# Patient Record
Sex: FEMALE | Race: WHITE | NOT HISPANIC OR LATINO | Employment: OTHER | URBAN - METROPOLITAN AREA
[De-identification: names, ages, dates, MRNs, and addresses within clinical notes are randomized per-mention and may not be internally consistent; named-entity substitution may affect disease eponyms.]

---

## 2020-04-27 ENCOUNTER — TELEMEDICINE (OUTPATIENT)
Dept: FAMILY MEDICINE CLINIC | Facility: CLINIC | Age: 65
End: 2020-04-27
Payer: COMMERCIAL

## 2020-04-27 VITALS — HEIGHT: 67 IN | BODY MASS INDEX: 45.99 KG/M2 | WEIGHT: 293 LBS

## 2020-04-27 DIAGNOSIS — Z20.822 EXPOSURE TO COVID-19 VIRUS: Primary | ICD-10-CM

## 2020-04-27 DIAGNOSIS — M06.9 RHEUMATOID ARTHRITIS, INVOLVING UNSPECIFIED SITE, UNSPECIFIED RHEUMATOID FACTOR PRESENCE: ICD-10-CM

## 2020-04-27 PROCEDURE — 99213 OFFICE O/P EST LOW 20 MIN: CPT | Performed by: FAMILY MEDICINE

## 2020-04-27 RX ORDER — LORAZEPAM 0.5 MG/1
0.5 TABLET ORAL EVERY 6 HOURS PRN
COMMUNITY
End: 2021-10-06 | Stop reason: ALTCHOICE

## 2020-04-27 RX ORDER — FENOFIBRATE 48 MG/1
48 TABLET, COATED ORAL DAILY
COMMUNITY
End: 2020-07-15

## 2020-04-27 RX ORDER — FENOFIBRIC ACID 45 MG/1
1 CAPSULE, DELAYED RELEASE ORAL EVERY EVENING
COMMUNITY
Start: 2020-03-09 | End: 2021-12-06

## 2020-04-27 RX ORDER — FOLIC ACID 1 MG/1
1000 TABLET ORAL DAILY
COMMUNITY
Start: 2020-03-05

## 2020-04-27 RX ORDER — ADALIMUMAB 40MG/0.4ML
KIT SUBCUTANEOUS
COMMUNITY
Start: 2020-01-29 | End: 2020-07-15

## 2020-04-27 RX ORDER — PANTOPRAZOLE SODIUM 40 MG/1
TABLET, DELAYED RELEASE ORAL
COMMUNITY
Start: 2020-04-21 | End: 2020-09-17 | Stop reason: SDUPTHER

## 2020-04-27 RX ORDER — PREDNISONE 1 MG/1
TABLET ORAL
COMMUNITY
Start: 2020-03-19 | End: 2020-07-15

## 2020-04-27 RX ORDER — ROSUVASTATIN CALCIUM 20 MG/1
20 TABLET, COATED ORAL EVERY EVENING
COMMUNITY
Start: 2020-02-03

## 2020-06-04 ENCOUNTER — OFFICE VISIT (OUTPATIENT)
Dept: FAMILY MEDICINE CLINIC | Facility: CLINIC | Age: 65
End: 2020-06-04
Payer: COMMERCIAL

## 2020-06-04 VITALS
OXYGEN SATURATION: 98 % | WEIGHT: 293 LBS | HEIGHT: 67 IN | TEMPERATURE: 99.4 F | SYSTOLIC BLOOD PRESSURE: 122 MMHG | RESPIRATION RATE: 16 BRPM | DIASTOLIC BLOOD PRESSURE: 82 MMHG | BODY MASS INDEX: 45.99 KG/M2 | HEART RATE: 84 BPM

## 2020-06-04 DIAGNOSIS — Z95.5 STENTED CORONARY ARTERY: Primary | ICD-10-CM

## 2020-06-04 DIAGNOSIS — I25.118 CORONARY ARTERY DISEASE OF NATIVE ARTERY OF NATIVE HEART WITH STABLE ANGINA PECTORIS (HCC): ICD-10-CM

## 2020-06-04 DIAGNOSIS — M06.9 RHEUMATOID ARTHRITIS, INVOLVING UNSPECIFIED SITE, UNSPECIFIED RHEUMATOID FACTOR PRESENCE: ICD-10-CM

## 2020-06-04 DIAGNOSIS — G47.30 SLEEP APNEA, UNSPECIFIED TYPE: ICD-10-CM

## 2020-06-04 DIAGNOSIS — E78.00 HYPERCHOLESTEROLEMIA: ICD-10-CM

## 2020-06-04 DIAGNOSIS — K21.9 GASTROESOPHAGEAL REFLUX DISEASE WITHOUT ESOPHAGITIS: ICD-10-CM

## 2020-06-04 DIAGNOSIS — I10 ESSENTIAL HYPERTENSION: ICD-10-CM

## 2020-06-04 DIAGNOSIS — E53.8 B12 DEFICIENCY: ICD-10-CM

## 2020-06-04 DIAGNOSIS — R42 VERTIGO: ICD-10-CM

## 2020-06-04 DIAGNOSIS — E55.9 VITAMIN D DEFICIENCY: ICD-10-CM

## 2020-06-04 DIAGNOSIS — E61.1 IRON DEFICIENCY: ICD-10-CM

## 2020-06-04 DIAGNOSIS — R73.03 PRE-DIABETES: ICD-10-CM

## 2020-06-04 PROCEDURE — 3008F BODY MASS INDEX DOCD: CPT | Performed by: FAMILY MEDICINE

## 2020-06-04 PROCEDURE — 3079F DIAST BP 80-89 MM HG: CPT | Performed by: FAMILY MEDICINE

## 2020-06-04 PROCEDURE — 1036F TOBACCO NON-USER: CPT | Performed by: FAMILY MEDICINE

## 2020-06-04 PROCEDURE — 3074F SYST BP LT 130 MM HG: CPT | Performed by: FAMILY MEDICINE

## 2020-06-04 PROCEDURE — 99214 OFFICE O/P EST MOD 30 MIN: CPT | Performed by: FAMILY MEDICINE

## 2020-06-05 ENCOUNTER — TELEPHONE (OUTPATIENT)
Dept: ADMINISTRATIVE | Facility: OTHER | Age: 65
End: 2020-06-05

## 2020-06-18 LAB
25(OH)D3+25(OH)D2 SERPL-MCNC: 38.9 NG/ML (ref 30–100)
ALBUMIN SERPL-MCNC: 4.2 G/DL (ref 3.8–4.8)
ALBUMIN/GLOB SERPL: 1.8 {RATIO} (ref 1.2–2.2)
ALP SERPL-CCNC: 99 IU/L (ref 39–117)
ALT SERPL-CCNC: 22 IU/L (ref 0–32)
APPEARANCE UR: CLEAR
AST SERPL-CCNC: 28 IU/L (ref 0–40)
BACTERIA URNS QL MICRO: NORMAL
BASOPHILS # BLD AUTO: 0.1 X10E3/UL (ref 0–0.2)
BASOPHILS NFR BLD AUTO: 1 %
BILIRUB SERPL-MCNC: <0.2 MG/DL (ref 0–1.2)
BILIRUB UR QL STRIP: NEGATIVE
BUN SERPL-MCNC: 14 MG/DL (ref 8–27)
BUN/CREAT SERPL: 16 (ref 12–28)
CALCIUM SERPL-MCNC: 9.1 MG/DL (ref 8.7–10.3)
CHLORIDE SERPL-SCNC: 107 MMOL/L (ref 96–106)
CHOLEST SERPL-MCNC: 188 MG/DL (ref 100–199)
CO2 SERPL-SCNC: 19 MMOL/L (ref 20–29)
COLOR UR: YELLOW
CREAT SERPL-MCNC: 0.85 MG/DL (ref 0.57–1)
EOSINOPHIL # BLD AUTO: 0.1 X10E3/UL (ref 0–0.4)
EOSINOPHIL NFR BLD AUTO: 2 %
EPI CELLS #/AREA URNS HPF: NORMAL /HPF (ref 0–10)
ERYTHROCYTE [DISTWIDTH] IN BLOOD BY AUTOMATED COUNT: 13.4 % (ref 11.7–15.4)
FERRITIN SERPL-MCNC: 121 NG/ML (ref 15–150)
GLOBULIN SER-MCNC: 2.3 G/DL (ref 1.5–4.5)
GLUCOSE SERPL-MCNC: 121 MG/DL (ref 65–99)
GLUCOSE UR QL: NEGATIVE
HCT VFR BLD AUTO: 37.7 % (ref 34–46.6)
HDLC SERPL-MCNC: 62 MG/DL
HGB BLD-MCNC: 12.3 G/DL (ref 11.1–15.9)
HGB UR QL STRIP: NEGATIVE
IMM GRANULOCYTES # BLD: 0 X10E3/UL (ref 0–0.1)
IMM GRANULOCYTES NFR BLD: 0 %
IRON SATN MFR SERPL: 32 % (ref 15–55)
IRON SERPL-MCNC: 106 UG/DL (ref 27–139)
KETONES UR QL STRIP: NEGATIVE
LDLC SERPL CALC-MCNC: 92 MG/DL (ref 0–99)
LEUKOCYTE ESTERASE UR QL STRIP: NEGATIVE
LYMPHOCYTES # BLD AUTO: 2.8 X10E3/UL (ref 0.7–3.1)
LYMPHOCYTES NFR BLD AUTO: 43 %
MCH RBC QN AUTO: 29 PG (ref 26.6–33)
MCHC RBC AUTO-ENTMCNC: 32.6 G/DL (ref 31.5–35.7)
MCV RBC AUTO: 89 FL (ref 79–97)
MICRO URNS: NORMAL
MICRO URNS: NORMAL
MONOCYTES # BLD AUTO: 0.6 X10E3/UL (ref 0.1–0.9)
MONOCYTES NFR BLD AUTO: 10 %
MUCOUS THREADS URNS QL MICRO: PRESENT
NEUTROPHILS # BLD AUTO: 2.9 X10E3/UL (ref 1.4–7)
NEUTROPHILS NFR BLD AUTO: 44 %
NITRITE UR QL STRIP: NEGATIVE
PH UR STRIP: 5.5 [PH] (ref 5–7.5)
PLATELET # BLD AUTO: 270 X10E3/UL (ref 150–450)
POTASSIUM SERPL-SCNC: 4.5 MMOL/L (ref 3.5–5.2)
PROT SERPL-MCNC: 6.5 G/DL (ref 6–8.5)
PROT UR QL STRIP: NEGATIVE
RBC # BLD AUTO: 4.24 X10E6/UL (ref 3.77–5.28)
RBC #/AREA URNS HPF: NORMAL /HPF (ref 0–2)
SL AMB EGFR AFRICAN AMERICAN: 84 ML/MIN/1.73
SL AMB EGFR NON AFRICAN AMERICAN: 73 ML/MIN/1.73
SL AMB URINALYSIS REFLEX: NORMAL
SL AMB VLDL CHOLESTEROL CALC: 34 MG/DL (ref 5–40)
SODIUM SERPL-SCNC: 145 MMOL/L (ref 134–144)
SP GR UR: 1.01 (ref 1–1.03)
TIBC SERPL-MCNC: 334 UG/DL (ref 250–450)
TRIGL SERPL-MCNC: 169 MG/DL (ref 0–149)
TSH SERPL DL<=0.005 MIU/L-ACNC: 1.42 UIU/ML (ref 0.45–4.5)
UIBC SERPL-MCNC: 228 UG/DL (ref 118–369)
UROBILINOGEN UR STRIP-ACNC: 0.2 MG/DL (ref 0.2–1)
VIT B12 SERPL-MCNC: 547 PG/ML (ref 232–1245)
WBC # BLD AUTO: 6.6 X10E3/UL (ref 3.4–10.8)
WBC #/AREA URNS HPF: NORMAL /HPF (ref 0–5)

## 2020-07-01 ENCOUNTER — HOSPITAL ENCOUNTER (INPATIENT)
Facility: HOSPITAL | Age: 65
LOS: 2 days | Discharge: HOME | DRG: 854 | End: 2020-07-03
Attending: EMERGENCY MEDICINE | Admitting: INTERNAL MEDICINE
Payer: COMMERCIAL

## 2020-07-01 ENCOUNTER — APPOINTMENT (EMERGENCY)
Dept: CT IMAGING | Facility: HOSPITAL | Age: 65
DRG: 854 | End: 2020-07-01
Payer: COMMERCIAL

## 2020-07-01 DIAGNOSIS — N20.1 URETEROLITHIASIS: Primary | ICD-10-CM

## 2020-07-01 DIAGNOSIS — R30.0 DYSURIA: ICD-10-CM

## 2020-07-01 DIAGNOSIS — A41.9 SEPSIS (HCC): ICD-10-CM

## 2020-07-01 DIAGNOSIS — N39.0 UTI (URINARY TRACT INFECTION): ICD-10-CM

## 2020-07-01 LAB
ALBUMIN SERPL BCP-MCNC: 4 G/DL (ref 3.5–5)
ALP SERPL-CCNC: 103 U/L (ref 46–116)
ALT SERPL W P-5'-P-CCNC: 32 U/L (ref 12–78)
ANION GAP SERPL CALCULATED.3IONS-SCNC: 14 MMOL/L (ref 4–13)
APTT PPP: 25 SECONDS (ref 23–37)
AST SERPL W P-5'-P-CCNC: 24 U/L (ref 5–45)
BASOPHILS # BLD AUTO: 0.09 THOUSANDS/ΜL (ref 0–0.1)
BASOPHILS NFR BLD AUTO: 1 % (ref 0–1)
BILIRUB SERPL-MCNC: 0.38 MG/DL (ref 0.2–1)
BILIRUB UR QL STRIP: NEGATIVE
BUN SERPL-MCNC: 21 MG/DL (ref 5–25)
CALCIUM SERPL-MCNC: 9.2 MG/DL (ref 8.3–10.1)
CHLORIDE SERPL-SCNC: 104 MMOL/L (ref 100–108)
CLARITY UR: ABNORMAL
CO2 SERPL-SCNC: 23 MMOL/L (ref 21–32)
COLOR UR: YELLOW
CREAT SERPL-MCNC: 1.19 MG/DL (ref 0.6–1.3)
EOSINOPHIL # BLD AUTO: 0.23 THOUSAND/ΜL (ref 0–0.61)
EOSINOPHIL NFR BLD AUTO: 2 % (ref 0–6)
ERYTHROCYTE [DISTWIDTH] IN BLOOD BY AUTOMATED COUNT: 14.4 % (ref 11.6–15.1)
GFR SERPL CREATININE-BSD FRML MDRD: 48 ML/MIN/1.73SQ M
GLUCOSE SERPL-MCNC: 154 MG/DL (ref 65–140)
GLUCOSE UR STRIP-MCNC: NEGATIVE MG/DL
HCT VFR BLD AUTO: 41.4 % (ref 34.8–46.1)
HGB BLD-MCNC: 13.1 G/DL (ref 11.5–15.4)
HGB UR QL STRIP.AUTO: ABNORMAL
IMM GRANULOCYTES # BLD AUTO: 0.09 THOUSAND/UL (ref 0–0.2)
IMM GRANULOCYTES NFR BLD AUTO: 1 % (ref 0–2)
INR PPP: 1.09 (ref 0.84–1.19)
KETONES UR STRIP-MCNC: ABNORMAL MG/DL
LACTATE SERPL-SCNC: 2.6 MMOL/L (ref 0.5–2)
LEUKOCYTE ESTERASE UR QL STRIP: ABNORMAL
LIPASE SERPL-CCNC: 111 U/L (ref 73–393)
LYMPHOCYTES # BLD AUTO: 4.08 THOUSANDS/ΜL (ref 0.6–4.47)
LYMPHOCYTES NFR BLD AUTO: 29 % (ref 14–44)
MCH RBC QN AUTO: 29 PG (ref 26.8–34.3)
MCHC RBC AUTO-ENTMCNC: 31.6 G/DL (ref 31.4–37.4)
MCV RBC AUTO: 92 FL (ref 82–98)
MONOCYTES # BLD AUTO: 1.18 THOUSAND/ΜL (ref 0.17–1.22)
MONOCYTES NFR BLD AUTO: 8 % (ref 4–12)
NEUTROPHILS # BLD AUTO: 8.37 THOUSANDS/ΜL (ref 1.85–7.62)
NEUTS SEG NFR BLD AUTO: 59 % (ref 43–75)
NITRITE UR QL STRIP: NEGATIVE
NRBC BLD AUTO-RTO: 0 /100 WBCS
PH UR STRIP.AUTO: 7 [PH] (ref 4.5–8)
PLATELET # BLD AUTO: 325 THOUSANDS/UL (ref 149–390)
PMV BLD AUTO: 9.4 FL (ref 8.9–12.7)
POTASSIUM SERPL-SCNC: 3.5 MMOL/L (ref 3.5–5.3)
PROT SERPL-MCNC: 7.7 G/DL (ref 6.4–8.2)
PROT UR STRIP-MCNC: ABNORMAL MG/DL
PROTHROMBIN TIME: 13.5 SECONDS (ref 11.6–14.5)
RBC # BLD AUTO: 4.51 MILLION/UL (ref 3.81–5.12)
SODIUM SERPL-SCNC: 141 MMOL/L (ref 136–145)
SP GR UR STRIP.AUTO: 1.02 (ref 1–1.03)
UROBILINOGEN UR QL STRIP.AUTO: 1 E.U./DL
WBC # BLD AUTO: 14.04 THOUSAND/UL (ref 4.31–10.16)

## 2020-07-01 PROCEDURE — 87086 URINE CULTURE/COLONY COUNT: CPT | Performed by: PHYSICIAN ASSISTANT

## 2020-07-01 PROCEDURE — 99285 EMERGENCY DEPT VISIT HI MDM: CPT

## 2020-07-01 PROCEDURE — 93005 ELECTROCARDIOGRAM TRACING: CPT

## 2020-07-01 PROCEDURE — 81001 URINALYSIS AUTO W/SCOPE: CPT

## 2020-07-01 PROCEDURE — 80053 COMPREHEN METABOLIC PANEL: CPT | Performed by: PHYSICIAN ASSISTANT

## 2020-07-01 PROCEDURE — 85610 PROTHROMBIN TIME: CPT | Performed by: PHYSICIAN ASSISTANT

## 2020-07-01 PROCEDURE — 83690 ASSAY OF LIPASE: CPT | Performed by: PHYSICIAN ASSISTANT

## 2020-07-01 PROCEDURE — 96365 THER/PROPH/DIAG IV INF INIT: CPT

## 2020-07-01 PROCEDURE — 85025 COMPLETE CBC W/AUTO DIFF WBC: CPT | Performed by: PHYSICIAN ASSISTANT

## 2020-07-01 PROCEDURE — 96375 TX/PRO/DX INJ NEW DRUG ADDON: CPT

## 2020-07-01 PROCEDURE — 96372 THER/PROPH/DIAG INJ SC/IM: CPT

## 2020-07-01 PROCEDURE — 36415 COLL VENOUS BLD VENIPUNCTURE: CPT | Performed by: PHYSICIAN ASSISTANT

## 2020-07-01 PROCEDURE — 87040 BLOOD CULTURE FOR BACTERIA: CPT | Performed by: PHYSICIAN ASSISTANT

## 2020-07-01 PROCEDURE — 83605 ASSAY OF LACTIC ACID: CPT | Performed by: PHYSICIAN ASSISTANT

## 2020-07-01 PROCEDURE — 85730 THROMBOPLASTIN TIME PARTIAL: CPT | Performed by: PHYSICIAN ASSISTANT

## 2020-07-01 PROCEDURE — 74177 CT ABD & PELVIS W/CONTRAST: CPT

## 2020-07-01 RX ORDER — ONDANSETRON 2 MG/ML
4 INJECTION INTRAMUSCULAR; INTRAVENOUS ONCE
Status: COMPLETED | OUTPATIENT
Start: 2020-07-01 | End: 2020-07-02

## 2020-07-01 RX ORDER — METOCLOPRAMIDE HYDROCHLORIDE 5 MG/ML
10 INJECTION INTRAMUSCULAR; INTRAVENOUS ONCE
Status: COMPLETED | OUTPATIENT
Start: 2020-07-01 | End: 2020-07-01

## 2020-07-01 RX ORDER — OLANZAPINE 10 MG/1
6 INJECTION, POWDER, LYOPHILIZED, FOR SOLUTION INTRAMUSCULAR ONCE
Status: COMPLETED | OUTPATIENT
Start: 2020-07-01 | End: 2020-07-01

## 2020-07-01 RX ORDER — FENTANYL CITRATE 50 UG/ML
50 INJECTION, SOLUTION INTRAMUSCULAR; INTRAVENOUS ONCE
Status: COMPLETED | OUTPATIENT
Start: 2020-07-01 | End: 2020-07-01

## 2020-07-01 RX ORDER — ONDANSETRON 2 MG/ML
1 INJECTION INTRAMUSCULAR; INTRAVENOUS ONCE
Status: COMPLETED | OUTPATIENT
Start: 2020-07-01 | End: 2020-07-01

## 2020-07-01 RX ORDER — HYDROMORPHONE HCL/PF 1 MG/ML
0.5 SYRINGE (ML) INJECTION ONCE
Status: COMPLETED | OUTPATIENT
Start: 2020-07-01 | End: 2020-07-01

## 2020-07-01 RX ADMIN — OLANZAPINE 6 MG: 10 INJECTION, POWDER, FOR SOLUTION INTRAMUSCULAR at 20:28

## 2020-07-01 RX ADMIN — METOCLOPRAMIDE HYDROCHLORIDE 10 MG: 5 INJECTION INTRAMUSCULAR; INTRAVENOUS at 21:09

## 2020-07-01 RX ADMIN — SODIUM CHLORIDE 1000 ML: 0.9 INJECTION, SOLUTION INTRAVENOUS at 22:47

## 2020-07-01 RX ADMIN — HYDROMORPHONE HYDROCHLORIDE 0.5 MG: 1 INJECTION, SOLUTION INTRAMUSCULAR; INTRAVENOUS; SUBCUTANEOUS at 21:12

## 2020-07-01 RX ADMIN — SODIUM CHLORIDE 1000 ML: 0.9 INJECTION, SOLUTION INTRAVENOUS at 23:14

## 2020-07-01 RX ADMIN — Medication 1000 MG: at 22:47

## 2020-07-01 RX ADMIN — FENTANYL CITRATE 50 MCG: 50 INJECTION INTRAMUSCULAR; INTRAVENOUS at 22:44

## 2020-07-01 RX ADMIN — IOHEXOL 100 ML: 350 INJECTION, SOLUTION INTRAVENOUS at 22:27

## 2020-07-01 RX ADMIN — WATER: 1 INJECTION INTRAMUSCULAR; INTRAVENOUS; SUBCUTANEOUS at 20:28

## 2020-07-02 ENCOUNTER — PREP FOR PROCEDURE (OUTPATIENT)
Dept: UROLOGY | Facility: CLINIC | Age: 65
End: 2020-07-02

## 2020-07-02 ENCOUNTER — ANESTHESIA EVENT (INPATIENT)
Dept: PERIOP | Facility: HOSPITAL | Age: 65
DRG: 854 | End: 2020-07-02
Payer: COMMERCIAL

## 2020-07-02 ENCOUNTER — APPOINTMENT (INPATIENT)
Dept: RADIOLOGY | Facility: HOSPITAL | Age: 65
DRG: 854 | End: 2020-07-02
Payer: COMMERCIAL

## 2020-07-02 ENCOUNTER — ANESTHESIA (INPATIENT)
Dept: PERIOP | Facility: HOSPITAL | Age: 65
DRG: 854 | End: 2020-07-02
Payer: COMMERCIAL

## 2020-07-02 ENCOUNTER — TELEPHONE (OUTPATIENT)
Dept: UROLOGY | Facility: CLINIC | Age: 65
End: 2020-07-02

## 2020-07-02 DIAGNOSIS — Z46.6 ENCOUNTER FOR ADJUSTMENT OF URETERAL STENT: ICD-10-CM

## 2020-07-02 DIAGNOSIS — N20.0 NEPHROLITHIASIS: Primary | ICD-10-CM

## 2020-07-02 LAB
ANION GAP SERPL CALCULATED.3IONS-SCNC: 6 MMOL/L (ref 4–13)
ATRIAL RATE: 89 BPM
BACTERIA UR QL AUTO: ABNORMAL /HPF
BASOPHILS # BLD AUTO: 0.05 THOUSANDS/ΜL (ref 0–0.1)
BASOPHILS NFR BLD AUTO: 0 % (ref 0–1)
BUN SERPL-MCNC: 18 MG/DL (ref 5–25)
CALCIUM SERPL-MCNC: 7.9 MG/DL (ref 8.3–10.1)
CAOX CRY URNS QL MICRO: ABNORMAL /HPF
CHLORIDE SERPL-SCNC: 105 MMOL/L (ref 100–108)
CO2 SERPL-SCNC: 27 MMOL/L (ref 21–32)
CREAT SERPL-MCNC: 0.84 MG/DL (ref 0.6–1.3)
EOSINOPHIL # BLD AUTO: 0.01 THOUSAND/ΜL (ref 0–0.61)
EOSINOPHIL NFR BLD AUTO: 0 % (ref 0–6)
ERYTHROCYTE [DISTWIDTH] IN BLOOD BY AUTOMATED COUNT: 14.4 % (ref 11.6–15.1)
GFR SERPL CREATININE-BSD FRML MDRD: 74 ML/MIN/1.73SQ M
GLUCOSE SERPL-MCNC: 121 MG/DL (ref 65–140)
HCT VFR BLD AUTO: 36.4 % (ref 34.8–46.1)
HGB BLD-MCNC: 11.4 G/DL (ref 11.5–15.4)
IMM GRANULOCYTES # BLD AUTO: 0.07 THOUSAND/UL (ref 0–0.2)
IMM GRANULOCYTES NFR BLD AUTO: 1 % (ref 0–2)
LACTATE SERPL-SCNC: 0.7 MMOL/L (ref 0.5–2)
LACTATE SERPL-SCNC: 1.2 MMOL/L (ref 0.5–2)
LYMPHOCYTES # BLD AUTO: 1.95 THOUSANDS/ΜL (ref 0.6–4.47)
LYMPHOCYTES NFR BLD AUTO: 16 % (ref 14–44)
MCH RBC QN AUTO: 29.5 PG (ref 26.8–34.3)
MCHC RBC AUTO-ENTMCNC: 31.3 G/DL (ref 31.4–37.4)
MCV RBC AUTO: 94 FL (ref 82–98)
MONOCYTES # BLD AUTO: 0.87 THOUSAND/ΜL (ref 0.17–1.22)
MONOCYTES NFR BLD AUTO: 7 % (ref 4–12)
NEUTROPHILS # BLD AUTO: 9.38 THOUSANDS/ΜL (ref 1.85–7.62)
NEUTS SEG NFR BLD AUTO: 76 % (ref 43–75)
NON-SQ EPI CELLS URNS QL MICRO: ABNORMAL /HPF
NRBC BLD AUTO-RTO: 0 /100 WBCS
PLATELET # BLD AUTO: 252 THOUSANDS/UL (ref 149–390)
PMV BLD AUTO: 9.6 FL (ref 8.9–12.7)
POTASSIUM SERPL-SCNC: 3.3 MMOL/L (ref 3.5–5.3)
QRS AXIS: 58 DEGREES
QRSD INTERVAL: 76 MS
QT INTERVAL: 346 MS
QTC INTERVAL: 434 MS
RBC # BLD AUTO: 3.86 MILLION/UL (ref 3.81–5.12)
RBC #/AREA URNS AUTO: ABNORMAL /HPF
SARS-COV-2 RNA RESP QL NAA+PROBE: NEGATIVE
SODIUM SERPL-SCNC: 138 MMOL/L (ref 136–145)
T WAVE AXIS: 73 DEGREES
VENTRICULAR RATE: 95 BPM
WBC # BLD AUTO: 12.33 THOUSAND/UL (ref 4.31–10.16)
WBC #/AREA URNS AUTO: ABNORMAL /HPF

## 2020-07-02 PROCEDURE — 93010 ELECTROCARDIOGRAM REPORT: CPT | Performed by: INTERNAL MEDICINE

## 2020-07-02 PROCEDURE — 36415 COLL VENOUS BLD VENIPUNCTURE: CPT | Performed by: PHYSICIAN ASSISTANT

## 2020-07-02 PROCEDURE — 99255 IP/OBS CONSLTJ NEW/EST HI 80: CPT | Performed by: UROLOGY

## 2020-07-02 PROCEDURE — 99223 1ST HOSP IP/OBS HIGH 75: CPT | Performed by: INTERNAL MEDICINE

## 2020-07-02 PROCEDURE — 52332 CYSTOSCOPY AND TREATMENT: CPT | Performed by: UROLOGY

## 2020-07-02 PROCEDURE — 87635 SARS-COV-2 COVID-19 AMP PRB: CPT | Performed by: PHYSICIAN ASSISTANT

## 2020-07-02 PROCEDURE — C1769 GUIDE WIRE: HCPCS | Performed by: UROLOGY

## 2020-07-02 PROCEDURE — 0T768DZ DILATION OF RIGHT URETER WITH INTRALUMINAL DEVICE, VIA NATURAL OR ARTIFICIAL OPENING ENDOSCOPIC: ICD-10-PCS | Performed by: UROLOGY

## 2020-07-02 PROCEDURE — 80048 BASIC METABOLIC PNL TOTAL CA: CPT | Performed by: PHYSICIAN ASSISTANT

## 2020-07-02 PROCEDURE — 83605 ASSAY OF LACTIC ACID: CPT | Performed by: PHYSICIAN ASSISTANT

## 2020-07-02 PROCEDURE — 74420 UROGRAPHY RTRGR +-KUB: CPT

## 2020-07-02 PROCEDURE — 85025 COMPLETE CBC W/AUTO DIFF WBC: CPT | Performed by: PHYSICIAN ASSISTANT

## 2020-07-02 PROCEDURE — C2617 STENT, NON-COR, TEM W/O DEL: HCPCS | Performed by: UROLOGY

## 2020-07-02 DEVICE — STENT URETERAL 6 FR 26CM INLAY OPTIMA: Type: IMPLANTABLE DEVICE | Site: URETER | Status: FUNCTIONAL

## 2020-07-02 RX ORDER — CIPROFLOXACIN 500 MG/1
500 TABLET, FILM COATED ORAL EVERY 12 HOURS SCHEDULED
Qty: 6 TABLET | Refills: 0 | Status: SHIPPED | OUTPATIENT
Start: 2020-07-02 | End: 2020-07-07

## 2020-07-02 RX ORDER — FENTANYL CITRATE 50 UG/ML
INJECTION, SOLUTION INTRAMUSCULAR; INTRAVENOUS AS NEEDED
Status: DISCONTINUED | OUTPATIENT
Start: 2020-07-02 | End: 2020-07-02 | Stop reason: SURG

## 2020-07-02 RX ORDER — PHENAZOPYRIDINE HYDROCHLORIDE 100 MG/1
100 TABLET, FILM COATED ORAL
Qty: 6 TABLET | Refills: 0 | Status: SHIPPED | OUTPATIENT
Start: 2020-07-02 | End: 2020-07-04

## 2020-07-02 RX ORDER — POTASSIUM CHLORIDE 20 MEQ/1
40 TABLET, EXTENDED RELEASE ORAL ONCE
Status: COMPLETED | OUTPATIENT
Start: 2020-07-02 | End: 2020-07-02

## 2020-07-02 RX ORDER — CEFAZOLIN SODIUM 1 G/3ML
INJECTION, POWDER, FOR SOLUTION INTRAMUSCULAR; INTRAVENOUS AS NEEDED
Status: DISCONTINUED | OUTPATIENT
Start: 2020-07-02 | End: 2020-07-02 | Stop reason: SURG

## 2020-07-02 RX ORDER — FOLIC ACID 1 MG/1
1000 TABLET ORAL DAILY
Status: DISCONTINUED | OUTPATIENT
Start: 2020-07-02 | End: 2020-07-03 | Stop reason: HOSPADM

## 2020-07-02 RX ORDER — SODIUM CHLORIDE 9 MG/ML
100 INJECTION, SOLUTION INTRAVENOUS CONTINUOUS
Status: DISCONTINUED | OUTPATIENT
Start: 2020-07-02 | End: 2020-07-03 | Stop reason: HOSPADM

## 2020-07-02 RX ORDER — GABAPENTIN 100 MG/1
300 CAPSULE ORAL ONCE
Status: CANCELLED | OUTPATIENT
Start: 2020-07-02 | End: 2020-07-02

## 2020-07-02 RX ORDER — HEPARIN SODIUM 5000 [USP'U]/ML
5000 INJECTION, SOLUTION INTRAVENOUS; SUBCUTANEOUS EVERY 8 HOURS SCHEDULED
Status: DISCONTINUED | OUTPATIENT
Start: 2020-07-02 | End: 2020-07-03 | Stop reason: HOSPADM

## 2020-07-02 RX ORDER — PHENAZOPYRIDINE HYDROCHLORIDE 100 MG/1
100 TABLET, FILM COATED ORAL
Status: DISCONTINUED | OUTPATIENT
Start: 2020-07-02 | End: 2020-07-03 | Stop reason: HOSPADM

## 2020-07-02 RX ORDER — KETOROLAC TROMETHAMINE 30 MG/ML
15 INJECTION, SOLUTION INTRAMUSCULAR; INTRAVENOUS EVERY 6 HOURS PRN
Status: DISCONTINUED | OUTPATIENT
Start: 2020-07-02 | End: 2020-07-03 | Stop reason: HOSPADM

## 2020-07-02 RX ORDER — SUCCINYLCHOLINE/SOD CL,ISO/PF 100 MG/5ML
SYRINGE (ML) INTRAVENOUS AS NEEDED
Status: DISCONTINUED | OUTPATIENT
Start: 2020-07-02 | End: 2020-07-02 | Stop reason: SURG

## 2020-07-02 RX ORDER — OXYCODONE HYDROCHLORIDE 10 MG/1
10 TABLET ORAL EVERY 6 HOURS PRN
Status: DISCONTINUED | OUTPATIENT
Start: 2020-07-02 | End: 2020-07-03 | Stop reason: HOSPADM

## 2020-07-02 RX ORDER — LIDOCAINE HYDROCHLORIDE 10 MG/ML
INJECTION, SOLUTION EPIDURAL; INFILTRATION; INTRACAUDAL; PERINEURAL AS NEEDED
Status: DISCONTINUED | OUTPATIENT
Start: 2020-07-02 | End: 2020-07-02 | Stop reason: SURG

## 2020-07-02 RX ORDER — CALCIUM CARBONATE 200(500)MG
1000 TABLET,CHEWABLE ORAL DAILY PRN
Status: DISCONTINUED | OUTPATIENT
Start: 2020-07-02 | End: 2020-07-03 | Stop reason: HOSPADM

## 2020-07-02 RX ORDER — ACETAMINOPHEN 325 MG/1
975 TABLET ORAL ONCE
Status: DISCONTINUED | OUTPATIENT
Start: 2020-07-02 | End: 2020-07-02 | Stop reason: HOSPADM

## 2020-07-02 RX ORDER — GABAPENTIN 300 MG/1
300 CAPSULE ORAL ONCE
Status: DISCONTINUED | OUTPATIENT
Start: 2020-07-02 | End: 2020-07-02 | Stop reason: HOSPADM

## 2020-07-02 RX ORDER — ONDANSETRON 2 MG/ML
INJECTION INTRAMUSCULAR; INTRAVENOUS AS NEEDED
Status: DISCONTINUED | OUTPATIENT
Start: 2020-07-02 | End: 2020-07-02 | Stop reason: SURG

## 2020-07-02 RX ORDER — FENOFIBRATE 48 MG/1
48 TABLET, COATED ORAL DAILY
Status: DISCONTINUED | OUTPATIENT
Start: 2020-07-02 | End: 2020-07-03 | Stop reason: HOSPADM

## 2020-07-02 RX ORDER — SODIUM CHLORIDE, SODIUM LACTATE, POTASSIUM CHLORIDE, CALCIUM CHLORIDE 600; 310; 30; 20 MG/100ML; MG/100ML; MG/100ML; MG/100ML
INJECTION, SOLUTION INTRAVENOUS CONTINUOUS PRN
Status: DISCONTINUED | OUTPATIENT
Start: 2020-07-02 | End: 2020-07-02 | Stop reason: SURG

## 2020-07-02 RX ORDER — MAGNESIUM HYDROXIDE 1200 MG/15ML
LIQUID ORAL AS NEEDED
Status: DISCONTINUED | OUTPATIENT
Start: 2020-07-02 | End: 2020-07-02 | Stop reason: HOSPADM

## 2020-07-02 RX ORDER — DEXAMETHASONE SODIUM PHOSPHATE 4 MG/ML
INJECTION, SOLUTION INTRA-ARTICULAR; INTRALESIONAL; INTRAMUSCULAR; INTRAVENOUS; SOFT TISSUE AS NEEDED
Status: DISCONTINUED | OUTPATIENT
Start: 2020-07-02 | End: 2020-07-02 | Stop reason: SURG

## 2020-07-02 RX ORDER — FENTANYL CITRATE/PF 50 MCG/ML
25 SYRINGE (ML) INJECTION
Status: DISCONTINUED | OUTPATIENT
Start: 2020-07-02 | End: 2020-07-02 | Stop reason: HOSPADM

## 2020-07-02 RX ORDER — DOCUSATE SODIUM 100 MG/1
100 CAPSULE, LIQUID FILLED ORAL 3 TIMES DAILY
Qty: 42 CAPSULE | Refills: 0 | Status: SHIPPED | OUTPATIENT
Start: 2020-07-02 | End: 2020-07-15

## 2020-07-02 RX ORDER — ACETAMINOPHEN 325 MG/1
650 TABLET ORAL EVERY 6 HOURS PRN
Status: DISCONTINUED | OUTPATIENT
Start: 2020-07-02 | End: 2020-07-03 | Stop reason: HOSPADM

## 2020-07-02 RX ORDER — PANTOPRAZOLE SODIUM 40 MG/1
40 TABLET, DELAYED RELEASE ORAL
Status: DISCONTINUED | OUTPATIENT
Start: 2020-07-02 | End: 2020-07-03 | Stop reason: HOSPADM

## 2020-07-02 RX ORDER — OXYBUTYNIN CHLORIDE 5 MG/1
5 TABLET ORAL 2 TIMES DAILY PRN
Qty: 60 TABLET | Refills: 0 | Status: SHIPPED | OUTPATIENT
Start: 2020-07-02 | End: 2020-07-15

## 2020-07-02 RX ORDER — OXYCODONE HYDROCHLORIDE 5 MG/1
5 TABLET ORAL EVERY 6 HOURS PRN
Status: DISCONTINUED | OUTPATIENT
Start: 2020-07-02 | End: 2020-07-02

## 2020-07-02 RX ORDER — OXYCODONE HYDROCHLORIDE AND ACETAMINOPHEN 5; 325 MG/1; MG/1
1 TABLET ORAL EVERY 6 HOURS PRN
Qty: 8 TABLET | Refills: 0 | Status: SHIPPED | OUTPATIENT
Start: 2020-07-02 | End: 2020-07-07

## 2020-07-02 RX ORDER — TAMSULOSIN HYDROCHLORIDE 0.4 MG/1
0.4 CAPSULE ORAL
Qty: 30 CAPSULE | Refills: 0 | Status: SHIPPED | OUTPATIENT
Start: 2020-07-02 | End: 2020-07-15

## 2020-07-02 RX ORDER — ACETAMINOPHEN 325 MG/1
975 TABLET ORAL ONCE
Status: CANCELLED | OUTPATIENT
Start: 2020-07-02 | End: 2020-07-02

## 2020-07-02 RX ORDER — ONDANSETRON 4 MG/1
4 TABLET, ORALLY DISINTEGRATING ORAL EVERY 6 HOURS PRN
Qty: 20 TABLET | Refills: 0 | Status: SHIPPED | OUTPATIENT
Start: 2020-07-02 | End: 2020-07-15

## 2020-07-02 RX ORDER — TAMSULOSIN HYDROCHLORIDE 0.4 MG/1
0.4 CAPSULE ORAL
Status: DISCONTINUED | OUTPATIENT
Start: 2020-07-02 | End: 2020-07-03 | Stop reason: HOSPADM

## 2020-07-02 RX ORDER — HYDROMORPHONE HCL/PF 1 MG/ML
0.5 SYRINGE (ML) INJECTION EVERY 4 HOURS PRN
Status: DISCONTINUED | OUTPATIENT
Start: 2020-07-02 | End: 2020-07-02

## 2020-07-02 RX ORDER — PROPOFOL 10 MG/ML
INJECTION, EMULSION INTRAVENOUS AS NEEDED
Status: DISCONTINUED | OUTPATIENT
Start: 2020-07-02 | End: 2020-07-02 | Stop reason: SURG

## 2020-07-02 RX ORDER — LORAZEPAM 0.5 MG/1
0.5 TABLET ORAL EVERY 6 HOURS PRN
Status: DISCONTINUED | OUTPATIENT
Start: 2020-07-02 | End: 2020-07-03 | Stop reason: HOSPADM

## 2020-07-02 RX ORDER — OXYBUTYNIN CHLORIDE 5 MG/1
5 TABLET ORAL 3 TIMES DAILY
Status: DISCONTINUED | OUTPATIENT
Start: 2020-07-02 | End: 2020-07-03 | Stop reason: HOSPADM

## 2020-07-02 RX ADMIN — KETOROLAC TROMETHAMINE 15 MG: 30 INJECTION, SOLUTION INTRAMUSCULAR at 20:54

## 2020-07-02 RX ADMIN — CEFAZOLIN SODIUM 3000 MG: 1 INJECTION, POWDER, FOR SOLUTION INTRAMUSCULAR; INTRAVENOUS at 13:32

## 2020-07-02 RX ADMIN — METOPROLOL TARTRATE 12.5 MG: 25 TABLET, FILM COATED ORAL at 08:27

## 2020-07-02 RX ADMIN — SODIUM CHLORIDE 100 ML/HR: 0.9 INJECTION, SOLUTION INTRAVENOUS at 00:25

## 2020-07-02 RX ADMIN — HEPARIN SODIUM 5000 UNITS: 5000 INJECTION INTRAVENOUS; SUBCUTANEOUS at 15:23

## 2020-07-02 RX ADMIN — Medication 140 MG: at 13:23

## 2020-07-02 RX ADMIN — FOLIC ACID 1000 MCG: 1 TABLET ORAL at 08:27

## 2020-07-02 RX ADMIN — FENTANYL CITRATE 75 MCG: 50 INJECTION INTRAMUSCULAR; INTRAVENOUS at 13:29

## 2020-07-02 RX ADMIN — SODIUM CHLORIDE, SODIUM LACTATE, POTASSIUM CHLORIDE, AND CALCIUM CHLORIDE: .6; .31; .03; .02 INJECTION, SOLUTION INTRAVENOUS at 12:55

## 2020-07-02 RX ADMIN — SODIUM CHLORIDE 100 ML/HR: 0.9 INJECTION, SOLUTION INTRAVENOUS at 02:25

## 2020-07-02 RX ADMIN — TAMSULOSIN HYDROCHLORIDE 0.4 MG: 0.4 CAPSULE ORAL at 16:12

## 2020-07-02 RX ADMIN — LIDOCAINE HYDROCHLORIDE 100 MG: 10 INJECTION, SOLUTION EPIDURAL; INFILTRATION; INTRACAUDAL; PERINEURAL at 13:23

## 2020-07-02 RX ADMIN — FENTANYL CITRATE 25 MCG: 50 INJECTION INTRAMUSCULAR; INTRAVENOUS at 13:23

## 2020-07-02 RX ADMIN — FENOFIBRATE 48 MG: 48 TABLET ORAL at 17:28

## 2020-07-02 RX ADMIN — HEPARIN SODIUM 5000 UNITS: 5000 INJECTION INTRAVENOUS; SUBCUTANEOUS at 00:25

## 2020-07-02 RX ADMIN — OXYBUTYNIN CHLORIDE 5 MG: 5 TABLET ORAL at 20:54

## 2020-07-02 RX ADMIN — OXYCODONE HYDROCHLORIDE 5 MG: 5 TABLET ORAL at 10:20

## 2020-07-02 RX ADMIN — PHENAZOPYRIDINE 100 MG: 100 TABLET ORAL at 16:12

## 2020-07-02 RX ADMIN — ONDANSETRON 4 MG: 2 INJECTION INTRAMUSCULAR; INTRAVENOUS at 13:29

## 2020-07-02 RX ADMIN — PROPOFOL 300 MG: 10 INJECTION, EMULSION INTRAVENOUS at 13:23

## 2020-07-02 RX ADMIN — OXYBUTYNIN CHLORIDE 5 MG: 5 TABLET ORAL at 16:13

## 2020-07-02 RX ADMIN — HEPARIN SODIUM 5000 UNITS: 5000 INJECTION INTRAVENOUS; SUBCUTANEOUS at 21:03

## 2020-07-02 RX ADMIN — ONDANSETRON 4 MG: 2 INJECTION INTRAMUSCULAR; INTRAVENOUS at 14:04

## 2020-07-02 RX ADMIN — POTASSIUM CHLORIDE 40 MEQ: 1500 TABLET, EXTENDED RELEASE ORAL at 10:20

## 2020-07-02 RX ADMIN — CEFTRIAXONE 1000 MG: 1 INJECTION, POWDER, FOR SOLUTION INTRAMUSCULAR; INTRAVENOUS at 23:22

## 2020-07-02 RX ADMIN — METOPROLOL TARTRATE 12.5 MG: 25 TABLET ORAL at 20:54

## 2020-07-02 RX ADMIN — ONDANSETRON 4 MG: 2 INJECTION INTRAMUSCULAR; INTRAVENOUS at 00:25

## 2020-07-02 RX ADMIN — DEXAMETHASONE SODIUM PHOSPHATE 8 MG: 4 INJECTION, SOLUTION INTRAMUSCULAR; INTRAVENOUS at 13:29

## 2020-07-02 RX ADMIN — PANTOPRAZOLE SODIUM 40 MG: 40 TABLET, DELAYED RELEASE ORAL at 05:24

## 2020-07-02 NOTE — UTILIZATION REVIEW
Initial Clinical Review    Admission: Date/Time/Statement: Admission Orders (From admission, onward)     Ordered        07/01/20 2346  Inpatient Admission  Once                   Orders Placed This Encounter   Procedures    Inpatient Admission     Standing Status:   Standing     Number of Occurrences:   1     Order Specific Question:   Admitting Physician     Answer:   Jean Claude Gary     Order Specific Question:   Level of Care     Answer:   Med Surg [16]     Order Specific Question:   Estimated length of stay     Answer:   More than 2 Midnights     Order Specific Question:   Certification     Answer:   I certify that inpatient services are medically necessary for this patient for a duration of greater than two midnights  See H&P and MD Progress Notes for additional information about the patient's course of treatment  ED Arrival Information     Expected Arrival Acuity Means of Arrival Escorted By Service Admission Type    - 7/1/2020 20:11 Emergent Ambulance [facility] Emergency [facility] General Medicine Emergency    Arrival Complaint    ABDOMINAL PAIN        Chief Complaint   Patient presents with    Abdominal Pain     Acute onset RLQ pain x3 hours associated with severe n/v  EMS treats with a 20g L AC, 4mg zofran  Pt arrives in obvious discomfort     Assessment/Plan: 58 yo fem w/hx htn, obesity, CAD to ED from home by EMS admitted as inpatient due to sepsis from infected kidney stone, also has UTI  Presented with 1 day of severe 10/10 R flank/abdominal pain radiating into groin, improved by multiple analgesics given in ED  Exam reveals lethargic after meds, RLQ/CVA tenderness  Imaging showed R hydronephrosis with stone  UA positive  Urology consulted, IV antbx, IVF, analgesics, antiemetics in progress  NPO for possible surgical intervention  7/2 per urology: Dx R ureteral stone with renal colic and hydronephrosis   Aggressive IVF,flomax, narcotic analgesics, antiemetics, continue IV antbx, strain all urine, to OR if unable to medically pass the stone  Taken to OR 7/2 @1341:   Procedure:CYSTOSCOPY RETROGRADE PYELOGRAM WITH INSERTION STENT URETERAL (Right)  General anesthesia  Operative Findings:Orthotopic ureteral orifices, cloudy urine seen to exit upon placement of right ureteral stent, no complications  A 6 [place] by 26 centimeter ureteral stent was placed on the right-hand side    7/2 postop urology note: provide ditropan, pyridium, toradol, no further gu intervention needed  Medical service will continue care       ED Triage Vitals   Temperature Pulse Respirations Blood Pressure SpO2   07/01/20 2024 07/01/20 2013 07/01/20 2013 07/01/20 2013 07/01/20 2013   99 5 °F (37 5 °C) 104 22 (!) 146/101 96 %      Temp Source Heart Rate Source Patient Position - Orthostatic VS BP Location FiO2 (%)   07/01/20 2024 07/01/20 2013 07/01/20 2013 07/01/20 2013 --   Oral Monitor Sitting Right arm       Pain Score       07/01/20 2112       Worst Possible Pain        Wt Readings from Last 1 Encounters:   07/02/20 (!) 143 kg (316 lb 2 2 oz)     Additional Vital Signs:   Date/Time  Temp  Pulse  Resp  BP  MAP (mmHg)  SpO2  O2 Flow Rate (L/min)  O2 Device  Patient Position - Orthostatic VS   07/02/20 16:49:17  97 9 °F (36 6 °C)  81  18  159/73  102  98 %         07/02/20 16:00:28  98 6 °F (37 °C)  74  19  142/64  90  97 %         07/02/20 15:23:11  97 9 °F (36 6 °C)  80  17  143/76  98  93 %    None (Room air)  Lying   07/02/20 14:46:15  97 5 °F (36 4 °C)  78  16  143/73  96  94 %         07/02/20 1415    97  16  158/69    94 %    None (Room air)     07/02/20 1359  97 2 °F (36 2 °C)Abnormal   84  20  137/65    100 %  6 L/min  Simple mask     07/02/20 1250  97 °F (36 1 °C)Abnormal   75  20  127/62    98 %    None (Room air)     07/02/20 07:15:50  98 2 °F (36 8 °C)  90  18  127/65  86  92 %    None (Room air)  Lying   07/02/20 02:23:07  97 8 °F (36 6 °C)  94  18  149/75  100  94 %         07/02/20 0145   94  18  140/80    95 %    None (Room air)  Lying   07/02/20 0030  98 6 °F (37 °C)  92  18  136/72    98 %    None (Room air)  Lying   07/02/20 0000  99 °F (37 2 °C)  94  20  137/90    97 %    None (Room air)  Lying   07/01/20 2247    93  22  141/99    96 %    None (Room air)  Lying   07/01/20 2143    82  20  147/101Abnormal     95 %    None (Room air)  Sitting       Pertinent Labs/Diagnostic Test Results:   CT abdomen pelvis with contrast   Final Result by Josette Earl MD (07/01 2250)       Mild right-sided hydronephrosis, the cause of which appears to be a 5 mm calculus at the mid ureter        Scattered colonic diverticulosis with no inflammatory changes present to suggest acute diverticulitis       7/1 EKG: nsr    Results from last 7 days   Lab Units 07/02/20  0025   SARS-COV-2  Negative     Results from last 7 days   Lab Units 07/02/20  0517 07/01/20  2049   WBC Thousand/uL 12 33* 14 04*   HEMOGLOBIN g/dL 11 4* 13 1   HEMATOCRIT % 36 4 41 4   PLATELETS Thousands/uL 252 325   NEUTROS ABS Thousands/µL 9 38* 8 37*         Results from last 7 days   Lab Units 07/02/20  0517 07/01/20  2049   SODIUM mmol/L 138 141   POTASSIUM mmol/L 3 3* 3 5   CHLORIDE mmol/L 105 104   CO2 mmol/L 27 23   ANION GAP mmol/L 6 14*   BUN mg/dL 18 21   CREATININE mg/dL 0 84 1 19   EGFR ml/min/1 73sq m 74 48   CALCIUM mg/dL 7 9* 9 2     Results from last 7 days   Lab Units 07/01/20  2049   AST U/L 24   ALT U/L 32   ALK PHOS U/L 103   TOTAL PROTEIN g/dL 7 7   ALBUMIN g/dL 4 0   TOTAL BILIRUBIN mg/dL 0 38         Results from last 7 days   Lab Units 07/02/20  0517 07/01/20  2049   GLUCOSE RANDOM mg/dL 121 154*     Results from last 7 days   Lab Units 07/01/20  2049   PROTIME seconds 13 5   INR  1 09   PTT seconds 25     Results from last 7 days   Lab Units 07/02/20  0517 07/02/20  0024 07/01/20  2049   LACTIC ACID mmol/L 0 7 1 2 2 6*     Results from last 7 days   Lab Units 07/01/20  2049   LIPASE u/L 111     Results from last 7 days   Lab Units 07/01/20 2223   CLARITY UA  Cloudy   COLOR UA  Yellow   SPEC GRAV UA  1 025   PH UA  7 0   GLUCOSE UA mg/dl Negative   KETONES UA mg/dl Trace*   BLOOD UA  Large*   PROTEIN UA mg/dl 100 (2+)*   NITRITE UA  Negative   BILIRUBIN UA  Negative   UROBILINOGEN UA E U /dl 1 0   LEUKOCYTES UA  Small*   WBC UA /hpf 4-10*   RBC UA /hpf Innumerable*   BACTERIA UA /hpf None Seen   EPITHELIAL CELLS WET PREP /hpf Occasional       Results from last 7 days   Lab Units 07/01/20 2101 07/01/20 2049   BLOOD CULTURE  Received in Microbiology Lab  Culture in Progress  Received in Microbiology Lab  Culture in Progress       ED Treatment:   Medication Administration from 07/01/2020 2011 to 07/02/2020 0211       Date/Time Order Dose Route Action     07/01/2020 2029 ondansetron (FOR EMS ONLY) [facility]) 4 mg/2 mL injection 4 mg   Given to EMS     07/01/2020 2028 OLANZapine (ZyPREXA) IM injection 6 mg 6 mg Intramuscular Given     07/01/2020 2112 HYDROmorphone (DILAUDID) injection 0 5 mg 0 5 mg Intravenous Given     07/01/2020 2109 metoclopramide (REGLAN) injection 10 mg 10 mg Intravenous Given     07/01/2020 2247 sodium chloride 0 9 % bolus 1,000 mL 1,000 mL Intravenous New Bag     07/01/2020 2244 fentanyl citrate (PF) 100 MCG/2ML 50 mcg 50 mcg Intravenous Given     07/01/2020 2247 ceftriaxone (ROCEPHIN) 1 g/50 mL in dextrose IVPB 1,000 mg Intravenous New Bag     07/01/2020 2314 sodium chloride 0 9 % bolus 1,000 mL 1,000 mL Intravenous New Bag     07/02/2020 0025 ondansetron (ZOFRAN) injection 4 mg 4 mg Intravenous Given     07/02/2020 0025 sodium chloride 0 9 % infusion 100 mL/hr Intravenous New Bag     07/02/2020 0025 heparin (porcine) subcutaneous injection 5,000 Units 5,000 Units Subcutaneous Given        Past Medical History:   Diagnosis Date    GERD (gastroesophageal reflux disease)     High cholesterol     HPV (human papilloma virus) infection     Hypercholesterolemia     Hypertension     Myocardial infarct ([facility] )     X2    Pleuritic chest pain     Rheumatoid arthritis ([place] [address] )     Rheu and cervical    Sleep apnea 2017    Vertigo      Present on Admission:   Ureterolithiasis   UTI (urinary tract infection)   Sepsis ([place] [address] )   Coronary artery disease of native artery of native heart with stable angina pectoris ([place] [address] )   Hypertension      Admitting Diagnosis: Ureterolithiasis [N20 1]  UTI (urinary tract infection) [N39 0]  Unspecified abdominal pain [R10 9]  Sepsis ([place] [address] ) [A41 9]  Age/Sex: 59 y o  female  Admission Orders:  Scheduled Medications:    Medications:  cefTRIAXone 1,000 mg Intravenous Q24H   fenofibrate 48 mg Oral Daily   folic acid 2,057 mcg Oral Daily   heparin (porcine) 5,000 Units Subcutaneous Q8H [facility]   metoprolol tartrate 12 5 mg Oral Q12H KATEY   oxybutynin 5 mg Oral TID   pantoprazole 40 mg Oral Early Morning   phenazopyridine 100 mg Oral TID With Meals   tamsulosin 0 4 mg Oral Daily With Dinner     Continuous IV Infusions:    sodium chloride 100 mL/hr Intravenous Continuous     PRN Meds:    acetaminophen 650 mg Oral Q6H PRN   calcium carbonate 1,000 mg Oral Daily PRN   ketorolac 15 mg Intravenous Q6H PRN   LORazepam 0 5 mg Oral Q6H PRN   oxyCODONE  X 1 7/2 @1020 10 mg Oral Q6H PRN     SCD's  Strain all urine  NPO, adv to house diet    IP CONSULT TO UROLOGY    Network Utilization Review Department  [email or web address]  ATTENTION: Please call with any questions or concerns to [phone] and carefully listen to the prompts so that you are directed to the right person  All voicemails are confidential   Harry Carreno all requests for admission clinical reviews, approved or denied determinations and any other requests to dedicated fax number below belonging to the campus where the patient is receiving treatment   List of dedicated fax numbers for the Facilities:  [address] [place] [address] DENIALS (Administrative/Medical Necessity) [phone]   [address] [place] [address] (Maternity/NICU/Pediatrics) [phone]   [facility] [phone]   Lauryn Smalls [phone]   [address] [place] [address] [phone]   [address] [facility] 1525 [facility] [phone]   Katlintonio Kiera [phone]   [address] [facility]  [address] [phone]

## 2020-07-02 NOTE — H&P
H&P- Randa Lux 1955, 59 y o  female MRN: [id number]  Unit/Bed#: ED 23 Encounter: [id number]  Primary Care Provider: Therese Harry MD   Date and time admitted to hospital: 7/1/2020  8:11 PM    * Ureterolithiasis  Assessment & Plan  · Patient with severe right-sided flank pain which began earlier today  She has not have a history of stones  · Mild right-sided hydronephrosis, the cause of which appears to be a 5 mm calculus at the mid ureter  · Pain control  · Strain all urine  · Antibiotics as below  · NPO after midnight  · IV fluid    UTI (urinary tract infection)  Assessment & Plan  · Patient with UTIs POA   · Case was discussed with on-call Urology who recommended antibiotics ceftriaxone in keeping patient NPO  · Will continue antibiotics for now  · Trend white blood cells  · Monitor fevers  · Patient hemodynamically stable right now  Sepsis ([address] )  Assessment & Plan  SIRS criteria:  Tachycardia, leukocytosis  Suspected source:  Infected stone  Lactic acid:  2 6  End organ damage:  None  IV Fluids:  Normal saline  IV antibiotics:  Ceftriaxone  Follow up on culture results  Monitor vital signs, laboratory studies  Coronary artery disease of native artery of native heart with stable angina pectoris [facility])  Assessment & Plan  · Coronary artery disease S/P stent  · Follows with [facility]  · No chest pain at this time  · Continue metoprolol  Hypertension  Assessment & Plan  · BP stable  · Continue metoprolol 25 mg   · Monitor BP      VTE Prophylaxis: Heparin  / sequential compression device   Code Status: full   POLST: There is no POLST form on file for this patient (pre-hospital)  Discussion with family: patient     Anticipated Length of Stay:  Patient will be admitted on an Inpatient basis with an anticipated length of stay of  > 2 midnights     Justification for Hospital Stay: sepsis and infected kidney stone     Total Time for Visit, including Counseling / Coordination of Care: 45 minutes  Greater than 50% of this total time spent on direct patient counseling and coordination of care  Chief Complaint:   Abdominal pain     History of Present Illness:    Velma Ulloa is a 59 y o  female who presents with infected kidney stone  She has past medical history significant for hypertension, obesity, coronary artery disease  She presents to the emergency department for 1 day history of severe right-sided flank and abdominal pain radiating into her groin  States that her pain was a 10/10 when she was having it received multiple pain medications in the ED dry bring her pain under control  She states that she has never had a kidney stone in the past   She states that her neighbors a doctor who told her that she should probably come to the ED to be evaluated  In the emergency department CT scan did show 5 mm right-sided stone with hydronephrosis  Patient also noted to have UTI  On-call urology was contacted and recommended antibiotics in keeping patient NPO for possible urologic procedure tomorrow  She does state that she was having some significant nausea and vomiting associated with the pain  Review of Systems:    Review of Systems   Constitutional: Positive for appetite change and chills  Negative for fatigue, fever and unexpected weight change  Respiratory: Negative for cough, chest tightness and shortness of breath  Cardiovascular: Negative for chest pain and palpitations  Gastrointestinal: Positive for abdominal pain, nausea and vomiting  Negative for diarrhea  Genitourinary: Positive for flank pain  Negative for decreased urine volume, dysuria, frequency and urgency  Musculoskeletal: Negative for arthralgias  Neurological: Negative for dizziness, syncope, light-headedness and headaches  All other systems reviewed and are negative        Past Medical and Surgical History:     Past Medical History:   Diagnosis Date    GERD (gastroesophageal reflux disease)     High cholesterol     HPV (human papilloma virus) infection     Hypercholesterolemia     Hypertension     [facility] infarct [facility] )     X2    Pleuritic chest pain     Rheumatoid arthritis ([facility] )     Rheu and cervical    Sleep apnea 2017    Vertigo        Past Surgical History:   Procedure Laterality Date    ANGIOPLASTY      BACK SURGERY      BIOPSY CORE NEEDLE  1980    CARDIAC CATHETERIZATION  2015    CARDIAC CATHETERIZATION      CERVICAL CONE BIOPSY      1980's    COLONOSCOPY      2006, 2019    DILATION AND CURETTAGE OF UTERUS      MAMMO (HISTORICAL)  12/2018    NECK SURGERY      OTHER SURGICAL HISTORY Right 11/2017    Surgical removal of lypoma shoulder blade    SHOULDER SURGERY Right 2014    had a lympoma done in 2017 also same shoulder    SHOULDER SURGERY Right 2014    TONSILLECTOMY      WISDOM TOOTH EXTRACTION         Meds/Allergies:    Prior to Admission medications    Medication Sig Start Date End Date Taking?  Authorizing Provider   Ascorbic Acid, Vitamin C, (VITAMIN C) 100 MG tablet Take 1,000 mg by mouth daily   Yes Historical Provider, MD   aspirin 81 MG tablet Take 162 mg by mouth daily   Yes Historical Provider, MD   Choline Fenofibrate (FENOFIBRIC ACID) 45 MG CPDR Take 1 capsule by mouth daily 3/9/20  Yes Historical Provider, MD   fenofibrate (TRICOR) 48 mg tablet Take 48 mg by mouth daily   Yes Historical Provider, MD   folic acid (FOLVITE) 1 mg tablet Take 1,000 mcg by mouth daily 3/5/20  Yes Historical Provider, MD   LACTOBACILLUS PO Take by mouth daily   Yes Historical Provider, MD   methotrexate 2 5 mg tablet TAKE 8 TABLETS BY MOUTH ONCE A WEEK 2/6/20  Yes Historical Provider, MD   metoprolol tartrate (LOPRESSOR) 25 mg tablet  3/5/20  Yes Historical Provider, MD   OMEGA-3 FATTY ACIDS PO Take 1 g by mouth daily   Yes Historical Provider, MD   pantoprazole (PROTONIX) 40 mg tablet  4/21/20  Yes Historical Provider, MD   rosuvastatin (CRESTOR) 20 MG tablet Take 20 mg by mouth daily 2/3/20  Yes Historical Provider, MD   adalimumab (HUMIRA) 40 mg/0 8 mL PSKT Inject 40 mg under the skin every 14 (fourteen) days    Historical Provider, MD   choline fenofibrate (TRILIPIX) 135 MG capsule TAKE 1 CAPSULE BY MOUTH EVERY DAY 12/12/19   Historical Provider, MD   HUMIRA PEN 40 MG/0 4ML PNKT  1/29/20   Historical Provider, MD   LORazepam (ATIVAN) 0 5 mg tablet Take 0 5 mg by mouth every 6 (six) hours as needed    Historical Provider, MD   methotrexate 2 5 mg tablet Take by mouth    Historical Provider, MD   predniSONE 5 mg tablet 2 TABLET ONCE A DAY X 10 DAYS 3/19/20   Historical Provider, MD     I have reviewed home medications with patient personally  Allergies:    Allergies   Allergen Reactions    Atorvastatin Myalgia    Diphenhydramine Hcl (Sleep) Itching    Pravastatin Myalgia    Simvastatin Myalgia       Social History:     Marital Status:    Occupation: unkn[employer]   Patient Pre-hospital Living Situation: lives at home   Patient Pre-hospital Level of Mobility: ambulates   Patient Pre-hospital Diet Restrictions: NPO   Substance Use History:   Social History     Substance and Sexual Activity   Alcohol Use Yes    Frequency: Monthly or less    Drinks per session: 1 or 2    Binge frequency: Never     Social History     Tobacco Use   Smoking Status Former Smoker    Packs/day: 2 00    Years: 30 00    Pack years: 60 00   Smokeless Tobacco Never Used     Social History     Substance and Sexual Activity   Drug Use Never       Family History:    Family History   Problem Relation Age of Onset    Cancer Mother     Lung cancer Mother     Heart disease Father        Physical Exam:     Vitals:   Blood Pressure: 137/90 (07/02/20 0000)  Pulse: 94 (07/02/20 0000)  Temperature: 99 °F (37 2 °C) (07/02/20 0000)  Temp Source: Oral (07/02/20 0000)  Respirations: 20 (07/02/20 0000)  Height: 5' 7" (170 2 cm) (07/01/20 2013)  Weight - Scale: (!) 141 kg (310 lb) (07/01/20 2013)  SpO2: 97 % (07/02/20 0000)    Physical Exam   Constitutional: She is oriented to person, place, and time  She appears well-developed and well-nourished  No distress  Patient appears very lethargic secondary to pain medication   HENT:   Head: Normocephalic and atraumatic  Mouth/Throat: Mucous membranes are not pale, not dry and not cyanotic  Eyes: Pupils are equal, round, and reactive to light  Conjunctivae and EOM are normal    Neck: Normal range of motion  Neck supple  No JVD present  Cardiovascular: Normal rate, regular rhythm and normal heart sounds  No murmur heard  Pulmonary/Chest: Effort normal and breath sounds normal  She has no wheezes  She has no rales  Abdominal: Soft  Bowel sounds are normal  She exhibits no distension  There is tenderness in the right lower quadrant  There is CVA tenderness  Musculoskeletal: Normal range of motion  She exhibits no edema  No lower extremity edema, Homans sign negative bilaterally  Neurological: She is alert and oriented to person, place, and time  No cranial nerve deficit or sensory deficit  Skin: Skin is warm and dry  Capillary refill takes less than 2 seconds  No rash noted  She is not diaphoretic  No erythema  Psychiatric: She has a normal mood and affect  Nursing note and vitals reviewed  Additional Data:     Lab Results: I have personally reviewed pertinent reports        Results from last 7 days   Lab Units 07/01/20  2049   WBC Thousand/uL 14 04*   HEMOGLOBIN g/dL 13 1   HEMATOCRIT % 41 4   PLATELETS Thousands/uL 325   NEUTROS PCT % 59   LYMPHS PCT % 29   MONOS PCT % 8   EOS PCT % 2     Results from last 7 days   Lab Units 07/01/20  2049   SODIUM mmol/L 141   POTASSIUM mmol/L 3 5   CHLORIDE mmol/L 104   CO2 mmol/L 23   BUN mg/dL 21   CREATININE mg/dL 1 19   ANION GAP mmol/L 14*   CALCIUM mg/dL 9 2   ALBUMIN g/dL 4 0   TOTAL BILIRUBIN mg/dL 0 38   ALK PHOS U/L 103   ALT U/L 32   AST U/L 24   GLUCOSE RANDOM mg/dL 154*     Results from last 7 days Lab Units 07/01/20  2049   INR  1 09             Results from last 7 days   Lab Units 07/01/20 2049   LACTIC ACID mmol/L 2 6*       Imaging: I have personally reviewed pertinent reports  CT abdomen pelvis with contrast   Final Result by Rian Hubbard MD (07/01 2250)      Mild right-sided hydronephrosis, the cause of which appears to be a 5 mm calculus at the mid ureter  Scattered colonic diverticulosis with no inflammatory changes present to suggest acute diverticulitis  Workstation performed: [id number]             EKG, Pathology, and Other Studies Reviewed on Admission:   · All reports viewed in epic     [employer] / [employer] Records Reviewed: Yes     ** Please Note: This note has been constructed using a voice recognition system   **

## 2020-07-02 NOTE — TELEPHONE ENCOUNTER
Patient is status post placement of a 6 [place] by 26 centimeter right ureteral stent, my office will add her to the stent database  She will need ureteroscopy and laser lithotripsy with any urology attending that is available in a number of weeks     She is not a candidate for the ambulatory surgery center given her weight and her comorbid conditions

## 2020-07-02 NOTE — PLAN OF CARE
Problem: GASTROINTESTINAL - ADULT  Goal: Maintains adequate nutritional intake  Description  INTERVENTIONS:  - Monitor percentage of each meal consumed  - Identify factors contributing to decreased intake, treat as appropriate  - Assist with meals as needed  - Monitor I&O, weight, and lab values if indicated  - Obtain nutrition services referral as needed  Outcome: Not Progressing  Note:   Pt is currently NPO

## 2020-07-02 NOTE — ASSESSMENT & PLAN NOTE
· Patient with UTIs POA   · Case was discussed with on-call Urology who recommended antibiotics ceftriaxone in keeping patient NPO  · Will continue antibiotics for now  · Trend white blood cells  · Monitor fevers  · Patient hemodynamically stable right now

## 2020-07-02 NOTE — TREATMENT PLAN
Patrice Luna is a 66-year-old female presenting with right flank pain and renal colic secondary to CT confirmed obstructing right ureteral calculus, right hydronephrosis and UTI  Postoperative day 0 cystoscopy, retrograde pyelography and insertion of right ureteral stent  Procedure went well without adverse event or complication  Cloudy urine seen on ureteral cannulation  Plan:  Continue medical optimization, symptom management and antibiosis  Discharge at the discretion of the Internal Medicine service tomorrow if patient remains afebrile and symptom managed  Ditropan, Pyridium and Toradol prescribed while inpatient  Stent stent related medications electronically prescribed  Patient will be contacted by our service with hospital follow-up appointment for preoperative re-evaluation and/or scheduling of elective ureteroscopy  No further  intervention indicated during this hospital stay  Our service will sign off

## 2020-07-02 NOTE — OP NOTE
OPERATIVE REPORT  PATIENT NAME: Velma Ulloa    :  1955  MRN: [id number]  Pt Location: AN OR ROOM 03    SURGERY DATE: 2020    Surgeon(s) and Role:     * Libra Malik MD - Primary    Preop Diagnosis:  Ureterolithiasis [N20 1]    Post-Op Diagnosis Codes:     * Ureterolithiasis [N20 1]    Procedure(s) (LRB):  CYSTOSCOPY RETROGRADE PYELOGRAM WITH INSERTION STENT URETERAL (Right)    Specimen(s):  * No specimens in log *    Estimated Blood Loss:   Minimal    Drains:  Ureteral Drain/Stent Right ureter 6 Fr  (Active)   Number of days: 0       Anesthesia Type:   General    Operative Indications:  Ureterolithiasis [N20 1]  Urinary tract infection    Operative Findings:  Orthotopic ureteral orifices, cloudy urine seen to exit upon placement of right ureteral stent, no complications  A 6 [place] by 26 centimeter ureteral stent was placed on the right-hand side    Complications:   None    Procedure and Technique:    PROCEDURES PERFORMED:  1) Cystoscopy  2) right retrograde pyelography with fluoroscopic interpretation  3) right ureteral stent placement (6F x 26cm    SURGEON:  Libra Malik MD    ASSISTANTS:  None    NOTE:  There were no qualified teaching residents to assist with this case    ANESTHESIA: General     COMPLICATIONS:   None    ANTIBIOTICS:  Ancef    INTRAOPERATIVE THROMBOEMBOLISM PROPHYLAXIS:  Pneumatic compression stockings     RADIOLOGIC FINDINGS:    1  Retrograde pyelogram was performed on the right side using a 5 Fr open ended catheter  Seventeen milliliters of contrast was injected, at incremental aliquots throughout the ureter    2  The following findings were noted: Moderate hydronephrosis, no filling defects or abnormalities noted on retrograde      INDICATIONS FOR PROCEDURE:  Morris Hinton is an 59 y o  old female with a right ureteral stone, elevated lactate, elevated white blood cell count, urinary tract infection, and right hydronephrosis    After discussing the options, the patient elected to undergo ureteral stent placement  We discussed the procedure in detail, the alternatives, and the risks, and they signed informed consent to proceed  PROCEDURE IN DETAIL:   The patient was identified and brought to the OR  Antibiotic prophylaxis and DVT prophylaxis were administered  They were placed in the comfortable dorsal lithotomy position with care to pad all pressure points  They were prepped and draped in the usual sterile fashion using hibiclens  A surgical time out was performed with all in the room in agreement with the correct patient, procedure, indications, and laterality  A 21-Greek rigid cystoscope was used to enter the bladder  The bladder was inspected in its entirety and there were no lesions noted  The ureteral orifices were identified in their orthotopic positions  The right ureteral orifice was identified and a 5 Fr open ended catheter was placed into the ureteral orifice  The stone was not visible on  fluoroscopic guidance  A retrograde pyelogram was performed with injection of contrast which demonstrated moderate hydroureteronephrosis  A solo wire was up to the kidney under fluoroscopic guidance  A right 6 [place] by 26 centimeter JJ stent was then passed up the wire  under fluoroscopic guidance into the right  kidney with a good curl noted in the kidney and in the bladder  The bladder was drained  The patient was placed back in the supine position, awakened from general anesthesia and brought to the recovery room in stable condition  SPECIMENS:   * No orders in the log *     IMPLANTS:   Implant Name Type Inv   Item Serial No   Lot No  LRB No  Used   URETERAL STENT 6 FR X 26 CM OPTIMA INLAY - [id number]  URETERAL STENT 6 FR X 26 CM OPTIMA INLAY  [employer] [id number] Right 1        COMPLICATIONS:  None    DISPOSITION: PACU  to floor    PLAN:  Our office will arrange for subsequent ureteroscopic stone intervention in the coming weeks  I have provided the patient her prescriptions for discharge including antibiotics with Cipro and pain medication and medications to make her stent more comfortable  Would recommend that she remain in hospital until she has clinically improved         I was present for the entire procedure and A qualified resident physician was not available    Patient Disposition:  PACU     SIGNATURE: Franklyn Wong MD  DATE: July 2, 2020  TIME: 1:50 PM

## 2020-07-02 NOTE — ANESTHESIA PREPROCEDURE EVALUATION
Review of Systems/Medical History  Patient summary reviewed  Chart reviewed  No history of anesthetic complications     Cardiovascular  EKG reviewed, Hyperlipidemia, Hypertension , CAD , History of percutaneous transluminal coronary angioplasty,    Pulmonary  Sleep apnea CPAP,        GI/Hepatic    GERD ,        Kidney stones,        Endo/Other    Obesity  morbid obesity   GYN       Hematology   Musculoskeletal  Rheumatoid arthritis ,        Neurology      Comment: Vertigo Psychology           Physical Exam    Airway    Mallampati score: II  TM Distance: >3 FB  Neck ROM: full     Dental       Cardiovascular      Pulmonary      Other Findings        Anesthesia Plan  ASA Score- 3     Anesthesia Type- general with ASA Monitors  Additional Monitors:   Airway Plan: ETT and LMA  Comment: NOTE LOOSE LOWER TOOTH  LMA poss ETT if LMA fails  Plan Factors-  Patient did not smoke on day of surgery  Induction- intravenous  Postoperative Plan-     Informed Consent- Anesthetic plan and risks discussed with patient  I personally reviewed this patient with the CRNA  Discussed and agreed on the Anesthesia Plan with the CRNA Gerhardt Sep

## 2020-07-02 NOTE — PLAN OF CARE
Problem: INFECTION - ADULT  Goal: Absence or prevention of progression during hospitalization  Description  INTERVENTIONS:  - Assess and monitor for signs and symptoms of infection  - Monitor lab/diagnostic results  - Monitor all insertion sites, i e  indwelling lines, tubes, and drains  - Monitor endotracheal if appropriate and nasal secretions for changes in amount and color  - [place] appropriate cooling/warming therapies per order  - Administer medications as ordered  - Instruct and encourage patient and family to use good hand hygiene technique  - Identify and instruct in appropriate isolation precautions for identified infection/condition  Outcome: Progressing     Problem: PAIN - ADULT  Goal: Verbalizes/displays adequate comfort level or baseline comfort level  Description  Interventions:  - Encourage patient to monitor pain and request assistance  - Assess pain using appropriate pain scale  - Administer analgesics based on type and severity of pain and evaluate response  - Implement non-pharmacological measures as appropriate and evaluate response  - Consider cultural and social influences on pain and pain management  - Notify physician/advanced practitioner if interventions unsuccessful or patient reports new pain  Outcome: Progressing     Problem: SAFETY ADULT  Goal: Maintain or return to baseline ADL function  Description  INTERVENTIONS:  -  Assess patient's ability to carry out ADLs; assess patient's baseline for ADL function and identify physical deficits which impact ability to perform ADLs (bathing, care of mouth/teeth, toileting, grooming, dressing, etc )  - Assess/evaluate cause of self-care deficits   - Assess range of motion  - Assess patient's mobility; develop plan if impaired  - Assess patient's need for assistive devices and provide as appropriate  - Encourage maximum independence but intervene and supervise when necessary  - Involve family in performance of ADLs  - Assess for home care needs following discharge   - Consider OT consult to assist with ADL evaluation and planning for discharge  - Provide patient education as appropriate  Outcome: Progressing

## 2020-07-02 NOTE — ANESTHESIA POSTPROCEDURE EVALUATION
Post-Op Assessment Note    CV Status:  Stable    Pain management: adequate     Mental Status:  Alert and awake   Hydration Status:  Euvolemic   PONV Controlled:  Controlled   Airway Patency:  Patent    Staff: Anesthesiologist           BP      Temp      Pulse     Resp      SpO2

## 2020-07-02 NOTE — PLAN OF CARE
Problem: Potential for Falls  Goal: Patient will remain free of falls  Description  INTERVENTIONS:  - Assess patient frequently for physical needs  -  Identify cognitive and physical deficits and behaviors that affect risk of falls    -  [place] fall precautions as indicated by assessment   - Educate patient/family on patient safety including physical limitations  - Instruct patient to call for assistance with activity based on assessment  - Modify environment to reduce risk of injury  - Consider OT/PT consult to assist with strengthening/mobility  Outcome: Progressing     Problem: PAIN - ADULT  Goal: Verbalizes/displays adequate comfort level or baseline comfort level  Description  Interventions:  - Encourage patient to monitor pain and request assistance  - Assess pain using appropriate pain scale  - Administer analgesics based on type and severity of pain and evaluate response  - Implement non-pharmacological measures as appropriate and evaluate response  - Consider cultural and social influences on pain and pain management  - Notify physician/advanced practitioner if interventions unsuccessful or patient reports new pain  Outcome: Progressing     Problem: INFECTION - ADULT  Goal: Absence or prevention of progression during hospitalization  Description  INTERVENTIONS:  - Assess and monitor for signs and symptoms of infection  - Monitor lab/diagnostic results  - Monitor all insertion sites, i e  indwelling lines, tubes, and drains  - Monitor endotracheal if appropriate and nasal secretions for changes in amount and color  - [place] appropriate cooling/warming therapies per order  - Administer medications as ordered  - Instruct and encourage patient and family to use good hand hygiene technique  - Identify and instruct in appropriate isolation precautions for identified infection/condition  Outcome: Progressing     Problem: SAFETY ADULT  Goal: Maintain or return to baseline ADL function  Description  INTERVENTIONS:  -  Assess patient's ability to carry out ADLs; assess patient's baseline for ADL function and identify physical deficits which impact ability to perform ADLs (bathing, care of mouth/teeth, toileting, grooming, dressing, etc )  - Assess/evaluate cause of self-care deficits   - Assess range of motion  - Assess patient's mobility; develop plan if impaired  - Assess patient's need for assistive devices and provide as appropriate  - Encourage maximum independence but intervene and supervise when necessary  - Involve family in performance of ADLs  - Assess for home care needs following discharge   - Consider OT consult to assist with ADL evaluation and planning for discharge  - Provide patient education as appropriate  Outcome: Progressing     Problem: DISCHARGE PLANNING  Goal: Discharge to home or other facility with appropriate resources  Description  INTERVENTIONS:  - Identify barriers to discharge w/patient and caregiver  - Arrange for needed discharge resources and transportation as appropriate  - Identify discharge learning needs (meds, wound care, etc )  - Arrange for interpretive services to assist at discharge as needed  - Refer to Case Management Department for coordinating discharge planning if the patient needs post-hospital services based on physician/advanced practitioner order or complex needs related to functional status, cognitive ability, or social support system  Outcome: Progressing     Problem: Knowledge Deficit  Goal: Patient/family/caregiver demonstrates understanding of disease process, treatment plan, medications, and discharge instructions  Description  Complete learning assessment and assess knowledge base    Interventions:  - Provide teaching at level of understanding  - Provide teaching via preferred learning methods  Outcome: Progressing     Problem: GASTROINTESTINAL - ADULT  Goal: Minimal or absence of nausea and/or vomiting  Description  INTERVENTIONS:  - Administer IV fluids if ordered to ensure adequate hydration  - Maintain NPO status until nausea and vomiting are resolved  - Nasogastric tube if ordered  - Administer ordered antiemetic medications as needed  - Provide nonpharmacologic comfort measures as appropriate  - Advance diet as tolerated, if ordered  - Consider nutrition services referral to assist patient with adequate nutrition and appropriate food choices  Outcome: Progressing     Problem: GENITOURINARY - ADULT  Goal: Maintains or returns to baseline urinary function  Description  INTERVENTIONS:  - Assess urinary function  - Encourage oral fluids to ensure adequate hydration if ordered  - Administer IV fluids as ordered to ensure adequate hydration  - Administer ordered medications as needed  - Offer frequent toileting  - Follow urinary retention protocol if ordered  Outcome: Progressing

## 2020-07-02 NOTE — ASSESSMENT & PLAN NOTE
· Patient with severe right-sided flank pain which began earlier today  She has not have a history of stones  · Mild right-sided hydronephrosis, the cause of which appears to be a 5 mm calculus at the mid ureter    · Pain control  · Strain all urine  · Antibiotics as below  · NPO after midnight  · IV fluid

## 2020-07-02 NOTE — ASSESSMENT & PLAN NOTE
· Coronary artery disease S/P stent  · Follows with [facility]  · No chest pain at this time  · Continue metoprolol

## 2020-07-02 NOTE — CONSULTS
CONSULT    Patient Name: Shar Parada  Patient MRN: [id number]  Date of Service: 7/2/2020   Date / Time Note Created: 7/2/2020 11:39 AM   Referring Provider: Yogesh Delcid MD    Provider Creating Note: RAMIREZ Reese    PCP: Gemma Tate  Attending Provider:  Yogesh Delcid MD    Reason for Consult: Flank Pain    HPI:  Shar Parada is a pleasant 75-year-old female without prior  history, presenting to [facility] Emergency room with a chief complaint of right flank pain; not accompanied by fever, dysuria or gross hematuria  Patient notes occasional chills; as well as waves of nausea without vomiting  CT of the abdomen and pelvis revealed right hydronephrosis secondary to 5 mm mid ureteral calculus  She denies any prior urologic evaluation, consultation or  manipulation including history of nephrolithiasis, spontaneous expulsion and/or requirement for ureteroscopic or open stone treatment  She is afebrile, hemodynamically stable with leukocytosis but no evidence of fulminant sepsis  Lactic acid negative  COVID panel negative  Urinalysis shows innumerable RBCs and 4-10 WBCs per high-powered field  No bacteria seen and positive occasional calcium oxalate crystals  She is admitted to the Internal Medicine service for IV fluids, symptom management and attempt at medical expulsive therapy in the interim  Urologic consultation requested for possible surgical intervention  Active Problems:    Patient Active Problem List   Diagnosis    [facility]oid [facility]    GERD (gastroesophageal reflux disease)    Sleep apnea    Hypertension    Vertigo    Hypercholesterolemia    Stented coronary artery    Coronary artery disease of native artery of native heart with stable angina pectoris ([facility] )    Ureterolithiasis    UTI (urinary tract infection)    Sepsis ([facility] )            Impressions  · Right Ureteral Calculus  · Hydronephrosis  · Renal Colic    Recommendations  1    Initiate aggressive IVFs   2  Flomax  3  Analgesia/Narcotics   4  Anti-emetics   5  ATBs empirically while awaiting culture   6  Strain urine   7  NPO  for OR if no spontaneous expulsion achieved  Explained risk, benefits and potential complications of ureteroscopic stone extraction  Patient has verbalized understanding of possible need for ureteral stent only for any signs of infection and/or technical difficult prohibiting stone retrieval etc ; requiring staged ureteroscopy electively once recovered and infection free as OP  Formal consent by surgeon          Past Medical History:   Diagnosis Date    GERD (gastroesophageal reflux disease)     High cholesterol     HPV (human papilloma virus) infection     Hypercholesterolemia     Hypertension     Myocardial infarct (HCC)     X2    Pleuritic chest pain     Rheumatoid arthritis ([facility] )     Rheu and cervical    Sleep apnea 2017    Vertigo        Past Surgical History:   Procedure Laterality Date    ANGIOPLASTY      BACK SURGERY      BIOPSY CORE NEEDLE  1980    CARDIAC CATHETERIZATION  2015    CARDIAC CATHETERIZATION      CERVICAL CONE BIOPSY      1980's    COLONOSCOPY      2006, 2019    DILATION AND CURETTAGE OF UTERUS      MAMMO (HISTORICAL)  12/2018    NECK SURGERY      OTHER SURGICAL HISTORY Right 11/2017    Surgical removal of lypoma shoulder blade    SHOULDER SURGERY Right 2014    had a lympoma done in 2017 also same shoulder    SHOULDER SURGERY Right 2014    TONSILLECTOMY      WISDOM TOOTH EXTRACTION         Family History   Problem Relation Age of Onset    Cancer Mother     Lung cancer Mother     Heart disease Father        Social History     Socioeconomic History    Marital status:      Spouse name: Not on file    Number of children: 11    Years of education: 15    Highest education level: High school graduate   Occupational History    Occupation: [employer]   Social Needs    Financial resource strain: Not on file   [place] insecurity:     Worry: Not on file     Inability: Not on file    Transportation needs:     Medical: Not on file     Non-medical: Not on file   Tobacco Use    Smoking status: Former Smoker     Packs/day: 2 00     Years: 30 00     Pack years: 60 00    Smokeless tobacco: Never Used   Substance and Sexual Activity    Alcohol use: Yes     Frequency: Monthly or less     Drinks per session: 1 or 2     Binge frequency: Never    Drug use: Never    Sexual activity: Not on file   Lifestyle    Physical activity:     Days per week: Not on file     Minutes per session: Not on file    Stress: Not on file   Relationships    Social connections:     Talks on phone: Not on file     Gets together: Not on file     Attends [employer] service: Not on file     Active member of club or organization: Not on file     Attends meetings of clubs or organizations: Not on file     Relationship status: Not on file    Intimate partner violence:     Fear of current or ex partner: Not on file     Emotionally abused: Not on file     Physically abused: Not on file     Forced sexual activity: Not on file   Other Topics Concern    Not on file   Social History Narrative    Most recent tobacco use screenin-    Do you currently or have you served in [employer]: No    Occupation: [employer]    Relationship status:     Live alone or with others: alone    Number of children: 0    Siblings: 5    Has smoked since age: 15    Chewing tobacco: none    Alcohol intake: Occasional    Caffeine intake: None    Illicit drugs: none    Diet: Regular    Exercise level: None    Family history of heart disease: Yes    Positive for CAD    Overweight: Yes    Obese:  Yes    Advance directive: Yes    Blood Transfusion: No    Marital status:     High cholesterol: Yes    High blood pressure: Yes    Diabetes: No    General stress level: Low    Presence of domestic violence: No    Guns present in home: No    Seat belts used routinely: Yes Sexual orientation: Heterosexual    Sunscreen used routinely: Yes    Seat belt/car seat used routinely: Yes    Exercise-How often do you exercise: moderate    Exercise- What type of exercise do you do: work    Do you have regular medical check ups: Yes    Do you have regular dental check ups: Yes    Illicit drugs-years of use: 0    Smoke alarm in home: Yes    International travel: NEG    Pets: Yes    Passive smoke exposure: No    Asbestos exposure: No    TB exposure: No    Environmental exposure: Yes    just dust    Animal exposure:  Yes       Allergies   Allergen Reactions    Atorvastatin Myalgia    Diphenhydramine Hcl (Sleep) Itching    Pravastatin Myalgia    Simvastatin Myalgia       Review of Systems  10 point review of systems negative except as noted in HPI  Constitutional:   positive for  - chills  Cardiovascular:   no chest pain or dyspnea on exertion  Gastrointestinal:   positive for - abdominal pain and nausea/vomiting  Genito-Urinary:   no dysuria, trouble voiding, or hematuria  Neurological:   no TIA or stroke symptoms     Chart Review   Allergies, current medications, history, problem list    Vital Signs  /65 (BP Location: Right arm)   Pulse 90   Temp 98 2 °F (36 8 °C) (Oral)   Resp 18   Ht 5' 7" (1 702 m)   Wt (!) 143 kg (316 lb 2 2 oz)   SpO2 92%   BMI 49 51 kg/m²     Physical Exam  General appearance: alert and oriented, in no acute distress, appears stated age, cooperative and no distress  Head: Normocephalic, without obvious abnormality, atraumatic  Neck: no adenopathy, no carotid bruit, no JVD, supple, symmetrical, trachea midline and thyroid not enlarged, symmetric, no tenderness/mass/nodules  Lungs: diminished breath sounds  Heart: regular rate and rhythm, S1, S2 normal, no murmur, click, rub or gallop  Abdomen: abnormal findings:  moderate tenderness in the lower abdomen  Extremities: extremities normal, warm and well-perfused; no cyanosis, clubbing, or edema  Pulses: 2+ and symmetric  Neurologic: Grossly normal  No urinary drains     Laboratory Studies  Lab Results   Component Value Date    K 3 3 (L) 07/02/2020    K 4 5 06/13/2020     07/02/2020     (H) 06/13/2020    CO2 27 07/02/2020    CO2 19 (L) 06/13/2020    CREATININE 0 84 07/02/2020    BUN 18 07/02/2020    BUN 14 06/13/2020     Lab Results   Component Value Date    WBC 12 33 (H) 07/02/2020    RBC 3 86 07/02/2020    HGB 11 4 (L) 07/02/2020    HCT 36 4 07/02/2020    MCV 94 07/02/2020    MCH 29 5 07/02/2020    RDW 14 4 07/02/2020     07/02/2020         Imaging and Other Studies  )  Ct Abdomen Pelvis With Contrast    Result Date: 7/1/2020  Narrative: CT ABDOMEN AND PELVIS WITH IV CONTRAST INDICATION:   RLQ TTP  COMPARISON:  None  TECHNIQUE:  CT examination of the abdomen and pelvis was performed  Axial, sagittal, and coronal 2D reformatted images were created from the source data and submitted for interpretation  Radiation dose length product (DLP) for this visit:  3281 mGy-cm   This examination, like all CT scans performed in the [facility], was performed utilizing techniques to minimize radiation dose exposure, including the use of iterative reconstruction and automated exposure control  IV Contrast:  100 mL of iohexol (OMNIPAQUE) Enteric Contrast:  Enteric contrast was not administered  FINDINGS: ABDOMEN LOWER CHEST:  No clinically significant abnormality identified in the visualized lower chest  LIVER/BILIARY TREE:  Liver is diffusely decreased in density consistent with fatty change  No CT evidence of suspicious hepatic mass  Normal hepatic contours  No biliary dilatation  GALLBLADDER:  No calcified gallstones  No pericholecystic inflammatory change  SPLEEN:  Unremarkable  PANCREAS:  Unremarkable  ADRENAL GLANDS:  Unremarkable  KIDNEYS/URETERS:  There is mild right-sided hydronephrosis and proximal hydroureter, the cause of which appears to be a 5 mm calculus at the mid ureter    There is a 3 to 4 mm nonobstructing calculus at the left renal midpole One or more sharply circumscribed subcentimeter renal hypodensities are noted  These lesions are too small to accurately characterize, but are statistically most likely to represent benign cortical renal cyst(s)  According to the guidelines published in  the [facility] Paper of the ACR Incidental Findings Committee (Radiology 2010), no further workup of these lesions is recommended  STOMACH AND BOWEL:  There is colonic diverticulosis without evidence of acute diverticulitis  APPENDIX:  A normal appendix was visualized  ABDOMINOPELVIC CAVITY:  No ascites  No pneumoperitoneum  No lymphadenopathy  VESSELS:  Atherosclerotic changes are present  No evidence of aneurysm  PELVIS REPRODUCTIVE ORGANS:  Unremarkable for patient's age  URINARY BLADDER:  Unremarkable  ABDOMINAL WALL/INGUINAL REGIONS:  There is a small fat-containing umbilical hernia  OSSEOUS STRUCTURES:  No acute fracture or destructive osseous lesion  Impression: Mild right-sided hydronephrosis, the cause of which appears to be a 5 mm calculus at the mid ureter  Scattered colonic diverticulosis with no inflammatory changes present to suggest acute diverticulitis   Workstation performed: [id number]       Medications     Current Facility-Administered Medications:  acetaminophen 650 mg Oral Q6H PRN Albin Butler PA-C    calcium carbonate 1,000 mg Oral Daily PRN Albin Butler PA-C    cefTRIAXone 1,000 mg Intravenous Q24H Albin Butler PA-C    fenofibrate 48 mg Oral Daily Albin Butler PA-C    folic acid 4,833 mcg Oral Daily Albin Butler PA-C    heparin (porcine) 5,000 Units Subcutaneous Q8H [facility] Albin Butler PA-C    LORazepam 0 5 mg Oral Q6H PRN Albin Butler PA-C    metoprolol tartrate 12 5 mg Oral Q12H [facility] Kimmy Mak MD    oxyCODONE 10 mg Oral Q6H PRN Albin Butler PA-C    oxyCODONE 5 mg Oral Q6H PRN Albin Butler PA-C    pantoprazole 40 mg Oral Early Morning Albin Butler PA-C sodium chloride 100 mL/hr Intravenous Continuous Albin Butler PA-C Last Rate: 100 mL/hr (07/02/20 0225)   tamsulosin 0 4 mg Oral Daily With [employer] CR[employer]

## 2020-07-02 NOTE — DISCHARGE INSTRUCTIONS
Ureteral Stent Placement   WHAT YOU NEED TO KNOW:     Raysa Romero,    Today you had a cystoscopy and a right ureteral stent placement, we placed a 6 [place] by 26 centimeter right ureteral stent without a string, this will drain your kidney and you will feel better  I will place orders for my office to schedule your subsequent stone surgery, either with me or with 1 of my colleagues  With a ureteral stent in place it is normal to have urgency of urination, frequency, blood in your urine, and pain in your right kidney when you urinate  If you have questions or concerns in the postoperative time frame, please do not hesitate to contact our office  Take care and be well,  Dr César High  Ureteral stent placement is a procedure to open a blocked or narrow ureter  The ureter is the tube that carries urine from your kidney into your bladder  A stent is a thin hollow plastic tube used to hold your ureter open and allow urine to flow  The stent may stay in for several weeks  DISCHARGE INSTRUCTIONS:   Medicines:   · Pain medicine  may be given to take away or decrease pain  Do not wait until the pain is severe before you take your medicine  · Antibiotics  help prevent infections  Your healthcare provider may prescribe these for you while your stent remains in  · Take your medicine as directed  Contact your healthcare provider if you think your medicine is not helping or if you have side effects  Tell him or her if you are allergic to any medicine  Keep a list of the medicines, vitamins, and herbs you take  Include the amounts, and when and why you take them  Bring the list or the pill bottles to follow-up visits  Carry your medicine list with you in case of an emergency  Follow up with your urologist as directed: You will need regular follow-up visits with your urologist as long as the stent remains in  He will check to make sure the stent is working properly  He may do urine cultures to check for infection  Write down your questions so you remember to ask them during your visits  Self-care:   · Drink liquids  as directed  Ask your healthcare provider how much liquid to drink each day and which liquids are best for you  Fluids such as cranberry or apple juice may be especially helpful to prevent urinary infections  · Return to normal activities  the day after your stent placement or as directed by your healthcare provider  · You may take a shower  the day after your stent placement if your healthcare provider says it is okay  Contact your healthcare provider or urologist if:   · You have a fever or chills  · You feel like you need to urinate often  · You have pain when you urinate or pain around your bladder or kidney  · You see blood in your urine or it looks cloudy  · You have questions or concerns about your condition or care  Seek care immediately or call 911 if:   · You urinate little or not at all  · You have severe pain in your abdomen  © 2017 [facility] Information is for End User's use only and may not be sold, redistributed or otherwise used for commercial purposes  All illustrations and images included in CareNotes® are the copyrighted property of [employer] , [employer]  or Manuel Pandey  The above information is an  only  It is not intended as medical advice for individual conditions or treatments  Talk to your doctor, nurse or pharmacist before following any medical regimen to see if it is safe and effective for you

## 2020-07-02 NOTE — MALNUTRITION/BMI
This medical record reflects one or more clinical indicators suggestive of malnutrition and/or morbid obesity  BMI Findings:  BMI Classifications: Morbid Obesity 45-49 9     Body mass index is 49 51 kg/m²  See Nutrition note dated 7/2/20 for additional details  Completed nutrition assessment is viewable in the nutrition documentation

## 2020-07-03 VITALS
HEIGHT: 67 IN | HEART RATE: 64 BPM | WEIGHT: 293 LBS | OXYGEN SATURATION: 99 % | RESPIRATION RATE: 16 BRPM | TEMPERATURE: 97.4 F | SYSTOLIC BLOOD PRESSURE: 127 MMHG | BODY MASS INDEX: 45.99 KG/M2 | DIASTOLIC BLOOD PRESSURE: 54 MMHG

## 2020-07-03 PROBLEM — A41.9 SEPSIS (HCC): Status: RESOLVED | Noted: 2020-07-01 | Resolved: 2020-07-03

## 2020-07-03 LAB
ANION GAP SERPL CALCULATED.3IONS-SCNC: 6 MMOL/L (ref 4–13)
BACTERIA UR CULT: NORMAL
BUN SERPL-MCNC: 16 MG/DL (ref 5–25)
CALCIUM SERPL-MCNC: 8.6 MG/DL (ref 8.3–10.1)
CHLORIDE SERPL-SCNC: 107 MMOL/L (ref 100–108)
CO2 SERPL-SCNC: 27 MMOL/L (ref 21–32)
CREAT SERPL-MCNC: 0.79 MG/DL (ref 0.6–1.3)
ERYTHROCYTE [DISTWIDTH] IN BLOOD BY AUTOMATED COUNT: 14.2 % (ref 11.6–15.1)
GFR SERPL CREATININE-BSD FRML MDRD: 79 ML/MIN/1.73SQ M
GLUCOSE SERPL-MCNC: 157 MG/DL (ref 65–140)
HCT VFR BLD AUTO: 38.1 % (ref 34.8–46.1)
HGB BLD-MCNC: 11.7 G/DL (ref 11.5–15.4)
MCH RBC QN AUTO: 29.5 PG (ref 26.8–34.3)
MCHC RBC AUTO-ENTMCNC: 30.7 G/DL (ref 31.4–37.4)
MCV RBC AUTO: 96 FL (ref 82–98)
PLATELET # BLD AUTO: 243 THOUSANDS/UL (ref 149–390)
PMV BLD AUTO: 9.5 FL (ref 8.9–12.7)
POTASSIUM SERPL-SCNC: 3.9 MMOL/L (ref 3.5–5.3)
RBC # BLD AUTO: 3.96 MILLION/UL (ref 3.81–5.12)
SODIUM SERPL-SCNC: 140 MMOL/L (ref 136–145)
WBC # BLD AUTO: 10.74 THOUSAND/UL (ref 4.31–10.16)

## 2020-07-03 PROCEDURE — 99239 HOSP IP/OBS DSCHRG MGMT >30: CPT | Performed by: INTERNAL MEDICINE

## 2020-07-03 PROCEDURE — 80048 BASIC METABOLIC PNL TOTAL CA: CPT | Performed by: INTERNAL MEDICINE

## 2020-07-03 PROCEDURE — 99285 EMERGENCY DEPT VISIT HI MDM: CPT | Performed by: PHYSICIAN ASSISTANT

## 2020-07-03 PROCEDURE — 85027 COMPLETE CBC AUTOMATED: CPT | Performed by: INTERNAL MEDICINE

## 2020-07-03 RX ORDER — SULFAMETHOXAZOLE AND TRIMETHOPRIM 800; 160 MG/1; MG/1
1 TABLET ORAL EVERY 12 HOURS SCHEDULED
Status: DISCONTINUED | OUTPATIENT
Start: 2020-07-03 | End: 2020-07-03 | Stop reason: HOSPADM

## 2020-07-03 RX ADMIN — OXYBUTYNIN CHLORIDE 5 MG: 5 TABLET ORAL at 08:30

## 2020-07-03 RX ADMIN — FOLIC ACID 1000 MCG: 1 TABLET ORAL at 08:26

## 2020-07-03 RX ADMIN — PHENAZOPYRIDINE 100 MG: 100 TABLET ORAL at 08:26

## 2020-07-03 RX ADMIN — SULFAMETHOXAZOLE AND TRIMETHOPRIM 1 TABLET: 800; 160 TABLET ORAL at 09:29

## 2020-07-03 RX ADMIN — PHENAZOPYRIDINE 100 MG: 100 TABLET ORAL at 12:05

## 2020-07-03 RX ADMIN — HEPARIN SODIUM 5000 UNITS: 5000 INJECTION INTRAVENOUS; SUBCUTANEOUS at 05:46

## 2020-07-03 RX ADMIN — PANTOPRAZOLE SODIUM 40 MG: 40 TABLET, DELAYED RELEASE ORAL at 05:46

## 2020-07-03 RX ADMIN — SODIUM CHLORIDE 100 ML/HR: 0.9 INJECTION, SOLUTION INTRAVENOUS at 08:31

## 2020-07-03 RX ADMIN — METOPROLOL TARTRATE 12.5 MG: 25 TABLET ORAL at 08:26

## 2020-07-03 NOTE — PLAN OF CARE
Problem: Potential for Falls  Goal: Patient will remain free of falls  Description  INTERVENTIONS:  - Assess patient frequently for physical needs  -  Identify cognitive and physical deficits and behaviors that affect risk of falls    -  Walling fall precautions as indicated by assessment   - Educate patient/family on patient safety including physical limitations  - Instruct patient to call for assistance with activity based on assessment  - Modify environment to reduce risk of injury  - Consider OT/PT consult to assist with strengthening/mobility  Outcome: Progressing     Problem: PAIN - ADULT  Goal: Verbalizes/displays adequate comfort level or baseline comfort level  Description  Interventions:  - Encourage patient to monitor pain and request assistance  - Assess pain using appropriate pain scale  - Administer analgesics based on type and severity of pain and evaluate response  - Implement non-pharmacological measures as appropriate and evaluate response  - Consider cultural and social influences on pain and pain management  - Notify physician/advanced practitioner if interventions unsuccessful or patient reports new pain  Outcome: Progressing     Problem: INFECTION - ADULT  Goal: Absence or prevention of progression during hospitalization  Description  INTERVENTIONS:  - Assess and monitor for signs and symptoms of infection  - Monitor lab/diagnostic results  - Monitor all insertion sites, i e  indwelling lines, tubes, and drains  - Monitor endotracheal if appropriate and nasal secretions for changes in amount and color  - Walling appropriate cooling/warming therapies per order  - Administer medications as ordered  - Instruct and encourage patient and family to use good hand hygiene technique  - Identify and instruct in appropriate isolation precautions for identified infection/condition  Outcome: Progressing     Problem: SAFETY ADULT  Goal: Maintain or return to baseline ADL function  Description  INTERVENTIONS:  -  Assess patient's ability to carry out ADLs; assess patient's baseline for ADL function and identify physical deficits which impact ability to perform ADLs (bathing, care of mouth/teeth, toileting, grooming, dressing, etc )  - Assess/evaluate cause of self-care deficits   - Assess range of motion  - Assess patient's mobility; develop plan if impaired  - Assess patient's need for assistive devices and provide as appropriate  - Encourage maximum independence but intervene and supervise when necessary  - Involve family in performance of ADLs  - Assess for home care needs following discharge   - Consider OT consult to assist with ADL evaluation and planning for discharge  - Provide patient education as appropriate  Outcome: Progressing     Problem: DISCHARGE PLANNING  Goal: Discharge to home or other facility with appropriate resources  Description  INTERVENTIONS:  - Identify barriers to discharge w/patient and caregiver  - Arrange for needed discharge resources and transportation as appropriate  - Identify discharge learning needs (meds, wound care, etc )  - Arrange for interpretive services to assist at discharge as needed  - Refer to Case Management Department for coordinating discharge planning if the patient needs post-hospital services based on physician/advanced practitioner order or complex needs related to functional status, cognitive ability, or social support system  Outcome: Progressing     Problem: Knowledge Deficit  Goal: Patient/family/caregiver demonstrates understanding of disease process, treatment plan, medications, and discharge instructions  Description  Complete learning assessment and assess knowledge base    Interventions:  - Provide teaching at level of understanding  - Provide teaching via preferred learning methods  Outcome: Progressing     Problem: GASTROINTESTINAL - ADULT  Goal: Minimal or absence of nausea and/or vomiting  Description  INTERVENTIONS:  - Administer IV fluids if ordered to ensure adequate hydration  - Maintain NPO status until nausea and vomiting are resolved  - Nasogastric tube if ordered  - Administer ordered antiemetic medications as needed  - Provide nonpharmacologic comfort measures as appropriate  - Advance diet as tolerated, if ordered  - Consider nutrition services referral to assist patient with adequate nutrition and appropriate food choices  Outcome: Progressing  Goal: Maintains adequate nutritional intake  Description  INTERVENTIONS:  - Monitor percentage of each meal consumed  - Identify factors contributing to decreased intake, treat as appropriate  - Assist with meals as needed  - Monitor I&O, weight, and lab values if indicated  - Obtain nutrition services referral as needed  Outcome: Progressing     Problem: GENITOURINARY - ADULT  Goal: Maintains or returns to baseline urinary function  Description  INTERVENTIONS:  - Assess urinary function  - Encourage oral fluids to ensure adequate hydration if ordered  - Administer IV fluids as ordered to ensure adequate hydration  - Administer ordered medications as needed  - Offer frequent toileting  - Follow urinary retention protocol if ordered  Outcome: Progressing     Problem: Nutrition/Hydration-ADULT  Goal: Nutrient/Hydration intake appropriate for improving, restoring or maintaining nutritional needs  Description  Monitor and assess patient's nutrition/hydration status for malnutrition  Collaborate with interdisciplinary team and initiate plan and interventions as ordered  Monitor patient's weight and dietary intake as ordered or per policy  Utilize nutrition screening tool and intervene as necessary  Determine patient's food preferences and provide high-protein, high-caloric foods as appropriate       INTERVENTIONS:  - Monitor oral intake, urinary output, labs, and treatment plans  - Assess nutrition and hydration status and recommend course of action  - Evaluate amount of meals eaten  - Assist patient with eating if necessary   - Allow adequate time for meals  - Recommend/ encourage appropriate diets, oral nutritional supplements, and vitamin/mineral supplements  - Order, calculate, and assess calorie counts as needed  - Recommend, monitor, and adjust tube feedings and TPN/PPN based on assessed needs  - Assess need for intravenous fluids  - Provide specific nutrition/hydration education as appropriate  - Include patient/family/caregiver in decisions related to nutrition  Outcome: Progressing

## 2020-07-03 NOTE — DISCHARGE SUMMARY
Discharge Summary - [facility] Internal Medicine    Patient Information: Farideh Matthew 59 y o  female MRN: [id number]  Unit/Bed#: W -01 Encounter: [id number]    Discharging Physician / Practitioner: Lucho Brennan MD  PCP: Alicia Mireles MD  Admission Date: 7/1/2020  Discharge Date: 07/03/20    Reason for Admission:  Right-sided flank pain    Discharge Diagnoses:     Principal Problem:    Ureterolithiasis  Active Problems:    Hypertension    Coronary artery disease of native artery of native heart with stable angina pectoris ([facility] [address] )    UTI (urinary tract infection)  Resolved Problems:    Sepsis ([facility] [address] )      Consultations During Hospital Stay:  · Urology    Procedures Performed:   · Cystoscopy retrograde pyelogram with ureteral stent placement    Significant Findings:   · Ureterolithiasis  · Urinary tract infection    Incidental Findings:   · None     Test Results Pending at Discharge (will require follow up): · None     Outpatient Tests Requested:  · None    Complications:  None    Hospital Course:     Farideh Matthew is a 59 y o  female patient who originally presented to the hospital on 7/1/2020 due to right-sided flank pain  On CT of the abdomen patient was noted to have mild right-sided hydronephrosis secondary to 5 mm calculus in the ureter  She was started on IV Rocephin and urine cultures were pending, when resulted she was transitioned to ciprofloxacin as per urology recommendations  Urology was consulted and patient was taken for retrograde pyelogram with ureteral stent placement  Patient states that her flank pain and dysuria had improved after procedure  Condition at Discharge: good     Discharge Day Visit / Exam:     Subjective:  Patient has no complaints    Vitals: Blood Pressure: 127/54 (07/03/20 0734)  Pulse: 64 (07/03/20 0734)  Temperature: (!) 97 4 °F (36 3 °C) (07/03/20 0734)  Temp Source: Oral (07/02/20 1831)  Respirations: 16 (07/03/20 0734)  Height: 5' 7" (170 2 cm) (07/02/20 1549)  Weight - Scale: (!) 143 kg (316 lb 2 2 oz) (07/02/20 0223)  SpO2: 99 % (07/03/20 0734)  Exam:   Physical Exam   Constitutional: She is oriented to person, place, and time  She appears well-developed and well-nourished  HENT:   Head: Normocephalic and atraumatic  Mouth/Throat: Oropharynx is clear and moist    Eyes: Pupils are equal, round, and reactive to light  Conjunctivae are normal    Neck: Neck supple  No JVD present  Cardiovascular: Normal rate, regular rhythm, normal heart sounds and intact distal pulses  Pulmonary/Chest: Effort normal and breath sounds normal    Abdominal: Soft  Bowel sounds are normal    Musculoskeletal: She exhibits no edema or tenderness  Neurological: She is alert and oriented to person, place, and time  Skin: Skin is warm and dry  Capillary refill takes less than 2 seconds  Psychiatric: She has a normal mood and affect  Her behavior is normal  Judgment and thought content normal    Nursing note and vitals reviewed  Discharge instructions/Information to patient and family:   See after visit summary for information provided to patient and family  Provisions for Follow-Up Care:  See after visit summary for information related to follow-up care and any pertinent home health orders  Disposition:     Home    For Discharges to [facility] SNF:   · Not Applicable to this Patient - Not Applicable to this Patient    Planned Readmission: no     Discharge Statement:  I spent 38 minutes discharging the patient  This time was spent on the day of discharge  I had direct contact with the patient on the day of discharge  Greater than 50% of the total time was spent examining patient, answering all patient questions, arranging and discussing plan of care with patient as well as directly providing post-discharge instructions  Additional time then spent on discharge activities      Discharge Medications:  See after visit summary for reconciled discharge medications provided to patient and family  ** Please Note: Dragon 360 Dictation voice to text software may have been used in the creation of this document   **

## 2020-07-04 NOTE — UTILIZATION REVIEW
Notification of Inpatient Admission/Inpatient Authorization Request   This is a Notification of Inpatient Admission for [address]  Be advised that this patient was admitted to our facility under Inpatient Status  Contact Jessenia Ryan at [phone] for additional admission information  [facility]  DEDICATED [phone]  Patient Name:   Randa Lux   YOB: 1955       [address]   [address]:   [place] Abdul  Tax ID: [id number]  NPI: [id number] Attending Provider/NPI:  Address:  Phone: Layla Nascimento Md [[id number]]  Same as the facility  [phone]   Place of Service Code: 24 Place of Service Name:  [address] [place] [address]   Start Date: 7/1/20 2348     Discharge Date & Time: 7/3/2020  3:11 PM    Type of Admission: Inpatient Status Discharge Disposition   (if discharged): Home/Self Care   Patient Diagnoses: Ureterolithiasis [N20 1]  UTI (urinary tract infection) [N39 0]  Unspecified abdominal pain [R10 9]  Sepsis ([address] ) [A41 9]     Orders: Admission Orders (From admission, onward)     Ordered        07/01/20 2344  Place in Observation (expected length of stay for this patient is less than two midnights)  Once         07/01/20 2346  Inpatient Admission  Once                    Assigned Utilization Review Contact: Jessenia Ryan  Utilization   Network Utilization Review Department  Phone: [phone]; Fax [phone]  Email: [email or web address]   ATTENTION PAYERS: Please call the assigned Utilization  directly with any questions or concerns ALL voicemails in the department are confidential  Send all requests for admission clinical reviews, approved or denied determinations and any other requests to dedicated fax number belonging to the campus where the patient is receiving treatment

## 2020-07-06 ENCOUNTER — PREP FOR PROCEDURE (OUTPATIENT)
Dept: UROLOGY | Facility: CLINIC | Age: 65
End: 2020-07-06

## 2020-07-06 ENCOUNTER — TRANSITIONAL CARE MANAGEMENT (OUTPATIENT)
Dept: FAMILY MEDICINE CLINIC | Facility: CLINIC | Age: 65
End: 2020-07-06

## 2020-07-06 DIAGNOSIS — N20.0 CALCULUS OF KIDNEY: Primary | ICD-10-CM

## 2020-07-06 LAB
BACTERIA BLD CULT: NORMAL
BACTERIA BLD CULT: NORMAL

## 2020-07-06 NOTE — UTILIZATION REVIEW
Initial Clinical Review    Admission: Date/Time/Statement: Admission Orders (From admission, onward)     Ordered        07/01/20 2346  Inpatient Admission  Once                   Orders Placed This Encounter   Procedures    Inpatient Admission     Standing Status:   Standing     Number of Occurrences:   1     Order Specific Question:   Admitting Physician     Answer:   Denise Turner     Order Specific Question:   Level of Care     Answer:   Med Surg [16]     Order Specific Question:   Estimated length of stay     Answer:   More than 2 Midnights     Order Specific Question:   Certification     Answer:   I certify that inpatient services are medically necessary for this patient for a duration of greater than two midnights  See H&P and MD Progress Notes for additional information about the patient's course of treatment  ED Arrival Information     Expected Arrival Acuity Means of Arrival Escorted By Service Admission Type    - 7/1/2020 20:11 Emergent [facility]ulance [facility] Emergency [facility] General Medicine Emergency    Arrival Complaint    ABDOMINAL PAIN        Chief Complaint   Patient presents with    Abdominal Pain     Acute onset RLQ pain x3 hours associated with severe n/v  EMS treats with a 20g L AC, 4mg zofran  Pt arrives in obvious discomfort     Assessment/Plan: 60 yo fem w/hx htn, obesity, CAD to ED from home by EMS admitted as inpatient due to sepsis from infected kidney stone, also has UTI  Presented with 1 day of severe 10/10 R flank/abdominal pain radiating into groin, improved by multiple analgesics given in ED  Exam reveals lethargic after meds, RLQ/CVA tenderness  Imaging showed R hydronephrosis with stone  UA positive  Urology consulted, IV antbx, IVF, analgesics, antiemetics in progress  NPO for possible surgical intervention  7/2 per urology: Dx R ureteral stone with renal colic and hydronephrosis   Aggressive IVF,flomax, narcotic analgesics, antiemetics, continue IV antbx, strain all urine, to OR if unable to medically pass the stone  Taken to OR 7/2 @1341:   Procedure:CYSTOSCOPY RETROGRADE PYELOGRAM WITH INSERTION STENT URETERAL (Right)  General anesthesia  Operative Findings:Orthotopic ureteral orifices, cloudy urine seen to exit upon placement of right ureteral stent, no complications  A 6 Lithuanian by 26 centimeter ureteral stent was placed on the right-hand side    7/2 postop urology note: provide ditropan, pyridium, toradol, no further gu intervention needed  Medical service will continue care       ED Triage Vitals   Temperature Pulse Respirations Blood Pressure SpO2   07/01/20 2024 07/01/20 2013 07/01/20 2013 07/01/20 2013 07/01/20 2013   99 5 °F (37 5 °C) 104 22 (!) 146/101 96 %      Temp Source Heart Rate Source Patient Position - Orthostatic VS BP Location FiO2 (%)   07/01/20 2024 07/01/20 2013 07/01/20 2013 07/01/20 2013 --   Oral Monitor Sitting Right arm       Pain Score       07/01/20 2112       Worst Possible Pain          Wt Readings from Last 1 Encounters:   07/02/20 (!) 143 kg (316 lb 2 2 oz)     Additional Vital Signs:   Date/Time  Temp  Pulse  Resp  BP  MAP (mmHg)  SpO2  O2 Flow Rate (L/min)  O2 Device  Patient Position - Orthostatic VS   07/02/20 16:49:17  97 9 °F (36 6 °C)  81  18  159/73  102  98 %         07/02/20 16:00:28  98 6 °F (37 °C)  74  19  142/64  90  97 %         07/02/20 15:23:11  97 9 °F (36 6 °C)  80  17  143/76  98  93 %    None (Room air)  Lying   07/02/20 14:46:15  97 5 °F (36 4 °C)  78  16  143/73  96  94 %         07/02/20 1415    97  16  158/69    94 %    None (Room air)     07/02/20 1359  97 2 °F (36 2 °C)Abnormal   84  20  137/65    100 %  6 L/min  Simple mask     07/02/20 1250  97 °F (36 1 °C)Abnormal   75  20  127/62    98 %    None (Room air)     07/02/20 07:15:50  98 2 °F (36 8 °C)  90  18  127/65  86  92 %    None (Room air)  Lying   07/02/20 02:23:07  97 8 °F (36 6 °C)  94  18  149/75  100  94 %         07/02/20 0145   94  18  140/80    95 %    None (Room air)  Lying   07/02/20 0030  98 6 °F (37 °C)  92  18  136/72    98 %    None (Room air)  Lying   07/02/20 0000  99 °F (37 2 °C)  94  20  137/90    97 %    None (Room air)  Lying   07/01/20 2247    93  22  141/99    96 %    None (Room air)  Lying   07/01/20 2143    82  20  147/101Abnormal     95 %    None (Room air)  Sitting       Pertinent Labs/Diagnostic Test Results:   CT abdomen pelvis with contrast   Final Result by David Lockhart MD (07/01 2250)       Mild right-sided hydronephrosis, the cause of which appears to be a 5 mm calculus at the mid ureter        Scattered colonic diverticulosis with no inflammatory changes present to suggest acute diverticulitis       7/1 EKG: nsr    Results from last 7 days   Lab Units 07/02/20  0025   SARS-COV-2  Negative     Results from last 7 days   Lab Units 07/03/20  0648 07/02/20  0517 07/01/20  2049   WBC Thousand/uL 10 74* 12 33* 14 04*   HEMOGLOBIN g/dL 11 7 11 4* 13 1   HEMATOCRIT % 38 1 36 4 41 4   PLATELETS Thousands/uL 243 252 325   NEUTROS ABS Thousands/µL  --  9 38* 8 37*         Results from last 7 days   Lab Units 07/03/20  0648 07/02/20  0517 07/01/20  2049   SODIUM mmol/L 140 138 141   POTASSIUM mmol/L 3 9 3 3* 3 5   CHLORIDE mmol/L 107 105 104   CO2 mmol/L 27 27 23   ANION GAP mmol/L 6 6 14*   BUN mg/dL 16 18 21   CREATININE mg/dL 0 79 0 84 1 19   EGFR ml/min/1 73sq m 79 74 48   CALCIUM mg/dL 8 6 7 9* 9 2     Results from last 7 days   Lab Units 07/01/20  2049   AST U/L 24   ALT U/L 32   ALK PHOS U/L 103   TOTAL PROTEIN g/dL 7 7   ALBUMIN g/dL 4 0   TOTAL BILIRUBIN mg/dL 0 38         Results from last 7 days   Lab Units 07/03/20  0648 07/02/20  0517 07/01/20  2049   GLUCOSE RANDOM mg/dL 157* 121 154*     Results from last 7 days   Lab Units 07/01/20  2049   PROTIME seconds 13 5   INR  1 09   PTT seconds 25     Results from last 7 days   Lab Units 07/02/20  0517 07/02/20  0024 07/01/20 2049   LACTIC ACID mmol/L 0 7 1 2 2 6*     Results from last 7 days   Lab Units 07/01/20 2049   LIPASE u/L 111     Results from last 7 days   Lab Units 07/01/20 2223   CLARITY UA  Cloudy   COLOR UA  Yellow   SPEC GRAV UA  1 025   PH UA  7 0   GLUCOSE UA mg/dl Negative   KETONES UA mg/dl Trace*   BLOOD UA  Large*   PROTEIN UA mg/dl 100 (2+)*   NITRITE UA  Negative   BILIRUBIN UA  Negative   UROBILINOGEN UA E U /dl 1 0   LEUKOCYTES UA  Small*   WBC UA /hpf 4-10*   RBC UA /hpf Innumerable*   BACTERIA UA /hpf None Seen   EPITHELIAL CELLS WET PREP /hpf Occasional       Results from last 7 days   Lab Units 07/01/20  2223 07/01/20  2101 07/01/20  2049   BLOOD CULTURE   --  No Growth After 4 Days  No Growth After 4 Days     URINE CULTURE  40,000-49,000 cfu/ml   --   --      ED Treatment:   Medication Administration from 07/01/2020 2011 to 07/02/2020 0211       Date/Time Order Dose Route Action     07/01/2020 2029 ondansetron (FOR EMS ONLY) (ZOFRAN) 4 mg/2 mL injection 4 mg   Given to EMS     07/01/2020 2028 OLANZapine (ZyPREXA) IM injection 6 mg 6 mg Intramuscular Given     07/01/2020 2112 HYDROmorphone (DILAUDID) injection 0 5 mg 0 5 mg Intravenous Given     07/01/2020 2109 metoclopramide (REGLAN) injection 10 mg 10 mg Intravenous Given     07/01/2020 2247 sodium chloride 0 9 % bolus 1,000 mL 1,000 mL Intravenous New Bag     07/01/2020 2244 fentanyl citrate (PF) 100 MCG/2ML 50 mcg 50 mcg Intravenous Given     07/01/2020 2247 ceftriaxone (ROCEPHIN) 1 g/50 mL in dextrose IVPB 1,000 mg Intravenous New Bag     07/01/2020 2314 sodium chloride 0 9 % bolus 1,000 mL 1,000 mL Intravenous New Bag     07/02/2020 0025 ondansetron (ZOFRAN) injection 4 mg 4 mg Intravenous Given     07/02/2020 0025 sodium chloride 0 9 % infusion 100 mL/hr Intravenous New Bag     07/02/2020 0025 heparin (porcine) subcutaneous injection 5,000 Units 5,000 Units Subcutaneous Given        Past Medical History:   Diagnosis Date    GERD (gastroesophageal reflux disease)     High cholesterol     HPV (human papilloma virus) infection     Hypercholesterolemia     Hypertension     Myocardial infarct (HCC)     X2    Pleuritic chest pain     Rheumatoid arthritis (HCC)     Rheu and cervical    Sleep apnea 2017    Vertigo      Present on Admission:   Ureterolithiasis   UTI (urinary tract infection)   (Resolved) Sepsis ([facility] [address] )   Coronary artery disease of native artery of native heart with stable angina pectoris [facility]   Hypertension      Admitting Diagnosis: Ureterolithiasis [N20 1]  UTI (urinary tract infection) [N39 0]  Unspecified abdominal pain [R10 9]  Sepsis ([facility] [address] ) [A41 9]  Age/Sex: 59 y o  female  Admission Orders:  Scheduled Medications:    No current facility-administered medications for this encounter  Continuous IV Infusions:    No current facility-administered medications for this encounter  PRN Meds:    acetaminophen 650 mg Oral Q6H PRN   calcium carbonate 1,000 mg Oral Daily PRN   ketorolac 15 mg Intravenous Q6H PRN   LORazepam 0 5 mg Oral Q6H PRN   oxyCODONE  X 1 7/2 @1020 10 mg Oral Q6H PRN     SCD's  Strain all urine  NPO, adv to house diet    IP CONSULT TO UROLOGY    Network Utilization Review Department  [email or web address]  ATTENTION: Please call with any questions or concerns to [phone] and carefully listen to the prompts so that you are directed to the right person  All voicemails are confidential   Monty Gallegos all requests for admission clinical reviews, approved or denied determinations and any other requests to dedicated fax number below belonging to the campus where the patient is receiving treatment   List of dedicated fax numbers for the Facilities:  [address] [place] [address] DENIALS (Administrative/Medical Necessity) [phone]   [address] (Maternity/NICU/Pediatrics) [phone]   [facility] [address] [facility] [phone] [facility] [place] [address] [phone]   [facility]  [phone] [address] [facility]  [phone]   [address] [place] [address] [phone]

## 2020-07-06 NOTE — ED PROVIDER NOTES
EMERGENCY MEDICINE NOTE        PATIENT IDENTIFICATION PHYSICIAN/SERVICE INFORMATION   Name: Yony Marx  MRN: [id number]  YOB: 1955  Age/Sex: 59 y o  female  Preferred Language: English  Code Status: Prior  Encounter Date: 7/1/2020  Attending Physician: No att  providers found  Admitting Physician: Natasha Omer MD   Primary Care Physician: Silvia Kwan MD         Primary Care Phone: [phone] [facility]     Chief Complaint   Patient presents with    Abdominal Pain     Acute onset RLQ pain x3 hours associated with severe n/v  EMS treats with a 20g L AC, 4mg zofran  Pt arrives in obvious discomfort         HISTORY OF PRESENT ILLNESS       History provided by:  Patient   used: No    Flank Pain   Pain location:  R flank  Pain quality: aching and sharp    Pain radiates to:  RLQ  Pain severity:  Severe  Onset quality:  Sudden  Duration: minutes  Timing:  Intermittent  Progression:  Waxing and waning  Chronicity:  New  Context: not alcohol use, not awakening from sleep, not diet changes, not eating, not laxative use, not medication withdrawal, not previous surgeries, not recent illness, not recent sexual activity, not recent travel, not retching, not sick contacts, not suspicious food intake and not trauma    Relieved by:  Nothing  Worsened by:  Nothing  Ineffective treatments: zofran    Associated symptoms: anorexia, nausea and vomiting    Associated symptoms: no belching, no chest pain, no chills, no constipation, no cough, no diarrhea, no dysuria, no fatigue, no fever, no flatus, no hematemesis, no hematochezia, no hematuria, no melena, no shortness of breath, no sore throat, no vaginal bleeding and no vaginal discharge    Risk factors: obesity    Risk factors: no alcohol abuse, no aspirin use, not elderly, has not had multiple surgeries, no NSAID use, not pregnant and no recent hospitalization          PAST MEDICAL AND SURGICAL HISTORY     Past Medical History:   Diagnosis Date    GERD (gastroesophageal reflux disease)     High cholesterol     HPV (human papilloma virus) infection     Hypercholesterolemia     Hypertension     Myocardial infarct (HCC)     X2    Pleuritic chest pain     Rheumatoid arthritis ([address] )     Rheu and cervical    Sleep apnea 2017    Vertigo        Past Surgical History:   Procedure Laterality Date    ANGIOPLASTY      BACK SURGERY      BIOPSY CORE NEEDLE  1980    CARDIAC CATHETERIZATION  2015    CARDIAC CATHETERIZATION      CERVICAL CONE BIOPSY      1980's    COLONOSCOPY      2006, 2019    DILATION AND CURETTAGE OF UTERUS      FL RETROGRADE PYELOGRAM  7/2/2020    MAMMO (HISTORICAL)  12/2018    NECK SURGERY      OTHER SURGICAL HISTORY Right 11/2017    Surgical removal of lypoma shoulder blade    SHOULDER SURGERY Right 2014    had a lympoma done in 2017 also same shoulder    SHOULDER SURGERY Right 2014    TONSILLECTOMY      WISDOM TOOTH EXTRACTION         Family History   Problem Relation Age of Onset    Cancer Mother     Lung cancer Mother     Heart disease Father        E-Cigarette/Vaping    E-Cigarette Use Never User      E-Cigarette/Vaping Substances     Social History     Tobacco Use    Smoking status: Former Smoker     Packs/day: 2 00     Years: 30 00     Pack years: 60 00    Smokeless tobacco: Never Used   Substance Use Topics    Alcohol use: Yes     Frequency: Monthly or less     Drinks per session: 1 or 2     Binge frequency: Never    Drug use: Never         ALLERGIES     Allergies   Allergen Reactions    Atorvastatin Myalgia    Diphenhydramine Hcl (Sleep) Itching    Pravastatin Myalgia    Simvastatin Myalgia         HOME MEDICATIONS     Prior to Admission Medications   Prescriptions Last Dose Informant Patient Reported? Taking?    Ascorbic Acid, Vitamin C, (VITAMIN C) 100 MG tablet 7/1/2020 at Unknown time  Yes Yes   Sig: Take 1,000 mg by mouth daily   Choline Fenofibrate (FENOFIBRIC ACID) 45 MG CPDR 2020 at Unknown time  Yes Yes   Sig: Take 1 capsule by mouth daily   HUMIRA PEN 40 MG/0 4ML PNKT   Yes No   LACTOBACILLUS PO 2020 at Unknown time  Yes Yes   Sig: Take by mouth daily   LORazepam (ATIVAN) 0 5 mg tablet Unknown at Unknown time  Yes No   Sig: Take 0 5 mg by mouth every 6 (six) hours as needed   OMEGA-3 FATTY ACIDS PO 2020 at Unknown time  Yes Yes   Sig: Take 1 g by mouth daily   adalimumab (HUMIRA) 40 mg/0 8 mL PSKT 2020  Yes No   Sig: Inject 40 mg under the skin every 14 (fourteen) days   aspirin 81 MG tablet 2020 at Unknown time  Yes Yes   Sig: Take 162 mg by mouth daily   choline fenofibrate (TRILIPIX) 135 MG capsule Not Taking at Unknown time  Yes No   Sig: TAKE 1 CAPSULE BY MOUTH EVERY DAY   fenofibrate (TRICOR) 48 mg tablet 2020 at Unknown time  Yes Yes   Sig: Take 48 mg by mouth daily   folic acid (FOLVITE) 1 mg tablet 2020 at Unknown time  Yes Yes   Sig: Take 1,000 mcg by mouth daily   methotrexate 2 5 mg tablet 2020 at Unknown time  Yes Yes   Sig: TAKE 8 TABLETS BY MOUTH ONCE A WEEK   methotrexate 2 5 mg tablet   Yes No   Sig: Take by mouth   metoprolol tartrate (LOPRESSOR) 25 mg tablet 2020 at Unknown time  Yes Yes   pantoprazole (PROTONIX) 40 mg tablet 2020 at Unknown time  Yes Yes   predniSONE 5 mg tablet Not Taking at Unknown time  Yes No   Si TABLET ONCE A DAY X 10 DAYS   rosuvastatin (CRESTOR) 20 MG tablet 2020 at Unknown time  Yes Yes   Sig: Take 20 mg by mouth daily      Facility-Administered Medications: None         REVIEW OF SYSTEMS     Review of Systems   Constitutional: Negative for activity change, appetite change, chills, diaphoresis, fatigue and fever  HENT: Negative for sore throat  Respiratory: Negative for cough and shortness of breath  Cardiovascular: Negative for chest pain  Gastrointestinal: Positive for abdominal pain, anorexia, nausea and vomiting   Negative for abdominal distention, constipation, diarrhea, flatus, hematemesis, hematochezia and melena  Genitourinary: Positive for flank pain  Negative for decreased urine volume, difficulty urinating, dysuria, frequency, genital sores, hematuria, urgency, vaginal bleeding and vaginal discharge  Skin: Negative for rash and wound  Neurological: Negative for dizziness and light-headedness  Hematological: Negative for adenopathy  Psychiatric/Behavioral: The patient is not nervous/anxious  All other systems reviewed and are negative  PHYSICAL EXAMINATION     ED Triage Vitals   Temperature Pulse Respirations Blood Pressure SpO2   07/01/20 2024 07/01/20 2013 07/01/20 2013 07/01/20 2013 07/01/20 2013   99 5 °F (37 5 °C) 104 22 (!) 146/101 96 %      Temp Source Heart Rate Source Patient Position - Orthostatic VS BP Location FiO2 (%)   07/01/20 2024 07/01/20 2013 07/01/20 2013 07/01/20 2013 --   Oral Monitor Sitting Right arm       Pain Score       07/01/20 2112       Worst Possible Pain         Wt Readings from Last 3 Encounters:   07/02/20 (!) 143 kg (316 lb 2 2 oz)   06/04/20 (!) 145 kg (319 lb)   04/27/20 (!) 140 kg (308 lb)         Physical Exam   Constitutional: She is oriented to person, place, and time  She appears well-developed and well-nourished  Non-toxic appearance  She does not appear ill  She appears distressed (unable to find comfortable position)  HENT:   Head: Normocephalic and atraumatic  Mouth/Throat: Oropharynx is clear and moist    Eyes: Pupils are equal, round, and reactive to light  Conjunctivae and EOM are normal    Neck: Normal range of motion  Neck supple  Cardiovascular: Normal rate, regular rhythm, normal heart sounds and intact distal pulses  Exam reveals no gallop and no friction rub  No murmur heard  Pulmonary/Chest: Effort normal and breath sounds normal  No stridor  No respiratory distress  She has no wheezes  She has no rales  She exhibits no tenderness  Abdominal: Soft   Bowel sounds are normal  She exhibits no distension and no mass  There is no hepatosplenomegaly  There is tenderness in the right lower quadrant  There is CVA tenderness (R)  There is no rigidity, no rebound, no guarding, no tenderness at McBurney's point and negative Bean's sign  No hernia  Negative Bean's  Negative Appendiceal signs (Psoas, Rovsing's, Obturator)  Negative Peritoneal Signs   Musculoskeletal: Normal range of motion  Neurological: She is alert and oriented to person, place, and time  Skin: Skin is warm and dry  Capillary refill takes less than 2 seconds  No rash noted  She is not diaphoretic  Nursing note and vitals reviewed          DIAGNOSTIC RESULTS     Laboratory results:    Labs Reviewed   CBC AND DIFFERENTIAL - Abnormal       Result Value Ref Range Status    WBC 14 04 (*) 4 31 - 10 16 Thousand/uL Final    RBC 4 51  3 81 - 5 12 Million/uL Final    Hemoglobin 13 1  11 5 - 15 4 g/dL Final    Hematocrit 41 4  34 8 - 46 1 % Final    MCV 92  82 - 98 fL Final    MCH 29 0  26 8 - 34 3 pg Final    MCHC 31 6  31 4 - 37 4 g/dL Final    RDW 14 4  11 6 - 15 1 % Final    MPV 9 4  8 9 - 12 7 fL Final    Platelets 133  857 - 390 Thousands/uL Final    nRBC 0  /100 WBCs Final    Neutrophils Relative 59  43 - 75 % Final    Immat GRANS % 1  0 - 2 % Final    Lymphocytes Relative 29  14 - 44 % Final    Monocytes Relative 8  4 - 12 % Final    Eosinophils Relative 2  0 - 6 % Final    Basophils Relative 1  0 - 1 % Final    Neutrophils Absolute 8 37 (*) 1 85 - 7 62 Thousands/µL Final    Immature Grans Absolute 0 09  0 00 - 0 20 Thousand/uL Final    Lymphocytes Absolute 4 08  0 60 - 4 47 Thousands/µL Final    Monocytes Absolute 1 18  0 17 - 1 22 Thousand/µL Final    Eosinophils Absolute 0 23  0 00 - 0 61 Thousand/µL Final    Basophils Absolute 0 09  0 00 - 0 10 Thousands/µL Final   COMPREHENSIVE METABOLIC PANEL - Abnormal    Sodium 141  136 - 145 mmol/L Final    Potassium 3 5  3 5 - 5 3 mmol/L Final    Chloride 104  100 - 108 mmol/L Final    CO2 23 21 - 32 mmol/L Final    ANION GAP 14 (*) 4 - 13 mmol/L Final    BUN 21  5 - 25 mg/dL Final    Creatinine 1 19  0 60 - 1 30 mg/dL Final    Comment: Standardized to IDMS reference method    Glucose 154 (*) 65 - 140 mg/dL Final    Comment:   If the patient is fasting, the ADA then defines impaired fasting glucose as > 100 mg/dL and diabetes as > or equal to 123 mg/dL  Specimen collection should occur prior to Sulfasalazine administration due to the potential for falsely depressed results  Specimen collection should occur prior to Sulfapyridine administration due to the potential for falsely elevated results  Calcium 9 2  8 3 - 10 1 mg/dL Final    AST 24  5 - 45 U/L Final    Comment:   Specimen collection should occur prior to Sulfasalazine administration due to the potential for falsely depressed results  ALT 32  12 - 78 U/L Final    Comment:   Specimen collection should occur prior to Sulfasalazine administration due to the potential for falsely depressed results  Alkaline Phosphatase 103  46 - 116 U/L Final    Total Protein 7 7  6 4 - 8 2 g/dL Final    Albumin 4 0  3 5 - 5 0 g/dL Final    Total Bilirubin 0 38  0 20 - 1 00 mg/dL Final    Comment:   Use of this assay is not recommended for patients undergoing treatment with eltrombopag due to the potential for falsely elevated results      eGFR 48  ml/min/1 73sq m Final    Narrative:     [place] guidelines for Chronic Kidney Disease (CKD):     Stage 1 with normal or high GFR (GFR > 90 mL/min/1 73 square meters)    Stage 2 Mild CKD (GFR = 60-89 mL/min/1 73 square meters)    Stage 3A Moderate CKD (GFR = 45-59 mL/min/1 73 square meters)    Stage 3B Moderate CKD (GFR = 30-44 mL/min/1 73 square meters)    Stage 4 Severe CKD (GFR = 15-29 mL/min/1 73 square meters)    Stage 5 End Stage CKD (GFR <15 mL/min/1 73 square meters)  Note: GFR calculation is accurate only with a steady state creatinine   LACTIC ACID, PLASMA - Abnormal LACTIC ACID 2 6 (*) 0 5 - 2 0 mmol/L Final    Narrative:     Result may be elevated if tourniquet was used during collection  URINE MICROSCOPIC - Abnormal    RBC, UA Innumerable (*) None Seen, 0-5 /hpf Final    WBC, UA 4-10 (*) None Seen, 0-5, 5-55, 5-65 /hpf Final    Epithelial Cells Occasional  None Seen, Occasional /hpf Final    Bacteria, UA None Seen  None Seen, Occasional /hpf Final    Ca Oxalate [place], UA Occasional (*) None Seen /hpf Final   URINE MACROSCOPIC, POC - Abnormal    Color, UA Yellow   Final    Clarity, UA Cloudy   Final    pH, UA 7 0  4 5 - 8 0 Final    Leukocytes, UA Small (*) Negative Final    Nitrite, UA Negative  Negative Final    Protein,  (2+) (*) Negative mg/dl Final    Glucose, UA Negative  Negative mg/dl Final    Ketones, UA Trace (*) Negative mg/dl Final    Urobilinogen, UA 1 0  0 2, 1 0 E U /dl E U /dl Final    Bilirubin, UA Negative  Negative Final    Blood, UA Large (*) Negative Final    Specific Gravity, UA 1 025  1 003 - 1 030 Final    Narrative:     CLINITEK RESULT   NOVEL CORONAVIRUS (COVID-19), PCR [facility] - Normal    SARS-CoV-2 Negative  Negative Final    Narrative: The specimen collection materials, transport medium, and/or testing methodology utilized in the production of these test results have been proven to be reliable in a limited validation with an abbreviated program under the Emergency Utilization Authorization provided by the FDA  Testing reported as "Presumptive positive" will be confirmed with secondary testing with a reference laboratory to ensure result accuracy  Clinical caution and judgement should be used with the interpretation of these results with consideration of the clinical impression and other laboratory testing  Testing reported as "Positive" or "Negative" has been proven to be accurate according to standard laboratory validation requirements    All testing is performed with control materials showing appropriate reactivity at standard intervals  LIPASE - Normal    Lipase 111  73 - 393 u/L Final   PROTIME-INR - Normal    Protime 13 5  11 6 - 14 5 seconds Final    INR 1 09  0 84 - 1 19 Final   APTT - Normal    PTT 25  23 - 37 seconds Final    Comment: Therapeutic Heparin Range =  60-90 seconds   LACTIC ACID 2 HOUR - Normal    LACTIC ACID 1 2  0 5 - 2 0 mmol/L Final    Narrative:     Result may be elevated if tourniquet was used during collection  All labs reviewed and utilized in the medical decision making process    Radiology results:    FL retrograde pyelogram   Final Result      Fluoroscopic guidance provided for right retrograde pyelogram  Please see procedure report for further details  Workstation performed: [id number]         CT abdomen pelvis with contrast   Final Result      Mild right-sided hydronephrosis, the cause of which appears to be a 5 mm calculus at the mid ureter  Scattered colonic diverticulosis with no inflammatory changes present to suggest acute diverticulitis        Workstation performed: [id number]             All radiology studies independently viewed by me and interpreted by the radiologist       PROCEDURES     Procedures        ASSESSMENT AND PLAN     MDM  Number of Diagnoses or Management Options  Sepsis ([address] ): new, needed workup  Ureterolithiasis: new, needed workup  UTI (urinary tract infection): new, needed workup     Amount and/or Complexity of Data Reviewed  Clinical lab tests: ordered and reviewed  Tests in the radiology section of CPT®: ordered and reviewed  Tests in the medicine section of CPT®: reviewed and ordered  Review and summarize past medical records: yes  Discuss the patient with other providers: yes  Independent visualization of images, tracings, or specimens: yes    Risk of Complications, Morbidity, and/or Mortality  Presenting problems: high  Diagnostic procedures: high  Management options: high    Patient Progress  Patient progress: improved      Initial ED assessment:  Ayleen Orourke Kelsy Jarquin is a 59 y o  female who presents with R Flank pain  Vitals signs reviewed and WNL  Physical examination is remarkable for RCVAT, RLQ TTP    Initial Ddx  includes but is not limited to:   renal colic, pyelonephritis, UTI, GI etiology, appendicitis, mesenteric adenitis, diverticulitis, pancreatitis, cholecystitis, biliary colic, AAA, rhabdomyolysis, tumor  Initial ED plan:   Plan will be to perform diagnostic testing of Blood labs, Urinalysis and CT of abdomen and treat symptomatically  Final ED summary/disposition: ADMIT MEDICINE: Discussed course of care with Patient and Family with Permission who is agreeable to admission for further eval, treatment  Called inpatient [address] [place] [address] who is aware of the patient, case discussed including HPI, pertinent PMH, ED Course, and workup  Hospitalist agreed with plan and will accept for admission/observation under the medicine service    MDM  Reviewed: previous chart, nursing note and vitals  Reviewed previous: labs  Interpretation: labs and CT scan          ED COURSE OF CARE AND REASSESSMENT          US AUDIT      Most Recent Value   Initial Alcohol Screen: US AUDIT-C    1  How often do you have a drink containing alcohol?  0 Filed at: 07/01/2020 2014   2  How many drinks containing alcohol do you have on a typical day you are drinking? 0 Filed at: 07/01/2020 2014   3b  FEMALE Any Age, or MALE 65+: How often do you have 4 or more drinks on one occassion? 0 Filed at: 07/01/2020 2014   Audit-C Score  0 Filed at: 07/01/2020 2014                  ZAHIDA/DAST-10      Most Recent Value   How many times in the past year have you    Used an illegal drug or used a prescription medication for non-medical reasons?   Never Filed at: 07/01/2020 2014                          Medications   ondansetron (FOR EMS ONLY) (ZOFRAN) 4 mg/2 mL injection 4 mg (0 mg Does not apply Given to EMS 7/1/20 2029)   OLANZapine (ZyPREXA) IM injection 6 mg (6 mg Intramuscular Given 7/1/20 2028)   sterile water injection **ADS Override Pull** (  Given 7/1/20 2028)   HYDROmorphone (DILAUDID) injection 0 5 mg (0 5 mg Intravenous Given 7/1/20 2112)   metoclopramide (REGLAN) injection 10 mg (10 mg Intravenous Given 7/1/20 2109)   sodium chloride 0 9 % bolus 1,000 mL (0 mL Intravenous Stopped 7/2/20 0001)   fentanyl citrate (PF) 100 MCG/2ML 50 mcg (50 mcg Intravenous Given 7/1/20 2244)   iohexol (OMNIPAQUE) 350 MG/ML injection (MULTI-DOSE) 100 mL (100 mL Intravenous Given 7/1/20 2227)   ceftriaxone (ROCEPHIN) 1 g/50 mL in dextrose IVPB (0 mg Intravenous Stopped 7/1/20 2330)   sodium chloride 0 9 % bolus 1,000 mL (0 mL Intravenous Stopped 7/2/20 0025)   ondansetron (ZOFRAN) injection 4 mg (4 mg Intravenous Given 7/2/20 0025)   potassium chloride (K-DUR,KLOR-CON) CR tablet 40 mEq (40 mEq Oral Given 7/2/20 1020)         FINAL IMPRESSION     Final diagnoses:   Ureterolithiasis   Sepsis ([facility] [address] )   UTI (urinary tract infection)         DISPOSITION AND PLANNING     Time reflects when diagnosis was documented in both MDM as applicable and the Disposition within this note     Time User Action Codes Description Comment    7/1/2020 11:41 PM Pavel Mountain Add [N20 1] Ureterolithiasis     7/1/2020 11:44 PM Pavel Mountain Add [A41 9] Sepsis ([facility] [address] )     7/1/2020 11:44 PM Pavel Mountain Add [N39 0] UTI (urinary tract infection)     7/2/2020  4:04 PM Braydon Resendizlla Add [R30 0] Dysuria       ED Disposition     ED Disposition Condition Date/Time Comment    Admit Stable Wed Jul 1, 2020 11:43 PM Case was discussed with Lana ORTEGA [facility] Hospitalist and the patient's admission status was agreed to be Admission Status: observation status to the service of Dr Sanya Kiser           Follow-up Information     Follow up With Specialties Details Why Contact Info Additional [address] [facility] Schedule an appointment as soon as possible for a visit in 2 week(s) Urology--our service will contact patient/caregiver with hospital follow-up appointment date and time once discharged  [address] [facility], [address] [facility], [address] [facility] [address]    Mana Ziegler MD Family Medicine Schedule an appointment as soon as possible for a visit in 1 week(s)  [address] [facility] [address] [facility] [address] [facility] [place]  [address] [facility] [address]  Schedule an appointment as soon as possible for a visit in 2 week(s)  Urology--our service will contact patient/caregiver with hospital follow-up appointment date and time once discharged        Mana Ziegler MD  [address]  [address] [facility] [address] [phone]    Schedule an appointment as soon as possible for a visit in 1 week(s)          DISCHARGE MEDICATIONS     Discharge Medication List as of 7/3/2020  2:19 PM      START taking these medications    Details   ciprofloxacin (CIPRO) 500 mg tablet Take 1 tablet (500 mg total) by mouth every 12 (twelve) hours for 5 days Start this medication the day prior to your prostate biopsy, Starting Thu 7/2/2020, Until Tue 7/7/2020, Normal      docusate sodium (COLACE) 100 mg capsule Take 1 capsule (100 mg total) by mouth 3 (three) times a day for 7 days, Starting Thu 7/2/2020, Until Thu 7/9/2020, Normal      ondansetron (ZOFRAN-ODT) 4 mg disintegrating tablet Take 1 tablet (4 mg total) by mouth every 6 (six) hours as needed for nausea or vomiting, Starting Thu 7/2/2020, Normal      oxybutynin (DITROPAN) 5 mg tablet Take 1 tablet (5 mg total) by mouth 2 (two) times a day as needed (stent colic and pain), Starting Thu 7/2/2020, Until Sat 8/1/2020, Normal      oxyCODONE-acetaminophen (PERCOCET) 5-325 mg per tablet Take 1 tablet by mouth every 6 (six) hours as needed for moderate pain for up to 5 daysMax Daily Amount: 4 tablets, Starting Thu 7/2/2020, Until Tue 7/7/2020, Normal      phenazopyridine (PYRIDIUM) 100 mg tablet Take 1 tablet (100 mg total) by mouth 3 (three) times a day with meals for 2 days, Starting Thu 7/2/2020, Until Sat 7/4/2020, Normal      tamsulosin (FLOMAX) 0 4 mg Take 1 capsule (0 4 mg total) by mouth daily with dinner, Starting Thu 7/2/2020, Normal         CONTINUE these medications which have NOT CHANGED    Details   Ascorbic Acid, Vitamin C, (VITAMIN C) 100 MG tablet Take 1,000 mg by mouth daily, Historical Med      aspirin 81 MG tablet Take 162 mg by mouth daily, Historical Med      !!  Choline Fenofibrate (FENOFIBRIC ACID) 45 MG CPDR Take 1 capsule by mouth daily, Starting Mon 3/9/2020, Historical Med      fenofibrate (TRICOR) 48 mg tablet Take 48 mg by mouth daily, Historical Med      folic acid (FOLVITE) 1 mg tablet Take 1,000 mcg by mouth daily, Starting Thu 3/5/2020, Historical Med      LACTOBACILLUS PO Take by mouth daily, Historical Med      !! methotrexate 2 5 mg tablet TAKE 8 TABLETS BY MOUTH ONCE A WEEK, Historical Med      metoprolol tartrate (LOPRESSOR) 25 mg tablet Starting Thu 3/5/2020, Historical Med      OMEGA-3 FATTY ACIDS PO Take 1 g by mouth daily, Historical Med      pantoprazole (PROTONIX) 40 mg tablet Starting Tue 4/21/2020, Historical Med      rosuvastatin (CRESTOR) 20 MG tablet Take 20 mg by mouth daily, Starting Mon 2/3/2020, Historical Med      adalimumab (HUMIRA) 40 mg/0 8 mL PSKT Inject 40 mg under the skin every 14 (fourteen) days, Historical Med      !! choline fenofibrate (TRILIPIX) 135 MG capsule TAKE 1 CAPSULE BY MOUTH EVERY DAY, Historical Med      HUMIRA PEN 40 MG/0 4ML PNKT Starting Wed 1/29/2020, Historical Med      LORazepam (ATIVAN) 0 5 mg tablet Take 0 5 mg by mouth every 6 (six) hours as needed, Historical Med      !! methotrexate 2 5 mg tablet Take by mouth, Historical Med      predniSONE 5 mg tablet 2 TABLET ONCE A DAY X 10 DAYS, Historical Med       !! - Potential duplicate medications found  Please discuss with provider            Outpatient Discharge Orders   Discharge Diet     Activity as tolerated     Call provider for:  persistent nausea or vomiting     Call provider for:  severe uncontrolled pain     Call provider for:  redness, tenderness, or signs of infection (pain, swelling, redness, odor or green/yellow discharge around incision site)     Call provider for: active or persistent bleeding     Call provider for:  difficulty breathing, headache or visual disturbances     Call provider for:  persistent dizziness or light-headedness       PDMP Review       Value Time User    PDMP Reviewed  Yes 7/2/2020  1:24 PM MD Shari Dye PA-C Greta Elliot, PA-C  07/06/20 0124

## 2020-07-06 NOTE — UTILIZATION REVIEW
Notification of Discharge  This is a Notification of Discharge from our facility [address]  Please be advised that this patient has been discharge from our facility  Below you will find the admission and discharge date and time including the patients disposition  PRESENTATION DATE: 7/1/2020  8:11 PM  OBS ADMISSION DATE:   IP ADMISSION DATE: 7/1/20 2348   DISCHARGE DATE: 7/3/2020  3:11 PM  DISPOSITION: Home/Self Care Home/Self Care   Admission Orders listed below:  Admission Orders (From admission, onward)     Ordered        07/01/20 2344  Place in Observation (expected length of stay for this patient is less than two midnights)  Once         07/01/20 2346  Inpatient Admission  Once                   Please contact the UR Department if additional information is required to close this patient's authorization/case  [address] [facility] Utilization Review Department  Main: [phone] x carefully listen to the prompts  All voicemails are confidential   [email or web address]  Send all requests for admission clinical reviews, approved or denied determinations and any other requests to dedicated fax number below belonging to the campus where the patient is receiving treatment   List of dedicated fax numbers:  [address] [place] [address] DENIALS (Administrative/Medical Necessity) [phone]   [address] (Maternity/NICU/Pediatrics) [phone]   Noreen Bedolla [phone]   Jo-Ann Johnson [phone]   [facility]  [phone]   Thonybashir Flaherty [facility] [address] [place] [address] [phone]   [facility]  [phone]   [address] [facility]  2401 Formerly named [facility] [address] [facility] [phone]

## 2020-07-06 NOTE — TELEPHONE ENCOUNTER
Called and spoke with patient she is scheduled for July 21 at the [facility] with Dr Bean  Advised she will need a  and they will call day prior with time of arrival  She will need to repeat UCX and have Covid-19 testing done 5-7 day prior to procedure date  Advised 7 day hold of any blood thinners, aspirin containing products and multivitamins  I will be mailing her a surgical packet with all of this information  She is aware to contact me with any questions or concerns

## 2020-07-06 NOTE — TELEPHONE ENCOUNTER
Called and spoke to patient  Made her aware that she does not need in office appointment and that she does need to have further surgery done  Made her aware that she will need to keep stent in until surgery  Made her aware that she will get a call from the surgery scheduler to schedule this  Patient verbalized understanding  Post Op Note    Farideh Matthew is a 59 y o  female s/p CYSTOSCOPY RETROGRADE PYELOGRAM WITH INSERTION STENT URETERAL (Right Bladder) performed 7/2/20  Farideh Matthew is a patient of Dr Ketan Tejeda and is seen at the [facility] office  How would you rate your pain on a scale from 1 to 10, 10 being the worst pain ever? 2  Have you had a fever? No  If so, what was your highest temperature? n/a F What is your current temperature? n/a F  Have your bowel movements been regular? Yes  Do you have any difficulty urinating? No  Does report frequency  Explained that this is a common symptoms with the stent in place  If the patient has an incision- do you have any redness around the incision or any drainage from the incision? No  If the patient has a drain- are you having any issues with the drain? N/a  If the patient has a miles- are you comfortable caring for your miles? N/a Is it draining urine? N/a  Do you have any other questions or concerns that I can address at this time?  no

## 2020-07-07 NOTE — UTILIZATION REVIEW
Notification of Discharge  This is a Notification of Discharge from our facility [address]  Please be advised that this patient has been discharge from our facility  Below you will find the admission and discharge date and time including the patients disposition  PRESENTATION DATE: 7/1/2020  8:11 PM  OBS ADMISSION DATE:   IP ADMISSION DATE: 7/1/20 2348   DISCHARGE DATE: 7/3/2020  3:11 PM  DISPOSITION: Home/Self Care Home/Self Care   Admission Orders listed below:  Admission Orders (From admission, onward)     Ordered        07/01/20 2344  Place in Observation (expected length of stay for this patient is less than two midnights)  Once         07/01/20 2346  Inpatient Admission  Once                   Please contact the UR Department if additional information is required to close this patient's authorization/case  [address] [facility] Utilization Review Department  Main: [phone] x carefully listen to the prompts  All voicemails are confidential   [email or web address]  Send all requests for admission clinical reviews, approved or denied determinations and any other requests to dedicated fax number below belonging to the campus where the patient is receiving treatment   List of dedicated fax numbers:  [address] [place] [address] DENIALS (Administrative/Medical Necessity) [phone]   [address]  (Maternity/NICU/Pediatrics) [phone]   Timothy Bishop [phone]   Particia Notice [phone]   Marcial Venegas [phone]   Huberchristine [facility] [address] [facility] [phone]   [facility]  [phone]   [address] [facility]  [address] [facility] [address] [facility] [phone]

## 2020-07-13 ENCOUNTER — APPOINTMENT (OUTPATIENT)
Dept: LAB | Facility: CLINIC | Age: 65
End: 2020-07-13
Payer: COMMERCIAL

## 2020-07-13 ENCOUNTER — TELEPHONE (OUTPATIENT)
Dept: UROLOGY | Facility: MEDICAL CENTER | Age: 65
End: 2020-07-13

## 2020-07-13 DIAGNOSIS — N20.0 CALCULUS OF KIDNEY: ICD-10-CM

## 2020-07-13 PROCEDURE — 87086 URINE CULTURE/COLONY COUNT: CPT

## 2020-07-13 PROCEDURE — U0003 INFECTIOUS AGENT DETECTION BY NUCLEIC ACID (DNA OR RNA); SEVERE ACUTE RESPIRATORY SYNDROME CORONAVIRUS 2 (SARS-COV-2) (CORONAVIRUS DISEASE [COVID-19]), AMPLIFIED PROBE TECHNIQUE, MAKING USE OF HIGH THROUGHPUT TECHNOLOGIES AS DESCRIBED BY CMS-2020-01-R: HCPCS

## 2020-07-13 NOTE — TELEPHONE ENCOUNTER
Patient of Dr Rex Crisostomo calling to get paperwork filled out prior to surgery  As per [facility] patient can fax records to [phone] and we can email them directly to her as soon as they are received       Patient states she will fax over sometime today

## 2020-07-14 LAB
BACTERIA UR CULT: NORMAL
INPATIENT: NORMAL
SARS-COV-2 RNA SPEC QL NAA+PROBE: NOT DETECTED

## 2020-07-15 RX ORDER — MECLIZINE HYDROCHLORIDE 25 MG/1
25 TABLET ORAL 3 TIMES DAILY PRN
COMMUNITY
End: 2021-02-22 | Stop reason: SDUPTHER

## 2020-07-15 NOTE — PRE-PROCEDURE INSTRUCTIONS
Pre-Surgery Instructions:   Medication Instructions    adalimumab (HUMIRA) 40 mg/0 8 mL PSKT Patient was instructed by Physician and understands   Ascorbic Acid, Vitamin C, (VITAMIN C) 100 MG tablet Patient was instructed by Physician and understands   aspirin 81 MG tablet Patient was instructed by Physician and understands   Choline Fenofibrate (FENOFIBRIC ACID) 45 MG CPDR Patient was instructed by Physician and understands   folic acid (FOLVITE) 1 mg tablet Patient was instructed by Physician and understands   LORazepam (ATIVAN) 0 5 mg tablet Patient was instructed by Physician and understands   meclizine (ANTIVERT) 25 mg tablet Patient was instructed by Physician and understands   methotrexate 2 5 mg tablet Patient was instructed by Physician and understands   metoprolol tartrate (LOPRESSOR) 25 mg tablet Patient was instructed by Physician and understands   OMEGA-3 FATTY ACIDS PO Patient was instructed by Physician and understands   pantoprazole (PROTONIX) 40 mg tablet Patient was instructed by Physician and understands   rosuvastatin (CRESTOR) 20 MG tablet Patient was instructed by Physician and understands  Pt instructed to take protonix and metoprolol the morning of surgery with a small sip of water and ativan and meclizine if needed  [facility] preop instructions reviewed with pt  Pt has antibacterial soap

## 2020-07-20 ENCOUNTER — ANESTHESIA EVENT (OUTPATIENT)
Dept: PERIOP | Facility: HOSPITAL | Age: 65
End: 2020-07-20
Payer: COMMERCIAL

## 2020-07-20 NOTE — TELEPHONE ENCOUNTER
Patient managed at the [facility]  Called and spoke to patient  Made her aware that he should not take any ibuprofen  She reports that she had not taken anything

## 2020-07-21 ENCOUNTER — ANESTHESIA (OUTPATIENT)
Dept: PERIOP | Facility: HOSPITAL | Age: 65
End: 2020-07-21
Payer: COMMERCIAL

## 2020-07-21 ENCOUNTER — HOSPITAL ENCOUNTER (OUTPATIENT)
Facility: HOSPITAL | Age: 65
Setting detail: OUTPATIENT SURGERY
Discharge: HOME | End: 2020-07-21
Attending: UROLOGY | Admitting: UROLOGY
Payer: COMMERCIAL

## 2020-07-21 ENCOUNTER — APPOINTMENT (OUTPATIENT)
Dept: RADIOLOGY | Facility: HOSPITAL | Age: 65
End: 2020-07-21
Payer: COMMERCIAL

## 2020-07-21 VITALS
SYSTOLIC BLOOD PRESSURE: 134 MMHG | HEIGHT: 67 IN | DIASTOLIC BLOOD PRESSURE: 69 MMHG | OXYGEN SATURATION: 95 % | RESPIRATION RATE: 18 BRPM | BODY MASS INDEX: 45.99 KG/M2 | WEIGHT: 293 LBS | HEART RATE: 72 BPM | TEMPERATURE: 97.7 F

## 2020-07-21 DIAGNOSIS — N20.0 NEPHROLITHIASIS: ICD-10-CM

## 2020-07-21 DIAGNOSIS — Z46.6 ENCOUNTER FOR ADJUSTMENT OF URETERAL STENT: ICD-10-CM

## 2020-07-21 PROCEDURE — 52352 CYSTOURETERO W/STONE REMOVE: CPT | Performed by: UROLOGY

## 2020-07-21 PROCEDURE — C2617 STENT, NON-COR, TEM W/O DEL: HCPCS | Performed by: UROLOGY

## 2020-07-21 PROCEDURE — C1769 GUIDE WIRE: HCPCS | Performed by: UROLOGY

## 2020-07-21 PROCEDURE — NC001 PR NO CHARGE: Performed by: UROLOGY

## 2020-07-21 PROCEDURE — 82360 CALCULUS ASSAY QUANT: CPT | Performed by: UROLOGY

## 2020-07-21 PROCEDURE — 52353 CYSTOURETERO W/LITHOTRIPSY: CPT | Performed by: UROLOGY

## 2020-07-21 DEVICE — VARIABLE LENGTH INJECTION STENT SET
Type: IMPLANTABLE DEVICE | Site: URETER | Status: FUNCTIONAL
Brand: CONTOUR VL™ INJECTION STENT SET

## 2020-07-21 RX ORDER — CEPHALEXIN 500 MG/1
500 CAPSULE ORAL EVERY 12 HOURS SCHEDULED
Qty: 10 CAPSULE | Refills: 0 | Status: SHIPPED | OUTPATIENT
Start: 2020-07-21 | End: 2020-07-29 | Stop reason: HOSPADM

## 2020-07-21 RX ORDER — DEXAMETHASONE SODIUM PHOSPHATE 4 MG/ML
INJECTION, SOLUTION INTRA-ARTICULAR; INTRALESIONAL; INTRAMUSCULAR; INTRAVENOUS; SOFT TISSUE AS NEEDED
Status: DISCONTINUED | OUTPATIENT
Start: 2020-07-21 | End: 2020-07-21 | Stop reason: SURG

## 2020-07-21 RX ORDER — OXYCODONE HYDROCHLORIDE AND ACETAMINOPHEN 5; 325 MG/1; MG/1
1 TABLET ORAL EVERY 6 HOURS PRN
Qty: 40 TABLET | Refills: 0 | Status: SHIPPED | OUTPATIENT
Start: 2020-07-21 | End: 2020-07-31

## 2020-07-21 RX ORDER — FENTANYL CITRATE/PF 50 MCG/ML
25 SYRINGE (ML) INJECTION
Status: DISCONTINUED | OUTPATIENT
Start: 2020-07-21 | End: 2020-07-21 | Stop reason: HOSPADM

## 2020-07-21 RX ORDER — GLYCOPYRROLATE 0.2 MG/ML
INJECTION INTRAMUSCULAR; INTRAVENOUS AS NEEDED
Status: DISCONTINUED | OUTPATIENT
Start: 2020-07-21 | End: 2020-07-21 | Stop reason: SURG

## 2020-07-21 RX ORDER — ROCURONIUM BROMIDE 10 MG/ML
INJECTION, SOLUTION INTRAVENOUS AS NEEDED
Status: DISCONTINUED | OUTPATIENT
Start: 2020-07-21 | End: 2020-07-21 | Stop reason: SURG

## 2020-07-21 RX ORDER — SCOLOPAMINE TRANSDERMAL SYSTEM 1 MG/1
1 PATCH, EXTENDED RELEASE TRANSDERMAL ONCE AS NEEDED
Status: DISCONTINUED | OUTPATIENT
Start: 2020-07-21 | End: 2020-07-21

## 2020-07-21 RX ORDER — OXYCODONE HYDROCHLORIDE AND ACETAMINOPHEN 5; 325 MG/1; MG/1
2 TABLET ORAL EVERY 4 HOURS PRN
Status: DISCONTINUED | OUTPATIENT
Start: 2020-07-21 | End: 2020-07-21 | Stop reason: HOSPADM

## 2020-07-21 RX ORDER — PROPOFOL 10 MG/ML
INJECTION, EMULSION INTRAVENOUS CONTINUOUS PRN
Status: DISCONTINUED | OUTPATIENT
Start: 2020-07-21 | End: 2020-07-21 | Stop reason: SURG

## 2020-07-21 RX ORDER — MAGNESIUM HYDROXIDE 1200 MG/15ML
LIQUID ORAL AS NEEDED
Status: DISCONTINUED | OUTPATIENT
Start: 2020-07-21 | End: 2020-07-21 | Stop reason: HOSPADM

## 2020-07-21 RX ORDER — FENTANYL CITRATE 50 UG/ML
INJECTION, SOLUTION INTRAMUSCULAR; INTRAVENOUS AS NEEDED
Status: DISCONTINUED | OUTPATIENT
Start: 2020-07-21 | End: 2020-07-21 | Stop reason: SURG

## 2020-07-21 RX ORDER — OXYCODONE HYDROCHLORIDE AND ACETAMINOPHEN 5; 325 MG/1; MG/1
1 TABLET ORAL EVERY 4 HOURS PRN
Status: DISCONTINUED | OUTPATIENT
Start: 2020-07-21 | End: 2020-07-21 | Stop reason: HOSPADM

## 2020-07-21 RX ORDER — HYDROCODONE BITARTRATE AND ACETAMINOPHEN 5; 325 MG/1; MG/1
1-2 TABLET ORAL EVERY 6 HOURS PRN
Qty: 20 TABLET | Refills: 0 | Status: SHIPPED | OUTPATIENT
Start: 2020-07-21 | End: 2020-07-21 | Stop reason: HOSPADM

## 2020-07-21 RX ORDER — ALBUTEROL SULFATE 2.5 MG/3ML
2.5 SOLUTION RESPIRATORY (INHALATION) ONCE AS NEEDED
Status: DISCONTINUED | OUTPATIENT
Start: 2020-07-21 | End: 2020-07-21 | Stop reason: HOSPADM

## 2020-07-21 RX ORDER — SCOLOPAMINE TRANSDERMAL SYSTEM 1 MG/1
1 PATCH, EXTENDED RELEASE TRANSDERMAL ONCE AS NEEDED
Status: DISCONTINUED | OUTPATIENT
Start: 2020-07-21 | End: 2020-07-21 | Stop reason: HOSPADM

## 2020-07-21 RX ORDER — PROPOFOL 10 MG/ML
INJECTION, EMULSION INTRAVENOUS AS NEEDED
Status: DISCONTINUED | OUTPATIENT
Start: 2020-07-21 | End: 2020-07-21 | Stop reason: SURG

## 2020-07-21 RX ORDER — MIDAZOLAM HYDROCHLORIDE 2 MG/2ML
INJECTION, SOLUTION INTRAMUSCULAR; INTRAVENOUS AS NEEDED
Status: DISCONTINUED | OUTPATIENT
Start: 2020-07-21 | End: 2020-07-21 | Stop reason: SURG

## 2020-07-21 RX ORDER — NEOSTIGMINE METHYLSULFATE 1 MG/ML
INJECTION INTRAVENOUS AS NEEDED
Status: DISCONTINUED | OUTPATIENT
Start: 2020-07-21 | End: 2020-07-21 | Stop reason: SURG

## 2020-07-21 RX ORDER — ONDANSETRON 2 MG/ML
INJECTION INTRAMUSCULAR; INTRAVENOUS AS NEEDED
Status: DISCONTINUED | OUTPATIENT
Start: 2020-07-21 | End: 2020-07-21 | Stop reason: SURG

## 2020-07-21 RX ORDER — SODIUM CHLORIDE 9 MG/ML
125 INJECTION, SOLUTION INTRAVENOUS CONTINUOUS
Status: DISCONTINUED | OUTPATIENT
Start: 2020-07-21 | End: 2020-07-21 | Stop reason: HOSPADM

## 2020-07-21 RX ORDER — OXYBUTYNIN CHLORIDE 5 MG/1
TABLET ORAL
Qty: 15 TABLET | Refills: 0 | Status: SHIPPED | OUTPATIENT
Start: 2020-07-21 | End: 2020-07-29 | Stop reason: HOSPADM

## 2020-07-21 RX ADMIN — MIDAZOLAM 2 MG: 1 INJECTION INTRAMUSCULAR; INTRAVENOUS at 15:19

## 2020-07-21 RX ADMIN — Medication 3000 MG: at 15:13

## 2020-07-21 RX ADMIN — ROCURONIUM BROMIDE 30 MG: 10 INJECTION, SOLUTION INTRAVENOUS at 15:19

## 2020-07-21 RX ADMIN — FENTANYL CITRATE 25 MCG: 50 INJECTION INTRAMUSCULAR; INTRAVENOUS at 16:22

## 2020-07-21 RX ADMIN — GLYCOPYRROLATE 0.6 MG: 0.2 INJECTION, SOLUTION INTRAMUSCULAR; INTRAVENOUS at 15:52

## 2020-07-21 RX ADMIN — SCOPALAMINE 1 PATCH: 1 PATCH, EXTENDED RELEASE TRANSDERMAL at 13:43

## 2020-07-21 RX ADMIN — PROPOFOL: 10 INJECTION, EMULSION INTRAVENOUS at 15:47

## 2020-07-21 RX ADMIN — PROPOFOL 200 MG: 10 INJECTION, EMULSION INTRAVENOUS at 15:19

## 2020-07-21 RX ADMIN — LIDOCAINE HYDROCHLORIDE 40 MG: 20 INJECTION, SOLUTION INTRAVENOUS at 15:19

## 2020-07-21 RX ADMIN — DEXAMETHASONE SODIUM PHOSPHATE 8 MG: 4 INJECTION, SOLUTION INTRAMUSCULAR; INTRAVENOUS at 15:49

## 2020-07-21 RX ADMIN — PROPOFOL 100 MCG/KG/MIN: 10 INJECTION, EMULSION INTRAVENOUS at 15:22

## 2020-07-21 RX ADMIN — SODIUM CHLORIDE 125 ML/HR: 0.9 INJECTION, SOLUTION INTRAVENOUS at 13:27

## 2020-07-21 RX ADMIN — NEOSTIGMINE METHYLSULFATE 3 MG: 1 INJECTION, SOLUTION INTRAVENOUS at 15:52

## 2020-07-21 RX ADMIN — FENTANYL CITRATE 100 MCG: 50 INJECTION, SOLUTION INTRAMUSCULAR; INTRAVENOUS at 15:19

## 2020-07-21 RX ADMIN — ONDANSETRON 4 MG: 2 INJECTION INTRAMUSCULAR; INTRAVENOUS at 15:19

## 2020-07-21 RX ADMIN — OXYCODONE HYDROCHLORIDE AND ACETAMINOPHEN 1 TABLET: 5; 325 TABLET ORAL at 17:51

## 2020-07-21 NOTE — OP NOTE
OPERATIVE REPORT  PATIENT NAME: Luisa Perez    :  1955  MRN: [id number]  Pt Location: [facility] OR ROOM 08    SURGERY DATE: 2020    Surgeon(s) and Role:     * Little Cruz MD - Primary    Preop Diagnosis:  Nephrolithiasis [N20 0]  Encounter for adjustment of ureteral stent [Z46 6]    Post-Op Diagnosis Codes:     * Nephrolithiasis [N20 0]     * Encounter for adjustment of ureteral stent [Z46 6]    Procedure(s) (LRB):  CYSTO, URETEROSCOPY STONE BASKET EXTRACTION , RETROGRADE PYELOGRAM, STENT (Right)    Specimen(s):  ID Type Source Tests Collected by Time Destination   A : RIGHT URETER STONE Calculus Ureter, Right STONE ANALYSIS Little Cruz MD 2020 1549        Estimated Blood Loss:   Minimal    Drains:  Ureteral Drain/Stent Right ureter 6 Fr  (Active)   Number of days: 0       Anesthesia Type:   General    Operative Indications:  Nephrolithiasis [N20 0]  Encounter for adjustment of ureteral stent [Z46 6]      Operative Findings:  5 mm stone about 8 cm above the bladder in ureter    Complications:   None    Procedure and Technique:      PLAN FOR STENT:  Cysto stent removal in 6-10 days    The patient was brought into the OR, properly identified and positioned on the table  General anesthesia was induced, and she was prepped using chlorhexidine solution and draped in lithotomy position  The 22F scope was introduced in the urethra, which showed no strictures  The bladder was inspected and had no lesions, stones, or tumors   The right ureteral stent was grasped, withdrawn, and wired  The rigid ureteroscope was introduced and carefully advanced up to stone  A basket was placed thru the scope and carefully engaged the stone  The scope was withdrawn, bringing the stone out, with no trauma to the ureter    Repeat ureteroscopy showed no trauma to the ureter    The scope was removed from the ureter, and the cystoscope was used to place a 6F Contour VL stent in the ureter, with the upper coils in the renal pelvis, and the distal coil in the bladder  Retrograde pyelogram on that side confirmed good position and no extravasation of contrast      The patient was awaken from anesthesia and taken to the PACU in good condition       I was present for the entire procedure    Patient Disposition:  PACU     SIGNATURE: Alvaro Rios MD  DATE: July 21, 2020  TIME: 3:53 PM

## 2020-07-21 NOTE — ANESTHESIA PREPROCEDURE EVALUATION
Review of Systems/Medical History  Patient summary reviewed  Chart reviewed  History of anesthetic complications PONV    Cardiovascular  EKG reviewed, Hyperlipidemia, Hypertension , CAD , History of percutaneous transluminal coronary angioplasty, No angina ,    Pulmonary  Smoker Cumulative Pack Years: 36, Sleep apnea CPAP,        GI/Hepatic    GERD well controlled,        Kidney stones,        Endo/Other    Obesity  morbid obesity   GYN  Negative gynecology ROS          Hematology  Negative hematology ROS      Musculoskeletal  Rheumatoid arthritis Severity: moderate,   Arthritis     Neurology      Comment: Vertigo Psychology           Physical Exam    Airway    Mallampati score: III  TM Distance: >3 FB  Neck ROM: full     Dental   No notable dental hx     Cardiovascular  Cardiovascular exam normal    Pulmonary  Pulmonary exam normal     Other Findings        Anesthesia Plan  ASA Score- 3     Anesthesia Type- general with ASA Monitors  Additional Monitors:   Airway Plan: ETT and LMA  Comment: NOTE LOOSE LOWER TOOTH  LMA poss ETT if LMA fails  Plan Factors-  Patient did not smoke on day of surgery  Induction- intravenous  Postoperative Plan-     Informed Consent- Anesthetic plan and risks discussed with patient

## 2020-07-21 NOTE — DISCHARGE INSTRUCTIONS
Ms  Dirk Elise are known to Dr Pema Rebolledo for kidney stones     CT obtained demonstrated obstructing right ureteral calculus (kidney stone coming out of your kidney and blocking the narrow to connecting kidney to bladder)  You were  taken to operative theater by Dr Ana María Sanchez 7/21 for cystoscopy, ureteroscopy, laser lithotripsy, basket stone extraction and insertion of right ureteral stent placed the procedure went well without adverse event or complications  You have a ureteral stent in place on the right  At home you will take antibiotic (Keflex) and pain medication  You may shower; but recommend against submersion in water to include:  Pool, beach, hot tub etc , until stent is removed  You may continue normal activities with the exception of extremely heavy lifting  There is no recommendation for bed rest or other activity limitation  The office will contact you for stent removal once you are discharged  Your stent should stay in place for approximately 10 days  If you have any questions you may contact the office, refer to information above  You will have varying degrees of blood in your urine after any urologic procedure  This is to be expected  However,  if you experience fever, chills, significant blood in urine accompanied by flank, abdominal, suprapubic pain or inability to void, shortness of breath and/or fainting; please seek immediate medical attention at your local emergency room  ([facility] )  Our office will contact you with hospital follow-up appointment date and time  On behalf of [facility], we wish you a quick and speedy recovery      Kind regards,  RAMIREZ Cardenas  Inpatient/hospital-based urology advanced practice clinician  On the half of JULIETTE Barajas  [facility]

## 2020-07-21 NOTE — H&P
HISTORY AND PHYSICAL  ? ? Patient Name: Vargas Heaton  Patient MRN: [id number]  Attending Provider: Ana María Sanchez MD  Service: Urology  Chief Complaint    5 mm right upper ureteral stone    HPI   Vargas Heaton is a 59 y o  female with stent placed recently for above stone   I plan cysto, right ureteroscopy, laser stone, stent  Potential risks and complications discussed, and informed consent was given by the patient  Medications  Meds/Allergies     No current facility-administered medications for this encounter  Prior to Admission Medications   Prescriptions Last Dose Informant Patient Reported? Taking? Ascorbic Acid, Vitamin C, (VITAMIN C) 100 MG tablet   Yes Yes   Sig: Take 1,000 mg by mouth daily   Choline Fenofibrate (FENOFIBRIC ACID) 45 MG CPDR   Yes Yes   Sig: Take 1 capsule by mouth daily   LORazepam (ATIVAN) 0 5 mg tablet   Yes Yes   Sig: Take 0 5 mg by mouth every 6 (six) hours as needed   OMEGA-3 FATTY ACIDS PO   Yes Yes   Sig: Take 1 g by mouth daily   adalimumab (HUMIRA) 40 mg/0 8 mL PSKT   Yes Yes   Sig: Inject 40 mg under the skin every 14 (fourteen) days   aspirin 81 MG tablet   Yes Yes   Sig: Take 162 mg by mouth daily   folic acid (FOLVITE) 1 mg tablet   Yes Yes   Sig: Take 1,000 mcg by mouth daily   meclizine (ANTIVERT) 25 mg tablet  Self Yes Yes   Sig: Take 25 mg by mouth 3 (three) times a day as needed for dizziness   methotrexate 2 5 mg tablet   Yes Yes   Sig: TAKE 8 TABLETS BY MOUTH ONCE A WEEK   metoprolol tartrate (LOPRESSOR) 25 mg tablet  Self Yes Yes   Sig: Take 12 5 mg by mouth every 12 (twelve) hours    pantoprazole (PROTONIX) 40 mg tablet   Yes Yes   rosuvastatin (CRESTOR) 20 MG tablet   Yes Yes   Sig: Take 20 mg by mouth daily      Facility-Administered Medications: None     No current facility-administered medications for this encounter     Review of Systems  10 point review of systems negative except as noted in HPI  Allergies  Allergies   Allergen Reactions    Atorvastatin Myalgia    Diphenhydramine Hcl (Sleep) Itching    Pravastatin Myalgia    Simvastatin Myalgia     PMH  Past Medical History:   Diagnosis Date    Arthritis     Cancer ([facility] [address] [facility] )     cervical    Coronary artery disease     CPAP (continuous positive airway pressure) dependence     GERD (gastroesophageal reflux disease)     High cholesterol     HPV (human papilloma virus) infection     Hypercholesterolemia     Hypertension     Kidney stone     Motion sickness     Myocardial infarct (HCC)     X2    Pleuritic chest pain     PONV (postoperative nausea and vomiting)     Rheumatoid arthritis ([facility] [address] [facility] )     Rheu and cervical    Sleep apnea 2017    Urinary frequency     Vertigo     Wears glasses      Past surgical history  Past Surgical History:   Procedure Laterality Date    ANGIOPLASTY      BIOPSY CORE NEEDLE      CARDIAC CATHETERIZATION      star close - closure system    CARDIAC CATHETERIZATION      CERVICAL CONE BIOPSY      's    COLONOSCOPY      ,     COLONOSCOPY      DILATION AND CURETTAGE OF UTERUS      FL RETROGRADE PYELOGRAM  2020    MAMMO (HISTORICAL)  2018    NECK SURGERY      OTHER SURGICAL HISTORY Right 2017    Surgical removal of lypoma shoulder blade    NC CYSTOURETHROSCOPY,URETER CATHETER Right 2020    Procedure: CYSTOSCOPY RETROGRADE PYELOGRAM WITH INSERTION STENT URETERAL;  Surgeon: Libra Malik MD;  [facility];  Service: Urology    SHOULDER SURGERY Right     had a lympoma done in 2017 also same shoulder    SHOULDER SURGERY Right 2014    TONSILLECTOMY      WISDOM TOOTH EXTRACTION       Social history  Social History     Tobacco Use    Smoking status: Former Smoker     Last attempt to quit: 7/15/1998     Years since quittin 0    Smokeless tobacco: Never Used   Substance Use Topics    Alcohol use: Yes     Frequency: Monthly or less     Drinks per session: 1 or 2     Binge frequency: Never    Drug use: Never ?  Physical Exam     vs  General appearance: alert and oriented, in no acute distress  Head: Normocephalic, without obvious abnormality, atraumatic  Eyes: conjunctivae/corneas clear  PERRL, EOM's intact  Fundi benign    Throat: lips, mucosa, and tongue normal; teeth and gums normal  Neck: no adenopathy, no carotid bruit, no JVD, supple, symmetrical, trachea midline and thyroid not enlarged, symmetric, no tenderness/mass/nodules  Lungs: clear to auscultation bilaterally  Heart: regular rate and rhythm, S1, S2 normal, no murmur, click, rub or gallop  Abdomen: soft, non-tender; bowel sounds normal; no masses,  no organomegaly  Extremities: extremities normal, warm and well-perfused; no cyanosis, clubbing, or edema  Neurologic: Grossly normal  Jia Nails MD

## 2020-07-21 NOTE — INTERVAL H&P NOTE
H&P reviewed  After examining the patient I find no changes in the patients condition since the H&P had been written      Vitals:    07/21/20 1313   BP: 130/61   Pulse: 72   Resp: 18   Temp: 97 6 °F (36 4 °C)   SpO2: 96%

## 2020-07-22 ENCOUNTER — TELEPHONE (OUTPATIENT)
Dept: UROLOGY | Facility: MEDICAL CENTER | Age: 65
End: 2020-07-22

## 2020-07-22 NOTE — TELEPHONE ENCOUNTER
Pt of Dr Estefanía Johnson and Dr Priya Ibrahim  Pt was seen by these 2 doctors for stone removal  Pt had CYSTO, URETEROSCOPY STONE BASKET EXTRACTION , RETROGRADE PYELOGRAM, STENT (Right Bladder) done 7/21/2020 with Dr Priya Ibrahim  According to operative note, Dr Priya Ibrahim recommended stent stay in place for 10 days and will need to be removed cystoscopically around 7/31/2020  Call placed to patient to schedule this appointment with Dr Priya Ibrahim  She is requesting the [facility] office as this is closer to her house and more convenient for her to travel to  Informed her I will reach out to their office in regards to her request for appointment  She is aware and will await a call from the office at this time  Pt also had questions about disability paperwork s/p surgery and is requesting to speak to surgery coordinator in regards to this   Informed her that I will contact the surgery scheduler in regards to her request

## 2020-07-22 NOTE — TELEPHONE ENCOUNTER
Patient of Dr Devin Alexandre  Patient had surgery yesterday calling for stent appointment  Please advise

## 2020-07-22 NOTE — TELEPHONE ENCOUNTER
Call placed to patient and spoke with her  Offered her appointment on 7/31/2020 with Dr More Hightower for cysto with stent removal  Pt was very appreciative and thankful for this appointment

## 2020-07-22 NOTE — TELEPHONE ENCOUNTER
I spoke to the patient, she stated Kristine Maloney needs an update  I advised Kristine Maloney needs to fax forms to update, we do not give the information over the phone since we can not verify that the caller is actually calling from an insurance company  Patient will have Kristine Maloney fax new forms to [phone]

## 2020-07-22 NOTE — TELEPHONE ENCOUNTER
Called and spoke to patient at this time  I advised we can get her scheduled 8/5/2020 at 11:30am for cysto stent removal     Patient states she wants this out on the 31st and does not want to keep it in any longer  I apologized as we do not have a sooner appt at this time  Patient requesting to just get schedule in [place] to have the stent removed  I advised I can route to the [place] office for them to call and assist with scheduling      Patient thankful call

## 2020-07-27 ENCOUNTER — APPOINTMENT (EMERGENCY)
Dept: CT IMAGING | Facility: HOSPITAL | Age: 65
DRG: 690 | End: 2020-07-27
Payer: COMMERCIAL

## 2020-07-27 ENCOUNTER — TELEPHONE (OUTPATIENT)
Dept: UROLOGY | Facility: MEDICAL CENTER | Age: 65
End: 2020-07-27

## 2020-07-27 ENCOUNTER — HOSPITAL ENCOUNTER (INPATIENT)
Facility: HOSPITAL | Age: 65
LOS: 2 days | Discharge: HOME | DRG: 690 | End: 2020-07-29
Attending: EMERGENCY MEDICINE | Admitting: INTERNAL MEDICINE
Payer: COMMERCIAL

## 2020-07-27 DIAGNOSIS — N10 ACUTE PYELONEPHRITIS: Primary | ICD-10-CM

## 2020-07-27 DIAGNOSIS — T83.84XA PAIN DUE TO URETERAL STENT, INITIAL ENCOUNTER (HCC): ICD-10-CM

## 2020-07-27 DIAGNOSIS — R31.9 HEMATURIA: ICD-10-CM

## 2020-07-27 DIAGNOSIS — A41.9 SEPSIS, DUE TO UNSPECIFIED ORGANISM, UNSPECIFIED WHETHER ACUTE ORGAN DYSFUNCTION PRESENT (HCC): ICD-10-CM

## 2020-07-27 DIAGNOSIS — Z96.0 S/P URETERAL STENT PLACEMENT: ICD-10-CM

## 2020-07-27 LAB
ALBUMIN SERPL BCP-MCNC: 3.7 G/DL (ref 3.5–5)
ALP SERPL-CCNC: 115 U/L (ref 46–116)
ALT SERPL W P-5'-P-CCNC: 24 U/L (ref 12–78)
ANION GAP SERPL CALCULATED.3IONS-SCNC: 9 MMOL/L (ref 4–13)
APTT PPP: 27 SECONDS (ref 23–37)
AST SERPL W P-5'-P-CCNC: 15 U/L (ref 5–45)
BACTERIA UR QL AUTO: ABNORMAL /HPF
BASOPHILS # BLD AUTO: 0.05 THOUSANDS/ΜL (ref 0–0.1)
BASOPHILS NFR BLD AUTO: 0 % (ref 0–1)
BILIRUB SERPL-MCNC: 0.49 MG/DL (ref 0.2–1)
BILIRUB UR QL STRIP: ABNORMAL
BUN SERPL-MCNC: 15 MG/DL (ref 5–25)
CALCIUM SERPL-MCNC: 8.8 MG/DL (ref 8.3–10.1)
CHLORIDE SERPL-SCNC: 104 MMOL/L (ref 100–108)
CLARITY UR: ABNORMAL
CO2 SERPL-SCNC: 27 MMOL/L (ref 21–32)
COLOR UR: ABNORMAL
CREAT SERPL-MCNC: 0.94 MG/DL (ref 0.6–1.3)
EOSINOPHIL # BLD AUTO: 0.2 THOUSAND/ΜL (ref 0–0.61)
EOSINOPHIL NFR BLD AUTO: 1 % (ref 0–6)
ERYTHROCYTE [DISTWIDTH] IN BLOOD BY AUTOMATED COUNT: 14 % (ref 11.6–15.1)
GFR SERPL CREATININE-BSD FRML MDRD: 64 ML/MIN/1.73SQ M
GLUCOSE SERPL-MCNC: 118 MG/DL (ref 65–140)
GLUCOSE UR STRIP-MCNC: NEGATIVE MG/DL
HCT VFR BLD AUTO: 41.8 % (ref 34.8–46.1)
HGB BLD-MCNC: 13.1 G/DL (ref 11.5–15.4)
HGB UR QL STRIP.AUTO: ABNORMAL
IMM GRANULOCYTES # BLD AUTO: 0.05 THOUSAND/UL (ref 0–0.2)
IMM GRANULOCYTES NFR BLD AUTO: 0 % (ref 0–2)
INR PPP: 1.11 (ref 0.84–1.19)
KETONES UR STRIP-MCNC: NEGATIVE MG/DL
LACTATE SERPL-SCNC: 1.3 MMOL/L (ref 0.5–2)
LEUKOCYTE ESTERASE UR QL STRIP: ABNORMAL
LYMPHOCYTES # BLD AUTO: 2.85 THOUSANDS/ΜL (ref 0.6–4.47)
LYMPHOCYTES NFR BLD AUTO: 20 % (ref 14–44)
MCH RBC QN AUTO: 29 PG (ref 26.8–34.3)
MCHC RBC AUTO-ENTMCNC: 31.3 G/DL (ref 31.4–37.4)
MCV RBC AUTO: 93 FL (ref 82–98)
MONOCYTES # BLD AUTO: 0.97 THOUSAND/ΜL (ref 0.17–1.22)
MONOCYTES NFR BLD AUTO: 7 % (ref 4–12)
NEUTROPHILS # BLD AUTO: 9.86 THOUSANDS/ΜL (ref 1.85–7.62)
NEUTS SEG NFR BLD AUTO: 72 % (ref 43–75)
NITRITE UR QL STRIP: NEGATIVE
NON-SQ EPI CELLS URNS QL MICRO: ABNORMAL /HPF
NRBC BLD AUTO-RTO: 0 /100 WBCS
PH UR STRIP.AUTO: 6 [PH]
PLATELET # BLD AUTO: 274 THOUSANDS/UL (ref 149–390)
PMV BLD AUTO: 9.2 FL (ref 8.9–12.7)
POTASSIUM SERPL-SCNC: 3.6 MMOL/L (ref 3.5–5.3)
PROT SERPL-MCNC: 7.5 G/DL (ref 6.4–8.2)
PROT UR STRIP-MCNC: >=300 MG/DL
PROTHROMBIN TIME: 13.7 SECONDS (ref 11.6–14.5)
RBC # BLD AUTO: 4.51 MILLION/UL (ref 3.81–5.12)
RBC #/AREA URNS AUTO: ABNORMAL /HPF
SODIUM SERPL-SCNC: 140 MMOL/L (ref 136–145)
SP GR UR STRIP.AUTO: >=1.03 (ref 1–1.03)
UROBILINOGEN UR QL STRIP.AUTO: 0.2 E.U./DL
WBC # BLD AUTO: 13.98 THOUSAND/UL (ref 4.31–10.16)
WBC #/AREA URNS AUTO: ABNORMAL /HPF

## 2020-07-27 PROCEDURE — 99285 EMERGENCY DEPT VISIT HI MDM: CPT | Performed by: EMERGENCY MEDICINE

## 2020-07-27 PROCEDURE — 99223 1ST HOSP IP/OBS HIGH 75: CPT | Performed by: INTERNAL MEDICINE

## 2020-07-27 PROCEDURE — 99285 EMERGENCY DEPT VISIT HI MDM: CPT

## 2020-07-27 PROCEDURE — 81001 URINALYSIS AUTO W/SCOPE: CPT | Performed by: EMERGENCY MEDICINE

## 2020-07-27 PROCEDURE — 85025 COMPLETE CBC W/AUTO DIFF WBC: CPT | Performed by: EMERGENCY MEDICINE

## 2020-07-27 PROCEDURE — 80053 COMPREHEN METABOLIC PANEL: CPT | Performed by: EMERGENCY MEDICINE

## 2020-07-27 PROCEDURE — 94660 CPAP INITIATION&MGMT: CPT

## 2020-07-27 PROCEDURE — 94760 N-INVAS EAR/PLS OXIMETRY 1: CPT

## 2020-07-27 PROCEDURE — 96375 TX/PRO/DX INJ NEW DRUG ADDON: CPT

## 2020-07-27 PROCEDURE — 85730 THROMBOPLASTIN TIME PARTIAL: CPT | Performed by: EMERGENCY MEDICINE

## 2020-07-27 PROCEDURE — 87086 URINE CULTURE/COLONY COUNT: CPT | Performed by: EMERGENCY MEDICINE

## 2020-07-27 PROCEDURE — 36415 COLL VENOUS BLD VENIPUNCTURE: CPT | Performed by: EMERGENCY MEDICINE

## 2020-07-27 PROCEDURE — 87077 CULTURE AEROBIC IDENTIFY: CPT | Performed by: EMERGENCY MEDICINE

## 2020-07-27 PROCEDURE — 87040 BLOOD CULTURE FOR BACTERIA: CPT | Performed by: EMERGENCY MEDICINE

## 2020-07-27 PROCEDURE — 85610 PROTHROMBIN TIME: CPT | Performed by: EMERGENCY MEDICINE

## 2020-07-27 PROCEDURE — 74177 CT ABD & PELVIS W/CONTRAST: CPT

## 2020-07-27 PROCEDURE — 83605 ASSAY OF LACTIC ACID: CPT | Performed by: EMERGENCY MEDICINE

## 2020-07-27 PROCEDURE — 87186 SC STD MICRODIL/AGAR DIL: CPT | Performed by: EMERGENCY MEDICINE

## 2020-07-27 PROCEDURE — 96374 THER/PROPH/DIAG INJ IV PUSH: CPT

## 2020-07-27 PROCEDURE — 96365 THER/PROPH/DIAG IV INF INIT: CPT

## 2020-07-27 RX ORDER — OXYBUTYNIN CHLORIDE 5 MG/1
5 TABLET ORAL 4 TIMES DAILY PRN
Status: DISCONTINUED | OUTPATIENT
Start: 2020-07-27 | End: 2020-07-28

## 2020-07-27 RX ORDER — HYDROMORPHONE HCL/PF 1 MG/ML
0.5 SYRINGE (ML) INJECTION EVERY 4 HOURS PRN
Status: DISCONTINUED | OUTPATIENT
Start: 2020-07-27 | End: 2020-07-29 | Stop reason: HOSPADM

## 2020-07-27 RX ORDER — ROSUVASTATIN CALCIUM 10 MG/1
20 TABLET, COATED ORAL DAILY
Status: DISCONTINUED | OUTPATIENT
Start: 2020-07-28 | End: 2020-07-29 | Stop reason: HOSPADM

## 2020-07-27 RX ORDER — ACETAMINOPHEN 325 MG/1
650 TABLET ORAL EVERY 4 HOURS PRN
Status: DISCONTINUED | OUTPATIENT
Start: 2020-07-27 | End: 2020-07-29 | Stop reason: HOSPADM

## 2020-07-27 RX ORDER — LORAZEPAM 0.5 MG/1
0.5 TABLET ORAL EVERY 6 HOURS PRN
Status: DISCONTINUED | OUTPATIENT
Start: 2020-07-27 | End: 2020-07-29 | Stop reason: HOSPADM

## 2020-07-27 RX ORDER — OXYCODONE HYDROCHLORIDE AND ACETAMINOPHEN 5; 325 MG/1; MG/1
1 TABLET ORAL EVERY 6 HOURS PRN
Status: DISCONTINUED | OUTPATIENT
Start: 2020-07-27 | End: 2020-07-29 | Stop reason: HOSPADM

## 2020-07-27 RX ORDER — MORPHINE SULFATE 10 MG/ML
6 INJECTION, SOLUTION INTRAMUSCULAR; INTRAVENOUS ONCE
Status: COMPLETED | OUTPATIENT
Start: 2020-07-27 | End: 2020-07-27

## 2020-07-27 RX ORDER — PANTOPRAZOLE SODIUM 40 MG/1
40 TABLET, DELAYED RELEASE ORAL
Status: DISCONTINUED | OUTPATIENT
Start: 2020-07-28 | End: 2020-07-29 | Stop reason: HOSPADM

## 2020-07-27 RX ORDER — OXYCODONE HYDROCHLORIDE 10 MG/1
10 TABLET ORAL EVERY 4 HOURS PRN
Status: DISCONTINUED | OUTPATIENT
Start: 2020-07-27 | End: 2020-07-29 | Stop reason: HOSPADM

## 2020-07-27 RX ORDER — ONDANSETRON 2 MG/ML
4 INJECTION INTRAMUSCULAR; INTRAVENOUS ONCE
Status: COMPLETED | OUTPATIENT
Start: 2020-07-27 | End: 2020-07-27

## 2020-07-27 RX ORDER — ASPIRIN 81 MG/1
162 TABLET ORAL DAILY
Status: DISCONTINUED | OUTPATIENT
Start: 2020-07-28 | End: 2020-07-29 | Stop reason: HOSPADM

## 2020-07-27 RX ORDER — MECLIZINE HYDROCHLORIDE 25 MG/1
25 TABLET ORAL 3 TIMES DAILY PRN
Status: DISCONTINUED | OUTPATIENT
Start: 2020-07-27 | End: 2020-07-29 | Stop reason: HOSPADM

## 2020-07-27 RX ORDER — SODIUM CHLORIDE 9 MG/ML
100 INJECTION, SOLUTION INTRAVENOUS CONTINUOUS
Status: DISCONTINUED | OUTPATIENT
Start: 2020-07-27 | End: 2020-07-29

## 2020-07-27 RX ORDER — ONDANSETRON 2 MG/ML
4 INJECTION INTRAMUSCULAR; INTRAVENOUS EVERY 6 HOURS PRN
Status: DISCONTINUED | OUTPATIENT
Start: 2020-07-27 | End: 2020-07-29 | Stop reason: HOSPADM

## 2020-07-27 RX ADMIN — ACETAMINOPHEN 650 MG: 325 TABLET, FILM COATED ORAL at 19:14

## 2020-07-27 RX ADMIN — OXYCODONE HYDROCHLORIDE AND ACETAMINOPHEN 1 TABLET: 5; 325 TABLET ORAL at 22:33

## 2020-07-27 RX ADMIN — ONDANSETRON 4 MG: 2 INJECTION INTRAMUSCULAR; INTRAVENOUS at 14:35

## 2020-07-27 RX ADMIN — CEFEPIME HYDROCHLORIDE 2000 MG: 2 INJECTION, POWDER, FOR SOLUTION INTRAVENOUS at 14:50

## 2020-07-27 RX ADMIN — IOHEXOL 100 ML: 350 INJECTION, SOLUTION INTRAVENOUS at 15:25

## 2020-07-27 RX ADMIN — SODIUM CHLORIDE 100 ML/HR: 0.9 INJECTION, SOLUTION INTRAVENOUS at 22:35

## 2020-07-27 RX ADMIN — METOPROLOL TARTRATE 12.5 MG: 25 TABLET ORAL at 22:33

## 2020-07-27 RX ADMIN — SODIUM CHLORIDE 100 ML/HR: 0.9 INJECTION, SOLUTION INTRAVENOUS at 19:09

## 2020-07-27 RX ADMIN — MORPHINE SULFATE 6 MG: 10 INJECTION INTRAVENOUS at 14:38

## 2020-07-27 NOTE — TELEPHONE ENCOUNTER
Spoke to Pt of Dr Herminia Raymundo with "a lot of bleeding" and pain 10 out of 10 s/p cysto URS and R Stent placement on 7/21/20  Pt states she is able to urinate but she stopped taking percocet for pain due to nausea  She was hydrating a good amount  Consulted with Dr Priscila Vazquez who advised going to ER to check on status of stent to rule out any issues with the placement of the stent  Pt agreed to this plan  She is scheduled for stent removal on  Fri 7/31/20

## 2020-07-27 NOTE — H&P
H&P- Austin Phoenix 1955, 59 y o  female MRN: [id number]    Unit/Bed#: ED 10 Encounter: [id number]    Primary Care Provider: Landon Davenport MD   Date and time admitted to hospital: 7/27/2020  1:50 PM        * Acute pyelonephritis  Assessment & Plan  · Antibiotic - cefepime 2 g IV Q 12 hours  Of note, the patient was previously on Keflex for 5 days up until 07/26/2020  · Cultures - follow-up urine culture  Follow-up blood cultures  · IV fluids - normal saline at 100 mL/hr  · Urology evaluation for possible stent removal   · Pain control - oxycodone  P r n  IV Dilaudid  · Antiemetics - Zofran  · Antipyretic - Tylenol  · Monitor WBC count  Hematuria  Assessment & Plan  · Likely due to acute pyelonephritis  · Hemoglobin is stable  Will recheck hemoglobin by the morning  · Monitor for symptoms  · Urology evaluation  · Consider Jasso with continuous bladder irrigation if any symptoms of obstruction or other need for irrigation arises  S/P ureteral stent placement  Assessment & Plan  · Based on CT scan, it appears that the patient's stone has now passed  The patient had lithotripsy for stone on 07/21/2020  · Recommend evaluation by Urology for decision on whether to remove the stent  Vertigo  Assessment & Plan  · Patient takes p r n  Meclizine and Ativan for vertigo symptoms  · Currently no vertigo symptoms  Hypertension  Assessment & Plan  · Continue home antihypertensive regimen  · Monitor blood pressures  Sleep apnea  Assessment & Plan  · CPAP at night  Setting is 12 cm water  GERD (gastroesophageal reflux disease)  Assessment & Plan  · Protonix  No current symptoms  Rheumatoid arthritis ([address] )  Assessment & Plan  · Normally is on Humira and methotrexate  She gets methotrexate typically on Fridays  · Holding these medications in the setting of acute infection  Additionally, she is not currently due for them today        VTE Prophylaxis: Pharmacologic is contraindicated due to hematuria  / sequential compression device   Code Status:  Full code level 1  POLST: There is no POLST form on file for this patient (pre-hospital)    Anticipated Length of Stay:  Patient will be admitted on an Inpatient basis with an anticipated length of stay of  > 2 midnights  Justification for Hospital Stay: evaluation for pyelonephritis with hematuria    Total Time for Visit, including Counseling / Coordination of Care: 45 minutes  Greater than 50% of this total time spent on direct patient counseling and coordination of care  Chief Complaint:  Right groin and lower abdominal pain    History of Present Illness:    Jackalyn Brittle is a 59 y o  female who presents with right groin and lower abdominal pain which is acute on chronic with pain starting about 1 month ago  The patient had been diagnosed with a kidney stone and had undergone 1st a stent  However, she required antibiotics and then ultimately after antibiotics were given went for lithotripsy on 07/21/2020  The patient then had a stent in place following the procedure which is still in place  The patient states that she has been urinating frequently and now although she was having blood only intermittently since the procedure has now more consistently been passing blood as well as large amounts of clots  The patient denies any fevers or chills  However, she did have some nausea  She had an episode of diarrhea yesterday but today had loose but formed stool  She has a little bit of dizziness today as well  The patient was evaluated in the emergency department and a CT scan was done which shows stent to be in place but the stone is no longer present that had previously been present  However, the patient does have an elevated white blood cell count any urine which is suspicious for urinary infection  Hemoglobin is stable  Request is made for patient to admitted into the hospital for further evaluation and treatment      Review of Systems:    Review of Systems   Constitutional: Negative for activity change, appetite change, chills and fever  HENT: Negative for congestion and sore throat  Eyes: Negative for visual disturbance  Respiratory: Negative for cough, choking and shortness of breath  Cardiovascular: Negative for chest pain, palpitations and leg swelling  Gastrointestinal: Positive for abdominal pain, nausea and vomiting  Negative for abdominal distention, blood in stool, constipation and diarrhea  Endocrine: Negative  Genitourinary: Positive for frequency, hematuria and pelvic pain  Negative for dysuria  Right lateral abdominal pain and right groin pain   Musculoskeletal: Negative for arthralgias, back pain, myalgias and neck pain  Skin: Negative for rash  Neurological: Positive for dizziness, light-headedness and headaches  Negative for weakness and numbness  Hematological: Negative  Psychiatric/Behavioral: Negative  All other systems reviewed and are negative        Past Medical and Surgical History:     Past Medical History:   Diagnosis Date    Arthritis     Cancer ([facility] )     cervical    Coronary artery disease     GERD (gastroesophageal reflux disease)     HPV (human papilloma virus) infection     Hypercholesterolemia     Hypertension     Kidney stone     Motion sickness     Myocardial infarct (HCC)     X2    Pleuritic chest pain     PONV (postoperative nausea and vomiting)     [facility] )     Rheu and cervical    Sleep apnea 2017    Vertigo        Past Surgical History:   Procedure Laterality Date    ANGIOPLASTY Right 09/14/2015    right groin / femoral     BIOPSY CORE NEEDLE  1980    CARDIAC CATHETERIZATION  2015    star close - closure system    CARDIAC CATHETERIZATION      CERVICAL CONE BIOPSY      1980's    COLONOSCOPY      2006, 2019    COLONOSCOPY      DILATION AND CURETTAGE OF UTERUS      FL RETROGRADE PYELOGRAM  7/2/2020    OTHER SURGICAL HISTORY Right 11/2017    Surgical removal of lypoma shoulder blade    ND CYSTO/URETERO W/LITHOTRIPSY &INDWELL STENT INSRT Right 7/21/2020    Procedure: CYSTO, URETEROSCOPY STONE BASKET EXTRACTION , RETROGRADE PYELOGRAM, STENT;  Surgeon: Chaparrita Couch MD;  Location: [facility];  Service: Urology    ND CYSTOURETHROSCOPY,URETER CATHETER Right 7/2/2020    Procedure: CYSTOSCOPY RETROGRADE PYELOGRAM WITH INSERTION STENT URETERAL;  Surgeon: Deion Stiles MD;  [facility];  Service: Urology    SHOULDER SURGERY Right 2014    had a lympoma done in 2017 also same shoulder    SHOULDER SURGERY Right 2014    TONSILLECTOMY      WISDOM TOOTH EXTRACTION         Meds/Allergies:    Prior to Admission medications    Medication Sig Start Date End Date Taking?  Authorizing Provider   adalimumab (HUMIRA) 40 mg/0 8 mL PSKT Inject 40 mg under the skin every 14 (fourteen) days   Yes Historical Provider, MD   Ascorbic Acid, Vitamin C, (VITAMIN C) 100 MG tablet Take 1,000 mg by mouth daily   Yes Historical Provider, MD   aspirin 81 MG tablet Take 162 mg by mouth daily   Yes Historical Provider, MD   Choline Fenofibrate (FENOFIBRIC ACID) 45 MG CPDR Take 1 capsule by mouth daily 3/9/20  Yes Historical Provider, MD   folic acid (FOLVITE) 1 mg tablet Take 1,000 mcg by mouth daily 3/5/20  Yes Historical Provider, MD   LORazepam (ATIVAN) 0 5 mg tablet Take 0 5 mg by mouth every 6 (six) hours as needed   Yes Historical Provider, MD   meclizine (ANTIVERT) 25 mg tablet Take 25 mg by mouth 3 (three) times a day as needed for dizziness   Yes Historical Provider, MD   methotrexate 2 5 mg tablet TAKE 8 TABLETS BY MOUTH ONCE A WEEK 2/6/20  Yes Historical Provider, MD   metoprolol tartrate (LOPRESSOR) 25 mg tablet Take 12 5 mg by mouth every 12 (twelve) hours  3/5/20  Yes Historical Provider, MD   OMEGA-3 FATTY ACIDS PO Take 1 g by mouth daily   Yes Historical Provider, MD   oxybutynin (DITROPAN) 5 mg tablet Every 6-8 hours if needed for bladder pressure or spasm 20  Yes Neftali Dukes MD   oxyCODONE-acetaminophen (PERCOCET) 5-325 mg per tablet Take 1 tablet by mouth every 6 (six) hours as needed for moderate pain for up to 10 daysMax Daily Amount: 4 tablets 20 Yes RAMIREZ Sarkar   pantoprazole (PROTONIX) 40 mg tablet  20  Yes Historical Provider, MD   rosuvastatin (CRESTOR) 20 MG tablet Take 20 mg by mouth daily 2/3/20  Yes Historical Provider, MD   cephalexin (KEFLEX) 500 mg capsule Take 1 capsule (500 mg total) by mouth every 12 (twelve) hours for 5 days 20  Neftali Dukes MD     I have reviewed home medications with patient personally  Allergies: Allergies   Allergen Reactions    Atorvastatin Myalgia    Diphenhydramine Hcl (Sleep) Itching    Pravastatin Myalgia    Simvastatin Myalgia       Social History:     Marital Status:      Substance Use History:   Social History     Substance and Sexual Activity   Alcohol Use Yes    Frequency: Monthly or less    Drinks per session: 1 or 2    Binge frequency: Never     Social History     Tobacco Use   Smoking Status Former Smoker    Packs/day: 2 00    Years: 30 00    Pack years: 60 00    Types: Cigarettes    Last attempt to quit: 7/15/1998    Years since quittin 0   Smokeless Tobacco Former User     Social History     Substance and Sexual Activity   Drug Use Never       Family History:    non-contributory    Physical Exam:     Vitals:   Blood Pressure: 128/60 (20 182)  Pulse: 84 (20 182)  Temperature: 99 5 °F (37 5 °C) (20 1239)  Temp Source: Oral (20 1239)  Respirations: 18 (20 182)  SpO2: 95 % (20)    Physical Exam   Constitutional: She is oriented to person, place, and time  No distress  HENT:   Mouth/Throat: Oropharynx is clear and moist  No oropharyngeal exudate  Eyes: Pupils are equal, round, and reactive to light  Neck: Neck supple  No JVD present     Cardiovascular: Normal rate and regular rhythm  No murmur heard  Pulmonary/Chest: Effort normal  She has no wheezes  She has no rales  Decreased breath sounds globally   Abdominal:   Tenderness in the right groin and suprapubic region  No flank tenderness noted  Abdomen is obese but is nondistended  Abdomen is soft and normoactive bowel sounds  Musculoskeletal: She exhibits no edema or tenderness  Lymphadenopathy:     She has no cervical adenopathy  Neurological: She is alert and oriented to person, place, and time  No sensory deficit  She exhibits normal muscle tone  Skin: Skin is warm and dry  No pallor  Vitals reviewed  Additional Data:     Lab Results: I have personally reviewed pertinent reports  Results from last 7 days   Lab Units 07/27/20  1400   WBC Thousand/uL 13 98*   HEMOGLOBIN g/dL 13 1   HEMATOCRIT % 41 8   PLATELETS Thousands/uL 274   NEUTROS PCT % 72   LYMPHS PCT % 20   MONOS PCT % 7   EOS PCT % 1     Results from last 7 days   Lab Units 07/27/20  1400   POTASSIUM mmol/L 3 6   CHLORIDE mmol/L 104   CO2 mmol/L 27   BUN mg/dL 15   CREATININE mg/dL 0 94   CALCIUM mg/dL 8 8   ALK PHOS U/L 115   ALT U/L 24   AST U/L 15     Results from last 7 days   Lab Units 07/27/20  1400   INR  1 11       Imaging: I have personally reviewed pertinent reports  Fl Retrograde Pyelogram    Result Date: 7/2/2020  Narrative: RIGHT RETROGRADE PYELOGRAM INDICATION:   Ureterolithiasis [N20 1]  COMPARISON: 7/1/2020 IMAGES:  5 FLUOROSCOPY TIME:   25 9 seconds CONTRAST: 17 mL of iohexol (OMNIPAQUE) FINDINGS: Fluoroscopic guidance provided for retrograde pyelogram  Partially visualized right ureter demonstrates normal course and caliber without evidence of filling defects  The visualized portions of the upper tract are unremarkable  There is placement of a right nephroureteral stent  Osseous and soft tissue detail limited by technique          Impression: Fluoroscopic guidance provided for right retrograde pyelogram  Please see procedure report for further details  Workstation performed: [id number]     Fl < 1 Hour    Result Date: 7/23/2020  Narrative: 20 seconds were utilized by the fluoroscopy unit  No films were obtained during this examination  Signed by: Lupillo Hunter MD     Ct Abdomen Pelvis With Contrast    Result Date: 7/27/2020  Narrative: CT ABDOMEN AND PELVIS WITH IV CONTRAST INDICATION:   Right flank right lower quadrant right inguinal pain, ureteroscopy with stone removal and stent placement  COMPARISON:  7/1/2020 TECHNIQUE:  CT examination of the abdomen and pelvis was performed  Axial, sagittal, and coronal 2D reformatted images were created from the source data and submitted for interpretation  Radiation dose length product (DLP) for this visit:  1177 mGy-cm   This examination, like all CT scans performed in the [facility], was performed utilizing techniques to minimize radiation dose exposure, including the use of iterative reconstruction and automated exposure control  IV Contrast:  100 mL of iohexol (OMNIPAQUE) Enteric Contrast:  Enteric contrast was not administered  FINDINGS: ABDOMEN LOWER CHEST:  Coronary artery calcifications  Normal cardiac size  No pleural or pericardial effusion  LIVER/BILIARY TREE:  Unremarkable  GALLBLADDER:  No calcified gallstones  No pericholecystic inflammatory change  SPLEEN:  Top normal size  PANCREAS:  Unremarkable  ADRENAL GLANDS:  Unremarkable  KIDNEYS/URETERS: Right nephroureteral stent appears to be in satisfactory position, however, there is persistent mild hydronephrosis  Previously seen right ureteric calculus has been expelled  Left renal 7 mm nonobstructing calculus is noted  STOMACH AND BOWEL:  Colonic diverticulosis without evidence of acute diverticulitis  No evidence of bowel obstruction  APPENDIX:  No findings to suggest appendicitis  ABDOMINOPELVIC CAVITY:  No ascites  No pneumoperitoneum  No lymphadenopathy   VESSELS:  Atherosclerotic changes are present  No evidence of aneurysm  PELVIS REPRODUCTIVE ORGANS:  Unremarkable for patient's age  URINARY BLADDER:  Unremarkable  ABDOMINAL WALL/INGUINAL REGIONS:  There is a small fat-containing umbilical hernia  OSSEOUS STRUCTURES:  No acute fracture or destructive osseous lesion  Bony demineralization  Degenerative changes of the spine  Impression: Right nephroureteral stent appears to be in satisfactory position, however, there is persistent mild hydronephrosis  Previously seen right ureteric calculus has been expelled  Left renal 7 mm nonobstructing calculus  Colonic diverticulosis  CAD  Small fat-containing umbilical hernia  Workstation performed: [id number]     Ct Abdomen Pelvis With Contrast    Result Date: 7/1/2020  Narrative: CT ABDOMEN AND PELVIS WITH IV CONTRAST INDICATION:   RLQ TTP  COMPARISON:  None  TECHNIQUE:  CT examination of the abdomen and pelvis was performed  Axial, sagittal, and coronal 2D reformatted images were created from the source data and submitted for interpretation  Radiation dose length product (DLP) for this visit:  8091 mGy-cm   This examination, like all CT scans performed in the [facility], was performed utilizing techniques to minimize radiation dose exposure, including the use of iterative reconstruction and automated exposure control  IV Contrast:  100 mL of iohexol (OMNIPAQUE) Enteric Contrast:  Enteric contrast was not administered  FINDINGS: ABDOMEN LOWER CHEST:  No clinically significant abnormality identified in the visualized lower chest  LIVER/BILIARY TREE:  Liver is diffusely decreased in density consistent with fatty change  No CT evidence of suspicious hepatic mass  Normal hepatic contours  No biliary dilatation  GALLBLADDER:  No calcified gallstones  No pericholecystic inflammatory change  SPLEEN:  Unremarkable  PANCREAS:  Unremarkable  ADRENAL GLANDS:  Unremarkable   KIDNEYS/URETERS:  There is mild right-sided hydronephrosis and proximal hydroureter, the cause of which appears to be a 5 mm calculus at the mid ureter  There is a 3 to 4 mm nonobstructing calculus at the left renal midpole One or more sharply circumscribed subcentimeter renal hypodensities are noted  These lesions are too small to accurately characterize, but are statistically most likely to represent benign cortical renal cyst(s)  According to the guidelines published in  the [facility] Paper of the ACR Incidental Findings Committee (Radiology 2010), no further workup of these lesions is recommended  STOMACH AND BOWEL:  There is colonic diverticulosis without evidence of acute diverticulitis  APPENDIX:  A normal appendix was visualized  ABDOMINOPELVIC CAVITY:  No ascites  No pneumoperitoneum  No lymphadenopathy  VESSELS:  Atherosclerotic changes are present  No evidence of aneurysm  PELVIS REPRODUCTIVE ORGANS:  Unremarkable for patient's age  URINARY BLADDER:  Unremarkable  ABDOMINAL WALL/INGUINAL REGIONS:  There is a small fat-containing umbilical hernia  OSSEOUS STRUCTURES:  No acute fracture or destructive osseous lesion  Impression: Mild right-sided hydronephrosis, the cause of which appears to be a 5 mm calculus at the mid ureter  Scattered colonic diverticulosis with no inflammatory changes present to suggest acute diverticulitis  Workstation performed: [id number]     Epic / [facility] Everywhere Records Reviewed: Yes     ** Please Note: This note has been constructed using a voice recognition system   **

## 2020-07-27 NOTE — ASSESSMENT & PLAN NOTE
· Antibiotic - cefepime 2 g IV Q 12 hours  Of note, the patient was previously on Keflex for 5 days up until 07/26/2020  · Cultures - follow-up urine culture  Follow-up blood cultures  · IV fluids - normal saline at 100 mL/hr  · Urology evaluation for possible stent removal   · Pain control - oxycodone  P r n  IV Dilaudid  · Antiemetics - Zofran  · Antipyretic - Tylenol  · Monitor WBC count

## 2020-07-27 NOTE — ED ATTENDING ATTESTATION
7/27/2020  IHarini DO, saw and evaluated the patient  I have discussed the patient with the resident/non-physician practitioner and agree with the resident's/non-physician practitioner's findings, Plan of Care, and MDM as documented in the resident's/non-physician practitioner's note, except where noted  All available labs and Radiology studies were reviewed  I was present for key portions of any procedure(s) performed by the resident/non-physician practitioner and I was immediately available to provide assistance  At this point I agree with the current assessment done in the Emergency Department  I have conducted an independent evaluation of this patient a history and physical is as follows:        49-year-old female presents with right lower quadrant abdominal pain, states it radiates to her hip into her groin  Significant history that 6 days ago she had a ureteroscopy where she had a kidney stone removal and stent exchange  States over the past 3 days she has had dysuria, increased frequency of urination in starting today she has noticed some hematuria  States today it hurts more when she urinates and has over the past few days  Pain is described as burning, nonradiating worse with palpation of the right lower quadrant or suprapubically better when left alone or sitting still  Worse with ambulation  No associated nausea vomiting or diarrhea , no fevers no chills  Review of Systems   Constitutional: Negative for activity change, chills, diaphoresis and fever  HENT: Negative for congestion, sinus pressure and sore throat  Eyes: Negative for pain and visual disturbance  Respiratory: Negative for cough, chest tightness, shortness of breath, wheezing and stridor  Cardiovascular: Negative for chest pain and palpitations  Gastrointestinal: Negative for abdominal distention, abdominal pain, constipation, diarrhea, nausea and vomiting     Genitourinary:  Positive for dysuria and frequency  Musculoskeletal: Negative for neck pain and neck stiffness  Skin: Negative for rash  Neurological: Negative for dizziness, speech difficulty, light-headedness, numbness and headaches  Physical Exam   Constitutional: She is oriented to person, place, and time  She appears well-developed  She appears distressed (Patient appears uncomfortable particularly with any movement)  HENT:   Head: Normocephalic and atraumatic  Eyes: Pupils are equal, round, and reactive to light  Neck: Normal range of motion  Neck supple  No tracheal deviation present  Cardiovascular: Normal rate, regular rhythm, normal heart sounds and intact distal pulses  No murmur heard  Pulmonary/Chest: Effort normal and breath sounds normal  No stridor  No respiratory distress  Abdominal: Soft  She exhibits no distension  There is tenderness ( right lower quadrant  No CVA tenderness)  There is no rebound and no guarding  Musculoskeletal: Normal range of motion  Neurological: She is alert and oriented to person, place, and time  Skin: Skin is warm and dry  She is not diaphoretic  No erythema  No pallor  Psychiatric: She has a normal mood and affect  Vitals reviewed           ED Course         Critical Care Time  Procedures      Labs Reviewed   CBC AND DIFFERENTIAL - Abnormal       Result Value Ref Range Status    WBC 13 98 (*) 4 31 - 10 16 Thousand/uL Final    RBC 4 51  3 81 - 5 12 Million/uL Final    Hemoglobin 13 1  11 5 - 15 4 g/dL Final    Hematocrit 41 8  34 8 - 46 1 % Final    MCV 93  82 - 98 fL Final    MCH 29 0  26 8 - 34 3 pg Final    MCHC 31 3 (*) 31 4 - 37 4 g/dL Final    RDW 14 0  11 6 - 15 1 % Final    MPV 9 2  8 9 - 12 7 fL Final    Platelets 108  053 - 390 Thousands/uL Final    nRBC 0  /100 WBCs Final    Neutrophils Relative 72  43 - 75 % Final    Immat GRANS % 0  0 - 2 % Final    Lymphocytes Relative 20  14 - 44 % Final    Monocytes Relative 7  4 - 12 % Final    Eosinophils Relative 1  0 - 6 % Final    Basophils Relative 0  0 - 1 % Final    Neutrophils Absolute 9 86 (*) 1 85 - 7 62 Thousands/µL Final    Immature Grans Absolute 0 05  0 00 - 0 20 Thousand/uL Final    Lymphocytes Absolute 2 85  0 60 - 4 47 Thousands/µL Final    Monocytes Absolute 0 97  0 17 - 1 22 Thousand/µL Final    Eosinophils Absolute 0 20  0 00 - 0 61 Thousand/µL Final    Basophils Absolute 0 05  0 00 - 0 10 Thousands/µL Final   UA W REFLEX TO MICROSCOPIC WITH REFLEX TO CULTURE - Abnormal    Color, UA Red   Final    Clarity, UA Turbid   Final    Specific Gravity, UA >=1 030  1 003 - 1 030 Final    pH, UA 6 0  4 5, 5 0, 5 5, 6 0, 6 5, 7 0, 7 5, 8 0 Final    Leukocytes, UA Trace (*) Negative Final    Nitrite, UA Negative  Negative Final    Protein, UA >=300 (*) Negative mg/dl Final    Glucose, UA Negative  Negative mg/dl Final    Ketones, UA Negative  Negative mg/dl Final    Urobilinogen, UA 0 2  0 2, 1 0 E U /dl E U /dl Final    Bilirubin, UA Small (*) Negative Final    Blood, UA Large (*) Negative Final   URINE MICROSCOPIC - Abnormal    RBC, UA Innumerable (*) None Seen, 0-5 /hpf Final    WBC, UA Field obscured, unable to enumerate (*) None Seen, 0-5, 5-55, 5-65 /hpf Final    Epithelial Cells Field obscured, unable to enumerate (*) None Seen, Occasional /hpf Final    Bacteria, UA Innumerable (*) None Seen, Occasional /hpf Final    Ca Oxalate Yuli, UA     Final    Comment: Field obscured, unable to enumerate    Narrative:     Concentrated microscopic on low volume urine   LACTIC ACID, PLASMA - Normal    LACTIC ACID 1 3  0 5 - 2 0 mmol/L Final    Narrative:     Result may be elevated if tourniquet was used during collection     PROTIME-INR - Normal    Protime 13 7  11 6 - 14 5 seconds Final    INR 1 11  0 84 - 1 19 Final   APTT - Normal    PTT 27  23 - 37 seconds Final    Comment: Therapeutic Heparin Range =  60-90 seconds   BLOOD CULTURE   BLOOD CULTURE   URINE CULTURE   COMPREHENSIVE METABOLIC PANEL    Sodium 338  136 - 145 mmol/L Final Potassium 3 6  3 5 - 5 3 mmol/L Final    Chloride 104  100 - 108 mmol/L Final    CO2 27  21 - 32 mmol/L Final    ANION GAP 9  4 - 13 mmol/L Final    BUN 15  5 - 25 mg/dL Final    Creatinine 0 94  0 60 - 1 30 mg/dL Final    Comment: Standardized to IDMS reference method    Glucose 118  65 - 140 mg/dL Final    Comment:   If the patient is fasting, the ADA then defines impaired fasting glucose as > 100 mg/dL and diabetes as > or equal to 123 mg/dL  Specimen collection should occur prior to Sulfasalazine administration due to the potential for falsely depressed results  Specimen collection should occur prior to Sulfapyridine administration due to the potential for falsely elevated results  Calcium 8 8  8 3 - 10 1 mg/dL Final    AST 15  5 - 45 U/L Final    Comment:   Specimen collection should occur prior to Sulfasalazine administration due to the potential for falsely depressed results  ALT 24  12 - 78 U/L Final    Comment:   Specimen collection should occur prior to Sulfasalazine administration due to the potential for falsely depressed results  Alkaline Phosphatase 115  46 - 116 U/L Final    Total Protein 7 5  6 4 - 8 2 g/dL Final    Albumin 3 7  3 5 - 5 0 g/dL Final    Total Bilirubin 0 49  0 20 - 1 00 mg/dL Final    Comment:   Use of this assay is not recommended for patients undergoing treatment with eltrombopag due to the potential for falsely elevated results      eGFR 64  ml/min/1 73sq m Final    Narrative:     [place] guidelines for Chronic Kidney Disease (CKD):     Stage 1 with normal or high GFR (GFR > 90 mL/min/1 73 square meters)    Stage 2 Mild CKD (GFR = 60-89 mL/min/1 73 square meters)    Stage 3A Moderate CKD (GFR = 45-59 mL/min/1 73 square meters)    Stage 3B Moderate CKD (GFR = 30-44 mL/min/1 73 square meters)    Stage 4 Severe CKD (GFR = 15-29 mL/min/1 73 square meters)    Stage 5 End Stage CKD (GFR <15 mL/min/1 73 square meters)  Note: GFR calculation is accurate only with a steady state creatinine       CT abdomen pelvis with contrast   Final Result      Right nephroureteral stent appears to be in satisfactory position, however, there is persistent mild hydronephrosis  Previously seen right ureteric calculus has been expelled  Left renal 7 mm nonobstructing calculus  Colonic diverticulosis  CAD  Small fat-containing umbilical hernia  Workstation performed: [id number]           MDM  Number of Diagnoses or Management Options  Acute pyelonephritis: new, needed workup  Pain due to ureteral stent, initial encounter [facility]): new, needed workup  Sepsis, due to unspecified organism, unspecified whether acute organ dysfunction present [facility]): new, needed workup  Diagnosis management comments:       Initial ED assessment:  25-year-old female right flank pain, recent urine discussed to be with stent placement and kidney stone removal now with dysuria polyuria and hematuria    Initial DDx includes but is not limited to:   Pyelonephritis,/UTI, ureteral injury, recurrent kidney stone, non  pathology such as colitis appendicitis    Initial ED plan:   Blood work, pain control, CT scan, antiemetics, IV fluids, disposition pending ED workup        Final ED summary/disposition:   After evaluation and workup in the emergency department, patient found have acute pyelonephritis on the right , mid hospital for IV antibiotics  , started on cefepime in the emergency department           Amount and/or Complexity of Data Reviewed  Clinical lab tests: ordered and reviewed  Tests in the radiology section of CPT®: ordered and reviewed  Review and summarize past medical records: yes  Discuss the patient with other providers: yes  Independent visualization of images, tracings, or specimens: yes      Time reflects when diagnosis was documented in both MDM as applicable and the Disposition within this note     Time User Action Codes Description Comment    7/27/2020 4:55 PM Hindosh, Sher Add [N12] Pyelonephritis     7/27/2020  4:55 PM Hindosh, Sher Remove [N12] Pyelonephritis     7/27/2020  4:55 PM Hindosh, Sher Add [N10] Acute pyelonephritis     7/27/2020  4:55 PM Hindosh, Sher Add [A41 9] Sepsis, due to unspecified organism, unspecified whether acute organ dysfunction present ([facility] [address] )     7/27/2020  4:56 PM Hindosh, Sher Add [[id number]] Pain due to ureteral stent, initial encounter [facility])       ED Disposition     ED Disposition Condition Date/Time Comment    Admit Stable Mon Jul 27, 2020  4:55 PM Case was discussed with Dr Cristi Farfan and the patient's admission status was agreed to be Admission Status: inpatient status to the service of Dr Gayle Cope             Follow-up Information    None

## 2020-07-27 NOTE — ASSESSMENT & PLAN NOTE
· Based on CT scan, it appears that the patient's stone has now passed  The patient had lithotripsy for stone on 07/21/2020  · Recommend evaluation by Urology for decision on whether to remove the stent

## 2020-07-27 NOTE — ED PROVIDER NOTES
History  Chief Complaint   Patient presents with    Hip Pain     PT presents w/right hip pain into urethra x 1 month  PT had stone, stent replaced 921     59year old female patient with recent ureteroscopy  with stent placement presents to ED with right flank pain and RLQ abdominal pain that radiated to her groin associated with dysuria , hematuria for 2-3 days  Patient reports the pain is 10/10 in intensity  Denies fever and chills  History provided by:  Patient  Hip Pain   Associated symptoms: abdominal pain, fatigue and nausea    Associated symptoms: no chest pain, no cough, no diarrhea, no fever, no headaches and no vomiting        Prior to Admission Medications   Prescriptions Last Dose Informant Patient Reported? Taking?    Ascorbic Acid, Vitamin C, (VITAMIN C) 100 MG tablet   Yes No   Sig: Take 1,000 mg by mouth daily   Choline Fenofibrate (FENOFIBRIC ACID) 45 MG CPDR   Yes No   Sig: Take 1 capsule by mouth daily   LORazepam (ATIVAN) 0 5 mg tablet   Yes No   Sig: Take 0 5 mg by mouth every 6 (six) hours as needed   OMEGA-3 FATTY ACIDS PO   Yes No   Sig: Take 1 g by mouth daily   adalimumab (HUMIRA) 40 mg/0 8 mL PSKT   Yes No   Sig: Inject 40 mg under the skin every 14 (fourteen) days   aspirin 81 MG tablet   Yes No   Sig: Take 162 mg by mouth daily   cephalexin (KEFLEX) 500 mg capsule   No No   Sig: Take 1 capsule (500 mg total) by mouth every 12 (twelve) hours for 5 days   folic acid (FOLVITE) 1 mg tablet   Yes No   Sig: Take 1,000 mcg by mouth daily   meclizine (ANTIVERT) 25 mg tablet  Self Yes No   Sig: Take 25 mg by mouth 3 (three) times a day as needed for dizziness   methotrexate 2 5 mg tablet   Yes No   Sig: TAKE 8 TABLETS BY MOUTH ONCE A WEEK   metoprolol tartrate (LOPRESSOR) 25 mg tablet  Self Yes No   Sig: Take 12 5 mg by mouth every 12 (twelve) hours    oxyCODONE-acetaminophen (PERCOCET) 5-325 mg per tablet   No No   Sig: Take 1 tablet by mouth every 6 (six) hours as needed for moderate pain for up to 10 daysMax Daily Amount: 4 tablets   oxybutynin (DITROPAN) 5 mg tablet   No No   Sig: Every 6-8 hours if needed for bladder pressure or spasm   pantoprazole (PROTONIX) 40 mg tablet   Yes No   rosuvastatin (CRESTOR) 20 MG tablet   Yes No   Sig: Take 20 mg by mouth daily      Facility-Administered Medications: None       Past Medical History:   Diagnosis Date    Arthritis     Cancer ([facility] )     cervical    Coronary artery disease     CPAP (continuous positive airway pressure) dependence     GERD (gastroesophageal reflux disease)     High cholesterol     HPV (human papilloma virus) infection     Hypercholesterolemia     Hypertension     Kidney stone     Motion sickness     Myocardial infarct (HCC)     X2    Pleuritic chest pain     PONV (postoperative nausea and vomiting)     Rheumatoid arthritis ([facility] )     Rheu and cervical    Sleep apnea 2017    Urinary frequency     Vertigo     Wears glasses        Past Surgical History:   Procedure Laterality Date    ANGIOPLASTY      BIOPSY CORE NEEDLE  1980    CARDIAC CATHETERIZATION  2015    star close - closure system    CARDIAC CATHETERIZATION      CERVICAL CONE BIOPSY      1980's    COLONOSCOPY      2006, 2019    COLONOSCOPY      DILATION AND CURETTAGE OF UTERUS      FL RETROGRADE PYELOGRAM  7/2/2020    MAMMO (HISTORICAL)  12/2018    NECK SURGERY      OTHER SURGICAL HISTORY Right 11/2017    Surgical removal of lypoma shoulder blade    [place] CYSTO/URETERO W/LITHOTRIPSY &INDWELL STENT INSRT Right 7/21/2020    Procedure: CYSTO, URETEROSCOPY STONE BASKET EXTRACTION , RETROGRADE PYELOGRAM, STENT;  Surgeon: Leroy Plata MD;  Location: [facility];  Service: Urology    [place] CYSTOURETHROSCOPY,URETER CATHETER Right 7/2/2020    Procedure: CYSTOSCOPY RETROGRADE PYELOGRAM WITH INSERTION STENT URETERAL;  Surgeon: Rio Shukla MD;  [facility];  Service: Urology    SHOULDER SURGERY Right 2014    had a lympoma done in 2017 also same shoulder    SHOULDER SURGERY Right 2014    TONSILLECTOMY      WISDOM TOOTH EXTRACTION         Family History   Problem Relation Age of Onset    Cancer Mother     Lung cancer Mother     Heart disease Father      I have reviewed and agree with the history as documented  E-Cigarette/Vaping    E-Cigarette Use Never User      E-Cigarette/Vaping Substances     Social History     Tobacco Use    Smoking status: Former Smoker     Last attempt to quit: 7/15/1998     Years since quittin 0    Smokeless tobacco: Former User   Substance Use Topics    Alcohol use: Yes     Frequency: Monthly or less     Drinks per session: 1 or 2     Binge frequency: Never    Drug use: Never        Review of Systems   Constitutional: Positive for activity change and fatigue  Negative for chills and fever  Respiratory: Negative for cough and chest tightness  Cardiovascular: Negative for chest pain and palpitations  Gastrointestinal: Positive for abdominal pain and nausea  Negative for constipation, diarrhea and vomiting  Genitourinary: Positive for dysuria, flank pain, frequency and hematuria  Musculoskeletal: Negative for back pain  Skin: Negative for color change  Neurological: Negative for dizziness and headaches  Psychiatric/Behavioral: Negative for agitation and behavioral problems         Physical Exam  ED Triage Vitals   Temperature Pulse Respirations Blood Pressure SpO2   20 1239 20 1239 20 1239 20 1239 20 1239   99 5 °F (37 5 °C) 101 20 129/60 93 %      Temp Source Heart Rate Source Patient Position - Orthostatic VS BP Location FiO2 (%)   20 1239 20 1239 20 1239 20 1239 --   Oral Monitor Sitting Left arm       Pain Score       20 1438       Worst Possible Pain             Orthostatic Vital Signs  Vitals:    20 1239   BP: 129/60   Pulse: 101   Patient Position - Orthostatic VS: Sitting       Physical Exam   Constitutional: She is oriented to person, place, and time  She appears distressed  HENT:   Head: Normocephalic and atraumatic  Neck: Neck supple  Cardiovascular: Normal rate, regular rhythm, normal heart sounds and intact distal pulses  No murmur heard  Pulmonary/Chest: Effort normal and breath sounds normal    Abdominal: There is tenderness (right sided )  There is no rebound  Neurological: She is alert and oriented to person, place, and time  Skin: Skin is warm and dry  Psychiatric: She has a normal mood and affect  Her behavior is normal    Nursing note and vitals reviewed  ED Medications  Medications   morphine (PF) 10 mg/mL injection 6 mg (6 mg Intravenous Given 7/27/20 1438)   ondansetron (ZOFRAN) injection 4 mg (4 mg Intravenous Given 7/27/20 1435)   cefepime (MAXIPIME) 2 g/50 mL dextrose IVPB (0 mg Intravenous Stopped 7/27/20 1530)   iohexol (OMNIPAQUE) 350 MG/ML injection (SINGLE-DOSE) 100 mL (100 mL Intravenous Given 7/27/20 1525)       Diagnostic Studies  Results Reviewed     Procedure Component Value Units Date/Time    Urine Microscopic [[id number]]  (Abnormal) Collected:  07/27/20 1400    Lab Status:  Final result Specimen:  Urine, Clean Catch Updated:  07/27/20 1500     RBC, UA Innumerable /hpf      WBC, UA       Field obscured, unable to enumerate     /hpf     Epithelial Cells       Field obscured, unable to enumerate     /hpf     Bacteria, UA Innumerable /hpf      Ca Oxalate Yuli, UA --    Narrative:       Concentrated microscopic on low volume urine    Urine culture [[id number]] Collected:  07/27/20 1400    Lab Status:   In process Specimen:  Urine, Clean Catch Updated:  07/27/20 1500    Comprehensive metabolic panel [[id number]] Collected:  07/27/20 1400    Lab Status:  Final result Specimen:  Blood from Arm, Right Updated:  07/27/20 1452     Sodium 140 mmol/L      Potassium 3 6 mmol/L      Chloride 104 mmol/L      CO2 27 mmol/L      ANION GAP 9 mmol/L      BUN 15 mg/dL      Creatinine 0 94 mg/dL      Glucose 118 mg/dL      Calcium 8 8 mg/dL      AST 15 U/L      ALT 24 U/L      Alkaline Phosphatase 115 U/L      Total Protein 7 5 g/dL      Albumin 3 7 g/dL      Total Bilirubin 0 49 mg/dL      eGFR 64 ml/min/1 73sq m     Narrative:       [place] guidelines for Chronic Kidney Disease (CKD):     Stage 1 with normal or high GFR (GFR > 90 mL/min/1 73 square meters)    Stage 2 Mild CKD (GFR = 60-89 mL/min/1 73 square meters)    Stage 3A Moderate CKD (GFR = 45-59 mL/min/1 73 square meters)    Stage 3B Moderate CKD (GFR = 30-44 mL/min/1 73 square meters)    Stage 4 Severe CKD (GFR = 15-29 mL/min/1 73 square meters)    Stage 5 End Stage CKD (GFR <15 mL/min/1 73 square meters)  Note: GFR calculation is accurate only with a steady state creatinine    Blood culture #2 [[id number]] Collected:  07/27/20 1430    Lab Status: In process Specimen:  Blood from Arm, Left Updated:  07/27/20 1450    Lactic acid [[id number]]  (Normal) Collected:  07/27/20 1400    Lab Status:  Final result Specimen:  Blood from Arm, Right Updated:  07/27/20 1445     LACTIC ACID 1 3 mmol/L     Narrative:       Result may be elevated if tourniquet was used during collection      UA w Reflex to Microscopic w Reflex to Culture [[id number]]  (Abnormal) Collected:  07/27/20 1400    Lab Status:  Final result Specimen:  Urine, Clean Catch Updated:  07/27/20 1440     Color, UA Red     Clarity, UA Turbid     Specific Gravity, UA >=1 030     pH, UA 6 0     Leukocytes, UA Trace     Nitrite, UA Negative     Protein, UA >=300 mg/dl      Glucose, UA Negative mg/dl      Ketones, UA Negative mg/dl      Urobilinogen, UA 0 2 E U /dl      Bilirubin, UA Small     Blood, UA Large    Protime-INR [[id number]]  (Normal) Collected:  07/27/20 1400    Lab Status:  Final result Specimen:  Blood from Arm, Right Updated:  07/27/20 1438     Protime 13 7 seconds      INR 1 11    APTT [[id number]]  (Normal) Collected:  07/27/20 1400    Lab Status:  Final result Specimen: Blood from Arm, Right Updated:  07/27/20 1438     PTT 27 seconds     CBC and differential [[id number]]  (Abnormal) Collected:  07/27/20 1400    Lab Status:  Final result Specimen:  Blood from Arm, Right Updated:  07/27/20 1432     WBC 13 98 Thousand/uL      RBC 4 51 Million/uL      Hemoglobin 13 1 g/dL      Hematocrit 41 8 %      MCV 93 fL      MCH 29 0 pg      MCHC 31 3 g/dL      RDW 14 0 %      MPV 9 2 fL      Platelets 043 Thousands/uL      nRBC 0 /100 WBCs      Neutrophils Relative 72 %      Immat GRANS % 0 %      Lymphocytes Relative 20 %      Monocytes Relative 7 %      Eosinophils Relative 1 %      Basophils Relative 0 %      Neutrophils Absolute 9 86 Thousands/µL      Immature Grans Absolute 0 05 Thousand/uL      Lymphocytes Absolute 2 85 Thousands/µL      Monocytes Absolute 0 97 Thousand/µL      Eosinophils Absolute 0 20 Thousand/µL      Basophils Absolute 0 05 Thousands/µL     Blood culture #1 [[id number]] Collected:  07/27/20 1400    Lab Status: In process Specimen:  Blood from Arm, Right Updated:  07/27/20 1425                 CT abdomen pelvis with contrast   Final Result by Myron Palmer MD (07/27 1549)      Right nephroureteral stent appears to be in satisfactory position, however, there is persistent mild hydronephrosis  Previously seen right ureteric calculus has been expelled  Left renal 7 mm nonobstructing calculus  Colonic diverticulosis  CAD  Small fat-containing umbilical hernia  Workstation performed: [id number]               Procedures  Procedures      ED Course                           Initial Sepsis Screening     Row Name 07/27/20 5170                Is the patient's history suggestive of a new or worsening infection? (!) Yes (Proceed)  -DA        Suspected source of infection  urinary tract infection  -DA        Are two or more of the following signs & symptoms of infection both present and new to the patient?   (!) Yes (Proceed)  -DA        Indicate SIRS criteria  Leukocytosis (WBC > 95755 IJL); Tachycardia > 90 bpm  -DA        If the answer is yes to both questions, suspicion of sepsis is present          If severe sepsis is present AND tissue hypoperfusion perists in the hour after fluid resuscitation or lactate > 4, the patient meets criteria for SEPTIC SHOCK          Are any of the following organ dysfunction criteria present within 6 hours of suspected infection and SIRS criteria that are NOT considered to be chronic conditions? No  -DA        Organ dysfunction          Date of presentation of severe sepsis          Time of presentation of severe sepsis          Tissue hypoperfusion persists in the hour after crystalloid fluid administration, evidenced, by either:          Was hypotension present within one hour of the conclusion of crystalloid fluid administration?           Date of presentation of septic shock          Time of presentation of septic shock            User Key  (r) = Recorded By, (t) = Taken By, (c) = Cosigned By    [address] Name Provider Type    JUANITA Cohen DO Physician                      MDM  Number of Diagnoses or Management Options  Diagnosis management comments: DDx :  UTI/Pyleonephritis  Recurrent kidney stone  ureteral injury from the stent        Disposition  Final diagnoses:   Acute pyelonephritis   Sepsis, due to unspecified organism, unspecified whether acute organ dysfunction present [facility])   Pain due to ureteral stent, initial encounter [facility])     Time reflects when diagnosis was documented in both MDM as applicable and the Disposition within this note     Time User Action Codes Description Comment    7/27/2020  4:55 PM Hindluis e, Sher Add [N12] Pyelonephritis     7/27/2020  4:55 PM Hindosh Sher Remove [N12] Pyelonephritis     7/27/2020  4:55 PM Hindosh, Sher Add [N10] Acute pyelonephritis     7/27/2020  4:55 PM Hindosh, Sher Add [A41 9] Sepsis, due to unspecified organism, unspecified whether acute organ dysfunction present ([facility] )     7/27/2020  4:56 PM Sher Britton Add [T83 [id number]] Pain due to ureteral stent, initial encounter [facility])       ED Disposition     ED Disposition Condition Date/Time Comment    Admit Stable Mon Jul 27, 2020  4:55 PM Case was discussed with Dr Antoinette Saxena and the patient's admission status was agreed to be Admission Status: inpatient status to the service of Dr Sonali Kerr           Follow-up Information    None         Patient's Medications   Discharge Prescriptions    No medications on file     No discharge procedures on file  PDMP Review       Value Time User    PDMP Reviewed  Yes 7/2/2020  1:24 PM Jaimee Chavez MD           ED Provider  Attending physically available and evaluated Alejandra Mauro CM managed the patient along with the ED Attending      Electronically Signed by         Bernadette Varela MD  07/27/20 6911

## 2020-07-27 NOTE — ASSESSMENT & PLAN NOTE
· Likely due to acute pyelonephritis  · Hemoglobin is stable  Will recheck hemoglobin by the morning  · Monitor for symptoms  · Urology evaluation  · Consider Jasso with continuous bladder irrigation if any symptoms of obstruction or other need for irrigation arises

## 2020-07-27 NOTE — ASSESSMENT & PLAN NOTE
· Normally is on Humira and methotrexate  She gets methotrexate typically on Fridays  · Holding these medications in the setting of acute infection  Additionally, she is not currently due for them today

## 2020-07-28 PROBLEM — K21.9 GERD (GASTROESOPHAGEAL REFLUX DISEASE): Status: RESOLVED | Noted: 2020-06-04 | Resolved: 2020-07-28

## 2020-07-28 PROBLEM — G47.30 SLEEP APNEA: Status: RESOLVED | Noted: 2017-01-01 | Resolved: 2020-07-28

## 2020-07-28 PROBLEM — R42 VERTIGO: Status: RESOLVED | Noted: 2020-06-04 | Resolved: 2020-07-28

## 2020-07-28 LAB
ALBUMIN SERPL BCP-MCNC: 2.8 G/DL (ref 3.5–5)
ALP SERPL-CCNC: 91 U/L (ref 46–116)
ALT SERPL W P-5'-P-CCNC: 20 U/L (ref 12–78)
ANION GAP SERPL CALCULATED.3IONS-SCNC: 7 MMOL/L (ref 4–13)
AST SERPL W P-5'-P-CCNC: 15 U/L (ref 5–45)
BASOPHILS # BLD AUTO: 0.07 THOUSANDS/ΜL (ref 0–0.1)
BASOPHILS NFR BLD AUTO: 1 % (ref 0–1)
BILIRUB SERPL-MCNC: 0.49 MG/DL (ref 0.2–1)
BUN SERPL-MCNC: 15 MG/DL (ref 5–25)
CALCIUM SERPL-MCNC: 8.2 MG/DL (ref 8.3–10.1)
CHLORIDE SERPL-SCNC: 106 MMOL/L (ref 100–108)
CO2 SERPL-SCNC: 28 MMOL/L (ref 21–32)
CREAT SERPL-MCNC: 0.84 MG/DL (ref 0.6–1.3)
EOSINOPHIL # BLD AUTO: 0.47 THOUSAND/ΜL (ref 0–0.61)
EOSINOPHIL NFR BLD AUTO: 5 % (ref 0–6)
ERYTHROCYTE [DISTWIDTH] IN BLOOD BY AUTOMATED COUNT: 14.2 % (ref 11.6–15.1)
GFR SERPL CREATININE-BSD FRML MDRD: 74 ML/MIN/1.73SQ M
GLUCOSE SERPL-MCNC: 113 MG/DL (ref 65–140)
HCT VFR BLD AUTO: 36.9 % (ref 34.8–46.1)
HGB BLD-MCNC: 11.5 G/DL (ref 11.5–15.4)
IMM GRANULOCYTES # BLD AUTO: 0.03 THOUSAND/UL (ref 0–0.2)
IMM GRANULOCYTES NFR BLD AUTO: 0 % (ref 0–2)
LYMPHOCYTES # BLD AUTO: 3.8 THOUSANDS/ΜL (ref 0.6–4.47)
LYMPHOCYTES NFR BLD AUTO: 39 % (ref 14–44)
MCH RBC QN AUTO: 29.7 PG (ref 26.8–34.3)
MCHC RBC AUTO-ENTMCNC: 31.2 G/DL (ref 31.4–37.4)
MCV RBC AUTO: 95 FL (ref 82–98)
MONOCYTES # BLD AUTO: 0.95 THOUSAND/ΜL (ref 0.17–1.22)
MONOCYTES NFR BLD AUTO: 10 % (ref 4–12)
NEUTROPHILS # BLD AUTO: 4.49 THOUSANDS/ΜL (ref 1.85–7.62)
NEUTS SEG NFR BLD AUTO: 45 % (ref 43–75)
NRBC BLD AUTO-RTO: 0 /100 WBCS
PLATELET # BLD AUTO: 237 THOUSANDS/UL (ref 149–390)
PMV BLD AUTO: 9.9 FL (ref 8.9–12.7)
POTASSIUM SERPL-SCNC: 3.8 MMOL/L (ref 3.5–5.3)
PROT SERPL-MCNC: 6.2 G/DL (ref 6.4–8.2)
RBC # BLD AUTO: 3.87 MILLION/UL (ref 3.81–5.12)
SODIUM SERPL-SCNC: 141 MMOL/L (ref 136–145)
WBC # BLD AUTO: 9.81 THOUSAND/UL (ref 4.31–10.16)

## 2020-07-28 PROCEDURE — 85025 COMPLETE CBC W/AUTO DIFF WBC: CPT | Performed by: INTERNAL MEDICINE

## 2020-07-28 PROCEDURE — 94760 N-INVAS EAR/PLS OXIMETRY 1: CPT

## 2020-07-28 PROCEDURE — 99255 IP/OBS CONSLTJ NEW/EST HI 80: CPT | Performed by: UROLOGY

## 2020-07-28 PROCEDURE — 80053 COMPREHEN METABOLIC PANEL: CPT | Performed by: INTERNAL MEDICINE

## 2020-07-28 PROCEDURE — 99232 SBSQ HOSP IP/OBS MODERATE 35: CPT | Performed by: INTERNAL MEDICINE

## 2020-07-28 RX ORDER — OXYBUTYNIN CHLORIDE 5 MG/1
5 TABLET ORAL 3 TIMES DAILY
Status: DISCONTINUED | OUTPATIENT
Start: 2020-07-28 | End: 2020-07-29 | Stop reason: HOSPADM

## 2020-07-28 RX ORDER — PHENAZOPYRIDINE HYDROCHLORIDE 100 MG/1
100 TABLET, FILM COATED ORAL
Qty: 6 TABLET | Refills: 0 | Status: SHIPPED | OUTPATIENT
Start: 2020-07-28 | End: 2020-07-30

## 2020-07-28 RX ORDER — PHENAZOPYRIDINE HYDROCHLORIDE 100 MG/1
100 TABLET, FILM COATED ORAL
Status: DISCONTINUED | OUTPATIENT
Start: 2020-07-28 | End: 2020-07-29 | Stop reason: HOSPADM

## 2020-07-28 RX ORDER — OXYCODONE HYDROCHLORIDE 10 MG/1
5 TABLET ORAL EVERY 6 HOURS PRN
Qty: 8 TABLET | Refills: 0 | Status: SHIPPED | OUTPATIENT
Start: 2020-07-28 | End: 2020-07-29 | Stop reason: HOSPADM

## 2020-07-28 RX ORDER — OXYBUTYNIN CHLORIDE 5 MG/1
5 TABLET ORAL 3 TIMES DAILY
Qty: 9 TABLET | Refills: 0 | Status: SHIPPED | OUTPATIENT
Start: 2020-07-28 | End: 2020-07-31

## 2020-07-28 RX ORDER — KETOROLAC TROMETHAMINE 30 MG/ML
15 INJECTION, SOLUTION INTRAMUSCULAR; INTRAVENOUS EVERY 6 HOURS SCHEDULED
Status: COMPLETED | OUTPATIENT
Start: 2020-07-28 | End: 2020-07-29

## 2020-07-28 RX ADMIN — OXYBUTYNIN CHLORIDE 5 MG: 5 TABLET ORAL at 16:54

## 2020-07-28 RX ADMIN — CEFEPIME 2000 MG: 2 INJECTION, POWDER, FOR SOLUTION INTRAVENOUS at 02:58

## 2020-07-28 RX ADMIN — OXYBUTYNIN CHLORIDE 5 MG: 5 TABLET ORAL at 09:06

## 2020-07-28 RX ADMIN — PANTOPRAZOLE SODIUM 40 MG: 40 TABLET, DELAYED RELEASE ORAL at 05:26

## 2020-07-28 RX ADMIN — CEFEPIME 2000 MG: 2 INJECTION, POWDER, FOR SOLUTION INTRAVENOUS at 15:24

## 2020-07-28 RX ADMIN — OXYBUTYNIN CHLORIDE 5 MG: 5 TABLET ORAL at 21:14

## 2020-07-28 RX ADMIN — ONDANSETRON 4 MG: 2 INJECTION INTRAMUSCULAR; INTRAVENOUS at 17:41

## 2020-07-28 RX ADMIN — KETOROLAC TROMETHAMINE 15 MG: 30 INJECTION, SOLUTION INTRAMUSCULAR at 17:36

## 2020-07-28 RX ADMIN — KETOROLAC TROMETHAMINE 15 MG: 30 INJECTION, SOLUTION INTRAMUSCULAR at 12:51

## 2020-07-28 RX ADMIN — SODIUM CHLORIDE 100 ML/HR: 0.9 INJECTION, SOLUTION INTRAVENOUS at 21:15

## 2020-07-28 RX ADMIN — SODIUM CHLORIDE 1000 ML: 0.9 INJECTION, SOLUTION INTRAVENOUS at 12:51

## 2020-07-28 RX ADMIN — KETOROLAC TROMETHAMINE 15 MG: 30 INJECTION, SOLUTION INTRAMUSCULAR at 23:45

## 2020-07-28 RX ADMIN — PHENAZOPYRIDINE 100 MG: 100 TABLET ORAL at 12:51

## 2020-07-28 RX ADMIN — PHENAZOPYRIDINE 100 MG: 100 TABLET ORAL at 16:54

## 2020-07-28 RX ADMIN — ASPIRIN 162 MG: 81 TABLET, COATED ORAL at 08:56

## 2020-07-28 RX ADMIN — METOPROLOL TARTRATE 12.5 MG: 25 TABLET ORAL at 08:56

## 2020-07-28 RX ADMIN — SODIUM CHLORIDE 100 ML/HR: 0.9 INJECTION, SOLUTION INTRAVENOUS at 09:07

## 2020-07-28 RX ADMIN — ROSUVASTATIN CALCIUM 20 MG: 10 TABLET, FILM COATED ORAL at 17:36

## 2020-07-28 RX ADMIN — METOPROLOL TARTRATE 12.5 MG: 25 TABLET ORAL at 21:14

## 2020-07-28 NOTE — PROGRESS NOTES
Progress Note - Maddy April 1955, 59 y o  female MRN: [id number]    Unit/Bed#: W -01 Encounter: [id number]    Primary Care Provider: Chayo Oropeza MD   Date and time admitted to hospital: 7/27/2020  1:50 PM        * Acute pyelonephritis  Assessment & Plan  69-year-old female with history of ureteroscopy 7/21 for obstructing right ureteral calculus with stent placement and cephalexin PO, she had planned ureteral stent retrieval 7/31  Patient presented to ED c/o persistent right flank pain radiating towards the groin, and subsequent gross hematuria  Patient denies fevers or chills  On ED found to have pathological UA and Leukocytosis  CT abdomen and pelvis demonstrated favorable position of left ureteral stent without evidence of upper tract obstruction  Also has 7mm stone on the left (currently asymptomatic)  Was started on cefepime, blood cultures and urine cultures pending  Urine culture:    20,000-29,000 cfu/ml   Gram Negative Rell Enteric Like  P             Patient is afebrile, hemodynamically stable without evidence of leukocytosis or acute kidney injury         Urology input:  · Right Ureteral Calculus--recent definitive ureteroscopy for stone management 7/15   · Hydronephrosis--resolved  · Retained ureteral stent--scheduled for cystoscopy with stent removal 7/31  · Presumed UTI--patient nontoxic  Urinalysis is not strong diagnostic tool for UTI in patients with chronic indwelling ureteral drains including ureteral stent clearance  · Renal Colic--likely related to however spastic ureter and bladder as results of presence of ureteral stent/foreign body     1  Recommend against  instrumentation during this hospitalization  2  Patient will be safe for cystoscopy and stent retrieval as schedule 7/31 with several days of pain management and antibiotics  3  Discharge per Internal Medicine when pain controlled        Plan:   · Antibiotic - cefepime 2 g IV Q 12 hours    Of note, the patient was previously on Keflex for 5 days up until 2020  · Cultures - follow-up urine culture  Follow-up blood cultures  · IV fluids - normal saline at 100 mL/hr  · Pain control - oxycodone  P r n  IV Dilaudid  · Antiemetics - Zofran  · Antipyretic - Tylenol  · Monitor WBC count  S/P ureteral stent placement  Assessment & Plan  · As stated above, patient had lithotripsy + stent for stone on 2020  · Will be removed  after patient is medically treated for current UTI      Hematuria  Assessment & Plan  Patient with indwelling ureteral stent and HPI as noted with hematuria likely due to acute pyelonephritis  Currently being treated with cefepime  Continue antibiotic treatment  Removal of stent on     Hypertension  Assessment & Plan  · Continue home antihypertensive regimen  · Monitor blood pressures  Rheumatoid arthritis ([facility] )  Assessment & Plan  · Normally is on Humira and methotrexate  She gets methotrexate typically on   · Holding these medications in the setting of acute infection  VTE Pharmacologic Prophylaxis:   Pharmacologic: Pharmacologic VTE Prophylaxis contraindicated due to hematuria  Mechanical VTE Prophylaxis in Place: Yes    Discussions with Specialists or Other Care Team Provider: urology    Education and Discussions with Family / Patient: yes    Current Length of Stay: 1 day(s)    Current Patient Status: Inpatient     Discharge Plan / Estimated Discharge Date: 48 hrs    Code Status: Level 1 - Full Code      Subjective:   Has persistent pain    Objective:     Vitals:   Temp (24hrs), Av 6 °F (37 °C), Min:98 3 °F (36 8 °C), Max:98 7 °F (37 1 °C)    Temp:  [98 3 °F (36 8 °C)-98 7 °F (37 1 °C)] 98 5 °F (36 9 °C)  HR:  [77-85] 77  Resp:  [18] 18  BP: (107-138)/(60-78) 110/63  SpO2:  [92 %-98 %] 98 %  Body mass index is 48 55 kg/m²  Input and Output Summary (last 24 hours):        Intake/Output Summary (Last 24 hours) at 2020 1440  Last data filed at 7/28/2020 1300  Gross per 24 hour   Intake 2589 99 ml   Output    Net 2589 99 ml       Physical Exam:     Physical Exam   Constitutional: She is oriented to person, place, and time  She appears well-developed and well-nourished  HENT:   Head: Normocephalic and atraumatic  Eyes: Pupils are equal, round, and reactive to light  Conjunctivae and EOM are normal    Neck: Normal range of motion  Neck supple  Cardiovascular: Normal rate and regular rhythm  No murmur heard  Pulmonary/Chest: Effort normal  She has no wheezes  She has no rales  Decreased breath sounds globally   Abdominal: Soft  Bowel sounds are normal  She exhibits no distension and no mass  There is tenderness  There is no rebound and no guarding  Tenderness in the right groin and suprapubic region  No flank tenderness noted  Abdomen is obese but is nondistended  Abdomen is soft and normoactive bowel sounds  Musculoskeletal: Normal range of motion  Neurological: She is alert and oriented to person, place, and time  Skin: Skin is warm and dry  Capillary refill takes less than 2 seconds  Psychiatric: She has a normal mood and affect  Vitals reviewed  Additional Data:     Labs:    Results from last 7 days   Lab Units 07/28/20  0533   WBC Thousand/uL 9 81   HEMOGLOBIN g/dL 11 5   HEMATOCRIT % 36 9   PLATELETS Thousands/uL 237   NEUTROS PCT % 45   LYMPHS PCT % 39   MONOS PCT % 10   EOS PCT % 5     Results from last 7 days   Lab Units 07/28/20  0533   POTASSIUM mmol/L 3 8   CHLORIDE mmol/L 106   CO2 mmol/L 28   BUN mg/dL 15   CREATININE mg/dL 0 84   CALCIUM mg/dL 8 2*   ALK PHOS U/L 91   ALT U/L 20   AST U/L 15     Results from last 7 days   Lab Units 07/27/20  1400   INR  1 11       * I Have Reviewed All Lab Data Listed Above  * Additional Pertinent Lab Tests Reviewed:  All Labs Within Last 24 Hours Reviewed    Imaging:    Imaging Reports Reviewed Today Include: CT  Imaging Personally Reviewed by Myself Includes:  none    Recent Cultures (last 7 days):     Results from last 7 days   Lab Units 07/27/20  1430 07/27/20  1400   BLOOD CULTURE  Received in Microbiology Lab  Culture in Progress  Received in Microbiology Lab  Culture in Progress  URINE CULTURE   --  20,000-29,000 cfu/ml Gram Negative Rell Enteric Like*  8642-3082 cfu/ml        Last 24 Hours Medication List:     Current Facility-Administered Medications:  acetaminophen 650 mg Oral Q4H PRN Yadira Melena, DO    aspirin 162 mg Oral Daily Yadira Melena, DO    cefepime 2,000 mg Intravenous Q12H Yadira Melena, DO Last Rate: Stopped (07/28/20 0700)   HYDROmorphone 0 5 mg Intravenous Q4H PRN Yadira Melena, DO    ketorolac 15 mg Intravenous Q6H [address] [place] Bee[place]KKINP    LORazepam 0 5 mg Oral Q6H PRN Yadira Melena, DO    meclizine 25 mg Oral TID PRN Yadira Melena, DO    metoprolol tartrate 12 5 mg Oral Q12H [address] Richard Orie Dancer, DO    ondansetron 4 mg Intravenous Q6H PRN Yadira Melena, DO    oxybutynin 5 mg Oral TID [place] RAM[place]    oxyCODONE 10 mg Oral Q4H PRN Yadira Melena, DO    oxyCODONE-acetaminophen 1 tablet Oral Q6H PRN Yadira Melena, DO    pantoprazole 40 mg Oral Early Morning Yadira Melena, DO    phenazopyridine 100 mg Oral TID With Meals RAMIREZ Reilly    rosuvastatin 20 mg Oral Daily Yadira Melena, DO    sodium chloride 1,000 mL Intravenous Once RAMIREZ Reilly Last Rate: 1,000 mL (07/28/20 1251)   sodium chloride 100 mL/hr Intravenous Continuous Yadira Melena, DO Last Rate: Stopped (07/28/20 1251)        Today, Patient Was Seen By: Isaias Cabezas MD    ** Please Note: This note has been constructed using a voice recognition system   **

## 2020-07-28 NOTE — MALNUTRITION/BMI
This medical record reflects one or more clinical indicators suggestive of  morbid obesity  BMI Findings:  BMI Classifications: Morbid Obesity 45-49 9     Body mass index is 48 55 kg/m²  See Nutrition note dated 07/28/2020 for additional details  Completed nutrition assessment is viewable in the nutrition documentation

## 2020-07-28 NOTE — UTILIZATION REVIEW
Initial Clinical Review    Admission: Date/Time/Statement: Admission Orders (From admission, onward)     Ordered        07/27/20 1657  Inpatient Admission (expected length of stay for this patient Order details is greater than two midnights)  Once                   Orders Placed This Encounter   Procedures    Inpatient Admission (expected length of stay for this patient Order details is greater than two midnights)     Standing Status:   Standing     Number of Occurrences:   1     Order Specific Question:   Admitting Physician     Answer:   Darrian Coe [[id number]]     Order Specific Question:   Level of Care     Answer:   Med Surg [16]     Order Specific Question:   Estimated length of stay     Answer:   More than 2 Midnights     Order Specific Question:   Certification     Answer:   I certify that inpatient services are medically necessary for this patient for a duration of greater than two midnights  See H&P and MD Progress Notes for additional information about the patient's course of treatment  ED Arrival Information     Expected Arrival Acuity Means of Arrival Escorted By Service Admission Type    - 7/27/2020 12:26 Urgent Walk-In Self Hospitalist Urgent    Arrival Complaint    Pain/Vaginal Bleeding/Vomiting(7/21 stent placed)        Chief Complaint   Patient presents with    Hip Pain     PT presents w/right hip pain into urethra x 1 month  PT had stone, stent replaced 7/21     Assessment/Plan: 60 yo female to ED from home admitted as Ipatient due to acute Pyelonephritis s/p Uretal stent placement  PMHx RA on methotrexate & Humira; GERD, HTN status post stent, antibx pre lithotripsy on 7/21 with stent placed following ( still intact)   Patient reports frequent urination with blood in clots with nausea and 1 episode of diarrhea & dizziness  IN ED: MD exam- Tenderness in the right groin and suprapubic region  No flank tenderness noted   Obtained inpatient labs, blood & urine culture with CT reveals persistent hydronephrosis; she received IVF, IV antibx, IV pain meds, consult Urology, follow cultures; monitor WBC  Consider Jasso with CBI if symptoms of obstruction  Pending Urology rec for stent removal      7/28 Urology: Cont with supportive care, aggressive IVF, antispasmodic, anti inflammatory IV antibx, follow cultures  No indication for intervention at this time, after clearance of infection would proceed with stent removal  Possible stent removal 7/31 7/28 Attending:   Feeling improved, started on IV cefepime at presentation-Urine cultures positive for gram negative rods enteric like; await further speciation & sensitivity prior to de escalating antibx     ED Triage Vitals   Temperature Pulse Respirations Blood Pressure SpO2   07/27/20 1239 07/27/20 1239 07/27/20 1239 07/27/20 1239 07/27/20 1239   99 5 °F (37 5 °C) 101 20 129/60 93 %      Temp Source Heart Rate Source Patient Position - Orthostatic VS BP Location FiO2 (%)   07/27/20 1239 07/27/20 1239 07/27/20 1239 07/27/20 1239 --   Oral Monitor Sitting Left arm       Pain Score       07/27/20 1438       Worst Possible Pain        Wt Readings from Last 1 Encounters:   07/28/20 (!) 141 kg (310 lb)     Additional Vital Signs:   Date/Time  Temp  Pulse  Resp  BP  MAP (mmHg)  SpO2  O2 Device  Patient Position - Orthostatic VS   07/28/20 07:55:01  98 5 °F (36 9 °C)  77  18  110/63  79  98 %       07/28/20 0300            96 %       07/27/20 2324            95 %  Other (comment)      O2 Device: CPAP at 07/27/20 2324   07/27/20 23:04:58  98 7 °F (37 1 °C)  85    107/64  78  92 %  None (Room air)      O2 Device: CPAP at 07/27/20 2304   07/27/20 23:04:31  98 7 °F (37 1 °C)  78    107/64  78  92 %       07/27/20 22:32:42  98 3 °F (36 8 °C)  78    138/78  98  95 %       07/27/20 1825    84  18  128/60  75  95 %  None (Room air)  Sitting   07/27/20 1239  99 5 °F (37 5 °C)  101  20  129/60    93 %  None (Room air)  Sitting      Weights (last 14 days) Date/Time  Weight  Weight Method  Height   07/28/20 1449  141 kg (310 lb)Abnormal     5' 7" (1 702 m)   07/27/20 2126  141 kg (310 lb)Abnormal   Stated  5' 7" (1 702 m)       Pertinent Labs/Diagnostic Test Results:       Results from last 7 days   Lab Units 07/28/20  0533 07/27/20  1400   WBC Thousand/uL 9 81 13 98*   HEMOGLOBIN g/dL 11 5 13 1   HEMATOCRIT % 36 9 41 8   PLATELETS Thousands/uL 237 274   NEUTROS ABS Thousands/µL 4 49 9 86*         Results from last 7 days   Lab Units 07/28/20  0533 07/27/20  1400   SODIUM mmol/L 141 140   POTASSIUM mmol/L 3 8 3 6   CHLORIDE mmol/L 106 104   CO2 mmol/L 28 27   ANION GAP mmol/L 7 9   BUN mg/dL 15 15   CREATININE mg/dL 0 84 0 94   EGFR ml/min/1 73sq m 74 64   CALCIUM mg/dL 8 2* 8 8     Results from last 7 days   Lab Units 07/28/20  0533 07/27/20  1400   AST U/L 15 15   ALT U/L 20 24   ALK PHOS U/L 91 115   TOTAL PROTEIN g/dL 6 2* 7 5   ALBUMIN g/dL 2 8* 3 7   TOTAL BILIRUBIN mg/dL 0 49 0 49         Results from last 7 days   Lab Units 07/28/20  0533 07/27/20  1400   GLUCOSE RANDOM mg/dL 113 118     Results from last 7 days   Lab Units 07/27/20  1400   PROTIME seconds 13 7   INR  1 11   PTT seconds 27             Results from last 7 days   Lab Units 07/27/20  1400   LACTIC ACID mmol/L 1 3       Results from last 7 days   Lab Units 07/27/20  1400   CLARITY UA  Turbid   COLOR UA  Red   SPEC GRAV UA  >=1 030   PH UA  6 0   GLUCOSE UA mg/dl Negative   KETONES UA mg/dl Negative   BLOOD UA  Large*   PROTEIN UA mg/dl >=300*   NITRITE UA  Negative   BILIRUBIN UA  Small*   UROBILINOGEN UA E U /dl 0 2   LEUKOCYTES UA  Trace*   WBC UA /hpf Field obscured, unable to enumerate*   RBC UA /hpf Innumerable*   BACTERIA UA /hpf Innumerable*   EPITHELIAL CELLS WET PREP /hpf Field obscured, unable to enumerate*       Results from last 7 days   Lab Units 07/27/20  1430 07/27/20  1400   BLOOD CULTURE  Received in Microbiology Lab  Culture in Progress  Received in Microbiology Lab   Culture in Progress  URINE CULTURE   --  20,000-29,000 cfu/ml Gram Negative Rell Enteric Like*  3652-9346 cfu/ml      7/27 Fl Retrograde Pyelogram   Impression: Fluoroscopic guidance provided for right retrograde pyelogram    Fl < 1 Hour  Result Date: 7/23/2020  Narrative: 20 seconds were utilized by the fluoroscopy unit        Ct Abdomen Pelvis With Contrast  Result Date: 7/27/2020   Impression: Right nephroureteral stent appears to be in satisfactory position, however, there is persistent mild hydronephrosis  Previously seen right ureteric calculus has been expelled  Left renal 7 mm nonobstructing calculus  Colonic diverticulosis  CAD   Small fat-containing umbilical hernia        ED Treatment:   Medication Administration from 07/27/2020 1226 to 07/27/2020 2122       Date/Time Order Dose Route Action     07/27/2020 1438 morphine (PF) 10 mg/mL injection 6 mg 6 mg Intravenous Given     07/27/2020 1435 ondansetron (ZOFRAN) injection 4 mg 4 mg Intravenous Given     07/27/2020 1450 cefepime (MAXIPIME) 2 g/50 mL dextrose IVPB 2,000 mg Intravenous New Bag     07/27/2020 1909 sodium chloride 0 9 % infusion 100 mL/hr Intravenous New Bag     07/27/2020 1914 acetaminophen (TYLENOL) tablet 650 mg 650 mg Oral Given        Past Medical History:   Diagnosis Date    Arthritis     [facility] )     cervical    Coronary artery disease     GERD (gastroesophageal reflux disease)     HPV (human papilloma virus) infection     Hypercholesterolemia     Hypertension     Kidney stone     Motion sickness     Myocardial infarct (HCC)     X2    Pleuritic chest pain     PONV (postoperative nausea and vomiting)     Rheumatoid arthritis ([facility] )     Rheu and cervical    Sleep apnea 2017    Vertigo      Present on Admission:   Rheumatoid arthritis ([facility] )   (Resolved) Sleep apnea   Hypertension   (Resolved) Vertigo   (Resolved) GERD (gastroesophageal reflux disease)    Admitting Diagnosis: Hip pain [M25 559]  Acute pyelonephritis [N10]  Hematuria [R31 9]  Pain due to ureteral stent, initial encounter ([facility] ) [T83 84XA]  S/P ureteral stent placement [Z96 0]  Sepsis, due to unspecified organism, unspecified whether acute organ dysfunction present ([facility] ) [A41 9]  Age/Sex: 59 y o  female  Admission Orders:  Scheduled Medications:    Medications:  aspirin 162 mg Oral Daily   cefepime 2,000 mg Intravenous Q12H   ketorolac 15 mg Intravenous Q6H [address]   metoprolol tartrate 12 5 mg Oral Q12H [address]   oxybutynin 5 mg Oral TID   pantoprazole 40 mg Oral Early Morning   phenazopyridine 100 mg Oral TID With Meals   rosuvastatin 20 mg Oral Daily     Continuous IV Infusions:    sodium chloride 100 mL/hr Intravenous Continuous     PRN Meds:    acetaminophen 650 mg Oral Q4H PRN   HYDROmorphone 0 5 mg Intravenous Q4H PRN   LORazepam 0 5 mg Oral Q6H PRN   meclizine 25 mg Oral TID PRN   ondansetron 4 mg Intravenous Q6H PRN   oxyCODONE 10 mg Oral Q4H PRN   oxyCODONE-acetaminophen 1 tablet Oral Q6H PRN     IP CONSULT TO UROLOGY  Up OOB  scd  I&O  Network Utilization Review Department  [email or web address]  ATTENTION: Please call with any questions or concerns to [phone] and carefully listen to the prompts so that you are directed to the right person  All voicemails are confidential   Americo Chery all requests for admission clinical reviews, approved or denied determinations and any other requests to dedicated fax number below belonging to the campus where the patient is receiving treatment   List of dedicated fax numbers for the Facilities:  FACILITY NAME UR FAX NUMBER   ADMISSION DENIALS (Administrative/Medical Necessity) [phone]   [address] (Maternity/NICU/Pediatrics) [phone]   Tiarra Baptise [phone]   Areta Sell [phone]   Brittnee Pitch [phone]   Janette Jest [address] [phone] [facility]  [phone]   [address] [facility]  [phone]   [address] [place] [address] [phone]

## 2020-07-28 NOTE — PLAN OF CARE
Problem: PAIN - ADULT  Goal: Verbalizes/displays adequate comfort level or baseline comfort level  Description  Interventions:  - Encourage patient to monitor pain and request assistance  - Assess pain using appropriate pain scale  - Administer analgesics based on type and severity of pain and evaluate response  - Implement non-pharmacological measures as appropriate and evaluate response  - Consider cultural and social influences on pain and pain management  - Notify physician/advanced practitioner if interventions unsuccessful or patient reports new pain  Outcome: Progressing     Problem: INFECTION - ADULT  Goal: Absence or prevention of progression during hospitalization  Description  INTERVENTIONS:  - Assess and monitor for signs and symptoms of infection  - Monitor lab/diagnostic results  - Monitor all insertion sites, i e  indwelling lines, tubes, and drains  - Monitor endotracheal if appropriate and nasal secretions for changes in amount and color  - [place] appropriate cooling/warming therapies per order  - Administer medications as ordered  - Instruct and encourage patient and family to use good hand hygiene technique  - Identify and instruct in appropriate isolation precautions for identified infection/condition  Outcome: Progressing     Problem: SAFETY ADULT  Goal: Patient will remain free of falls  Description  INTERVENTIONS:  - Assess patient frequently for physical needs  -  Identify cognitive and physical deficits and behaviors that affect risk of falls    -  [place] fall precautions as indicated by assessment   - Educate patient/family on patient safety including physical limitations  - Instruct patient to call for assistance with activity based on assessment  - Modify environment to reduce risk of injury  - Consider OT/PT consult to assist with strengthening/mobility  Outcome: Progressing  Goal: Maintain or return to baseline ADL function  Description  INTERVENTIONS:  -  Assess patient's ability to carry out ADLs; assess patient's baseline for ADL function and identify physical deficits which impact ability to perform ADLs (bathing, care of mouth/teeth, toileting, grooming, dressing, etc )  - Assess/evaluate cause of self-care deficits   - Assess range of motion  - Assess patient's mobility; develop plan if impaired  - Assess patient's need for assistive devices and provide as appropriate  - Encourage maximum independence but intervene and supervise when necessary  - Involve family in performance of ADLs  - Assess for home care needs following discharge   - Consider OT consult to assist with ADL evaluation and planning for discharge  - Provide patient education as appropriate  Outcome: Progressing  Goal: Maintain or return mobility status to optimal level  Description  INTERVENTIONS:  - Assess patient's baseline mobility status (ambulation, transfers, stairs, etc )    - Identify cognitive and physical deficits and behaviors that affect mobility  - Identify mobility aids required to assist with transfers and/or ambulation (gait belt, sit-to-stand, lift, walker, cane, etc )  - [place] fall precautions as indicated by assessment  - Record patient progress and toleration of activity level on Mobility SBAR; progress patient to next Phase/Stage  - Instruct patient to call for assistance with activity based on assessment  - Consider rehabilitation consult to assist with strengthening/weightbearing, etc   Outcome: Progressing     Problem: DISCHARGE PLANNING  Goal: Discharge to home or other facility with appropriate resources  Description  INTERVENTIONS:  - Identify barriers to discharge w/patient and caregiver  - Arrange for needed discharge resources and transportation as appropriate  - Identify discharge learning needs (meds, wound care, etc )  - Arrange for interpretive services to assist at discharge as needed  - Refer to Case Management Department for coordinating discharge planning if the patient needs post-hospital services based on physician/advanced practitioner order or complex needs related to functional status, cognitive ability, or social support system  Outcome: Progressing     Problem: Knowledge Deficit  Goal: Patient/family/caregiver demonstrates understanding of disease process, treatment plan, medications, and discharge instructions  Description  Complete learning assessment and assess knowledge base    Interventions:  - Provide teaching at level of understanding  - Provide teaching via preferred learning methods  Outcome: Progressing

## 2020-07-28 NOTE — ASSESSMENT & PLAN NOTE
Patient with indwelling ureteral stent and HPI as noted with hematuria likely due to acute pyelonephritis   Currently being treated with cefepime  Continue antibiotic treatment  Removal of stent on 7/31

## 2020-07-28 NOTE — ASSESSMENT & PLAN NOTE
60-year-old female with history of ureteroscopy 7/21 for obstructing right ureteral calculus with stent placement and cephalexin PO, she had planned ureteral stent retrieval 7/31  Patient presented to ED c/o persistent right flank pain radiating towards the groin, and subsequent gross hematuria  Patient denies fevers or chills  On ED found to have pathological UA and Leukocytosis  CT abdomen and pelvis demonstrated favorable position of left ureteral stent without evidence of upper tract obstruction  Also has 7mm stone on the left (currently asymptomatic)  Was started on cefepime, blood cultures and urine cultures pending  Urine culture:    20,000-29,000 cfu/ml   Gram Negative Rell Enteric Like  P             Patient is afebrile, hemodynamically stable without evidence of leukocytosis or acute kidney injury         Urology input:  · Right Ureteral Calculus--recent definitive ureteroscopy for stone management 7/15   · Hydronephrosis--resolved  · Retained ureteral stent--scheduled for cystoscopy with stent removal 7/31  · Presumed UTI--patient nontoxic  Urinalysis is not strong diagnostic tool for UTI in patients with chronic indwelling ureteral drains including ureteral stent clearance  · Renal Colic--likely related to however spastic ureter and bladder as results of presence of ureteral stent/foreign body     1  Recommend against  instrumentation during this hospitalization  2  Patient will be safe for cystoscopy and stent retrieval as schedule 7/31 with several days of pain management and antibiotics  3  Discharge per Internal Medicine when pain controlled        Plan:   · Antibiotic - cefepime 2 g IV Q 12 hours  Of note, the patient was previously on Keflex for 5 days up until 07/26/2020  · Cultures - follow-up urine culture  Follow-up blood cultures  · IV fluids - normal saline at 100 mL/hr  · Pain control - oxycodone  P r n   IV Dilaudid  · Antiemetics - Zofran  · Antipyretic - Tylenol  · Monitor WBC count

## 2020-07-28 NOTE — CONSULTS
CONSULT    Patient Name: Lino Gordon  Patient MRN: [id number]  Date of Service: 7/28/2020   Date / Time Note Created: 7/28/2020 11:42 AM   Referring Provider: Paul David MD  Provider Creating Note: RAMIREZ Rush    PCP: Jean Carlos Lemus  Attending Provider:  Paul David MD    Reason for Consult: Flank Pain    HPI: Lino Gordon is a 72-year-old female status post definitive ureteroscopy 7/21 for obstructing right ureteral calculus  Procedure went well without adverse event or complication  She is scheduled for cystoscopy and ureteral stent retrieval 7/31  Patient presented to [facility] with chief urologic complaint of persistent right flank pain not accompanied by fever or chills, but positive gross hematuria  CT of the abdomen and pelvis demonstrated favorable position of left ureteral stent without evidence of upper tract obstruction  Bladder and reproductive organs within normal limits  She is afebrile, hemodynamically stable without evidence of leukocytosis or acute kidney injury  Urinalysis is mildly suggestive of infection  Please note, patient recently received multiple doses of antibiotic throughout perioperative interval     She is admitted to the Internal Medicine service for aggressive IV fluids, empiric antibiosis and symptom management  Urologic consultation is requested for possible surgical intervention         Active Problems:    Patient Active Problem List   Diagnosis    Rheumatoid arthritis ([facility] [address] )    GERD (gastroesophageal reflux disease)    Sleep apnea    Hypertension    Vertigo    Hypercholesterolemia    Stented coronary artery    Coronary artery disease of native artery of native heart with stable angina pectoris (HCC)    Ureterolithiasis    UTI (urinary tract infection)    Acute pyelonephritis    S/P ureteral stent placement    Hematuria            Impressions  · Right Ureteral Calculus--recent definitive ureteroscopy for stone management 7/15 · Hydronephrosis--resolved  · Retained ureteral stent--scheduled for cystoscopy with stent removal 7/31  · Presumed UTI--patient nontoxic  Urinalysis is not strong diagnostic tool for UTI in patients with chronic indwelling ureteral drains including ureteral stent clearance  · Renal Colic--likely related to however spastic ureter and bladder as results of presence of ureteral stent/foreign body    Recommendations  1  Continue medical optimization  2  Aggressive IV fluids  3  Antibiosis  4  Anti inflammatory  5  Anti spasmodic  6  Bowel regimen  7  Recommend against  instrumentation during this hospitalization  8  Patient will be safe for cystoscopy and stent retrieval as schedule 7/31 with several days of pain management and antibiotics    9  Discharge per Internal Medicine when pain controlled        Past Medical History:   Diagnosis Date    Arthritis     Cancer ([address] )     cervical    Coronary artery disease     GERD (gastroesophageal reflux disease)     HPV (human papilloma virus) infection     Hypercholesterolemia     Hypertension     Kidney stone     Motion sickness     Myocardial infarct (HCC)     X2    Pleuritic chest pain     PONV (postoperative nausea and vomiting)     Rheumatoid arthritis ([address] )     Rheu and cervical    Sleep apnea 2017    Vertigo        Past Surgical History:   Procedure Laterality Date    ANGIOPLASTY Right 09/14/2015    right groin / femoral    [address] NEEDLE  1980    CARDIAC CATHETERIZATION  2015    star close - closure system    CARDIAC CATHETERIZATION      CERVICAL CONE BIOPSY      1980's    COLONOSCOPY      2006, 2019    COLONOSCOPY      DILATION AND CURETTAGE OF UTERUS      FL RETROGRADE PYELOGRAM  7/2/2020    OTHER SURGICAL HISTORY Right 11/2017    Surgical removal of lypoma shoulder blade    NV CYSTO/URETERO W/LITHOTRIPSY &INDWELL STENT INSRT Right 7/21/2020    Procedure: CYSTO, URETEROSCOPY STONE BASKET EXTRACTION , RETROGRADE PYELOGRAM, STENT; Surgeon: Kiesha Wagner MD;  Location: [facility];  Service: Urology    VT CYSTOURETHROSCOPY,URETER CATHETER Right 2020    Procedure: CYSTOSCOPY RETROGRADE PYELOGRAM WITH INSERTION STENT URETERAL;  Surgeon: Caitie Mitchell MD;  [facility];  Service: Urology    SHOULDER SURGERY Right     had a lympoma done in 2017 also same shoulder    SHOULDER SURGERY Right 2014    TONSILLECTOMY      WISDOM TOOTH EXTRACTION         Family History   Problem Relation Age of Onset    Cancer Mother     Lung cancer Mother     Heart disease Father        Social History     Socioeconomic History    Marital status:      Spouse name: Not on file    Number of children: 11    Years of education: 15    Highest education level: High school graduate   Occupational History    Occupation: [employer]   Social Needs    Financial resource strain: Not on file    Food insecurity:     Worry: Not on file     Inability: Not on file   MSB Cybersecurity needs:     Medical: Not on file     Non-medical: Not on file   Tobacco Use    Smoking status: Former Smoker     Packs/day: 2 00     Years: 30 00     Pack years: 60 00     Types: Cigarettes     Last attempt to quit: 7/15/1998     Years since quittin 0    Smokeless tobacco: Former User   Substance and Sexual Activity    Alcohol use: Yes     Frequency: Monthly or less     Drinks per session: 1 or 2     Binge frequency: Never    Drug use: Never    Sexual activity: Not on file   Lifestyle    Physical activity:     Days per week: Not on file     Minutes per session: Not on file    Stress: Not on file   Relationships    Social connections:     Talks on phone: Not on file     Gets together: Not on file     Attends Spiritism service: Not on file     Active member of club or organization: Not on file     Attends meetings of clubs or organizations: Not on file     Relationship status: Not on file    Intimate partner violence:     Fear of current or ex partner: Not on file     Emotionally abused: Not on file     Physically abused: Not on file     Forced sexual activity: Not on file   Other Topics Concern    Not on file   Social History Narrative    Most recent tobacco use screenin-    Do you currently or have you served in [employer]: No    Occupation: [employer]    Relationship status:     Live alone or with others: alone    Number of children: 0    Siblings: 5    Has smoked since age: 15    Chewing tobacco: none    Alcohol intake: Occasional    Caffeine intake: None    Illicit drugs: none    Diet: Regular    Exercise level: None    Family history of heart disease: Yes    Positive for CAD    Overweight: Yes    Obese: Yes    Advance directive: Yes    Blood Transfusion: No    Marital status:     High cholesterol: Yes    High blood pressure: Yes    Diabetes: No    General stress level: Low    Presence of domestic violence: No    Guns present in home: No    Seat belts used routinely: Yes    Sexual orientation: Heterosexual    Sunscreen used routinely: Yes    Seat belt/car seat used routinely: Yes    Exercise-How often do you exercise: moderate    Exercise- What type of exercise do you do: work    Do you have regular medical check ups: Yes    Do you have regular dental check ups: Yes    Illicit drugs-years of use: 0    Smoke alarm in home: Yes    International travel: NEG    Pets: Yes    Passive smoke exposure: No    Asbestos exposure: No    TB exposure: No    Environmental exposure: Yes    just dust    Animal exposure:  Yes       Allergies   Allergen Reactions    Atorvastatin Myalgia    Diphenhydramine Hcl (Sleep) Itching    Pravastatin Myalgia    Simvastatin Myalgia       Review of Systems  10 point review of systems negative except as noted in HPI  Constitutional:   negative for - chills or fever  Cardiovascular:   negative for - chest pain  Gastrointestinal:   positive for - abdominal pain  Genito-Urinary:   positive for - hematuria and urinary frequency/urgency  negative for - incontinence  Neurological:   no TIA or stroke symptoms     Chart Review   Allergies, current medications, history, problem list    Vital Signs  /63   Pulse 77   Temp 98 5 °F (36 9 °C)   Resp 18   Ht 5' 7" (1 702 m)   Wt (!) 141 kg (310 lb)   SpO2 98%   BMI 48 55 kg/m²     Physical Exam  General appearance: alert and oriented, in no acute distress, appears stated age, cooperative and no distress  Head: Normocephalic, without obvious abnormality, atraumatic  Neck: no adenopathy, no carotid bruit, no JVD, supple, symmetrical, trachea midline and thyroid not enlarged, symmetric, no tenderness/mass/nodules  Lungs: diminished breath sounds  Heart: regular rate and rhythm, S1, S2 normal, no murmur, click, rub or gallop  Abdomen: abnormal findings:  mild tenderness in the lower abdomen  Extremities: extremities normal, warm and well-perfused; no cyanosis, clubbing, or edema  Pulses: 2+ and symmetric  Neurologic: Grossly normal  No external urinary drains  Right internalized ureteral stent  Laboratory Studies  Lab Results   Component Value Date    K 3 8 07/28/2020    K 4 5 06/13/2020     07/28/2020     (H) 06/13/2020    CO2 28 07/28/2020    CO2 19 (L) 06/13/2020    CREATININE 0 84 07/28/2020    BUN 15 07/28/2020    BUN 14 06/13/2020     Lab Results   Component Value Date    WBC 9 81 07/28/2020    RBC 3 87 07/28/2020    HGB 11 5 07/28/2020    HCT 36 9 07/28/2020    MCV 95 07/28/2020    MCH 29 7 07/28/2020    RDW 14 2 07/28/2020     07/28/2020         Imaging and Other Studies  )  Fl Retrograde Pyelogram    Result Date: 7/2/2020  Narrative: RIGHT RETROGRADE PYELOGRAM INDICATION:   Ureterolithiasis [N20 1]   COMPARISON: 7/1/2020 IMAGES:  5 FLUOROSCOPY TIME:   25 9 seconds CONTRAST: 17 mL of iohexol (OMNIPAQUE) FINDINGS: Fluoroscopic guidance provided for retrograde pyelogram  Partially visualized right ureter demonstrates normal course and caliber without evidence of filling defects  The visualized portions of the upper tract are unremarkable  There is placement of a right nephroureteral stent  Osseous and soft tissue detail limited by technique  Impression: Fluoroscopic guidance provided for right retrograde pyelogram  Please see procedure report for further details  Workstation performed: [id number]     Fl < 1 Hour    Result Date: 7/23/2020  Narrative: 20 seconds were utilized by the fluoroscopy unit  No films were obtained during this examination  Signed by: Ladan Ware MD     Ct Abdomen Pelvis With Contrast    Result Date: 7/27/2020  Narrative: CT ABDOMEN AND PELVIS WITH IV CONTRAST INDICATION:   Right flank right lower quadrant right inguinal pain, ureteroscopy with stone removal and stent placement  COMPARISON:  7/1/2020 TECHNIQUE:  CT examination of the abdomen and pelvis was performed  Axial, sagittal, and coronal 2D reformatted images were created from the source data and submitted for interpretation  Radiation dose length product (DLP) for this visit:  1177 mGy-cm   This examination, like all CT scans performed in the [facility], was performed utilizing techniques to minimize radiation dose exposure, including the use of iterative reconstruction and automated exposure control  IV Contrast:  100 mL of iohexol (OMNIPAQUE) Enteric Contrast:  Enteric contrast was not administered  FINDINGS: ABDOMEN LOWER CHEST:  Coronary artery calcifications  Normal cardiac size  No pleural or pericardial effusion  LIVER/BILIARY TREE:  Unremarkable  GALLBLADDER:  No calcified gallstones  No pericholecystic inflammatory change  SPLEEN:  Top normal size  PANCREAS:  Unremarkable  ADRENAL GLANDS:  Unremarkable  KIDNEYS/URETERS: Right nephroureteral stent appears to be in satisfactory position, however, there is persistent mild hydronephrosis  Previously seen right ureteric calculus has been expelled    Left renal 7 mm nonobstructing calculus is noted  STOMACH AND BOWEL:  Colonic diverticulosis without evidence of acute diverticulitis  No evidence of bowel obstruction  APPENDIX:  No findings to suggest appendicitis  ABDOMINOPELVIC CAVITY:  No ascites  No pneumoperitoneum  No lymphadenopathy  VESSELS:  Atherosclerotic changes are present  No evidence of aneurysm  PELVIS REPRODUCTIVE ORGANS:  Unremarkable for patient's age  URINARY BLADDER:  Unremarkable  ABDOMINAL WALL/INGUINAL REGIONS:  There is a small fat-containing umbilical hernia  OSSEOUS STRUCTURES:  No acute fracture or destructive osseous lesion  Bony demineralization  Degenerative changes of the spine  Impression: Right nephroureteral stent appears to be in satisfactory position, however, there is persistent mild hydronephrosis  Previously seen right ureteric calculus has been expelled  Left renal 7 mm nonobstructing calculus  Colonic diverticulosis  CAD  Small fat-containing umbilical hernia  Workstation performed: [id number]     Ct Abdomen Pelvis With Contrast    Result Date: 7/1/2020  Narrative: CT ABDOMEN AND PELVIS WITH IV CONTRAST INDICATION:   RLQ TTP  COMPARISON:  None  TECHNIQUE:  CT examination of the abdomen and pelvis was performed  Axial, sagittal, and coronal 2D reformatted images were created from the source data and submitted for interpretation  Radiation dose length product (DLP) for this visit:  8810 mGy-cm   This examination, like all CT scans performed in the [facility], was performed utilizing techniques to minimize radiation dose exposure, including the use of iterative reconstruction and automated exposure control  IV Contrast:  100 mL of iohexol (OMNIPAQUE) Enteric Contrast:  Enteric contrast was not administered  FINDINGS: ABDOMEN LOWER CHEST:  No clinically significant abnormality identified in the visualized lower chest  LIVER/BILIARY TREE:  Liver is diffusely decreased in density consistent with fatty change    No CT evidence of suspicious hepatic mass  Normal hepatic contours  No biliary dilatation  GALLBLADDER:  No calcified gallstones  No pericholecystic inflammatory change  SPLEEN:  Unremarkable  PANCREAS:  Unremarkable  ADRENAL GLANDS:  Unremarkable  KIDNEYS/URETERS:  There is mild right-sided hydronephrosis and proximal hydroureter, the cause of which appears to be a 5 mm calculus at the mid ureter  There is a 3 to 4 mm nonobstructing calculus at the left renal midpole One or more sharply circumscribed subcentimeter renal hypodensities are noted  These lesions are too small to accurately characterize, but are statistically most likely to represent benign cortical renal cyst(s)  According to the guidelines published in  the [place] Paper of the ACR Incidental Findings Committee (Radiology 2010), no further workup of these lesions is recommended  STOMACH AND BOWEL:  There is colonic diverticulosis without evidence of acute diverticulitis  APPENDIX:  A normal appendix was visualized  ABDOMINOPELVIC CAVITY:  No ascites  No pneumoperitoneum  No lymphadenopathy  VESSELS:  Atherosclerotic changes are present  No evidence of aneurysm  PELVIS REPRODUCTIVE ORGANS:  Unremarkable for patient's age  URINARY BLADDER:  Unremarkable  ABDOMINAL WALL/INGUINAL REGIONS:  There is a small fat-containing umbilical hernia  OSSEOUS STRUCTURES:  No acute fracture or destructive osseous lesion  Impression: Mild right-sided hydronephrosis, the cause of which appears to be a 5 mm calculus at the mid ureter  Scattered colonic diverticulosis with no inflammatory changes present to suggest acute diverticulitis   Workstation performed: [id number]       Medications     Current Facility-Administered Medications:  acetaminophen 650 mg Oral Q4H PRN Gweneth Glaze, DO    aspirin 162 mg Oral Daily Gweneth Glaze, DO    cefepime 2,000 mg Intravenous Q12H Gweneth Glaze, DO Last Rate: 2,000 mg (07/28/20 0258)   HYDROmorphone 0 5 mg Intravenous Q4H PRN Gweneth Glaze, DO LORazepam 0 5 mg Oral Q6H PRN Shagufta Stakes, DO    meclizine 25 mg Oral TID PRN Shagufta Stakes, DO    metoprolol tartrate 12 5 mg Oral Q12H [facility] Vladimir Pacheco, DO    ondansetron 4 mg Intravenous Q6H PRN Shagufta Stakes, DO    oxybutynin 5 mg Oral 4x Daily PRN Shagufta Stakes, DO    oxyCODONE 10 mg Oral Q4H PRN Shagufta Stakes, DO    oxyCODONE-acetaminophen 1 tablet Oral Q6H PRN Shagufta Stakes, DO    pantoprazole 40 mg Oral Early Morning Shagufta Stakes, DO    rosuvastatin 20 mg Oral Daily Shagufta Stakes, DO    sodium chloride 100 mL/hr Intravenous Continuous Shagufta Stakes, DO Last Rate: 100 mL/hr (07/28/20 0965)               RAMIREZ Owen

## 2020-07-28 NOTE — PLAN OF CARE
Problem: PAIN - ADULT  Goal: Verbalizes/displays adequate comfort level or baseline comfort level  Description  Interventions:  - Encourage patient to monitor pain and request assistance  - Assess pain using appropriate pain scale  - Administer analgesics based on type and severity of pain and evaluate response  - Implement non-pharmacological measures as appropriate and evaluate response  - Consider cultural and social influences on pain and pain management  - Notify physician/advanced practitioner if interventions unsuccessful or patient reports new pain  7/27/2020 2319 by Sarita Colby RN  Outcome: Progressing  7/27/2020 2319 by Sarita Colby RN  Outcome: Progressing     Problem: INFECTION - ADULT  Goal: Absence or prevention of progression during hospitalization  Description  INTERVENTIONS:  - Assess and monitor for signs and symptoms of infection  - Monitor lab/diagnostic results  - Monitor all insertion sites, i e  indwelling lines, tubes, and drains  - Monitor endotracheal if appropriate and nasal secretions for changes in amount and color  - [place] appropriate cooling/warming therapies per order  - Administer medications as ordered  - Instruct and encourage patient and family to use good hand hygiene technique  - Identify and instruct in appropriate isolation precautions for identified infection/condition  7/27/2020 2319 by Sarita Colby RN  Outcome: Progressing  7/27/2020 2319 by Sarita Colby RN  Outcome: Progressing  Goal: Absence of fever/infection during neutropenic period  Description  INTERVENTIONS:  - Monitor WBC    7/27/2020 2319 by Sarita Colby RN  Outcome: Progressing  7/27/2020 2319 by Sarita Colby RN  Outcome: Progressing     Problem: SAFETY ADULT  Goal: Patient will remain free of falls  Description  INTERVENTIONS:  - Assess patient frequently for physical needs  -  Identify cognitive and physical deficits and behaviors that affect risk of falls    -  [place] fall precautions as indicated by assessment   - Educate patient/family on patient safety including physical limitations  - Instruct patient to call for assistance with activity based on assessment  - Modify environment to reduce risk of injury  - Consider OT/PT consult to assist with strengthening/mobility  7/27/2020 2319 by Sarita Colby RN  Outcome: Progressing  7/27/2020 2319 by Sarita Colby RN  Outcome: Progressing  Goal: Maintain or return to baseline ADL function  Description  INTERVENTIONS:  -  Assess patient's ability to carry out ADLs; assess patient's baseline for ADL function and identify physical deficits which impact ability to perform ADLs (bathing, care of mouth/teeth, toileting, grooming, dressing, etc )  - Assess/evaluate cause of self-care deficits   - Assess range of motion  - Assess patient's mobility; develop plan if impaired  - Assess patient's need for assistive devices and provide as appropriate  - Encourage maximum independence but intervene and supervise when necessary  - Involve family in performance of ADLs  - Assess for home care needs following discharge   - Consider OT consult to assist with ADL evaluation and planning for discharge  - Provide patient education as appropriate  7/27/2020 2319 by Sarita Colby RN  Outcome: Progressing  7/27/2020 2319 by Sarita Colby RN  Outcome: Progressing  Goal: Maintain or return mobility status to optimal level  Description  INTERVENTIONS:  - Assess patient's baseline mobility status (ambulation, transfers, stairs, etc )    - Identify cognitive and physical deficits and behaviors that affect mobility  - Identify mobility aids required to assist with transfers and/or ambulation (gait belt, sit-to-stand, lift, walker, cane, etc )  - [place] fall precautions as indicated by assessment  - Record patient progress and toleration of activity level on Mobility SBAR; progress patient to next Phase/Stage  - Instruct patient to call for assistance with activity based on assessment  - Consider rehabilitation consult to assist with strengthening/weightbearing, etc   7/27/2020 2319 by Deacon Chacon RN  Outcome: Progressing  7/27/2020 2319 by Deacon Chacon RN  Outcome: Progressing     Problem: DISCHARGE PLANNING  Goal: Discharge to home or other facility with appropriate resources  Description  INTERVENTIONS:  - Identify barriers to discharge w/patient and caregiver  - Arrange for needed discharge resources and transportation as appropriate  - Identify discharge learning needs (meds, wound care, etc )  - Arrange for interpretive services to assist at discharge as needed  - Refer to Case Management Department for coordinating discharge planning if the patient needs post-hospital services based on physician/advanced practitioner order or complex needs related to functional status, cognitive ability, or social support system  7/27/2020 2319 by Deacon Chacon RN  Outcome: Progressing  7/27/2020 2319 by Deacon Chacon RN  Outcome: Progressing     Problem: Knowledge Deficit  Goal: Patient/family/caregiver demonstrates understanding of disease process, treatment plan, medications, and discharge instructions  Description  Complete learning assessment and assess knowledge base    Interventions:  - Provide teaching at level of understanding  - Provide teaching via preferred learning methods  7/27/2020 2319 by Deacon Chacon RN  Outcome: Progressing  7/27/2020 2319 by Deacon Chacon RN  Outcome: Progressing

## 2020-07-28 NOTE — ASSESSMENT & PLAN NOTE
· Normally is on Humira and methotrexate  She gets methotrexate typically on Fridays  · Holding these medications in the setting of acute infection

## 2020-07-28 NOTE — ASSESSMENT & PLAN NOTE
· As stated above, patient had lithotripsy + stent for stone on 07/21/2020    · Will be removed 7/31 after patient is medically treated for current UTI

## 2020-07-29 VITALS
BODY MASS INDEX: 45.99 KG/M2 | DIASTOLIC BLOOD PRESSURE: 65 MMHG | HEART RATE: 79 BPM | WEIGHT: 293 LBS | TEMPERATURE: 98.5 F | OXYGEN SATURATION: 99 % | RESPIRATION RATE: 16 BRPM | SYSTOLIC BLOOD PRESSURE: 127 MMHG | HEIGHT: 67 IN

## 2020-07-29 PROBLEM — N10 ACUTE PYELONEPHRITIS: Status: RESOLVED | Noted: 2020-07-27 | Resolved: 2020-07-29

## 2020-07-29 LAB
ANION GAP SERPL CALCULATED.3IONS-SCNC: 9 MMOL/L (ref 4–13)
BUN SERPL-MCNC: 13 MG/DL (ref 5–25)
CALCIUM SERPL-MCNC: 8.6 MG/DL (ref 8.3–10.1)
CHLORIDE SERPL-SCNC: 109 MMOL/L (ref 100–108)
CO2 SERPL-SCNC: 26 MMOL/L (ref 21–32)
CREAT SERPL-MCNC: 0.85 MG/DL (ref 0.6–1.3)
ERYTHROCYTE [DISTWIDTH] IN BLOOD BY AUTOMATED COUNT: 14.2 % (ref 11.6–15.1)
GFR SERPL CREATININE-BSD FRML MDRD: 73 ML/MIN/1.73SQ M
GLUCOSE SERPL-MCNC: 118 MG/DL (ref 65–140)
HCT VFR BLD AUTO: 40.9 % (ref 34.8–46.1)
HGB BLD-MCNC: 12.4 G/DL (ref 11.5–15.4)
MCH RBC QN AUTO: 29.2 PG (ref 26.8–34.3)
MCHC RBC AUTO-ENTMCNC: 30.3 G/DL (ref 31.4–37.4)
MCV RBC AUTO: 96 FL (ref 82–98)
PLATELET # BLD AUTO: 232 THOUSANDS/UL (ref 149–390)
PMV BLD AUTO: 9.6 FL (ref 8.9–12.7)
POTASSIUM SERPL-SCNC: 3.8 MMOL/L (ref 3.5–5.3)
RBC # BLD AUTO: 4.25 MILLION/UL (ref 3.81–5.12)
SODIUM SERPL-SCNC: 144 MMOL/L (ref 136–145)
WBC # BLD AUTO: 9.25 THOUSAND/UL (ref 4.31–10.16)

## 2020-07-29 PROCEDURE — 80048 BASIC METABOLIC PNL TOTAL CA: CPT | Performed by: STUDENT IN AN ORGANIZED HEALTH CARE EDUCATION/TRAINING PROGRAM

## 2020-07-29 PROCEDURE — 99239 HOSP IP/OBS DSCHRG MGMT >30: CPT | Performed by: INTERNAL MEDICINE

## 2020-07-29 PROCEDURE — 85027 COMPLETE CBC AUTOMATED: CPT | Performed by: STUDENT IN AN ORGANIZED HEALTH CARE EDUCATION/TRAINING PROGRAM

## 2020-07-29 RX ORDER — CEPHALEXIN 500 MG/1
500 CAPSULE ORAL EVERY 6 HOURS SCHEDULED
Status: DISCONTINUED | OUTPATIENT
Start: 2020-07-29 | End: 2020-07-29 | Stop reason: HOSPADM

## 2020-07-29 RX ORDER — CEPHALEXIN 500 MG/1
500 CAPSULE ORAL EVERY 6 HOURS SCHEDULED
Qty: 20 CAPSULE | Refills: 0 | Status: SHIPPED | OUTPATIENT
Start: 2020-07-29 | End: 2020-07-31

## 2020-07-29 RX ORDER — ACETAMINOPHEN 325 MG/1
650 TABLET ORAL EVERY 4 HOURS PRN
Refills: 0
Start: 2020-07-29 | End: 2021-10-06 | Stop reason: ALTCHOICE

## 2020-07-29 RX ADMIN — METOPROLOL TARTRATE 12.5 MG: 25 TABLET ORAL at 08:11

## 2020-07-29 RX ADMIN — OXYCODONE HYDROCHLORIDE AND ACETAMINOPHEN 1 TABLET: 5; 325 TABLET ORAL at 15:44

## 2020-07-29 RX ADMIN — PANTOPRAZOLE SODIUM 40 MG: 40 TABLET, DELAYED RELEASE ORAL at 05:20

## 2020-07-29 RX ADMIN — PHENAZOPYRIDINE 100 MG: 100 TABLET ORAL at 15:44

## 2020-07-29 RX ADMIN — CEPHALEXIN 500 MG: 500 CAPSULE ORAL at 12:04

## 2020-07-29 RX ADMIN — SODIUM CHLORIDE 100 ML/HR: 0.9 INJECTION, SOLUTION INTRAVENOUS at 05:43

## 2020-07-29 RX ADMIN — PHENAZOPYRIDINE 100 MG: 100 TABLET ORAL at 08:11

## 2020-07-29 RX ADMIN — KETOROLAC TROMETHAMINE 15 MG: 30 INJECTION, SOLUTION INTRAMUSCULAR at 05:20

## 2020-07-29 RX ADMIN — CEFEPIME 2000 MG: 2 INJECTION, POWDER, FOR SOLUTION INTRAVENOUS at 03:06

## 2020-07-29 RX ADMIN — OXYBUTYNIN CHLORIDE 5 MG: 5 TABLET ORAL at 15:44

## 2020-07-29 RX ADMIN — ASPIRIN 162 MG: 81 TABLET, COATED ORAL at 08:11

## 2020-07-29 RX ADMIN — OXYBUTYNIN CHLORIDE 5 MG: 5 TABLET ORAL at 08:11

## 2020-07-29 RX ADMIN — PHENAZOPYRIDINE 100 MG: 100 TABLET ORAL at 12:04

## 2020-07-29 NOTE — DISCHARGE INSTRUCTIONS
Urinary Traction Infection in Older Adults   WHAT YOU NEED TO KNOW:   A urinary tract infection (UTI) is caused by bacteria that get inside your urinary tract  Your urinary tract includes your kidneys, ureters, bladder, and urethra  Urine is made in your kidneys, and it flows from the ureters to the bladder  Urine leaves the bladder through the urethra  A UTI is more common in your lower urinary tract, which includes your bladder and urethra  DISCHARGE INSTRUCTIONS:   Seek care immediately if:   · You are urinating very little or not at all  · You are vomiting  · You have a high fever with shaking chills  · You have side or back pain that gets worse  Contact your healthcare provider if:   · You have a fever  · You are a woman and you have increased white or yellow discharge from your vagina  · You do not feel better after 2 days of taking antibiotics  · You have questions or concerns about your condition or care  Medicines:   · Medicines  help treat the bacterial infection or decrease pain and burning when you urinate  You may also need medicines to decrease the urge to urinate often  Your healthcare provider may recommend cranberry juice or cranberry supplements to help decrease your symptoms  · Take your medicine as directed  Contact your healthcare provider if you think your medicine is not helping or if you have side effects  Tell him or her if you are allergic to any medicine  Keep a list of the medicines, vitamins, and herbs you take  Include the amounts, and when and why you take them  Bring the list or the pill bottles to follow-up visits  Carry your medicine list with you in case of an emergency  Self-care:   · Urinate when you feel the urge  Do not hold your urine because bacteria can grow in the bladder if urine stays in the bladder too long  It may be helpful to urinate at least every 3 to 4 hours  · Drink liquids as directed    Liquids can help flush bacteria from your urinary tract  Ask how much liquid to drink each day and which liquids are best for you  You may need to drink more liquids than usual to help flush out the bacteria  Do not drink alcohol, caffeine, and citrus juices  These can irritate your bladder and increase your symptoms  · Apply heat  on your abdomen for 20 to 30 minutes every 2 hours for as many days as directed  Heat helps decrease discomfort and pressure in your bladder  Prevent a UTI:   · Women should wipe front to back  after urinating or having a bowel movement  This may prevent germs from getting into the urinary tract  · Urinate after you have sex  to flush away bacteria that can enter your urinary tract during sex  · Wear cotton underwear and clothes that fit loose  Tight pants and nylon underwear can trap moisture and cause bacteria to grow  Follow up with your healthcare provider as directed:  Write down your questions so you remember to ask them during your visits  © 2017 [address] [employer] [employer] Information is for End User's use only and may not be sold, redistributed or otherwise used for commercial purposes  All illustrations and images included in CareNotes® are the copyrighted property of [employer]  or [employer]  The above information is an  only  It is not intended as medical advice for individual conditions or treatments  Talk to your doctor, nurse or pharmacist before following any medical regimen to see if it is safe and effective for you

## 2020-07-29 NOTE — ASSESSMENT & PLAN NOTE
70-year-old female with history of ureteroscopy 7/21 for obstructing right ureteral calculus with stent placement and cephalexin PO, she had planned ureteral stent retrieval 7/31  Patient presented to ED c/o persistent right flank pain radiating towards the groin, and subsequent gross hematuria  Patient denies fevers or chills  On ED found to have pathological UA and Leukocytosis  CT abdomen and pelvis demonstrated favorable position of left ureteral stent without evidence of upper tract obstruction  Also has 7mm stone on the left (currently asymptomatic)  Was started on cefepime, blood cultures and urine cultures pending for narrowing antibiotic treatment and continuing treatment as outpatient  Urine culture:    20,000-29,000 cfu/ml   Gram Negative Rell Enteric Like  P             Patient is afebrile, hemodynamically stable without evidence of leukocytosis or acute kidney injury  Refers occasional pain but well controlled with current pain regimen  Blood culture negative at 24 hrs         Urology input:  · Right Ureteral Calculus--recent definitive ureteroscopy for stone management 7/15   · Hydronephrosis--resolved  · Retained ureteral stent--scheduled for cystoscopy with stent removal 7/31  · Presumed UTI--patient nontoxic  Urinalysis is not strong diagnostic tool for UTI in patients with chronic indwelling ureteral drains including ureteral stent clearance  · Renal Colic--likely related to however spastic ureter and bladder as results of presence of ureteral stent/foreign body     1  Recommend against  instrumentation during this hospitalization  2  Patient will be safe for cystoscopy and stent retrieval as schedule 7/31 with several days of pain management and antibiotics  3  Discharge per Internal Medicine when pain controlled        Plan:   · Antibiotic - cefepime 2 g IV Q 12 hours  · Cultures - follow-up urine culture  · IV fluids - normal saline at 100 mL/hr  · Pain control - oxycodone  P r n  IV Dilaudid  · Antiemetics - Zofran  · Antipyretic - Tylenol  · Monitor WBC count

## 2020-07-29 NOTE — DISCHARGE INSTR - AVS FIRST PAGE
Dear Stefany Reyes,     It was our pleasure to care for you here at [facility]  It is our hope that we were always able to exceed the expected standards for your care during your stay  You were hospitalized due to pyelonephritis  You were cared for on the [address] [place] [address] fourth floor by Kael Roberts MD under the service of Manpreet Aquino MD with the [facility] Internal [facility] Hospitalist Group who covers for your primary care physician (PCP), Eva Chao MD, while you were hospitalized  If you have any questions or concerns related to this hospitalization, you may contact us at [phone]  For follow up as well as any medication refills, we recommend that you follow up with your primary care physician  A registered nurse will reach out to you by phone within a few days after your discharge to answer any additional questions that you may have after going home  However, at this time we provide for you here, the most important instructions / recommendations at discharge:     · Notable Medication Adjustments -   · Would avoid taking Humira and methotrexate If next dose is within the next week while active infection is present, then resume as prescribed  · Continue antibiotic with Keflex for 5 more days  · Testing Required after Discharge -   · none  · Important follow up information -   · Follow up with your nephrologist on 7/31 as planned   · Other Instructions -   · Keep well hydrated  Take pain medication when needed    · Please review this entire after visit summary as additional general instructions including medication list, appointments, activity, diet, any pertinent wound care, and other additional recommendations from your care team that may be provided for you        Sincerely,     Kael Roberts MD

## 2020-07-29 NOTE — DISCHARGE SUMMARY
Discharge- Richard Calles 1955, 59 y o  female MRN: [id number]    Unit/Bed#: W -01 Encounter: [id number]    Primary Care Provider: Silvia Buitrago MD   Date and time admitted to hospital: 7/27/2020  1:50 PM        * Acute pyelonephritis  Assessment & Plan  80-year-old female with history of ureteroscopy 7/21 for obstructing right ureteral calculus with stent placement and cephalexin PO, she had planned ureteral stent retrieval 7/31  Patient presented to ED c/o persistent right flank pain radiating towards the groin, and subsequent gross hematuria  Patient denies fevers or chills  On ED found to have pathological UA and Leukocytosis  CT abdomen and pelvis demonstrated favorable position of left ureteral stent without evidence of upper tract obstruction  Also has 7mm stone on the left (currently asymptomatic)  Was started on cefepime, blood cultures and urine cultures pending for narrowing antibiotic treatment and continuing treatment as outpatient  Urine culture:    20,000-29,000 cfu/ml   Gram Negative Rell Enteric Like  P             Patient is afebrile, hemodynamically stable without evidence of leukocytosis or acute kidney injury  Refers occasional pain but well controlled with current pain regimen  Blood culture negative at 24 hrs         Urology input:  · Right Ureteral Calculus--recent definitive ureteroscopy for stone management 7/15   · Hydronephrosis--resolved  · Retained ureteral stent--scheduled for cystoscopy with stent removal 7/31  · Presumed UTI--patient nontoxic  Urinalysis is not strong diagnostic tool for UTI in patients with chronic indwelling ureteral drains including ureteral stent clearance  · Renal Colic--likely related to however spastic ureter and bladder as results of presence of ureteral stent/foreign body     1  Recommend against  instrumentation during this hospitalization    2  Patient will be safe for cystoscopy and stent retrieval as schedule 7/31 with several days of pain management and antibiotics  3  Discharge per Internal Medicine when pain controlled        Plan:   · Antibiotic - cefepime 2 g IV Q 12 hours  · Cultures - follow-up urine culture  · IV fluids - normal saline at 100 mL/hr  · Pain control - oxycodone  P r n  IV Dilaudid  · Antiemetics - Zofran  · Antipyretic - Tylenol  · Monitor WBC count  S/P ureteral stent placement  Assessment & Plan  · As stated above, patient had lithotripsy + stent for stone on 07/21/2020  · Will be removed 7/31 after patient is medically treated for current UTI      Hematuria  Assessment & Plan  Patient with indwelling ureteral stent and HPI as noted with hematuria likely due to acute pyelonephritis  Currently being treated with cefepime  Continue antibiotic treatment  Removal of stent on 7/31    Hypertension  Assessment & Plan  · Continue home antihypertensive regimen  · Monitor blood pressures  Rheumatoid arthritis ([address] )  Assessment & Plan  · Normally is on Humira and methotrexate  She gets methotrexate typically on Fridays  · Holding these medications in the setting of acute infection  Discharging Resident Physician: Mukesh Townsend MD  Attending: Nadir Bernal MD  PCP: Almas Noble MD  Admission Date: 7/27/2020  Discharge Date: 07/29/20    Disposition:     Home    Reason for Admission: pyleonephritis    Consultations During Hospital Stay:  · ID/Neph    Procedures Performed:     · none    Significant Findings / Test Results:     · Ct with left 7mm stone (currently asymptomatic)    Incidental Findings:   · none    Test Results Pending at Discharge (will require follow up):   · none     Outpatient Tests Requested:  · none    Complications:  none    Hospital Course:     Sai Zendejas is a 59-year-old female with history of ureteroscopy 7/21 for obstructing right ureteral calculus with stent placement and cephalexin PO, she had planned ureteral stent retrieval 7/31   Patient presented  7/27/2020 to ED c/o persistent right flank pain radiating towards the groin, and subsequent gross hematuria  Patient denies fevers or chills  On ED found to have pathological UA and Leukocytosis  CT abdomen and pelvis demonstrated favorable position of left ureteral stent without evidence of upper tract obstruction  Also has 7mm stone on the left (currently asymptomatic)  Was started on cefepime, blood cultures and urine cultures pending for narrowing antibiotic treatment and continuing treatment as outpatient  Condition at Discharge: fair     Discharge Day Visit / Exam:     Subjective:  Feels better  Vitals: Blood Pressure: 131/67 (07/29/20 0733)  Pulse: 92 (07/29/20 0733)  Temperature: 98 3 °F (36 8 °C) (07/29/20 0733)  Temp Source: Oral (07/28/20 1535)  Respirations: 16 (07/29/20 0733)  Height: 5' 7" (170 2 cm) (07/28/20 1449)  Weight - Scale: (!) 141 kg (310 lb) (07/28/20 1449)  SpO2: 95 % (07/29/20 0733)  Exam:   Physical Exam   Constitutional: She is oriented to person, place, and time  She appears well-developed and well-nourished  HENT:   Head: Normocephalic and atraumatic  Eyes: Pupils are equal, round, and reactive to light  Conjunctivae and EOM are normal    Neck: Normal range of motion  Neck supple  Cardiovascular: Normal rate and regular rhythm  No murmur heard  Pulmonary/Chest: Effort normal  She has no wheezes  She has no rales  Decreased breath sounds globally   Abdominal: Soft  Bowel sounds are normal  She exhibits no distension and no mass  There is tenderness  There is no rebound and no guarding  Tenderness in the right groin and suprapubic region  No flank tenderness noted  Abdomen is obese but is nondistended  Abdomen is soft and normoactive bowel sounds  Musculoskeletal: Normal range of motion  Neurological: She is alert and oriented to person, place, and time  Skin: Skin is warm and dry  Capillary refill takes less than 2 seconds  Psychiatric: She has a normal mood and affect  Vitals reviewed  Discussion with Family: none required    Discharge instructions/Information to patient and family:   See after visit summary for information provided to patient and family  Provisions for Follow-Up Care:  See after visit summary for information related to follow-up care and any pertinent home health orders  Planned Readmission: none     Discharge Medications:  See after visit summary for reconciled discharge medications provided to patient and family        ** Please Note: This note has been constructed using a voice recognition system **

## 2020-07-29 NOTE — PLAN OF CARE
Problem: PAIN - ADULT  Goal: Verbalizes/displays adequate comfort level or baseline comfort level  Description  Interventions:  - Encourage patient to monitor pain and request assistance  - Assess pain using appropriate pain scale  - Administer analgesics based on type and severity of pain and evaluate response  - Implement non-pharmacological measures as appropriate and evaluate response  - Consider cultural and social influences on pain and pain management  - Notify physician/advanced practitioner if interventions unsuccessful or patient reports new pain  Outcome: Completed     Problem: INFECTION - ADULT  Goal: Absence or prevention of progression during hospitalization  Description  INTERVENTIONS:  - Assess and monitor for signs and symptoms of infection  - Monitor lab/diagnostic results  - Monitor all insertion sites, i e  indwelling lines, tubes, and drains  - Monitor endotracheal if appropriate and nasal secretions for changes in amount and color  - [place] appropriate cooling/warming therapies per order  - Administer medications as ordered  - Instruct and encourage patient and family to use good hand hygiene technique  - Identify and instruct in appropriate isolation precautions for identified infection/condition  Outcome: Completed     Problem: SAFETY ADULT  Goal: Patient will remain free of falls  Description  INTERVENTIONS:  - Assess patient frequently for physical needs  -  Identify cognitive and physical deficits and behaviors that affect risk of falls    -  [place] fall precautions as indicated by assessment   - Educate patient/family on patient safety including physical limitations  - Instruct patient to call for assistance with activity based on assessment  - Modify environment to reduce risk of injury  - Consider OT/PT consult to assist with strengthening/mobility  Outcome: Completed  Goal: Maintain or return to baseline ADL function  Description  INTERVENTIONS:  -  Assess patient's ability to carry out ADLs; assess patient's baseline for ADL function and identify physical deficits which impact ability to perform ADLs (bathing, care of mouth/teeth, toileting, grooming, dressing, etc )  - Assess/evaluate cause of self-care deficits   - Assess range of motion  - Assess patient's mobility; develop plan if impaired  - Assess patient's need for assistive devices and provide as appropriate  - Encourage maximum independence but intervene and supervise when necessary  - Involve family in performance of ADLs  - Assess for home care needs following discharge   - Consider OT consult to assist with ADL evaluation and planning for discharge  - Provide patient education as appropriate  Outcome: Completed  Goal: Maintain or return mobility status to optimal level  Description  INTERVENTIONS:  - Assess patient's baseline mobility status (ambulation, transfers, stairs, etc )    - Identify cognitive and physical deficits and behaviors that affect mobility  - Identify mobility aids required to assist with transfers and/or ambulation (gait belt, sit-to-stand, lift, walker, cane, etc )  - [place] fall precautions as indicated by assessment  - Record patient progress and toleration of activity level on Mobility SBAR; progress patient to next Phase/Stage  - Instruct patient to call for assistance with activity based on assessment  - Consider rehabilitation consult to assist with strengthening/weightbearing, etc   Outcome: Completed     Problem: DISCHARGE PLANNING  Goal: Discharge to home or other facility with appropriate resources  Description  INTERVENTIONS:  - Identify barriers to discharge w/patient and caregiver  - Arrange for needed discharge resources and transportation as appropriate  - Identify discharge learning needs (meds, wound care, etc )  - Arrange for interpretive services to assist at discharge as needed  - Refer to Case Management Department for coordinating discharge planning if the patient needs post-hospital services based on physician/advanced practitioner order or complex needs related to functional status, cognitive ability, or social support system  Outcome: Completed     Problem: Knowledge Deficit  Goal: Patient/family/caregiver demonstrates understanding of disease process, treatment plan, medications, and discharge instructions  Description  Complete learning assessment and assess knowledge base    Interventions:  - Provide teaching at level of understanding  - Provide teaching via preferred learning methods  Outcome: Completed

## 2020-07-30 ENCOUNTER — TRANSITIONAL CARE MANAGEMENT (OUTPATIENT)
Dept: FAMILY MEDICINE CLINIC | Facility: CLINIC | Age: 65
End: 2020-07-30

## 2020-07-30 LAB
BACTERIA UR CULT: ABNORMAL
BACTERIA UR CULT: ABNORMAL

## 2020-07-30 NOTE — TELEPHONE ENCOUNTER
I spoke to the patient, new paper work was never received from [employer]  She will call her  and have them send the paper work to be completed by me Monday 8/3/2020 and sent back ASAP

## 2020-07-31 ENCOUNTER — PROCEDURE VISIT (OUTPATIENT)
Dept: UROLOGY | Facility: MEDICAL CENTER | Age: 65
End: 2020-07-31
Payer: COMMERCIAL

## 2020-07-31 ENCOUNTER — TELEPHONE (OUTPATIENT)
Dept: UROLOGY | Facility: AMBULATORY SURGERY CENTER | Age: 65
End: 2020-07-31

## 2020-07-31 VITALS
DIASTOLIC BLOOD PRESSURE: 68 MMHG | WEIGHT: 293 LBS | HEIGHT: 67 IN | SYSTOLIC BLOOD PRESSURE: 136 MMHG | BODY MASS INDEX: 45.99 KG/M2 | TEMPERATURE: 98.7 F

## 2020-07-31 DIAGNOSIS — N20.0 CALCULUS OF KIDNEY: ICD-10-CM

## 2020-07-31 DIAGNOSIS — N20.0 CALCULUS OF KIDNEY: Primary | ICD-10-CM

## 2020-07-31 DIAGNOSIS — N30.00 ACUTE CYSTITIS WITHOUT HEMATURIA: Primary | ICD-10-CM

## 2020-07-31 DIAGNOSIS — N20.1 CALCULUS OF URETER: ICD-10-CM

## 2020-07-31 PROCEDURE — 52310 CYSTOSCOPY AND TREATMENT: CPT | Performed by: UROLOGY

## 2020-07-31 PROCEDURE — 3008F BODY MASS INDEX DOCD: CPT | Performed by: UROLOGY

## 2020-07-31 PROCEDURE — 3075F SYST BP GE 130 - 139MM HG: CPT | Performed by: UROLOGY

## 2020-07-31 PROCEDURE — 1111F DSCHRG MED/CURRENT MED MERGE: CPT | Performed by: UROLOGY

## 2020-07-31 PROCEDURE — 1036F TOBACCO NON-USER: CPT | Performed by: UROLOGY

## 2020-07-31 PROCEDURE — 99213 OFFICE O/P EST LOW 20 MIN: CPT | Performed by: UROLOGY

## 2020-07-31 PROCEDURE — 3078F DIAST BP <80 MM HG: CPT | Performed by: UROLOGY

## 2020-07-31 RX ORDER — NITROFURANTOIN 25; 75 MG/1; MG/1
100 CAPSULE ORAL 2 TIMES DAILY
Qty: 10 CAPSULE | Refills: 0 | Status: SHIPPED | OUTPATIENT
Start: 2020-07-31 | End: 2020-08-05

## 2020-07-31 NOTE — TELEPHONE ENCOUNTER
Return to work note completed and emailed to patient as requested  Pt is aware and will await email

## 2020-07-31 NOTE — TELEPHONE ENCOUNTER
Patient seen today by Devin Alexandre is calling for a release to go back to work emailed to her   Her email address is [email or web address]

## 2020-07-31 NOTE — PROGRESS NOTES
HISTORY:    Now 10 days out from a right ureteroscopy, laser of 8 mm mid ureteral stone,  Basket of particles which is not finalized yet  Readmitted for pain three days ago, discharged yesterday  Culture Ecoli from hospital, it was negative preop going into the procedure  CT showed 4 mm stone in mid pole left kidney         ASSESSMENT / PLAN:     cysto stent removal done today  Sensitivity of the recent E coli showed it not sensitive cephalexin which she is on,   I switch her to Macrobid twice per day for five days    She is considering having the left side undergo laser procedure also to prevent any episodes of renal colic  If she decides against that, follow-up one year  The following portions of the patient's history were reviewed and updated as appropriate: allergies, current medications, past family history, past medical history, past social history, past surgical history and problem list     Review of Systems   All other systems reviewed and are negative  Objective:     Physical Exam   Constitutional: She is oriented to person, place, and time  She appears well-developed and well-nourished  No distress  HENT:   Head: Normocephalic and atraumatic  Eyes: No scleral icterus  Pulmonary/Chest: Effort normal    Neurological: She is alert and oriented to person, place, and time  Skin: She is not diaphoretic  No pallor  Psychiatric: She has a normal mood and affect  Her behavior is normal  Judgment and thought content normal          Cystoscopy  Date/Time: 7/31/2020 2:40 PM  Performed by: Jennifer Ocampo MD  Authorized by: Jennifer Ocampo MD     Procedure details: simple removal of a foreign body, stone, or stent    Additional Procedure Details:     Patient presents for cystoscopy  I described the procedure, answered any questions, and we discussed potential risks and complications  Patient expressed understanding, and signed an informed consent document    The patient was carefully placed in lithotomy position on the examining table  The urethra was prepped with sterile disinfectant  The 15 French flexible cystoscope was passed in the urethra with the following findings:    Urethra:    Recessed     Bladder:   Mild inflammation   Stent removed without difficulty    Residual urine estimated to be  minimal     The patient tolerated the procedure well and was escorted from the examining table  No results found for: PSA]  BUN   Date Value Ref Range Status   07/29/2020 13 5 - 25 mg/dL Final   06/13/2020 14 8 - 27 mg/dL Final     Creatinine   Date Value Ref Range Status   07/29/2020 0 85 0 60 - 1 30 mg/dL Final     Comment:     Standardized to IDMS reference method     No components found for: CBC      Patient Active Problem List   Diagnosis    Rheumatoid arthritis ([facility] [address] )    Hypertension    Hypercholesterolemia    Stented coronary artery    Coronary artery disease of native artery of native heart with stable angina pectoris (HCC)    Ureterolithiasis    UTI (urinary tract infection)    S/P ureteral stent placement    Hematuria        Diagnoses and all orders for this visit:    Acute cystitis without hematuria  -     nitrofurantoin (MACROBID) 100 mg capsule; Take 1 capsule (100 mg total) by mouth 2 (two) times a day for 5 days           Patient ID: Jackalyn Brittle is a 59 y o  female        Current Outpatient Medications:     acetaminophen (TYLENOL) 325 mg tablet, Take 2 tablets (650 mg total) by mouth every 4 (four) hours as needed for mild pain, headaches or fever, Disp: , Rfl: 0    adalimumab (HUMIRA) 40 mg/0 8 mL PSKT, Inject 40 mg under the skin every 14 (fourteen) days, Disp: , Rfl:     Ascorbic Acid, Vitamin C, (VITAMIN C) 100 MG tablet, Take 1,000 mg by mouth daily, Disp: , Rfl:     aspirin 81 MG tablet, Take 162 mg by mouth daily, Disp: , Rfl:     Choline Fenofibrate (FENOFIBRIC ACID) 45 MG CPDR, Take 1 capsule by mouth daily, Disp: , Rfl:     folic acid (FOLVITE) 1 mg tablet, Take 1,000 mcg by mouth daily, Disp: , Rfl:     LORazepam (ATIVAN) 0 5 mg tablet, Take 0 5 mg by mouth every 6 (six) hours as needed, Disp: , Rfl:     meclizine (ANTIVERT) 25 mg tablet, Take 25 mg by mouth 3 (three) times a day as needed for dizziness, Disp: , Rfl:     methotrexate 2 5 mg tablet, TAKE 8 TABLETS BY MOUTH ONCE A WEEK, Disp: , Rfl:     metoprolol tartrate (LOPRESSOR) 25 mg tablet, Take 12 5 mg by mouth every 12 (twelve) hours , Disp: , Rfl:     OMEGA-3 FATTY ACIDS PO, Take 1 g by mouth daily, Disp: , Rfl:     oxybutynin (DITROPAN) 5 mg tablet, Take 1 tablet (5 mg total) by mouth 3 (three) times a day for 3 days, Disp: 9 tablet, Rfl: 0    oxyCODONE-acetaminophen (PERCOCET) 5-325 mg per tablet, Take 1 tablet by mouth every 6 (six) hours as needed for moderate pain for up to 10 daysMax Daily Amount: 4 tablets, Disp: 40 tablet, Rfl: 0    pantoprazole (PROTONIX) 40 mg tablet, , Disp: , Rfl:     rosuvastatin (CRESTOR) 20 MG tablet, Take 20 mg by mouth daily, Disp: , Rfl:     nitrofurantoin (MACROBID) 100 mg capsule, Take 1 capsule (100 mg total) by mouth 2 (two) times a day for 5 days, Disp: 10 capsule, Rfl: 0    Past Medical History:   Diagnosis Date    Arthritis     Cancer ([facility] [address] )     cervical    Coronary artery disease     GERD (gastroesophageal reflux disease)     HPV (human papilloma virus) infection     Hypercholesterolemia     Hypertension     Kidney stone     Motion sickness     Myocardial infarct (HCC)     X2    Pleuritic chest pain     PONV (postoperative nausea and vomiting)     Rheumatoid arthritis ([address] )     Rheu and cervical    Sleep apnea 2017    Vertigo        Past Surgical History:   Procedure Laterality Date    ANGIOPLASTY Right 09/14/2015    right groin / femoral     BIOPSY CORE NEEDLE  1980    CARDIAC CATHETERIZATION  2015    star close - closure system    CARDIAC CATHETERIZATION      CERVICAL CONE BIOPSY      1980's    COLONOSCOPY      2006, 2019    COLONOSCOPY      DILATION AND CURETTAGE OF UTERUS      FL RETROGRADE PYELOGRAM  7/2/2020    OTHER SURGICAL HISTORY Right 11/2017    Surgical removal of lypoma shoulder blade    MS CYSTO/URETERO W/LITHOTRIPSY &INDWELL STENT INSRT Right 7/21/2020    Procedure: CYSTO, URETEROSCOPY STONE BASKET EXTRACTION , RETROGRADE PYELOGRAM, STENT;  Surgeon: Scot Kennedy MD;  [facility];  Service: Urology    MS CYSTOURETHROSCOPY,URETER CATHETER Right 7/2/2020    Procedure: CYSTOSCOPY RETROGRADE PYELOGRAM WITH INSERTION STENT URETERAL;  Surgeon: Marissa Davila MD;  [facility];  Service: Urology    SHOULDER SURGERY Right 2014    had a lympoma done in 2017 also same shoulder    SHOULDER SURGERY Right 2014    TONSILLECTOMY      WISDOM TOOTH EXTRACTION         Social History

## 2020-08-02 LAB
BACTERIA BLD CULT: NORMAL
BACTERIA BLD CULT: NORMAL

## 2020-08-03 NOTE — TELEPHONE ENCOUNTER
Patient called in regard to her need for a removal of the kidney stone  She is inquiring if this can have this procedure done in [place]- possibly with Dr Destinee Shannon  This was done in with  prior kidney stone    She is also reporting that she is having oral surgery 8/4  Inquiring if this would interfere with the need for intubation during kidney stone blast  Please call to discuss [phone]  This afternoon would be best  Thank you

## 2020-08-04 LAB
CALCIUM OXALATE DIHYDRATE MFR STONE IR: 86 %
COLOR STONE: NORMAL
COM MFR STONE: 12 %
COMMENT-STONE3: NORMAL
COMPOSITION: NORMAL
HYDROXYAPATITE 24H ENGDIFF UR: 2 %
LABORATORY COMMENT REPORT: NORMAL
PHOTO: NORMAL
SIZE STONE: NORMAL MM
SPEC SOURCE SUBJ: NORMAL
STONE ANALYSIS-IMP: NORMAL
STONE ANALYSIS-IMP: NORMAL
WT STONE: 46 MG

## 2020-08-04 NOTE — TELEPHONE ENCOUNTER
I would not recommend surgical intervention for a 4 mm nonobstructing stone  This is considered passable size and may not cause any problems  I cannot justify surgery for a single stone measuring as small as it is

## 2020-08-04 NOTE — TELEPHONE ENCOUNTER
Patient had surgery with Dr Nicole Walter on 7/2/20 from ER admission for stent placement  Definitive stone surgery with Dr Rodriguez Feeling was on 7/21/20  Stent was then removed via cystoscopy in office on 7/31/20  Patient has non obstructing 7mm left renal calculus, with which she wants elective surgery for with Dr Nicole Walter prior to becoming symptomatic  Consulted with Belen HA  Patient can be scheduled for normal 3 month URS follow up with renal US and KUB  At that visit we can then set up elective URS for asymptomatic non obstructing kidney stone  Please assist in scheduling appointment with AP for approx 3 months with renal US and KUB, orders are in  Her surgery with Dr Nicole Walter can be scheduled after that appointment

## 2020-08-04 NOTE — TELEPHONE ENCOUNTER
Patient can be scheduled with me in the office to discuss further if she likes   Otherwise agree with observation, all other things being equal

## 2020-08-04 NOTE — TELEPHONE ENCOUNTER
I spoke to pt and she does not want to follow up in 3 months  She would like stone out asap   Please assist

## 2020-08-04 NOTE — TELEPHONE ENCOUNTER
Spoke with patient at this time  Advised this is a non urgent procedure  I can scheduled her for next available with Dr Anna Andrade for follow up, or I can schedule patient for next available follow up with SUSAN  Advised this surgery can then be scheduled after appointment  If patient wants this done sooner rather than later we can have it done with the next available urologist versus her specifying which MD she prefers  Patient amenable to this   Follow up to discuss surgery scheduled for 9/10/20 at 4pm with Margaret HA

## 2020-08-04 NOTE — TELEPHONE ENCOUNTER
Call placed to patient and spoke with her  Informed her of the recommendations of Dr Salima Cobb at this time  Pt is aware and would like to proceed with scheduling surgery with Dr Keven Fam as his office is much closer to her for stone removal  Informed patient I will contact his office to let them know of plan of care and they will contact you with further recommendations  She is aware and will await a call at this time

## 2020-08-06 ENCOUNTER — TELEPHONE (OUTPATIENT)
Dept: UROLOGY | Facility: MEDICAL CENTER | Age: 65
End: 2020-08-06

## 2020-08-06 NOTE — TELEPHONE ENCOUNTER
[facility] DISABILITY UPDATE FORMS RECEIVED FROM OFFICE BY EMAIL 8/5/2020, COMPLETED AND FAXED TO [phone]

## 2020-09-08 ENCOUNTER — TELEPHONE (OUTPATIENT)
Dept: FAMILY MEDICINE CLINIC | Facility: CLINIC | Age: 65
End: 2020-09-08

## 2020-09-08 NOTE — PROGRESS NOTES
Assessment and plan:       1  Nephrolithiasis  - Patient recently underwent staged right ureteroscopy for an obstructing stone in the setting of infection  She continues to have a nonobstructing stone in the mid pole of her left kidney measuring 7 mm  - Patient previously discussed elective ureteroscopy with Dr Rex Crisostomo in regards to the stone in her left kidney  - Case request today has been placed for cystoscopy with left-sided ureteroscopy, holmium laser lithotripsy, retrograde pyelograms and stent placement  - Urine specimen collected today will be sent for preoperative purposes  - She will follow-up for this procedure in the near future  Andie Guerra PA-C      Chief Complaint     Chief Complaint   Patient presents with    Nephrolithiasis         History of Present Illness     Shar Parada is a 59 y o  female patient who underwent right ureteroscopy on 07/21/2020 for an obstructing ureteral stone  Stent was subsequently removed using cystoscopy on 07/31/2020  Her CT scan at that time continue to show 7 mm stone in the mid pole of the left kidney  She returns today to discuss elective ureteroscopy  This was previously discussed by Dr Rex Crisostomo and the patient would like the stone removed  She has completely asymptomatic in regards to this  She is very nervous about developing another infection like she had when she was passing a stone on the right  She denies any flank pain, lower urinary tract symptoms, or gross hematuria  Urine specimen today is completely clean        Laboratory     Lab Results   Component Value Date    CREATININE 0 85 07/29/2020       No results found for: PSA    Recent Results (from the past 1 hour(s))   POCT urine dip    Collection Time: 09/10/20  3:55 PM   Result Value Ref Range    LEUKOCYTE ESTERASE,UA NEG     NITRITE,UA NEG     SL AMB POCT UROBILINOGEN NEG     POCT URINE PROTEIN NEG      PH,UA 5     BLOOD,UA NEG     SPECIFIC GRAVITY,UA 1 015 KETONES,UA NEG     BILIRUBIN,UA NEG     GLUCOSE, UA NEG      COLOR,UA Yellow     CLARITY,UA Clear          Review of Systems     Review of Systems   Constitutional: Negative for chills and fever  HENT: Negative  Eyes: Negative  Respiratory: Negative for cough and shortness of breath  Cardiovascular: Negative for chest pain  Gastrointestinal: Negative for constipation, diarrhea, nausea and vomiting  Endocrine: Negative  Genitourinary: Negative for difficulty urinating, dysuria, enuresis, flank pain, frequency, hematuria and urgency  Musculoskeletal: Negative  Skin: Negative  Allergies     Allergies   Allergen Reactions    Atorvastatin Myalgia    Diphenhydramine Hcl (Sleep) Itching    Pravastatin Myalgia    Simvastatin Myalgia       Physical Exam     Physical Exam  Vitals signs and nursing note reviewed  Constitutional:       General: She is not in acute distress  Appearance: Normal appearance  She is well-developed  She is obese  She is not ill-appearing, toxic-appearing or diaphoretic  HENT:      Head: Normocephalic and atraumatic  Right Ear: External ear normal       Left Ear: External ear normal    Eyes:      General: No scleral icterus  Right eye: No discharge  Left eye: No discharge  Cardiovascular:      Rate and Rhythm: Normal rate and regular rhythm  Pulses: Normal pulses  Heart sounds: Normal heart sounds  Pulmonary:      Effort: Pulmonary effort is normal       Breath sounds: Normal breath sounds  Abdominal:      Tenderness: There is no right CVA tenderness or left CVA tenderness  Musculoskeletal:      Comments: Ambulates independently   Skin:     General: Skin is warm and dry  Neurological:      Mental Status: She is alert and oriented to person, place, and time  Psychiatric:         Mood and Affect: Mood normal          Behavior: Behavior normal          Thought Content:  Thought content normal          Judgment: Judgment normal            Vital Signs     Vitals:    09/10/20 1547   BP: 130/70   BP Location: Left arm   Patient Position: Sitting   Cuff Size: Adult   Pulse: 80   Temp: 97 5 °F (36 4 °C)   Weight: (!) 142 kg (314 lb)   Height: 5' 7" (1 702 m)         Current Medications       Current Outpatient Medications:     acetaminophen (TYLENOL) 325 mg tablet, Take 2 tablets (650 mg total) by mouth every 4 (four) hours as needed for mild pain, headaches or fever, Disp: , Rfl: 0    adalimumab (HUMIRA) 40 mg/0 8 mL PSKT, Inject 40 mg under the skin every 14 (fourteen) days, Disp: , Rfl:     Ascorbic Acid, Vitamin C, (VITAMIN C) 100 MG tablet, Take 1,000 mg by mouth daily, Disp: , Rfl:     aspirin 81 MG tablet, Take 162 mg by mouth daily, Disp: , Rfl:     Choline Fenofibrate (FENOFIBRIC ACID) 45 MG CPDR, Take 1 capsule by mouth daily, Disp: , Rfl:     folic acid (FOLVITE) 1 mg tablet, Take 1,000 mcg by mouth daily, Disp: , Rfl:     LORazepam (ATIVAN) 0 5 mg tablet, Take 0 5 mg by mouth every 6 (six) hours as needed, Disp: , Rfl:     meclizine (ANTIVERT) 25 mg tablet, Take 25 mg by mouth 3 (three) times a day as needed for dizziness, Disp: , Rfl:     methotrexate 2 5 mg tablet, TAKE 8 TABLETS BY MOUTH ONCE A WEEK, Disp: , Rfl:     metoprolol tartrate (LOPRESSOR) 25 mg tablet, Take 12 5 mg by mouth every 12 (twelve) hours , Disp: , Rfl:     OMEGA-3 FATTY ACIDS PO, Take 1 g by mouth daily, Disp: , Rfl:     oxybutynin (DITROPAN) 5 mg tablet, Take 1 tablet (5 mg total) by mouth 3 (three) times a day for 3 days, Disp: 9 tablet, Rfl: 0    pantoprazole (PROTONIX) 40 mg tablet, , Disp: , Rfl:     rosuvastatin (CRESTOR) 20 MG tablet, Take 20 mg by mouth daily, Disp: , Rfl:       Active Problems     Patient Active Problem List   Diagnosis    Rheumatoid arthritis ([facility] [address] )    Hypertension    Hypercholesterolemia    Stented coronary artery    Coronary artery disease of native artery of native heart with stable angina pectoris ([address] )    Calculus of ureter    UTI (urinary tract infection)    S/P ureteral stent placement    Hematuria    Calculus of kidney         Past Medical History     Past Medical History:   Diagnosis Date    Abnormal Pap smear of cervix     cin3; hpv non 16/18 +(12/2016); LGSIL 6/2018    Arthritis     Cancer (HCC)     cervical    Coronary artery disease     GERD (gastroesophageal reflux disease)     HPV (human papilloma virus) infection     Hypercholesterolemia     Hypertension     Kidney stone     Motion sickness     Myocardial infarct (HCC)     X2    Pleuritic chest pain     PONV (postoperative nausea and vomiting)     Rheumatoid arthritis ([address] )     Rheu and cervical    Sleep apnea 2017    Vertigo          Surgical History     Past Surgical History:   Procedure Laterality Date    ANGIOPLASTY Right 09/14/2015    right groin / femoral     BIOPSY CORE NEEDLE  1980    CARDIAC CATHETERIZATION  2015    star close - closure system    CARDIAC CATHETERIZATION      CERVICAL CONE BIOPSY      1980's    COLONOSCOPY      2006, 2019    COLONOSCOPY      DILATION AND CURETTAGE OF UTERUS      FL RETROGRADE PYELOGRAM  7/2/2020    MAMMO (HISTORICAL)  2018    Neg    OTHER SURGICAL HISTORY Right 11/2017    Surgical removal of lypoma shoulder blade    SD CYSTO/URETERO W/LITHOTRIPSY &INDWELL STENT INSRT Right 7/21/2020    Procedure: CYSTO, URETEROSCOPY STONE BASKET EXTRACTION , RETROGRADE PYELOGRAM, STENT;  Surgeon: Suzanne Seymour MD;  [facility];  Service: Urology    SD CYSTOURETHROSCOPY,URETER CATHETER Right 7/2/2020    Procedure: CYSTOSCOPY RETROGRADE PYELOGRAM WITH INSERTION STENT URETERAL;  Surgeon: Radhika Cadena MD;  [facility];  Service: Urology    SHOULDER SURGERY Right 2014    had a lympoma done in 2017 also same shoulder    SHOULDER SURGERY Right 2014    TONSILLECTOMY      WISDOM TOOTH EXTRACTION           Family History     Family History   Problem Relation Age of Onset    Cancer Mother     Lung cancer Mother     Heart disease Father          Social History     Social History       Radiology

## 2020-09-10 ENCOUNTER — OFFICE VISIT (OUTPATIENT)
Dept: UROLOGY | Facility: CLINIC | Age: 65
End: 2020-09-10
Payer: COMMERCIAL

## 2020-09-10 VITALS
HEART RATE: 80 BPM | HEIGHT: 67 IN | BODY MASS INDEX: 45.99 KG/M2 | WEIGHT: 293 LBS | SYSTOLIC BLOOD PRESSURE: 130 MMHG | DIASTOLIC BLOOD PRESSURE: 70 MMHG | TEMPERATURE: 97.5 F

## 2020-09-10 DIAGNOSIS — N20.0 CALCULUS OF KIDNEY: Primary | ICD-10-CM

## 2020-09-10 LAB
SL AMB  POCT GLUCOSE, UA: NORMAL
SL AMB LEUKOCYTE ESTERASE,UA: NORMAL
SL AMB POCT BILIRUBIN,UA: NORMAL
SL AMB POCT BLOOD,UA: NORMAL
SL AMB POCT CLARITY,UA: CLEAR
SL AMB POCT COLOR,UA: YELLOW
SL AMB POCT KETONES,UA: NORMAL
SL AMB POCT NITRITE,UA: NORMAL
SL AMB POCT PH,UA: 5
SL AMB POCT SPECIFIC GRAVITY,UA: 1.01
SL AMB POCT URINE PROTEIN: NORMAL
SL AMB POCT UROBILINOGEN: NORMAL

## 2020-09-10 PROCEDURE — 87086 URINE CULTURE/COLONY COUNT: CPT | Performed by: PHYSICIAN ASSISTANT

## 2020-09-10 PROCEDURE — 81002 URINALYSIS NONAUTO W/O SCOPE: CPT | Performed by: PHYSICIAN ASSISTANT

## 2020-09-10 PROCEDURE — 99214 OFFICE O/P EST MOD 30 MIN: CPT | Performed by: PHYSICIAN ASSISTANT

## 2020-09-11 DIAGNOSIS — Z00.00 GENERAL MEDICAL EXAM: Primary | ICD-10-CM

## 2020-09-11 LAB — BACTERIA UR CULT: NORMAL

## 2020-09-16 ENCOUNTER — TELEPHONE (OUTPATIENT)
Dept: UROLOGY | Facility: AMBULATORY SURGERY CENTER | Age: 65
End: 2020-09-16

## 2020-09-16 NOTE — TELEPHONE ENCOUNTER
I called pt this afternoon to discuss scheduling sx with Dr More Hightower at the [facility] on 10/8/20  There was no answer when I called pt  So I did leave a voicemail asking pt to call our office back to discuss

## 2020-09-17 ENCOUNTER — PREP FOR PROCEDURE (OUTPATIENT)
Dept: UROLOGY | Facility: AMBULATORY SURGERY CENTER | Age: 65
End: 2020-09-17

## 2020-09-17 DIAGNOSIS — Z01.818 PREOP EXAMINATION: ICD-10-CM

## 2020-09-17 DIAGNOSIS — R39.89 SUSPECTED UTI: ICD-10-CM

## 2020-09-17 DIAGNOSIS — N20.0 CALCULUS OF KIDNEY: Primary | ICD-10-CM

## 2020-09-17 DIAGNOSIS — K21.9 GERD WITHOUT ESOPHAGITIS: Primary | ICD-10-CM

## 2020-09-17 RX ORDER — PANTOPRAZOLE SODIUM 40 MG/1
40 TABLET, DELAYED RELEASE ORAL DAILY
Qty: 90 TABLET | Refills: 1 | Status: SHIPPED | OUTPATIENT
Start: 2020-09-17 | End: 2021-03-10

## 2020-09-17 NOTE — TELEPHONE ENCOUNTER
I spoke with pt this morning and scheduled her for surgery with Dr Cherrie Hernandez at the [facility] on 10/8/20  I verbally went over all of her pre op instructions and prep information with her  She is aware that she will need to have a urine culture done by the end of next week  Per pt 's request I mailed her a copy of her surgical packet and she was instructed to call me with any questions or concerns regarding this procedure

## 2020-09-21 NOTE — TELEPHONE ENCOUNTER
Patient called to see what the lab hours are at the [facility]  I transferred her to the switchboard at the hospital and they would be able to check how late they are open

## 2020-09-22 ENCOUNTER — APPOINTMENT (OUTPATIENT)
Dept: LAB | Facility: CLINIC | Age: 65
End: 2020-09-22
Payer: COMMERCIAL

## 2020-09-22 DIAGNOSIS — R39.89 SUSPECTED UTI: ICD-10-CM

## 2020-09-22 DIAGNOSIS — N20.0 CALCULUS OF KIDNEY: ICD-10-CM

## 2020-09-22 DIAGNOSIS — Z01.818 PREOP EXAMINATION: ICD-10-CM

## 2020-09-22 PROCEDURE — 87086 URINE CULTURE/COLONY COUNT: CPT

## 2020-09-23 ENCOUNTER — ANESTHESIA EVENT (OUTPATIENT)
Dept: PERIOP | Facility: HOSPITAL | Age: 65
End: 2020-09-23
Payer: COMMERCIAL

## 2020-09-23 LAB — BACTERIA UR CULT: NORMAL

## 2020-09-24 NOTE — TELEPHONE ENCOUNTER
I called pt this morning to discuss needing to reschedule her procedure with Dr Priya Ibrahim on 10/8 due to her not being a candidate for the [address]  There was no answer when I called pt so I did leave a detailed message with this information and I am now holding 10/13/20 at the [facility] with Dr Priya Ibrahim

## 2020-09-25 NOTE — TELEPHONE ENCOUNTER
I returned pt 's phone call and was able to speak with her  I confirmed that after anesthesia reviewed her chart they determined that she is not a good candidate to have this procedure at the [facility]  Pt  Verbalized understanding and agreed to the new surgical date of 10/13/20 at the [facility] with Dr Salina Mercedes  I confirmed that her urine culture did not need to be repeated  Pt then stated that she will be calling her insurance company regarding FMLA disability paperwork and give them the updated information and they will be sending me a form that will need to be filled out  Pt was instructed to call our office if she had any questions or concerns regarding this procedure

## 2020-09-30 LAB
BUN SERPL-MCNC: 14 MG/DL (ref 8–27)
BUN/CREAT SERPL: 19 (ref 12–28)
CALCIUM SERPL-MCNC: 9.2 MG/DL (ref 8.7–10.3)
CHLORIDE SERPL-SCNC: 105 MMOL/L (ref 96–106)
CHOLEST SERPL-MCNC: 200 MG/DL (ref 100–199)
CO2 SERPL-SCNC: 24 MMOL/L (ref 20–29)
CREAT SERPL-MCNC: 0.74 MG/DL (ref 0.57–1)
GLUCOSE SERPL-MCNC: 130 MG/DL (ref 65–99)
HDLC SERPL-MCNC: 65 MG/DL
LDLC SERPL CALC-MCNC: 109 MG/DL (ref 0–99)
POTASSIUM SERPL-SCNC: 4.5 MMOL/L (ref 3.5–5.2)
SL AMB EGFR AFRICAN AMERICAN: 99 ML/MIN/1.73
SL AMB EGFR NON AFRICAN AMERICAN: 86 ML/MIN/1.73
SL AMB VLDL CHOLESTEROL CALC: 26 MG/DL (ref 5–40)
SODIUM SERPL-SCNC: 143 MMOL/L (ref 134–144)
TRIGL SERPL-MCNC: 152 MG/DL (ref 0–149)

## 2020-10-08 ENCOUNTER — ANESTHESIA (OUTPATIENT)
Dept: PERIOP | Facility: HOSPITAL | Age: 65
End: 2020-10-08
Payer: COMMERCIAL

## 2020-10-09 PROCEDURE — NC001 PR NO CHARGE: Performed by: UROLOGY

## 2020-10-12 ENCOUNTER — OFFICE VISIT (OUTPATIENT)
Dept: FAMILY MEDICINE CLINIC | Facility: CLINIC | Age: 65
End: 2020-10-12
Payer: COMMERCIAL

## 2020-10-12 VITALS
HEIGHT: 67 IN | RESPIRATION RATE: 16 BRPM | WEIGHT: 293 LBS | HEART RATE: 68 BPM | OXYGEN SATURATION: 97 % | DIASTOLIC BLOOD PRESSURE: 80 MMHG | TEMPERATURE: 98.4 F | BODY MASS INDEX: 45.99 KG/M2 | SYSTOLIC BLOOD PRESSURE: 126 MMHG

## 2020-10-12 DIAGNOSIS — I10 ESSENTIAL HYPERTENSION: Primary | ICD-10-CM

## 2020-10-12 DIAGNOSIS — E78.00 HYPERCHOLESTEROLEMIA: ICD-10-CM

## 2020-10-12 DIAGNOSIS — K21.9 GASTROESOPHAGEAL REFLUX DISEASE WITHOUT ESOPHAGITIS: ICD-10-CM

## 2020-10-12 DIAGNOSIS — Z23 NEED FOR VACCINATION: ICD-10-CM

## 2020-10-12 DIAGNOSIS — R73.03 PRE-DIABETES: ICD-10-CM

## 2020-10-12 DIAGNOSIS — G47.33 OSA (OBSTRUCTIVE SLEEP APNEA): ICD-10-CM

## 2020-10-12 DIAGNOSIS — I25.118 CORONARY ARTERY DISEASE OF NATIVE ARTERY OF NATIVE HEART WITH STABLE ANGINA PECTORIS (HCC): ICD-10-CM

## 2020-10-12 DIAGNOSIS — E66.01 CLASS 3 SEVERE OBESITY DUE TO EXCESS CALORIES WITH SERIOUS COMORBIDITY AND BODY MASS INDEX (BMI) OF 45.0 TO 49.9 IN ADULT (HCC): ICD-10-CM

## 2020-10-12 DIAGNOSIS — N20.0 CALCULUS OF KIDNEY: ICD-10-CM

## 2020-10-12 DIAGNOSIS — Z00.00 WELL ADULT EXAM: ICD-10-CM

## 2020-10-12 DIAGNOSIS — R42 VERTIGO: ICD-10-CM

## 2020-10-12 DIAGNOSIS — M06.9 RHEUMATOID ARTHRITIS, INVOLVING UNSPECIFIED SITE, UNSPECIFIED WHETHER RHEUMATOID FACTOR PRESENT (HCC): ICD-10-CM

## 2020-10-12 PROBLEM — E66.813 CLASS 3 SEVERE OBESITY DUE TO EXCESS CALORIES WITH SERIOUS COMORBIDITY AND BODY MASS INDEX (BMI) OF 45.0 TO 49.9 IN ADULT (HCC): Status: ACTIVE | Noted: 2020-10-12

## 2020-10-12 LAB — SL AMB POCT HEMOGLOBIN AIC: 6.1 (ref ?–6.5)

## 2020-10-12 PROCEDURE — 99397 PER PM REEVAL EST PAT 65+ YR: CPT | Performed by: FAMILY MEDICINE

## 2020-10-12 PROCEDURE — 83036 HEMOGLOBIN GLYCOSYLATED A1C: CPT | Performed by: FAMILY MEDICINE

## 2020-10-12 PROCEDURE — 90471 IMMUNIZATION ADMIN: CPT | Performed by: FAMILY MEDICINE

## 2020-10-12 PROCEDURE — 3288F FALL RISK ASSESSMENT DOCD: CPT | Performed by: FAMILY MEDICINE

## 2020-10-12 PROCEDURE — 3074F SYST BP LT 130 MM HG: CPT | Performed by: FAMILY MEDICINE

## 2020-10-12 PROCEDURE — 90732 PPSV23 VACC 2 YRS+ SUBQ/IM: CPT | Performed by: FAMILY MEDICINE

## 2020-10-12 PROCEDURE — 1036F TOBACCO NON-USER: CPT | Performed by: FAMILY MEDICINE

## 2020-10-12 PROCEDURE — 3725F SCREEN DEPRESSION PERFORMED: CPT | Performed by: FAMILY MEDICINE

## 2020-10-12 PROCEDURE — 1101F PT FALLS ASSESS-DOCD LE1/YR: CPT | Performed by: FAMILY MEDICINE

## 2020-10-12 PROCEDURE — 4040F PNEUMOC VAC/ADMIN/RCVD: CPT | Performed by: FAMILY MEDICINE

## 2020-10-12 PROCEDURE — 3079F DIAST BP 80-89 MM HG: CPT | Performed by: FAMILY MEDICINE

## 2020-10-13 ENCOUNTER — HOSPITAL ENCOUNTER (OUTPATIENT)
Facility: HOSPITAL | Age: 65
Setting detail: OUTPATIENT SURGERY
Discharge: HOME | End: 2020-10-13
Attending: UROLOGY | Admitting: UROLOGY
Payer: COMMERCIAL

## 2020-10-13 ENCOUNTER — APPOINTMENT (OUTPATIENT)
Dept: RADIOLOGY | Facility: HOSPITAL | Age: 65
End: 2020-10-13
Payer: COMMERCIAL

## 2020-10-13 ENCOUNTER — TELEPHONE (OUTPATIENT)
Dept: UROLOGY | Facility: MEDICAL CENTER | Age: 65
End: 2020-10-13

## 2020-10-13 VITALS
RESPIRATION RATE: 16 BRPM | BODY MASS INDEX: 45.99 KG/M2 | HEIGHT: 67 IN | TEMPERATURE: 98.2 F | HEART RATE: 72 BPM | OXYGEN SATURATION: 99 % | WEIGHT: 293 LBS | DIASTOLIC BLOOD PRESSURE: 72 MMHG | SYSTOLIC BLOOD PRESSURE: 166 MMHG

## 2020-10-13 VITALS — HEART RATE: 102 BPM

## 2020-10-13 DIAGNOSIS — N20.0 CALCULUS OF KIDNEY: ICD-10-CM

## 2020-10-13 PROCEDURE — C1894 INTRO/SHEATH, NON-LASER: HCPCS | Performed by: UROLOGY

## 2020-10-13 PROCEDURE — 74420 UROGRAPHY RTRGR +-KUB: CPT

## 2020-10-13 PROCEDURE — 52356 CYSTO/URETERO W/LITHOTRIPSY: CPT | Performed by: UROLOGY

## 2020-10-13 PROCEDURE — C2617 STENT, NON-COR, TEM W/O DEL: HCPCS | Performed by: UROLOGY

## 2020-10-13 PROCEDURE — C1769 GUIDE WIRE: HCPCS | Performed by: UROLOGY

## 2020-10-13 PROCEDURE — NC001 PR NO CHARGE: Performed by: UROLOGY

## 2020-10-13 DEVICE — STENT CONTOUR INJ 6FR 30CM: Type: IMPLANTABLE DEVICE | Site: URETER | Status: FUNCTIONAL

## 2020-10-13 RX ORDER — MIDAZOLAM HYDROCHLORIDE 2 MG/2ML
INJECTION, SOLUTION INTRAMUSCULAR; INTRAVENOUS AS NEEDED
Status: DISCONTINUED | OUTPATIENT
Start: 2020-10-13 | End: 2020-10-13

## 2020-10-13 RX ORDER — TRAMADOL HYDROCHLORIDE 50 MG/1
50 TABLET ORAL EVERY 6 HOURS PRN
Status: DISCONTINUED | OUTPATIENT
Start: 2020-10-13 | End: 2020-10-13 | Stop reason: HOSPADM

## 2020-10-13 RX ORDER — ONDANSETRON 2 MG/ML
4 INJECTION INTRAMUSCULAR; INTRAVENOUS EVERY 6 HOURS PRN
Status: DISCONTINUED | OUTPATIENT
Start: 2020-10-13 | End: 2020-10-13 | Stop reason: HOSPADM

## 2020-10-13 RX ORDER — SODIUM CHLORIDE 9 MG/ML
125 INJECTION, SOLUTION INTRAVENOUS CONTINUOUS
Status: DISCONTINUED | OUTPATIENT
Start: 2020-10-13 | End: 2020-10-13 | Stop reason: HOSPADM

## 2020-10-13 RX ORDER — NEOSTIGMINE METHYLSULFATE 1 MG/ML
INJECTION INTRAVENOUS AS NEEDED
Status: DISCONTINUED | OUTPATIENT
Start: 2020-10-13 | End: 2020-10-13

## 2020-10-13 RX ORDER — MAGNESIUM HYDROXIDE 1200 MG/15ML
LIQUID ORAL AS NEEDED
Status: DISCONTINUED | OUTPATIENT
Start: 2020-10-13 | End: 2020-10-13 | Stop reason: HOSPADM

## 2020-10-13 RX ORDER — ROCURONIUM BROMIDE 10 MG/ML
INJECTION, SOLUTION INTRAVENOUS AS NEEDED
Status: DISCONTINUED | OUTPATIENT
Start: 2020-10-13 | End: 2020-10-13

## 2020-10-13 RX ORDER — TRAMADOL HYDROCHLORIDE 50 MG/1
50 TABLET ORAL EVERY 6 HOURS PRN
Qty: 15 TABLET | Refills: 0 | Status: SHIPPED | OUTPATIENT
Start: 2020-10-13 | End: 2020-10-23

## 2020-10-13 RX ORDER — FENTANYL CITRATE 50 UG/ML
25 INJECTION, SOLUTION INTRAMUSCULAR; INTRAVENOUS
Status: DISCONTINUED | OUTPATIENT
Start: 2020-10-13 | End: 2020-10-13 | Stop reason: HOSPADM

## 2020-10-13 RX ORDER — DEXAMETHASONE SODIUM PHOSPHATE 4 MG/ML
INJECTION, SOLUTION INTRA-ARTICULAR; INTRALESIONAL; INTRAMUSCULAR; INTRAVENOUS; SOFT TISSUE AS NEEDED
Status: DISCONTINUED | OUTPATIENT
Start: 2020-10-13 | End: 2020-10-13

## 2020-10-13 RX ORDER — OXYBUTYNIN CHLORIDE 5 MG/1
TABLET ORAL
Qty: 15 TABLET | Refills: 0 | Status: SHIPPED | OUTPATIENT
Start: 2020-10-13 | End: 2021-12-06 | Stop reason: ALTCHOICE

## 2020-10-13 RX ORDER — GLYCOPYRROLATE 0.2 MG/ML
INJECTION INTRAMUSCULAR; INTRAVENOUS AS NEEDED
Status: DISCONTINUED | OUTPATIENT
Start: 2020-10-13 | End: 2020-10-13

## 2020-10-13 RX ORDER — FENTANYL CITRATE 50 UG/ML
INJECTION, SOLUTION INTRAMUSCULAR; INTRAVENOUS AS NEEDED
Status: DISCONTINUED | OUTPATIENT
Start: 2020-10-13 | End: 2020-10-13

## 2020-10-13 RX ORDER — PROPOFOL 10 MG/ML
INJECTION, EMULSION INTRAVENOUS AS NEEDED
Status: DISCONTINUED | OUTPATIENT
Start: 2020-10-13 | End: 2020-10-13

## 2020-10-13 RX ADMIN — FENTANYL CITRATE 50 MCG: 50 INJECTION, SOLUTION INTRAMUSCULAR; INTRAVENOUS at 12:53

## 2020-10-13 RX ADMIN — Medication 3000 MG: at 12:10

## 2020-10-13 RX ADMIN — PROPOFOL 200 MG: 10 INJECTION, EMULSION INTRAVENOUS at 12:25

## 2020-10-13 RX ADMIN — MIDAZOLAM 2 MG: 1 INJECTION INTRAMUSCULAR; INTRAVENOUS at 12:15

## 2020-10-13 RX ADMIN — TRIMETHOBENZAMIDE HYDROCHLORIDE 200 MG: 100 INJECTION INTRAMUSCULAR at 15:08

## 2020-10-13 RX ADMIN — NEOSTIGMINE METHYLSULFATE 4 MG: 1 INJECTION, SOLUTION INTRAVENOUS at 13:00

## 2020-10-13 RX ADMIN — FENTANYL CITRATE 100 MCG: 50 INJECTION, SOLUTION INTRAMUSCULAR; INTRAVENOUS at 12:24

## 2020-10-13 RX ADMIN — SODIUM CHLORIDE 125 ML/HR: 0.9 INJECTION, SOLUTION INTRAVENOUS at 11:10

## 2020-10-13 RX ADMIN — SODIUM CHLORIDE 125 ML/HR: 0.9 INJECTION, SOLUTION INTRAVENOUS at 13:37

## 2020-10-13 RX ADMIN — FENTANYL CITRATE 50 MCG: 50 INJECTION, SOLUTION INTRAMUSCULAR; INTRAVENOUS at 12:50

## 2020-10-13 RX ADMIN — ROCURONIUM BROMIDE 40 MG: 10 INJECTION, SOLUTION INTRAVENOUS at 12:25

## 2020-10-13 RX ADMIN — GLYCOPYRROLATE 0.6 MG: 0.2 INJECTION, SOLUTION INTRAMUSCULAR; INTRAVENOUS at 13:00

## 2020-10-13 RX ADMIN — LIDOCAINE HYDROCHLORIDE 100 MG: 20 INJECTION, SOLUTION INTRAVENOUS at 12:25

## 2020-10-13 RX ADMIN — DEXAMETHASONE SODIUM PHOSPHATE 4 MG: 4 INJECTION, SOLUTION INTRAMUSCULAR; INTRAVENOUS at 13:08

## 2020-10-13 RX ADMIN — ONDANSETRON 4 MG: 2 INJECTION INTRAMUSCULAR; INTRAVENOUS at 13:19

## 2020-10-15 ENCOUNTER — TELEPHONE (OUTPATIENT)
Dept: UROLOGY | Facility: AMBULATORY SURGERY CENTER | Age: 65
End: 2020-10-15

## 2020-10-28 ENCOUNTER — TELEPHONE (OUTPATIENT)
Dept: FAMILY MEDICINE CLINIC | Facility: CLINIC | Age: 65
End: 2020-10-28

## 2021-02-13 DIAGNOSIS — Z23 ENCOUNTER FOR IMMUNIZATION: ICD-10-CM

## 2021-02-22 DIAGNOSIS — R42 VERTIGO: Primary | ICD-10-CM

## 2021-02-22 RX ORDER — HYDROCODONE BITARTRATE AND ACETAMINOPHEN 7.5; 325 MG/1; MG/1
1 TABLET ORAL
COMMUNITY
Start: 2021-02-04 | End: 2021-10-06 | Stop reason: ALTCHOICE

## 2021-02-22 RX ORDER — ADALIMUMAB 40MG/0.4ML
KIT SUBCUTANEOUS
COMMUNITY
Start: 2021-02-16

## 2021-02-22 RX ORDER — FENOFIBRATE 48 MG/1
TABLET, COATED ORAL
COMMUNITY
Start: 2021-02-18 | End: 2021-12-06

## 2021-02-22 RX ORDER — MECLIZINE HYDROCHLORIDE 25 MG/1
25 TABLET ORAL 3 TIMES DAILY PRN
Qty: 90 TABLET | Refills: 1 | Status: SHIPPED | OUTPATIENT
Start: 2021-02-22 | End: 2021-12-06 | Stop reason: SDUPTHER

## 2021-02-24 ENCOUNTER — OFFICE VISIT (OUTPATIENT)
Dept: FAMILY MEDICINE CLINIC | Facility: CLINIC | Age: 66
End: 2021-02-24
Payer: COMMERCIAL

## 2021-02-24 VITALS
WEIGHT: 293 LBS | BODY MASS INDEX: 45.99 KG/M2 | SYSTOLIC BLOOD PRESSURE: 122 MMHG | HEIGHT: 67 IN | DIASTOLIC BLOOD PRESSURE: 82 MMHG | RESPIRATION RATE: 16 BRPM | OXYGEN SATURATION: 98 % | HEART RATE: 77 BPM

## 2021-02-24 DIAGNOSIS — H61.22 IMPACTED CERUMEN OF LEFT EAR: ICD-10-CM

## 2021-02-24 DIAGNOSIS — H69.82 EUSTACHIAN TUBE DYSFUNCTION, LEFT: ICD-10-CM

## 2021-02-24 DIAGNOSIS — R42 VERTIGO: Primary | ICD-10-CM

## 2021-02-24 PROCEDURE — 3725F SCREEN DEPRESSION PERFORMED: CPT | Performed by: FAMILY MEDICINE

## 2021-02-24 PROCEDURE — 99213 OFFICE O/P EST LOW 20 MIN: CPT | Performed by: FAMILY MEDICINE

## 2021-02-24 PROCEDURE — 69210 REMOVE IMPACTED EAR WAX UNI: CPT | Performed by: FAMILY MEDICINE

## 2021-02-24 PROCEDURE — 1160F RVW MEDS BY RX/DR IN RCRD: CPT | Performed by: FAMILY MEDICINE

## 2021-02-24 PROCEDURE — 1036F TOBACCO NON-USER: CPT | Performed by: FAMILY MEDICINE

## 2021-02-24 PROCEDURE — 3079F DIAST BP 80-89 MM HG: CPT | Performed by: FAMILY MEDICINE

## 2021-02-24 PROCEDURE — 3074F SYST BP LT 130 MM HG: CPT | Performed by: FAMILY MEDICINE

## 2021-02-24 RX ORDER — PSEUDOEPHEDRINE HYDROCHLORIDE 30 MG/1
60 TABLET ORAL EVERY 6 HOURS PRN
Qty: 30 TABLET | Refills: 0 | Status: SHIPPED | OUTPATIENT
Start: 2021-02-24 | End: 2021-12-06 | Stop reason: ALTCHOICE

## 2021-02-24 NOTE — PROGRESS NOTES
Assessment/Plan:    1  Vertigo  -     neomycin-polymyxin-hydrocortisone (CORTISPORIN) otic solution; Administer 4 drops into the left ear every 6 (six) hours for 5 days  -     pseudoephedrine (SUDAFED) 30 mg tablet; Take 2 tablets (60 mg total) by mouth every 6 (six) hours as needed for congestion for up to 4 days    2  Eustachian tube dysfunction, left  -     neomycin-polymyxin-hydrocortisone (CORTISPORIN) otic solution; Administer 4 drops into the left ear every 6 (six) hours for 5 days  -     pseudoephedrine (SUDAFED) 30 mg tablet; Take 2 tablets (60 mg total) by mouth every 6 (six) hours as needed for congestion for up to 4 days    3  Impacted cerumen of left ear  -     neomycin-polymyxin-hydrocortisone (CORTISPORIN) otic solution; Administer 4 drops into the left ear every 6 (six) hours for 5 days  -     pseudoephedrine (SUDAFED) 30 mg tablet; Take 2 tablets (60 mg total) by mouth every 6 (six) hours as needed for congestion for up to 4 days         Subjective:      Patient ID: Kennedy Pereyra is a 72 y o  female  HPIleft ear with pain and cracklingand virtigo   X 2 days   Tried peroxide    Hx of vertigo       The following portions of the patient's history were reviewed and updated as appropriate: allergies, current medications, past family history, past medical history, past social history, past surgical history and problem list     Review of Systems   HENT: Positive for ear pain  All other systems reviewed and are negative  Objective:      /82 (BP Location: Left arm, Patient Position: Sitting, Cuff Size: Standard)   Pulse 77   Resp 16   Ht 5' 7" (1 702 m)   Wt (!) 142 kg (314 lb)   SpO2 98%   BMI 49 18 kg/m²     No visits with results within 2 Week(s) from this visit  Latest known visit with results is:   Office Visit on 10/12/2020   Component Date Value    Hemoglobin A1C 10/12/2020 6 1           Physical Exam  Constitutional:       General: She is in acute distress (virtigo)  Appearance: She is obese  HENT:      Right Ear: Tympanic membrane, ear canal and external ear normal       Left Ear: There is impacted cerumen  Ear cerumen removal    Date/Time: 2/24/2021 1:31 PM  Performed by: Dread Verdugo MD  Authorized by: Dread Verdugo MD   Universal Protocol:  Timeout called at: 2/24/2021 1:31 PM     Procedure details:     Location:  L ear    Procedure type: irrigation with instrumentation      Instrumentation: curette    Post-procedure details:     Patient tolerance of procedure:   Tolerated well, no immediate complications        Dread Verdugo MD  [place] [id number]

## 2021-03-08 ENCOUNTER — OFFICE VISIT (OUTPATIENT)
Dept: PULMONOLOGY | Facility: CLINIC | Age: 66
End: 2021-03-08
Payer: MEDICARE

## 2021-03-08 VITALS
DIASTOLIC BLOOD PRESSURE: 80 MMHG | WEIGHT: 293 LBS | SYSTOLIC BLOOD PRESSURE: 128 MMHG | HEIGHT: 67 IN | HEART RATE: 59 BPM | OXYGEN SATURATION: 98 % | BODY MASS INDEX: 45.99 KG/M2 | TEMPERATURE: 98 F

## 2021-03-08 DIAGNOSIS — G47.33 OSA (OBSTRUCTIVE SLEEP APNEA): Primary | ICD-10-CM

## 2021-03-08 PROCEDURE — 3008F BODY MASS INDEX DOCD: CPT | Performed by: FAMILY MEDICINE

## 2021-03-08 PROCEDURE — 99213 OFFICE O/P EST LOW 20 MIN: CPT | Performed by: INTERNAL MEDICINE

## 2021-03-08 NOTE — ASSESSMENT & PLAN NOTE
Patient has been on CPAP therapy since early in 2018  Pressure settings changed several times but last was 12 cm water pressure  Original symptom was for daytime sleepiness along with comorbidity of coronary disease  Recent compliance has been excellent  Advised to continue  Patient requests change of DME provider  She would also like to get a large mask instead of a medium mask because of a new dental bridge    15 minutes visit

## 2021-03-08 NOTE — PROGRESS NOTES
Assessment/Plan:    DANNY (obstructive sleep apnea)  Patient has been on CPAP therapy since early in 2018  Pressure settings changed several times but last was 12 cm water pressure  Original symptom was for daytime sleepiness along with comorbidity of coronary disease  Recent compliance has been excellent  Advised to continue  Patient requests change of DME provider  She would also like to get a large mask instead of a medium mask because of a new dental bridge  Diagnoses and all orders for this visit:    DANNY (obstructive sleep apnea)  -     Mask fitting only; Future  -     PAP DME Resupply/Reorder          Subjective:      Patient ID: Konnie Seip is a 72 y o  female  Patient was last seen in July 2019 for sleep apnea  Recently has felt that her mask was too small and the she has a medium mask and thinks that she should have a large meds  She also had a recent new dental bridge and feels that the medium mask is impinging on this  Has been able to use the device nightly  Compliance is excellent with use of more than 9 hours per night  However patient feels that she puts this on in the evening when she is in bed and it takes her some time to fall asleep  No daytime sleepiness to speak of  No morning headache  Still has a fair amount of nocturia  Note recent problems with the nephrolithiasis requiring surgery  Comorbidity of coronary disease with previous stent  No recent symptoms  The following portions of the patient's history were reviewed and updated as appropriate: allergies, current medications, past family history, past medical history, past social history, past surgical history and problem list     Review of Systems   Constitutional: Negative for activity change and unexpected weight change  Respiratory: Positive for apnea  Cardiovascular: Negative for chest pain and palpitations           Objective:      /80 (BP Location: Left arm, Patient Position: Sitting, Cuff Size: Large)   Pulse 59   Temp 98 °F (36 7 °C) (Tympanic)   Ht 5' 7" (1 702 m)   Wt (!) 143 kg (314 lb 6 oz)   SpO2 98%   BMI 49 24 kg/m²          Physical Exam  Vitals signs reviewed  Constitutional:       Appearance: She is obese  She is not ill-appearing  Neurological:      Mental Status: She is alert and oriented to person, place, and time     Psychiatric:         Mood and Affect: Mood normal          Behavior: Behavior normal

## 2021-03-10 DIAGNOSIS — K21.9 GERD WITHOUT ESOPHAGITIS: ICD-10-CM

## 2021-03-10 RX ORDER — PANTOPRAZOLE SODIUM 40 MG/1
TABLET, DELAYED RELEASE ORAL
Qty: 90 TABLET | Refills: 1 | Status: SHIPPED | OUTPATIENT
Start: 2021-03-10 | End: 2021-03-22 | Stop reason: SDUPTHER

## 2021-03-11 ENCOUNTER — TRANSCRIBE ORDERS (OUTPATIENT)
Dept: SLEEP CENTER | Facility: CLINIC | Age: 66
End: 2021-03-11

## 2021-03-22 DIAGNOSIS — K21.9 GERD WITHOUT ESOPHAGITIS: ICD-10-CM

## 2021-03-22 RX ORDER — PANTOPRAZOLE SODIUM 40 MG/1
40 TABLET, DELAYED RELEASE ORAL DAILY
Qty: 90 TABLET | Refills: 1 | Status: SHIPPED | OUTPATIENT
Start: 2021-03-22 | End: 2021-09-27 | Stop reason: SDUPTHER

## 2021-03-29 ENCOUNTER — IMMUNIZATIONS (OUTPATIENT)
Dept: FAMILY MEDICINE CLINIC | Facility: HOSPITAL | Age: 66
End: 2021-03-29

## 2021-03-29 DIAGNOSIS — Z23 ENCOUNTER FOR IMMUNIZATION: Primary | ICD-10-CM

## 2021-03-29 PROCEDURE — 91300 SARS-COV-2 / COVID-19 MRNA VACCINE (PFIZER-BIONTECH) 30 MCG: CPT

## 2021-03-29 PROCEDURE — 0001A SARS-COV-2 / COVID-19 MRNA VACCINE (PFIZER-BIONTECH) 30 MCG: CPT

## 2021-04-20 ENCOUNTER — IMMUNIZATIONS (OUTPATIENT)
Dept: FAMILY MEDICINE CLINIC | Facility: HOSPITAL | Age: 66
End: 2021-04-20

## 2021-04-20 DIAGNOSIS — Z23 ENCOUNTER FOR IMMUNIZATION: Primary | ICD-10-CM

## 2021-04-20 PROCEDURE — 0002A SARS-COV-2 / COVID-19 MRNA VACCINE (PFIZER-BIONTECH) 30 MCG: CPT

## 2021-04-20 PROCEDURE — 91300 SARS-COV-2 / COVID-19 MRNA VACCINE (PFIZER-BIONTECH) 30 MCG: CPT

## 2021-06-01 ENCOUNTER — TELEMEDICINE (OUTPATIENT)
Dept: FAMILY MEDICINE CLINIC | Facility: CLINIC | Age: 66
End: 2021-06-01
Payer: MEDICARE

## 2021-06-01 VITALS — WEIGHT: 293 LBS | HEIGHT: 67 IN | BODY MASS INDEX: 45.99 KG/M2

## 2021-06-01 DIAGNOSIS — J01.10 ACUTE NON-RECURRENT FRONTAL SINUSITIS: Primary | ICD-10-CM

## 2021-06-01 PROCEDURE — 99213 OFFICE O/P EST LOW 20 MIN: CPT | Performed by: FAMILY MEDICINE

## 2021-06-01 RX ORDER — AZITHROMYCIN 250 MG/1
TABLET, FILM COATED ORAL
Qty: 6 TABLET | Refills: 0 | Status: SHIPPED | OUTPATIENT
Start: 2021-06-01 | End: 2021-06-05

## 2021-06-01 RX ORDER — LORATADINE 10 MG/1
10 TABLET ORAL DAILY
COMMUNITY

## 2021-06-01 NOTE — PROGRESS NOTES
Virtual Regular Visit      Assessment/Plan:    Problem List Items Addressed This Visit        Respiratory    Acute non-recurrent frontal sinusitis - Primary     Patient started on a trial of z pack  Advised to use over-the-counter Allegra or Claritin, flonase for symptom relief  Follow-up if symptoms persist or worsen  Relevant Medications    azithromycin (ZITHROMAX) 250 mg tablet               Reason for visit is   Chief Complaint   Patient presents with    COVID-19    Virtual Regular Visit        Encounter provider Jayson Cockayne, MD    Provider located at [address] [place] [id number]      Recent Visits  No visits were found meeting these conditions  Showing recent visits within past 7 days and meeting all other requirements     Today's Visits  Date Type Provider Dept   06/01/21 Telemedicine Jayson Cockayne, MD [facility]   Showing today's visits and meeting all other requirements     Future Appointments  No visits were found meeting these conditions  Showing future appointments within next 150 days and meeting all other requirements        The patient was identified by name and date of birth  Kenisha Negro was informed that this is a telemedicine visit and that the visit is being conducted through [address] [place] [address] Now and patient was informed that this is a secure, HIPAA-compliant platform  She agrees to proceed     My office door was closed  No one else was in the room  She acknowledged consent and understanding of privacy and security of the video platform  The patient has agreed to participate and understands they can discontinue the visit at any time  Patient is aware this is a billable service  Subjective  Kenisha Negro is a 72 y o  female    Sore Throat   This is a new problem  Episode onset: 2 weeks  The problem has been unchanged  There has been no fever  The pain is at a severity of 5/10   The pain is moderate  Associated symptoms include congestion, coughing (dry), a hoarse voice and a plugged ear sensation  Pertinent negatives include no ear pain or shortness of breath  She has had no exposure to strep or mono  She has tried cool liquids (nasal saline, flonase, benadryl  ) for the symptoms          Past Medical History:   Diagnosis Date    Abnormal Pap smear of cervix     cin3; hpv non 16/18 +(12/2016); LGSIL 6/2018    Arthritis     Cancer (HCC)     cervical    Coronary artery disease     CPAP (continuous positive airway pressure) dependence     GERD (gastroesophageal reflux disease)     History of pneumonia     2 yrs ago    HPV (human papilloma virus) infection     Hypercholesterolemia     Hypertension     Kidney stone     Morbid obesity ([address] )     Motion sickness     Myocardial infarct ([facility] [address]     X2; 2000 and 2002//LVH cardiology Dr Cris Cochran yearly    Pleuritic chest pain     "from constant lifting on the job chest muscle area"    PONV (postoperative nausea and vomiting)     "extremely with the gas"    Rheumatoid arthritis ([facility] [address]     Rheu and cervical    Shortness of breath     moderate activity// pt does work full time/on and off feet in [address] [place] [address] Drive Sleep apnea 2017    Vertigo     Wears partial dentures     lower       Past Surgical History:   Procedure Laterality Date    ANGIOPLASTY Right 09/14/2015    right groin / femoral    [facility]  2015    star close - closure system    CARDIAC CATHETERIZATION  2000    angioplasty/[facility]    CERVICAL CONE BIOPSY      1980's    COLONOSCOPY      2006, 2019    COLONOSCOPY      DILATION AND CURETTAGE OF UTERUS      FL RETROGRADE PYELOGRAM  7/2/2020    FL RETROGRADE PYELOGRAM  10/13/2020    MAMMO (HISTORICAL)  2018    Neg    OTHER SURGICAL HISTORY Right 11/2017    Surgical removal of lypoma shoulder blade    SC CYSTO/URETERO W/LITHOTRIPSY &INDWELL STENT INSRT Right 7/21/2020    Procedure: CYSTO, URETEROSCOPY STONE BASKET EXTRACTION , RETROGRADE PYELOGRAM, STENT;  Surgeon: Jacey Bojorquez MD;  Location: [facility];  Service: Urology    IN CYSTO/URETERO W/LITHOTRIPSY &INDWELL STENT INSRT Left 10/13/2020    Procedure: CYSTOSCOPY URETEROSCOPY WITH LITHOTRIPSY HOLMIUM LASER, RETROGRADE PYELOGRAM AND INSERTION STENT URETERAL;  Surgeon: Jacey Bojorquez MD;  [facility];  Service: Urology    IN [address] Right 7/2/2020    Procedure: CYSTOSCOPY RETROGRADE PYELOGRAM WITH INSERTION STENT URETERAL;  Surgeon: Wesley Martin MD;  [facility];  Service: Urology    SHOULDER SURGERY Right 2014    had a lympoma done in 2017 also same shoulder    SHOULDER SURGERY Right 2014    TONSILLECTOMY      WISDOM TOOTH EXTRACTION         Current Outpatient Medications   Medication Sig Dispense Refill    Ascorbic Acid, Vitamin C, (VITAMIN C) 100 MG tablet Take 1,000 mg by mouth daily      aspirin 81 MG tablet Take 162 mg by mouth daily      Choline Fenofibrate (FENOFIBRIC ACID) 45 MG CPDR Take 1 capsule by mouth every evening       Cranberry 450 MG TABS Take 1 tablet by mouth 2 (two) times a day      fenofibrate (TRICOR) 48 mg tablet       Flaxseed, Linseed, (FLAX SEEDS PO) Take 1 tablet by mouth daily      folic acid (FOLVITE) 1 mg tablet Take 1,000 mcg by mouth daily      Humira Pen 40 MG/0 4ML PNKT Finishes end of march 2021      loratadine (Allergy) 10 mg tablet Take 10 mg by mouth daily      LORazepam (ATIVAN) 0 5 mg tablet Take 0 5 mg by mouth every 6 (six) hours as needed      meclizine (ANTIVERT) 25 mg tablet Take 1 tablet (25 mg total) by mouth 3 (three) times a day as needed for dizziness 90 tablet 1    methotrexate 2 5 mg tablet 2 5 mg once a week Takes on Fridays  Last dose10/9/20  8 PILLS ONE TIME A WEEK      metoprolol tartrate (LOPRESSOR) 25 mg tablet Take 12 5 mg by mouth every 12 (twelve) hours       OMEGA-3 FATTY ACIDS PO Take 1 g by mouth 2 (two) times a day       pantoprazole (PROTONIX) 40 mg tablet Take 1 tablet (40 mg total) by mouth daily 90 tablet 1    rosuvastatin (CRESTOR) 20 MG tablet Take 20 mg by mouth every evening       acetaminophen (TYLENOL) 325 mg tablet Take 2 tablets (650 mg total) by mouth every 4 (four) hours as needed for mild pain, headaches or fever (Patient not taking: Reported on 2/24/2021)  0    azithromycin (ZITHROMAX) 250 mg tablet Take 2 tablets today then 1 tablet daily x 4 days 6 tablet 0    Diclofenac Sodium (VOLTAREN) 1 % APPLY 4 GM FOUR TIMES A DAY AS NEEDED TRANSDERMAL 30 DAYS      HYDROcodone-acetaminophen (NORCO) 7 5-325 mg per tablet Take 1 tablet by mouth every 4 to 6 hours as needed for pain      neomycin-polymyxin-hydrocortisone (CORTISPORIN) otic solution Administer 4 drops into the left ear every 6 (six) hours for 5 days 10 mL 0    oxybutynin (DITROPAN) 5 mg tablet Take 1 tablet (5 mg total) by mouth 3 (three) times a day for 3 days 9 tablet 0    oxybutynin (DITROPAN) 5 mg tablet Every 6-8 hours if needed for bladder pressure or spasm 15 tablet 0    pseudoephedrine (SUDAFED) 30 mg tablet Take 2 tablets (60 mg total) by mouth every 6 (six) hours as needed for congestion for up to 4 days 30 tablet 0     No current facility-administered medications for this visit  Allergies   Allergen Reactions    Hydrocodone Vomiting     "can take oxycodone without a problem"    Atorvastatin Myalgia    Caffeine - Food Allergy Tachycardia     And high blood pressure    Diphenhydramine Hcl (Sleep) Itching    Pravastatin Myalgia    Simvastatin Myalgia       Review of Systems   Constitutional: Negative  HENT: Positive for congestion, hoarse voice and sore throat  Negative for ear pain  Respiratory: Positive for cough (dry)  Negative for shortness of breath  Cardiovascular: Negative  Negative for chest pain and palpitations  Musculoskeletal: Negative for myalgias  Neurological: Negative  Psychiatric/Behavioral: Negative  Video Exam    Vitals:    06/01/21 0848   Weight: (!) 141 kg (310 lb)   Height: 5' 7" (1 702 m)       Physical Exam  Constitutional:       General: She is not in acute distress  Pulmonary:      Effort: Pulmonary effort is normal  No respiratory distress  Neurological:      Mental Status: She is alert and oriented to person, place, and time  Psychiatric:         Behavior: Behavior normal          Thought Content: Thought content normal          Judgment: Judgment normal           I spent 15 minutes with patient today in which greater than 50% of the time was spent in counseling/coordination of care regarding Management of symptoms      VIRTUAL VISIT [facility] acknowledges that she has consented to an online visit or consultation  She understands that the online visit is based solely on information provided by her, and that, in the absence of a face-to-face physical evaluation by the physician, the diagnosis she receives is both limited and provisional in terms of accuracy and completeness  This is not intended to replace a full medical face-to-face evaluation by the physician  Janice Vail understands and accepts these terms

## 2021-06-01 NOTE — ASSESSMENT & PLAN NOTE
Patient started on a trial of z pack  Advised to use over-the-counter Allegra or Claritin, flonase for symptom relief  Follow-up if symptoms persist or worsen

## 2021-06-18 ENCOUNTER — TELEPHONE (OUTPATIENT)
Dept: FAMILY MEDICINE CLINIC | Facility: CLINIC | Age: 66
End: 2021-06-18

## 2021-09-27 ENCOUNTER — TELEPHONE (OUTPATIENT)
Dept: FAMILY MEDICINE CLINIC | Facility: CLINIC | Age: 66
End: 2021-09-27

## 2021-09-27 DIAGNOSIS — K21.9 GERD WITHOUT ESOPHAGITIS: ICD-10-CM

## 2021-09-27 RX ORDER — PANTOPRAZOLE SODIUM 40 MG/1
40 TABLET, DELAYED RELEASE ORAL DAILY
Qty: 90 TABLET | Refills: 1 | Status: SHIPPED | OUTPATIENT
Start: 2021-09-27 | End: 2022-03-07 | Stop reason: SDUPTHER

## 2021-09-27 NOTE — TELEPHONE ENCOUNTER
Smith Judd would like to know if she is due for her second pneumonia shot  States she was told at her last visit that she is due

## 2021-10-05 DIAGNOSIS — B34.9 VIRAL INFECTION, UNSPECIFIED: Primary | ICD-10-CM

## 2021-10-05 PROCEDURE — U0003 INFECTIOUS AGENT DETECTION BY NUCLEIC ACID (DNA OR RNA); SEVERE ACUTE RESPIRATORY SYNDROME CORONAVIRUS 2 (SARS-COV-2) (CORONAVIRUS DISEASE [COVID-19]), AMPLIFIED PROBE TECHNIQUE, MAKING USE OF HIGH THROUGHPUT TECHNOLOGIES AS DESCRIBED BY CMS-2020-01-R: HCPCS | Performed by: FAMILY MEDICINE

## 2021-10-05 PROCEDURE — U0005 INFEC AGEN DETEC AMPLI PROBE: HCPCS | Performed by: FAMILY MEDICINE

## 2021-10-06 ENCOUNTER — TELEMEDICINE (OUTPATIENT)
Dept: FAMILY MEDICINE CLINIC | Facility: CLINIC | Age: 66
End: 2021-10-06
Payer: MEDICARE

## 2021-10-06 VITALS — HEIGHT: 67 IN | BODY MASS INDEX: 45.99 KG/M2 | WEIGHT: 293 LBS

## 2021-10-06 DIAGNOSIS — U07.1 COVID-19 VIRUS INFECTION: Primary | ICD-10-CM

## 2021-10-06 PROCEDURE — 99214 OFFICE O/P EST MOD 30 MIN: CPT | Performed by: FAMILY MEDICINE

## 2021-10-06 RX ORDER — SODIUM CHLORIDE 9 MG/ML
20 INJECTION, SOLUTION INTRAVENOUS ONCE
Status: CANCELLED | OUTPATIENT
Start: 2021-10-07

## 2021-10-06 RX ORDER — AZITHROMYCIN 250 MG/1
TABLET, FILM COATED ORAL
Qty: 6 TABLET | Refills: 0 | Status: ON HOLD | OUTPATIENT
Start: 2021-10-06 | End: 2021-10-08 | Stop reason: ALTCHOICE

## 2021-10-06 RX ORDER — ALBUTEROL SULFATE 90 UG/1
3 AEROSOL, METERED RESPIRATORY (INHALATION) ONCE AS NEEDED
Status: CANCELLED | OUTPATIENT
Start: 2021-10-07

## 2021-10-06 RX ORDER — FLUTICASONE PROPIONATE AND SALMETEROL XINAFOATE 115; 21 UG/1; UG/1
1 AEROSOL, METERED RESPIRATORY (INHALATION) 4 TIMES DAILY
Qty: 12 G | Refills: 0 | Status: SHIPPED | OUTPATIENT
Start: 2021-10-06 | End: 2021-10-13

## 2021-10-06 RX ORDER — ACETAMINOPHEN 325 MG/1
650 TABLET ORAL ONCE AS NEEDED
Status: CANCELLED | OUTPATIENT
Start: 2021-10-07

## 2021-10-06 RX ORDER — ONDANSETRON 2 MG/ML
4 INJECTION INTRAMUSCULAR; INTRAVENOUS ONCE AS NEEDED
Status: CANCELLED | OUTPATIENT
Start: 2021-10-07

## 2021-10-07 ENCOUNTER — APPOINTMENT (EMERGENCY)
Dept: RADIOLOGY | Facility: HOSPITAL | Age: 66
DRG: 871 | End: 2021-10-07
Payer: MEDICARE

## 2021-10-07 ENCOUNTER — APPOINTMENT (INPATIENT)
Dept: CT IMAGING | Facility: HOSPITAL | Age: 66
DRG: 871 | End: 2021-10-07
Payer: MEDICARE

## 2021-10-07 ENCOUNTER — HOSPITAL ENCOUNTER (OUTPATIENT)
Dept: INFUSION CENTER | Facility: HOSPITAL | Age: 66
Discharge: HOME | DRG: 871 | End: 2021-10-07
Payer: MEDICARE

## 2021-10-07 ENCOUNTER — HOSPITAL ENCOUNTER (INPATIENT)
Facility: HOSPITAL | Age: 66
LOS: 1 days | Discharge: HOME | DRG: 871 | End: 2021-10-08
Attending: INTERNAL MEDICINE | Admitting: INTERNAL MEDICINE
Payer: MEDICARE

## 2021-10-07 VITALS
DIASTOLIC BLOOD PRESSURE: 53 MMHG | OXYGEN SATURATION: 92 % | TEMPERATURE: 103.2 F | SYSTOLIC BLOOD PRESSURE: 114 MMHG | HEART RATE: 97 BPM | RESPIRATION RATE: 22 BRPM

## 2021-10-07 DIAGNOSIS — U07.1 COVID-19 VIRUS INFECTION: Primary | ICD-10-CM

## 2021-10-07 DIAGNOSIS — U07.1 COVID-19: ICD-10-CM

## 2021-10-07 DIAGNOSIS — R06.03 RESPIRATORY DISTRESS: ICD-10-CM

## 2021-10-07 DIAGNOSIS — K52.9 GASTROENTERITIS: Primary | ICD-10-CM

## 2021-10-07 LAB
ALBUMIN SERPL BCP-MCNC: 4.1 G/DL (ref 3.4–4.8)
ALP SERPL-CCNC: 78.3 U/L (ref 35–140)
ALT SERPL W P-5'-P-CCNC: 28 U/L (ref 5–54)
ANION GAP SERPL CALCULATED.3IONS-SCNC: 17 MMOL/L (ref 4–13)
APTT PPP: 27 SECONDS (ref 23–37)
AST SERPL W P-5'-P-CCNC: 33 U/L (ref 15–41)
BASOPHILS # BLD MANUAL: 0 THOUSAND/UL (ref 0–0.1)
BASOPHILS NFR MAR MANUAL: 0 % (ref 0–1)
BILIRUB SERPL-MCNC: 0.41 MG/DL (ref 0.3–1.2)
BNP SERPL-MCNC: 119.7 PG/ML (ref 1–100)
BUN SERPL-MCNC: 10 MG/DL (ref 6–20)
CALCIUM SERPL-MCNC: 8.7 MG/DL (ref 8.4–10.2)
CHLORIDE SERPL-SCNC: 104 MMOL/L (ref 96–108)
CK SERPL-CCNC: 113.7 U/L (ref 38–234)
CO2 SERPL-SCNC: 19 MMOL/L (ref 22–33)
CREAT SERPL-MCNC: 0.97 MG/DL (ref 0.4–1.1)
CRP SERPL QL: 0.8 MG/DL (ref 0–1)
EOSINOPHIL # BLD MANUAL: 0.07 THOUSAND/UL (ref 0–0.4)
EOSINOPHIL NFR BLD MANUAL: 1 % (ref 0–6)
ERYTHROCYTE [DISTWIDTH] IN BLOOD BY AUTOMATED COUNT: 15.1 % (ref 11.6–15.1)
GFR SERPL CREATININE-BSD FRML MDRD: 61 ML/MIN/1.73SQ M
GLUCOSE SERPL-MCNC: 160 MG/DL (ref 65–140)
HCT VFR BLD AUTO: 42.5 % (ref 34.8–46.1)
HGB BLD-MCNC: 13.5 G/DL (ref 11.5–15.4)
INR PPP: 1.08 (ref 0.84–1.19)
LACTATE SERPL-SCNC: 1.8 MMOL/L (ref 0–2)
LACTATE SERPL-SCNC: 2.5 MMOL/L (ref 0–2)
LYMPHOCYTES # BLD AUTO: 1.47 THOUSAND/UL (ref 0.6–4.47)
LYMPHOCYTES # BLD AUTO: 21 % (ref 14–44)
MCH RBC QN AUTO: 29 PG (ref 26.8–34.3)
MCHC RBC AUTO-ENTMCNC: 31.8 G/DL (ref 31.4–37.4)
MCV RBC AUTO: 91 FL (ref 82–98)
MONOCYTES # BLD AUTO: 0.42 THOUSAND/UL (ref 0–1.22)
MONOCYTES NFR BLD: 6 % (ref 4–12)
NEUTROPHILS # BLD MANUAL: 5.03 THOUSAND/UL (ref 1.85–7.62)
NEUTS BAND NFR BLD MANUAL: 16 % (ref 0–8)
NEUTS SEG NFR BLD AUTO: 56 % (ref 43–75)
PLATELET # BLD AUTO: 203 THOUSANDS/UL (ref 149–390)
PLATELET BLD QL SMEAR: ADEQUATE
PMV BLD AUTO: 9.8 FL (ref 8.9–12.7)
POLYCHROMASIA BLD QL SMEAR: PRESENT
POTASSIUM SERPL-SCNC: 3.6 MMOL/L (ref 3.5–5)
PROT SERPL-MCNC: 7.4 G/DL (ref 6.4–8.3)
PROTHROMBIN TIME: 13.9 SECONDS (ref 11.6–14.5)
RBC # BLD AUTO: 4.65 MILLION/UL (ref 3.81–5.12)
RBC MORPH BLD: PRESENT
SODIUM SERPL-SCNC: 140 MMOL/L (ref 133–145)
TROPONIN I SERPL-MCNC: <0.03 NG/ML (ref 0–0.07)
WBC # BLD AUTO: 6.99 THOUSAND/UL (ref 4.31–10.16)

## 2021-10-07 PROCEDURE — 87040 BLOOD CULTURE FOR BACTERIA: CPT | Performed by: INTERNAL MEDICINE

## 2021-10-07 PROCEDURE — 85610 PROTHROMBIN TIME: CPT | Performed by: INTERNAL MEDICINE

## 2021-10-07 PROCEDURE — 96361 HYDRATE IV INFUSION ADD-ON: CPT

## 2021-10-07 PROCEDURE — G1004 CDSM NDSC: HCPCS

## 2021-10-07 PROCEDURE — 80053 COMPREHEN METABOLIC PANEL: CPT | Performed by: INTERNAL MEDICINE

## 2021-10-07 PROCEDURE — 71275 CT ANGIOGRAPHY CHEST: CPT

## 2021-10-07 PROCEDURE — 83605 ASSAY OF LACTIC ACID: CPT | Performed by: INTERNAL MEDICINE

## 2021-10-07 PROCEDURE — 82550 ASSAY OF CK (CPK): CPT | Performed by: INTERNAL MEDICINE

## 2021-10-07 PROCEDURE — 94640 AIRWAY INHALATION TREATMENT: CPT

## 2021-10-07 PROCEDURE — 85027 COMPLETE CBC AUTOMATED: CPT | Performed by: INTERNAL MEDICINE

## 2021-10-07 PROCEDURE — 85007 BL SMEAR W/DIFF WBC COUNT: CPT | Performed by: INTERNAL MEDICINE

## 2021-10-07 PROCEDURE — 94760 N-INVAS EAR/PLS OXIMETRY 1: CPT

## 2021-10-07 PROCEDURE — 99223 1ST HOSP IP/OBS HIGH 75: CPT | Performed by: INTERNAL MEDICINE

## 2021-10-07 PROCEDURE — 96374 THER/PROPH/DIAG INJ IV PUSH: CPT

## 2021-10-07 PROCEDURE — M0243 CASIRIVI AND IMDEVI INFUSION: HCPCS | Performed by: FAMILY MEDICINE

## 2021-10-07 PROCEDURE — 99285 EMERGENCY DEPT VISIT HI MDM: CPT | Performed by: INTERNAL MEDICINE

## 2021-10-07 PROCEDURE — 99285 EMERGENCY DEPT VISIT HI MDM: CPT

## 2021-10-07 PROCEDURE — 84145 PROCALCITONIN (PCT): CPT | Performed by: INTERNAL MEDICINE

## 2021-10-07 PROCEDURE — 71045 X-RAY EXAM CHEST 1 VIEW: CPT

## 2021-10-07 PROCEDURE — 96360 HYDRATION IV INFUSION INIT: CPT

## 2021-10-07 PROCEDURE — 1123F ACP DISCUSS/DSCN MKR DOCD: CPT | Performed by: INTERNAL MEDICINE

## 2021-10-07 PROCEDURE — 36415 COLL VENOUS BLD VENIPUNCTURE: CPT

## 2021-10-07 PROCEDURE — 86140 C-REACTIVE PROTEIN: CPT | Performed by: INTERNAL MEDICINE

## 2021-10-07 PROCEDURE — XW033E5 INTRODUCTION OF REMDESIVIR ANTI-INFECTIVE INTO PERIPHERAL VEIN, PERCUTANEOUS APPROACH, NEW TECHNOLOGY GROUP 5: ICD-10-PCS | Performed by: INTERNAL MEDICINE

## 2021-10-07 PROCEDURE — 85730 THROMBOPLASTIN TIME PARTIAL: CPT | Performed by: INTERNAL MEDICINE

## 2021-10-07 PROCEDURE — 84484 ASSAY OF TROPONIN QUANT: CPT | Performed by: INTERNAL MEDICINE

## 2021-10-07 PROCEDURE — 83880 ASSAY OF NATRIURETIC PEPTIDE: CPT | Performed by: INTERNAL MEDICINE

## 2021-10-07 RX ORDER — LOPERAMIDE HYDROCHLORIDE 2 MG/1
2 CAPSULE ORAL 3 TIMES DAILY PRN
Status: DISCONTINUED | OUTPATIENT
Start: 2021-10-07 | End: 2021-10-08 | Stop reason: HOSPADM

## 2021-10-07 RX ORDER — FLUTICASONE FUROATE AND VILANTEROL 100; 25 UG/1; UG/1
1 POWDER RESPIRATORY (INHALATION) DAILY
Status: DISCONTINUED | OUTPATIENT
Start: 2021-10-07 | End: 2021-10-08 | Stop reason: HOSPADM

## 2021-10-07 RX ORDER — SODIUM CHLORIDE 9 MG/ML
125 INJECTION, SOLUTION INTRAVENOUS CONTINUOUS
Status: DISCONTINUED | OUTPATIENT
Start: 2021-10-07 | End: 2021-10-08 | Stop reason: HOSPADM

## 2021-10-07 RX ORDER — ONDANSETRON 2 MG/ML
4 INJECTION INTRAMUSCULAR; INTRAVENOUS ONCE AS NEEDED
Status: COMPLETED | OUTPATIENT
Start: 2021-10-07 | End: 2021-10-07

## 2021-10-07 RX ORDER — ONDANSETRON 2 MG/ML
4 INJECTION INTRAMUSCULAR; INTRAVENOUS ONCE
Status: COMPLETED | OUTPATIENT
Start: 2021-10-07 | End: 2021-10-07

## 2021-10-07 RX ORDER — ONDANSETRON 2 MG/ML
4 INJECTION INTRAMUSCULAR; INTRAVENOUS EVERY 8 HOURS PRN
Status: DISCONTINUED | OUTPATIENT
Start: 2021-10-07 | End: 2021-10-08 | Stop reason: HOSPADM

## 2021-10-07 RX ORDER — ACETAMINOPHEN 325 MG/1
650 TABLET ORAL ONCE AS NEEDED
Status: CANCELLED | OUTPATIENT
Start: 2021-10-07

## 2021-10-07 RX ORDER — ONDANSETRON 2 MG/ML
4 INJECTION INTRAMUSCULAR; INTRAVENOUS ONCE AS NEEDED
Status: CANCELLED | OUTPATIENT
Start: 2021-10-07

## 2021-10-07 RX ORDER — PANTOPRAZOLE SODIUM 40 MG/1
40 TABLET, DELAYED RELEASE ORAL
Status: DISCONTINUED | OUTPATIENT
Start: 2021-10-07 | End: 2021-10-08 | Stop reason: HOSPADM

## 2021-10-07 RX ORDER — DEXAMETHASONE SODIUM PHOSPHATE 4 MG/ML
6 INJECTION, SOLUTION INTRA-ARTICULAR; INTRALESIONAL; INTRAMUSCULAR; INTRAVENOUS; SOFT TISSUE EVERY 24 HOURS
Status: DISCONTINUED | OUTPATIENT
Start: 2021-10-07 | End: 2021-10-07

## 2021-10-07 RX ORDER — ACETAMINOPHEN 325 MG/1
650 TABLET ORAL EVERY 6 HOURS PRN
Status: DISCONTINUED | OUTPATIENT
Start: 2021-10-07 | End: 2021-10-08 | Stop reason: HOSPADM

## 2021-10-07 RX ORDER — SODIUM CHLORIDE 9 MG/ML
20 INJECTION, SOLUTION INTRAVENOUS ONCE
Status: COMPLETED | OUTPATIENT
Start: 2021-10-07 | End: 2021-10-07

## 2021-10-07 RX ORDER — ASPIRIN 81 MG/1
81 TABLET, CHEWABLE ORAL DAILY
Status: DISCONTINUED | OUTPATIENT
Start: 2021-10-07 | End: 2021-10-08 | Stop reason: HOSPADM

## 2021-10-07 RX ORDER — ACETAMINOPHEN 325 MG/1
650 TABLET ORAL ONCE
Status: COMPLETED | OUTPATIENT
Start: 2021-10-07 | End: 2021-10-07

## 2021-10-07 RX ORDER — ACETAMINOPHEN 325 MG/1
650 TABLET ORAL ONCE AS NEEDED
Status: DISCONTINUED | OUTPATIENT
Start: 2021-10-07 | End: 2021-10-10 | Stop reason: HOSPADM

## 2021-10-07 RX ORDER — SODIUM CHLORIDE 9 MG/ML
20 INJECTION, SOLUTION INTRAVENOUS ONCE
Status: CANCELLED | OUTPATIENT
Start: 2021-10-07

## 2021-10-07 RX ORDER — IBUPROFEN 600 MG/1
600 TABLET ORAL ONCE
Status: COMPLETED | OUTPATIENT
Start: 2021-10-07 | End: 2021-10-07

## 2021-10-07 RX ORDER — ALBUTEROL SULFATE 90 UG/1
3 AEROSOL, METERED RESPIRATORY (INHALATION) ONCE AS NEEDED
Status: DISCONTINUED | OUTPATIENT
Start: 2021-10-07 | End: 2021-10-10 | Stop reason: HOSPADM

## 2021-10-07 RX ORDER — ALBUTEROL SULFATE 90 UG/1
3 AEROSOL, METERED RESPIRATORY (INHALATION) ONCE AS NEEDED
Status: CANCELLED | OUTPATIENT
Start: 2021-10-07

## 2021-10-07 RX ORDER — FOLIC ACID 1 MG/1
1000 TABLET ORAL DAILY
Status: DISCONTINUED | OUTPATIENT
Start: 2021-10-07 | End: 2021-10-08 | Stop reason: HOSPADM

## 2021-10-07 RX ORDER — GUAIFENESIN/DEXTROMETHORPHAN 100-10MG/5
10 SYRUP ORAL EVERY 4 HOURS PRN
Status: DISCONTINUED | OUTPATIENT
Start: 2021-10-07 | End: 2021-10-08 | Stop reason: HOSPADM

## 2021-10-07 RX ORDER — ASCORBIC ACID 500 MG
1000 TABLET ORAL DAILY
Status: DISCONTINUED | OUTPATIENT
Start: 2021-10-07 | End: 2021-10-08 | Stop reason: HOSPADM

## 2021-10-07 RX ADMIN — Medication 12.5 MG: at 21:20

## 2021-10-07 RX ADMIN — SODIUM CHLORIDE 125 ML/HR: 0.9 INJECTION, SOLUTION INTRAVENOUS at 13:23

## 2021-10-07 RX ADMIN — CASIRIVIMAB AND IMDEVIMAB 1200 MG COMBINED: 600; 600 INJECTION, SOLUTION, CONCENTRATE INTRAVENOUS at 08:25

## 2021-10-07 RX ADMIN — ACETAMINOPHEN 650 MG: 325 TABLET, FILM COATED ORAL at 12:22

## 2021-10-07 RX ADMIN — IBUPROFEN 600 MG: 600 TABLET, FILM COATED ORAL at 21:20

## 2021-10-07 RX ADMIN — SODIUM CHLORIDE 125 ML/HR: 0.9 INJECTION, SOLUTION INTRAVENOUS at 17:37

## 2021-10-07 RX ADMIN — ONDANSETRON 4 MG: 2 INJECTION INTRAMUSCULAR; INTRAVENOUS at 08:34

## 2021-10-07 RX ADMIN — FLUTICASONE FUROATE AND VILANTEROL TRIFENATATE 1 PUFF: 100; 25 POWDER RESPIRATORY (INHALATION) at 16:59

## 2021-10-07 RX ADMIN — OXYCODONE HYDROCHLORIDE AND ACETAMINOPHEN 1000 MG: 500 TABLET ORAL at 17:30

## 2021-10-07 RX ADMIN — REMDESIVIR 200 MG: 100 INJECTION, POWDER, LYOPHILIZED, FOR SOLUTION INTRAVENOUS at 17:31

## 2021-10-07 RX ADMIN — FOLIC ACID 1000 MCG: 1 TABLET ORAL at 17:30

## 2021-10-07 RX ADMIN — SODIUM CHLORIDE 20 ML/HR: 9 INJECTION, SOLUTION INTRAVENOUS at 08:06

## 2021-10-07 RX ADMIN — SODIUM CHLORIDE 1000 ML: 0.9 INJECTION, SOLUTION INTRAVENOUS at 10:32

## 2021-10-07 RX ADMIN — ASPIRIN 81 MG: 81 TABLET, CHEWABLE ORAL at 17:30

## 2021-10-07 RX ADMIN — IOHEXOL 185 ML: 350 INJECTION, SOLUTION INTRAVENOUS at 15:38

## 2021-10-07 RX ADMIN — ONDANSETRON 4 MG: 2 INJECTION INTRAMUSCULAR; INTRAVENOUS at 11:10

## 2021-10-07 RX ADMIN — PANTOPRAZOLE SODIUM 40 MG: 40 TABLET, DELAYED RELEASE ORAL at 17:31

## 2021-10-07 RX ADMIN — GUAIFENESIN AND DEXTROMETHORPHAN 10 ML: 100; 10 SYRUP ORAL at 22:54

## 2021-10-08 VITALS
RESPIRATION RATE: 20 BRPM | BODY MASS INDEX: 50.48 KG/M2 | OXYGEN SATURATION: 98 % | WEIGHT: 293 LBS | SYSTOLIC BLOOD PRESSURE: 127 MMHG | HEART RATE: 74 BPM | DIASTOLIC BLOOD PRESSURE: 69 MMHG | TEMPERATURE: 99.4 F

## 2021-10-08 PROBLEM — A41.9 SEPSIS (HCC): Status: RESOLVED | Noted: 2020-07-01 | Resolved: 2021-10-08

## 2021-10-08 LAB
BILIRUB UR QL STRIP: NEGATIVE
CLARITY UR: ABNORMAL
COLOR UR: YELLOW
GLUCOSE UR STRIP-MCNC: NEGATIVE MG/DL
HGB UR QL STRIP.AUTO: NEGATIVE
KETONES UR STRIP-MCNC: NEGATIVE MG/DL
LEUKOCYTE ESTERASE UR QL STRIP: NEGATIVE
NITRITE UR QL STRIP: NEGATIVE
PH UR STRIP.AUTO: 6 [PH]
PROCALCITONIN SERPL-MCNC: 0.08 NG/ML
PROCALCITONIN SERPL-MCNC: 28.47 NG/ML
PROT UR STRIP-MCNC: NEGATIVE MG/DL
SP GR UR STRIP.AUTO: 1.02 (ref 1–1.03)
UROBILINOGEN UR QL STRIP.AUTO: 0.2 E.U./DL

## 2021-10-08 PROCEDURE — 94640 AIRWAY INHALATION TREATMENT: CPT

## 2021-10-08 PROCEDURE — 84145 PROCALCITONIN (PCT): CPT | Performed by: INTERNAL MEDICINE

## 2021-10-08 PROCEDURE — G0008 ADMIN INFLUENZA VIRUS VAC: HCPCS | Performed by: INTERNAL MEDICINE

## 2021-10-08 PROCEDURE — 81003 URINALYSIS AUTO W/O SCOPE: CPT | Performed by: INTERNAL MEDICINE

## 2021-10-08 PROCEDURE — 99239 HOSP IP/OBS DSCHRG MGMT >30: CPT | Performed by: INTERNAL MEDICINE

## 2021-10-08 PROCEDURE — 90662 IIV NO PRSV INCREASED AG IM: CPT | Performed by: INTERNAL MEDICINE

## 2021-10-08 PROCEDURE — 94760 N-INVAS EAR/PLS OXIMETRY 1: CPT

## 2021-10-08 RX ORDER — GUAIFENESIN/DEXTROMETHORPHAN 100-10MG/5
10 SYRUP ORAL EVERY 4 HOURS PRN
Qty: 118 ML | Refills: 0 | Status: SHIPPED | OUTPATIENT
Start: 2021-10-08 | End: 2021-12-06 | Stop reason: ALTCHOICE

## 2021-10-08 RX ORDER — ONDANSETRON 4 MG/1
4 TABLET, FILM COATED ORAL EVERY 8 HOURS PRN
Qty: 20 TABLET | Refills: 0 | Status: SHIPPED | OUTPATIENT
Start: 2021-10-08

## 2021-10-08 RX ADMIN — FOLIC ACID 1000 MCG: 1 TABLET ORAL at 11:02

## 2021-10-08 RX ADMIN — INFLUENZA A VIRUS A/VICTORIA/2570/2019 IVR-215 (H1N1) ANTIGEN (FORMALDEHYDE INACTIVATED), INFLUENZA A VIRUS A/TASMANIA/503/2020 IVR-221 (H3N2) ANTIGEN (FORMALDEHYDE INACTIVATED), INFLUENZA B VIRUS B/PHUKET/3073/2013 ANTIGEN (FORMALDEHYDE INACTIVATED), AND INFLUENZA B VIRUS B/WASHINGTON/02/2019 ANTIGEN (FORMALDEHYDE INACTIVATED) 0.7 ML: 60; 60; 60; 60 INJECTION, SUSPENSION INTRAMUSCULAR at 13:11

## 2021-10-08 RX ADMIN — ACETAMINOPHEN 650 MG: 325 TABLET, FILM COATED ORAL at 11:09

## 2021-10-08 RX ADMIN — OXYCODONE HYDROCHLORIDE AND ACETAMINOPHEN 1000 MG: 500 TABLET ORAL at 10:22

## 2021-10-08 RX ADMIN — PANTOPRAZOLE SODIUM 40 MG: 40 TABLET, DELAYED RELEASE ORAL at 05:11

## 2021-10-08 RX ADMIN — ENOXAPARIN SODIUM 135 MG: 150 INJECTION SUBCUTANEOUS at 11:03

## 2021-10-08 RX ADMIN — Medication 12.5 MG: at 10:22

## 2021-10-08 RX ADMIN — FLUTICASONE FUROATE AND VILANTEROL TRIFENATATE 1 PUFF: 100; 25 POWDER RESPIRATORY (INHALATION) at 08:15

## 2021-10-08 RX ADMIN — ASPIRIN 81 MG: 81 TABLET, CHEWABLE ORAL at 10:23

## 2021-10-11 ENCOUNTER — TRANSITIONAL CARE MANAGEMENT (OUTPATIENT)
Dept: FAMILY MEDICINE CLINIC | Facility: CLINIC | Age: 66
End: 2021-10-11

## 2021-10-12 LAB
BACTERIA BLD CULT: NORMAL
BACTERIA BLD CULT: NORMAL

## 2021-10-13 ENCOUNTER — TELEMEDICINE (OUTPATIENT)
Dept: FAMILY MEDICINE CLINIC | Facility: CLINIC | Age: 66
End: 2021-10-13
Payer: MEDICARE

## 2021-10-13 VITALS — BODY MASS INDEX: 45.99 KG/M2 | HEIGHT: 67 IN | WEIGHT: 293 LBS

## 2021-10-13 DIAGNOSIS — U07.1 COVID-19 VIRUS INFECTION: Primary | ICD-10-CM

## 2021-10-13 PROCEDURE — 99442 PR PHYS/QHP TELEPHONE EVALUATION 11-20 MIN: CPT | Performed by: FAMILY MEDICINE

## 2021-10-13 RX ORDER — BUDESONIDE, GLYCOPYRROLATE, AND FORMOTEROL FUMARATE 160; 9; 4.8 UG/1; UG/1; UG/1
1 AEROSOL, METERED RESPIRATORY (INHALATION) 4 TIMES DAILY
Qty: 10.7 G | Refills: 0 | Status: SHIPPED | COMMUNITY
Start: 2021-10-13

## 2021-10-13 RX ORDER — BENZONATATE 200 MG/1
200 CAPSULE ORAL 3 TIMES DAILY PRN
Qty: 20 CAPSULE | Refills: 0 | Status: SHIPPED | OUTPATIENT
Start: 2021-10-13 | End: 2021-12-06 | Stop reason: ALTCHOICE

## 2021-12-06 ENCOUNTER — OFFICE VISIT (OUTPATIENT)
Dept: FAMILY MEDICINE CLINIC | Facility: CLINIC | Age: 66
End: 2021-12-06
Payer: MEDICARE

## 2021-12-06 VITALS
HEIGHT: 67 IN | OXYGEN SATURATION: 99 % | RESPIRATION RATE: 16 BRPM | HEART RATE: 69 BPM | WEIGHT: 293 LBS | BODY MASS INDEX: 45.99 KG/M2 | DIASTOLIC BLOOD PRESSURE: 86 MMHG | SYSTOLIC BLOOD PRESSURE: 128 MMHG

## 2021-12-06 DIAGNOSIS — I10 PRIMARY HYPERTENSION: ICD-10-CM

## 2021-12-06 DIAGNOSIS — M06.9 RHEUMATOID ARTHRITIS, INVOLVING UNSPECIFIED SITE, UNSPECIFIED WHETHER RHEUMATOID FACTOR PRESENT (HCC): ICD-10-CM

## 2021-12-06 DIAGNOSIS — G47.33 OSA (OBSTRUCTIVE SLEEP APNEA): ICD-10-CM

## 2021-12-06 DIAGNOSIS — I25.118 CORONARY ARTERY DISEASE OF NATIVE ARTERY OF NATIVE HEART WITH STABLE ANGINA PECTORIS (HCC): ICD-10-CM

## 2021-12-06 DIAGNOSIS — Z86.16 HISTORY OF COVID-19: ICD-10-CM

## 2021-12-06 DIAGNOSIS — J44.9 CHRONIC OBSTRUCTIVE PULMONARY DISEASE, UNSPECIFIED COPD TYPE (HCC): ICD-10-CM

## 2021-12-06 DIAGNOSIS — Z00.00 WELL ADULT EXAM: Primary | ICD-10-CM

## 2021-12-06 DIAGNOSIS — R42 VERTIGO: ICD-10-CM

## 2021-12-06 DIAGNOSIS — L98.9 SKIN LESION OF CHEEK: ICD-10-CM

## 2021-12-06 PROBLEM — J01.10 ACUTE NON-RECURRENT FRONTAL SINUSITIS: Status: RESOLVED | Noted: 2021-06-01 | Resolved: 2021-12-06

## 2021-12-06 PROCEDURE — 99214 OFFICE O/P EST MOD 30 MIN: CPT | Performed by: FAMILY MEDICINE

## 2021-12-06 PROCEDURE — G0439 PPPS, SUBSEQ VISIT: HCPCS | Performed by: FAMILY MEDICINE

## 2021-12-06 RX ORDER — IBUPROFEN 600 MG/1
TABLET ORAL
COMMUNITY
End: 2021-12-06 | Stop reason: ALTCHOICE

## 2021-12-06 RX ORDER — MECLIZINE HYDROCHLORIDE 25 MG/1
25 TABLET ORAL 3 TIMES DAILY PRN
Qty: 90 TABLET | Refills: 1 | Status: SHIPPED | OUTPATIENT
Start: 2021-12-06 | End: 2022-06-17 | Stop reason: SDUPTHER

## 2022-02-22 NOTE — TELEPHONE ENCOUNTER
Patient sister calling sil[facility]ano that patient has not yet been seen in the ER   Stating shes going to " bleed to death " I advised I would send a message high priority to clinical and she stated " that's not enough, you need to call the ER and tell them to take her into a room immediately, or I told her to start screaming "    Lenore Shankar can be reached at [phone] Nursing home

## 2022-03-07 DIAGNOSIS — K21.9 GERD WITHOUT ESOPHAGITIS: ICD-10-CM

## 2022-03-07 RX ORDER — PANTOPRAZOLE SODIUM 40 MG/1
40 TABLET, DELAYED RELEASE ORAL DAILY
Qty: 90 TABLET | Refills: 1 | Status: SHIPPED | OUTPATIENT
Start: 2022-03-07

## 2022-03-11 ENCOUNTER — TELEPHONE (OUTPATIENT)
Dept: FAMILY MEDICINE CLINIC | Facility: CLINIC | Age: 67
End: 2022-03-11

## 2022-03-11 ENCOUNTER — HOSPITAL ENCOUNTER (EMERGENCY)
Facility: HOSPITAL | Age: 67
Discharge: HOME | End: 2022-03-11
Attending: INTERNAL MEDICINE | Admitting: INTERNAL MEDICINE
Payer: MEDICARE

## 2022-03-11 ENCOUNTER — APPOINTMENT (EMERGENCY)
Dept: CT IMAGING | Facility: HOSPITAL | Age: 67
End: 2022-03-11
Payer: MEDICARE

## 2022-03-11 VITALS
BODY MASS INDEX: 45.99 KG/M2 | WEIGHT: 293 LBS | OXYGEN SATURATION: 98 % | HEIGHT: 67 IN | SYSTOLIC BLOOD PRESSURE: 130 MMHG | DIASTOLIC BLOOD PRESSURE: 62 MMHG | TEMPERATURE: 97.9 F | RESPIRATION RATE: 17 BRPM | HEART RATE: 77 BPM

## 2022-03-11 DIAGNOSIS — R10.9 RIGHT FLANK PAIN: Primary | ICD-10-CM

## 2022-03-11 DIAGNOSIS — M25.551 POSTERIOR PAIN OF HIP, RIGHT: ICD-10-CM

## 2022-03-11 LAB
ALBUMIN SERPL BCP-MCNC: 4.3 G/DL (ref 3.5–5)
ALP SERPL-CCNC: 77 U/L (ref 34–104)
ALT SERPL W P-5'-P-CCNC: 18 U/L (ref 7–52)
ANION GAP SERPL CALCULATED.3IONS-SCNC: 8 MMOL/L (ref 4–13)
AST SERPL W P-5'-P-CCNC: 18 U/L (ref 13–39)
BASOPHILS # BLD AUTO: 0.07 THOUSANDS/ΜL (ref 0–0.1)
BASOPHILS NFR BLD AUTO: 1 % (ref 0–1)
BILIRUB SERPL-MCNC: 0.32 MG/DL (ref 0.2–1)
BILIRUB UR QL STRIP: NEGATIVE
BUN SERPL-MCNC: 20 MG/DL (ref 5–25)
CALCIUM SERPL-MCNC: 9.1 MG/DL (ref 8.4–10.2)
CHLORIDE SERPL-SCNC: 105 MMOL/L (ref 96–108)
CLARITY UR: CLEAR
CO2 SERPL-SCNC: 27 MMOL/L (ref 21–32)
COLOR UR: YELLOW
CREAT SERPL-MCNC: 0.8 MG/DL (ref 0.6–1.3)
EOSINOPHIL # BLD AUTO: 0.19 THOUSAND/ΜL (ref 0–0.61)
EOSINOPHIL NFR BLD AUTO: 3 % (ref 0–6)
ERYTHROCYTE [DISTWIDTH] IN BLOOD BY AUTOMATED COUNT: 14.4 % (ref 11.6–15.1)
GFR SERPL CREATININE-BSD FRML MDRD: 77 ML/MIN/1.73SQ M
GLUCOSE SERPL-MCNC: 106 MG/DL (ref 65–140)
GLUCOSE UR STRIP-MCNC: NEGATIVE MG/DL
HCT VFR BLD AUTO: 40.7 % (ref 34.8–46.1)
HGB BLD-MCNC: 13.2 G/DL (ref 11.5–15.4)
HGB UR QL STRIP.AUTO: NEGATIVE
IMM GRANULOCYTES # BLD AUTO: 0.02 THOUSAND/UL (ref 0–0.2)
IMM GRANULOCYTES NFR BLD AUTO: 0 % (ref 0–2)
KETONES UR STRIP-MCNC: NEGATIVE MG/DL
LEUKOCYTE ESTERASE UR QL STRIP: NEGATIVE
LYMPHOCYTES # BLD AUTO: 3.54 THOUSANDS/ΜL (ref 0.6–4.47)
LYMPHOCYTES NFR BLD AUTO: 46 % (ref 14–44)
MCH RBC QN AUTO: 29.2 PG (ref 26.8–34.3)
MCHC RBC AUTO-ENTMCNC: 32.4 G/DL (ref 31.4–37.4)
MCV RBC AUTO: 90 FL (ref 82–98)
MONOCYTES # BLD AUTO: 0.69 THOUSAND/ΜL (ref 0.17–1.22)
MONOCYTES NFR BLD AUTO: 9 % (ref 4–12)
NEUTROPHILS # BLD AUTO: 3.13 THOUSANDS/ΜL (ref 1.85–7.62)
NEUTS SEG NFR BLD AUTO: 41 % (ref 43–75)
NITRITE UR QL STRIP: NEGATIVE
NRBC BLD AUTO-RTO: 0 /100 WBCS
PH UR STRIP.AUTO: 6 [PH]
PLATELET # BLD AUTO: 263 THOUSANDS/UL (ref 149–390)
PMV BLD AUTO: 9.6 FL (ref 8.9–12.7)
POTASSIUM SERPL-SCNC: 3.9 MMOL/L (ref 3.5–5.3)
PROT SERPL-MCNC: 7.3 G/DL (ref 6.4–8.4)
PROT UR STRIP-MCNC: NEGATIVE MG/DL
RBC # BLD AUTO: 4.52 MILLION/UL (ref 3.81–5.12)
SODIUM SERPL-SCNC: 140 MMOL/L (ref 135–147)
SP GR UR STRIP.AUTO: 1.02 (ref 1–1.03)
UROBILINOGEN UR QL STRIP.AUTO: 0.2 E.U./DL
WBC # BLD AUTO: 7.64 THOUSAND/UL (ref 4.31–10.16)

## 2022-03-11 PROCEDURE — 36415 COLL VENOUS BLD VENIPUNCTURE: CPT | Performed by: PHYSICIAN ASSISTANT

## 2022-03-11 PROCEDURE — 96361 HYDRATE IV INFUSION ADD-ON: CPT

## 2022-03-11 PROCEDURE — 96375 TX/PRO/DX INJ NEW DRUG ADDON: CPT

## 2022-03-11 PROCEDURE — 74176 CT ABD & PELVIS W/O CONTRAST: CPT

## 2022-03-11 PROCEDURE — 85025 COMPLETE CBC W/AUTO DIFF WBC: CPT | Performed by: PHYSICIAN ASSISTANT

## 2022-03-11 PROCEDURE — G1004 CDSM NDSC: HCPCS

## 2022-03-11 PROCEDURE — 99284 EMERGENCY DEPT VISIT MOD MDM: CPT

## 2022-03-11 PROCEDURE — 80053 COMPREHEN METABOLIC PANEL: CPT | Performed by: PHYSICIAN ASSISTANT

## 2022-03-11 PROCEDURE — 96374 THER/PROPH/DIAG INJ IV PUSH: CPT

## 2022-03-11 PROCEDURE — 81003 URINALYSIS AUTO W/O SCOPE: CPT | Performed by: PHYSICIAN ASSISTANT

## 2022-03-11 PROCEDURE — 99284 EMERGENCY DEPT VISIT MOD MDM: CPT | Performed by: PHYSICIAN ASSISTANT

## 2022-03-11 RX ORDER — KETOROLAC TROMETHAMINE 30 MG/ML
15 INJECTION, SOLUTION INTRAMUSCULAR; INTRAVENOUS ONCE
Status: COMPLETED | OUTPATIENT
Start: 2022-03-11 | End: 2022-03-11

## 2022-03-11 RX ORDER — DIAZEPAM 5 MG/1
5 TABLET ORAL EVERY 8 HOURS PRN
Qty: 12 TABLET | Refills: 0 | Status: SHIPPED | OUTPATIENT
Start: 2022-03-11 | End: 2022-03-21

## 2022-03-11 RX ORDER — ONDANSETRON 2 MG/ML
4 INJECTION INTRAMUSCULAR; INTRAVENOUS ONCE
Status: COMPLETED | OUTPATIENT
Start: 2022-03-11 | End: 2022-03-11

## 2022-03-11 RX ADMIN — ONDANSETRON 4 MG: 2 INJECTION INTRAMUSCULAR; INTRAVENOUS at 11:32

## 2022-03-11 RX ADMIN — SODIUM CHLORIDE 1000 ML: 0.9 INJECTION, SOLUTION INTRAVENOUS at 11:30

## 2022-03-11 RX ADMIN — KETOROLAC TROMETHAMINE 15 MG: 30 INJECTION, SOLUTION INTRAMUSCULAR at 11:31

## 2022-03-11 NOTE — TELEPHONE ENCOUNTER
Patient called and complained of hip pain and feeling some nausea  She feels the same way she did when she had a kidney stone before  I did advise her to go to the ER so they can do any necessary blood work or imaging  Patient was in agreement and will go to the [address] [place] [address],[address]

## 2022-03-11 NOTE — ED PROVIDER NOTES
History  Chief Complaint   Patient presents with    Flank Pain     Pt presents to the ED from home with complaint of right sided flank pain which stared x2 days ago; history of kidney stones on same side; states "I thought I threw my hip out because I have been doing a lot of work around the house"; states nausea started today; took Zofran 4 mg PO15 minutes prior to arrival which has "helped" the vomiting; denies vomiting, diarrhea and/or fever at home; denies radiation of pain; denies problems with urination; pt has TENS machine on right flank at this time "which is r     Pt with Past Medical History: Arthritis, cervical Cancer, Coronary artery disease, CPAP, GERD, HPV, Hypercholesterolemia, HTN, Kidney stone, Morbid obesity, CAD, MI X2; 2000 and 2002//[facility] cardiology Dr Jhony Boyer yearly,  Rheumatoid arthritis, Sleep apnea, Vertigo  Past Surgical History: PCI, ANGIOPLASTY, CARDIAC CATHETERIZATION, D&C UTERUS, Surgical removal of lypoma shoulder blade, CYSTO/URETERO W/LITHOTRIPSY &INDWELL STENT INSR, Right and Left;  TONSILLECTOMY,  WISDOM TOOTH EXTRACTION      Presents to the ED from home c/o 2 day h/o constant, atraumatic right posterior hip /flank pain which at first pt "I thought I threw my hip out because I have been doing a lot of work around the house", but then started with nausea today, took Zofran 4 mg PO15 minutes prior to arrival which has "helped", so then pt got concerned about possible kidney stones, which she's had in the past/  Pt denies fever, no cp, no sob, No vomiting, diarrhea, no urinary complaints  Pt has also been using her TENS machine  Prior to Admission Medications   Prescriptions Last Dose Informant Patient Reported? Taking? Ascorbic Acid, Vitamin C, (VITAMIN C) 100 MG tablet   Yes No   Sig: Take 1,000 mg by mouth daily   Budeson-Glycopyrrol-Formoterol (Breztri Aerosphere) 160-9-4 8 MCG/ACT AERO   No No   Sig: Inhale 1 puff 4 (four) times a day Rinse mouth after use  Cholecalciferol 50 MCG ( UT) CAPS   Yes No   Sig: Take 1 capsule by mouth daily   Cranberry 450 MG TABS   Yes No   Sig: Take 1 tablet by mouth 2 (two) times a day   Diclofenac Sodium (VOLTAREN) 1 %   Yes No   Sig: APPLY 4 GM FOUR TIMES A DAY AS NEEDED TRANSDERMAL 30 DAYS   Flaxseed, Linseed, (FLAX SEEDS PO)   Yes No   Sig: Take 1 tablet by mouth daily   Humira Pen 40 MG/0 4ML PNKT   Yes No   Sig: Finishes end of 2021   OMEGA-3 FATTY ACIDS PO   Yes No   Sig: Take 1 g by mouth 2 (two) times a day    aspirin 81 MG tablet   Yes No   Sig: Take 162 mg by mouth daily   fenofibrate (TRICOR) 54 MG tablet   Yes No   folic acid (FOLVITE) 1 mg tablet   Yes No   Sig: Take 1,000 mcg by mouth daily   loratadine (Allergy) 10 mg tablet   Yes No   Sig: Take 10 mg by mouth daily   meclizine (ANTIVERT) 25 mg tablet   No No   Sig: Take 1 tablet (25 mg total) by mouth 3 (three) times a day as needed for dizziness   methotrexate 2 5 mg tablet   Yes No   Si 5 mg once a week Takes on   Last dose10/9/20  8 PILLS ONE TIME A WEEK   metoprolol tartrate (LOPRESSOR) 25 mg tablet  Self Yes No   Sig: Take 12 5 mg by mouth every 12 (twelve) hours    multivitamin-minerals (CENTRUM) tablet   Yes No   Sig: Take by mouth daily   ondansetron (ZOFRAN) 4 mg tablet   No No   Sig: Take 1 tablet (4 mg total) by mouth every 8 (eight) hours as needed for nausea or vomiting   pantoprazole (PROTONIX) 40 mg tablet   No No   Sig: Take 1 tablet (40 mg total) by mouth daily   rosuvastatin (CRESTOR) 20 MG tablet   Yes No   Sig: Take 20 mg by mouth every evening       Facility-Administered Medications: None       Past Medical History:   Diagnosis Date    Abnormal Pap smear of cervix     cin3; hpv non 16/18 +(2016); LGSIL 2018    Arthritis     Cancer (HCC)     cervical    Coronary artery disease     CPAP (continuous positive airway pressure) dependence     GERD (gastroesophageal reflux disease)     History of pneumonia     2 yrs ago  HPV (human papilloma virus) infection     Hypercholesterolemia     Hypertension     Kidney stone     [facility]bid obesity [facility] [address] )     Motion sickness     Myocardial infarct ([facility] [address] )     X2; 2000 and 2002//[facility] cardiology Dr Yates Hinders yearly    Pleuritic chest pain     "from constant lifting on the job chest muscle area"    PONV (postoperative nausea and vomiting)     "extremely with the gas"    Rheumatoid arthritis ([facility] [address] )     Rheu and cervical    Shortness of breath     moderate activity// 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[place] [address] Drive Sleep apnea 2017    Vertigo     Wears partial dentures     lower       Past Surgical History:   Procedure Laterality Date    ANGIOPLASTY Right 09/14/2015    right groin / femoral    [facility]  2015    star close - closure system    CARDIAC CATHETERIZATION  2000    [facility]    CERVICAL CONE BIOPSY      1980's    COLONOSCOPY      2006, 2019    COLONOSCOPY      DILATION AND CURETTAGE OF UTERUS      FL RETROGRADE PYELOGRAM  7/2/2020    FL RETROGRADE PYELOGRAM  10/13/2020    MAMMO (HISTORICAL)  2018    Neg    OTHER SURGICAL HISTORY Right 11/2017    Surgical removal of lypoma shoulder blade    [place] CYSTO/URETERO W/LITHOTRIPSY &INDWELL STENT INSRT Right 7/21/2020    Procedure: CYSTO, URETEROSCOPY STONE BASKET EXTRACTION , RETROGRADE PYELOGRAM, STENT;  Surgeon: Courtney Heaton MD;  [facility];  Service: Urology    [place] CYSTO/URETERO W/LITHOTRIPSY &INDWELL STENT INSRT Left 10/13/2020    Procedure: CYSTOSCOPY URETEROSCOPY WITH LITHOTRIPSY HOLMIUM LASER, RETROGRADE PYELOGRAM AND INSERTION STENT URETERAL;  Surgeon: Courtney Heaton MD;  [facility];  Service: Urology    [place] CYSTOURETHROSCOPY,URETER CATHETER Right 7/2/2020    Procedure: CYSTOSCOPY RETROGRADE PYELOGRAM WITH INSERTION STENT URETERAL;  Surgeon: Jaimee Chavez MD;  [facility];  Service: Urology    SHOULDER SURGERY Right 2014 had a lympoma done in 2017 also same shoulder    SHOULDER SURGERY Right 2014    TONSILLECTOMY      WISDOM TOOTH EXTRACTION         Family History   Problem Relation Age of Onset    Cancer Mother     Lung cancer Mother     Heart disease Father      I have reviewed and agree with the history as documented  E-Cigarette/Vaping    E-Cigarette Use Never User      E-Cigarette/Vaping Substances    Nicotine No     THC No     CBD No     Flavoring No     Other No     Unknown No      Social History     Tobacco Use    Smoking status: Former Smoker     Packs/day: 2 00     Years: 30 00     Pack years: 60 00     Types: Cigarettes     Quit date: 7/15/1998     Years since quittin 6    Smokeless tobacco: Never Used   Vaping Use    Vaping Use: Never used   Substance Use Topics    Alcohol use: Yes    Drug use: Never       Review of Systems   Constitutional: Negative for chills and fever  HENT: Negative for hearing loss and sore throat  Eyes: Negative for visual disturbance  Respiratory: Negative for cough and shortness of breath  Cardiovascular: Negative for chest pain and leg swelling  Gastrointestinal: Negative for abdominal pain, constipation, diarrhea, nausea and vomiting  Genitourinary: Positive for flank pain  Negative for difficulty urinating, dysuria, frequency, hematuria, pelvic pain, urgency, vaginal bleeding and vaginal discharge  Musculoskeletal: Positive for back pain, gait problem and myalgias  Negative for arthralgias  Skin: Negative for pallor and rash  Neurological: Negative for dizziness, weakness and numbness  Psychiatric/Behavioral: Negative for behavioral problems  Physical Exam  Physical Exam  Vitals and nursing note reviewed  Constitutional:       General: She is in acute distress  Appearance: She is well-developed  She is obese  HENT:      Head: Normocephalic and atraumatic        Right Ear: External ear normal       Left Ear: External ear normal  Nose: Nose normal       Mouth/Throat:      Mouth: Mucous membranes are moist       Pharynx: Oropharynx is clear  Eyes:      Conjunctiva/sclera: Conjunctivae normal    Cardiovascular:      Rate and Rhythm: Normal rate and regular rhythm  Pulmonary:      Effort: Pulmonary effort is normal  No respiratory distress  Breath sounds: Normal breath sounds  Abdominal:      General: Bowel sounds are normal       Palpations: Abdomen is soft  Tenderness: There is no abdominal tenderness  There is right CVA tenderness (mild)  There is no left CVA tenderness  Musculoskeletal:         General: Normal range of motion  Cervical back: Normal range of motion  Back:       Right lower leg: No edema  Left lower leg: No edema  Comments: Mild tenderness to circled area   Skin:     General: Skin is warm and dry  Capillary Refill: Capillary refill takes less than 2 seconds  Findings: No rash  Neurological:      General: No focal deficit present  Mental Status: She is alert and oriented to person, place, and time  Motor: No weakness        Gait: Gait normal    Psychiatric:         Behavior: Behavior normal          Vital Signs  ED Triage Vitals   Temperature Pulse Respirations Blood Pressure SpO2   03/11/22 1103 03/11/22 1103 03/11/22 1103 03/11/22 1105 03/11/22 1103   98 3 °F (36 8 °C) 78 16 124/60 98 %      Temp Source Heart Rate Source Patient Position - Orthostatic VS BP Location FiO2 (%)   03/11/22 1103 03/11/22 1103 03/11/22 1103 03/11/22 1103 --   Oral Monitor Sitting Right arm       Pain Score       03/11/22 1103       5           Vitals:    03/11/22 1103 03/11/22 1105 03/11/22 1347   BP:  124/60 130/62   Pulse: 78  77   Patient Position - Orthostatic VS: Sitting  Sitting         Visual Acuity      ED Medications  Medications   sodium chloride 0 9 % bolus 1,000 mL (0 mL Intravenous Stopped 3/11/22 1346)   ketorolac (TORADOL) injection 15 mg (15 mg Intravenous Given 3/11/22 1131)   ondansetron (ZOFRAN) injection 4 mg (4 mg Intravenous Given 3/11/22 1132)       Diagnostic Studies  Results Reviewed     Procedure Component Value Units Date/Time    UA w Reflex to Microscopic w Reflex to Culture [[id number]]  (Normal) Collected: 03/11/22 1148    Lab Status: Final result Specimen: Urine, Clean Catch Updated: 03/11/22 1201     Color, UA Yellow     Clarity, UA Clear     Specific Calvin, UA 1 020     pH, UA 6 0     Leukocytes, UA Negative     Nitrite, UA Negative     Protein, UA Negative mg/dl      Glucose, UA Negative mg/dl      Ketones, UA Negative mg/dl      Urobilinogen, UA 0 2 E U /dl      Bilirubin, UA Negative     Blood, UA Negative    Comprehensive metabolic panel [[id number]] Collected: 03/11/22 1129    Lab Status: Final result Specimen: Blood from Arm, Right Updated: 03/11/22 1153     Sodium 140 mmol/L      Potassium 3 9 mmol/L      Chloride 105 mmol/L      CO2 27 mmol/L      ANION GAP 8 mmol/L      BUN 20 mg/dL      Creatinine 0 80 mg/dL      Glucose 106 mg/dL      Calcium 9 1 mg/dL      AST 18 U/L      ALT 18 U/L      Alkaline Phosphatase 77 U/L      Total Protein 7 3 g/dL      Albumin 4 3 g/dL      Total Bilirubin 0 32 mg/dL      eGFR 77 ml/min/1 73sq m     Narrative:      [place] guidelines for Chronic Kidney Disease (CKD):     Stage 1 with normal or high GFR (GFR > 90 mL/min/1 73 square meters)    Stage 2 Mild CKD (GFR = 60-89 mL/min/1 73 square meters)    Stage 3A Moderate CKD (GFR = 45-59 mL/min/1 73 square meters)    Stage 3B Moderate CKD (GFR = 30-44 mL/min/1 73 square meters)    Stage 4 Severe CKD (GFR = 15-29 mL/min/1 73 square meters)    Stage 5 End Stage CKD (GFR <15 mL/min/1 73 square meters)  Note: GFR calculation is accurate only with a steady state creatinine    CBC and differential [[id number]]  (Abnormal) Collected: 03/11/22 1129    Lab Status: Final result Specimen: Blood from Arm, Right Updated: 03/11/22 1133     WBC 7 64 Thousand/uL      RBC 4 52 Million/uL      Hemoglobin 13 2 g/dL      Hematocrit 40 7 %      MCV 90 fL      MCH 29 2 pg      MCHC 32 4 g/dL      RDW 14 4 %      MPV 9 6 fL      Platelets 455 Thousands/uL      nRBC 0 /100 WBCs      Neutrophils Relative 41 %      Immat GRANS % 0 %      Lymphocytes Relative 46 %      Monocytes Relative 9 %      Eosinophils Relative 3 %      Basophils Relative 1 %      Neutrophils Absolute 3 13 Thousands/µL      Immature Grans Absolute 0 02 Thousand/uL      Lymphocytes Absolute 3 54 Thousands/µL      Monocytes Absolute 0 69 Thousand/µL      Eosinophils Absolute 0 19 Thousand/µL      Basophils Absolute 0 07 Thousands/µL                  CT renal stone study abdomen pelvis without contrast   Final Result by Ruthie Donahue MD (03/11 1313)      Limited study but no acute CT finding is identified to explain the patient's complaints  Workstation performed: [id number]                    Procedures  Procedures         ED Course                               SBIRT 20yo+      Most Recent Value   SBIRT (24 yo +)    In order to provide better care to our patients, we are screening all of our patients for alcohol and drug use  Would it be okay to ask you these screening questions? Yes Filed at: 03/11/2022 1146   Initial Alcohol Screen: US AUDIT-C     1  How often do you have a drink containing alcohol? 0 Filed at: 03/11/2022 1146   2  How many drinks containing alcohol do you have on a typical day you are drinking? 0 Filed at: 03/11/2022 1146   3b  FEMALE Any Age, or MALE 65+: How often do you have 4 or more drinks on one occassion? 0 Filed at: 03/11/2022 1146   Audit-C Score 0 Filed at: 03/11/2022 1146   ZAHIDA: How many times in the past year have you    Used an illegal drug or used a prescription medication for non-medical reasons?  Never Filed at: 03/11/2022 1146                    MDM  Number of Diagnoses or Management Options  Posterior pain of hip, right  Right flank pain  Diagnosis management comments: Labs wnl, ct wnl, no stone, sx likely MS will tx as such       Amount and/or Complexity of Data Reviewed  Clinical lab tests: ordered and reviewed  Tests in the radiology section of CPT®: ordered and reviewed  Review and summarize past medical records: yes        Disposition  Final diagnoses:   Right flank pain   Posterior pain of hip, right     Time reflects when diagnosis was documented in both MDM as applicable and the Disposition within this note     Time User Action Codes Description Comment    3/11/2022  1:29 PM [facility] [R10 9] Right flank pain     3/11/2022  1:29 PM [facility] Add [M25 551] Posterior pain of hip, right       ED Disposition     ED Disposition Condition Date/Time Comment    Discharge Stable Fri Mar 11, 2022  1:29 PM Patrice Luna discharge to home/self care              Follow-up Information     Follow up With Specialties Details Why Contact Info Additional Information    Denis Swan MD Family Medicine   [address]  [address]  [address]  [phone]       [facility] Physical Therapy   [phone]          Discharge Medication List as of 3/11/2022  1:34 PM      START taking these medications    Details   diazepam (VALIUM) 5 mg tablet Take 1 tablet (5 mg total) by mouth every 8 (eight) hours as needed for muscle spasms for up to 10 days, Starting Fri 3/11/2022, Until Mon 3/21/2022 at 2359, Normal         CONTINUE these medications which have NOT CHANGED    Details   Ascorbic Acid, Vitamin C, (VITAMIN C) 100 MG tablet Take 1,000 mg by mouth daily, Historical Med      aspirin 81 MG tablet Take 162 mg by mouth daily, Historical Med      Budeson-Glycopyrrol-Formoterol (Breztri Aerosphere) 160-9-4 8 MCG/ACT AERO Inhale 1 puff 4 (four) times a day Rinse mouth after use , Starting Wed 10/13/2021, Sample      Cholecalciferol 50 MCG (2000 UT) CAPS Take 1 capsule by mouth daily, Historical Med      Cranberry 450 MG TABS Take 1 tablet by mouth 2 (two) times a day, Historical Med      Diclofenac Sodium (VOLTAREN) 1 % APPLY 4 GM FOUR TIMES A DAY AS NEEDED TRANSDERMAL 30 DAYS, Historical Med      fenofibrate (TRICOR) 54 MG tablet Starting Wed 6/30/2021, Historical Med      Flaxseed, Linseed, (FLAX SEEDS PO) Take 1 tablet by mouth daily, Historical Med      folic acid (FOLVITE) 1 mg tablet Take 1,000 mcg by mouth daily, Starting Thu 3/5/2020, Historical Med      Humira Pen 40 MG/0 4ML PNKT Finishes end of march 2021, Starting Tue 2/16/2021, Historical Med      loratadine (Allergy) 10 mg tablet Take 10 mg by mouth daily, Historical Med      meclizine (ANTIVERT) 25 mg tablet Take 1 tablet (25 mg total) by mouth 3 (three) times a day as needed for dizziness, Starting Mon 12/6/2021, Print      methotrexate 2 5 mg tablet 2 5 mg once a week Takes on Fridays  Last dose10/9/20  8 PILLS ONE TIME A WEEK, Starting Thu 2/6/2020, Historical Med      metoprolol tartrate (LOPRESSOR) 25 mg tablet Take 12 5 mg by mouth every 12 (twelve) hours , Starting Thu 3/5/2020, Historical Med      multivitamin-minerals (CENTRUM) tablet Take by mouth daily, Historical Med      OMEGA-3 FATTY ACIDS PO Take 1 g by mouth 2 (two) times a day , Historical Med      ondansetron (ZOFRAN) 4 mg tablet Take 1 tablet (4 mg total) by mouth every 8 (eight) hours as needed for nausea or vomiting, Starting Fri 10/8/2021, Normal      pantoprazole (PROTONIX) 40 mg tablet Take 1 tablet (40 mg total) by mouth daily, Starting Mon 3/7/2022, Normal      rosuvastatin (CRESTOR) 20 MG tablet Take 20 mg by mouth every evening , Starting Mon 2/3/2020, Historical Med             No discharge procedures on file      PDMP Review       Value Time User    PDMP Reviewed  Yes 7/2/2020  1:24 PM Jackie Paulino MD          ED Provider  Electronically Signed by           Sandra Donahue PA-C  03/11/22 0230

## 2022-03-11 NOTE — DISCHARGE INSTRUCTIONS
Use Tylenol 650 mg every 4 hours or Anti-inflammatories like Advil, Motrin, Ibuprofen, Aleve every 6 hours; you can alternate the 2 medications taking something every 3 hours for pain, if no improvement you can add Valium as a muscle relaxant  Follow-up with your doctor or comprehensive spine management program in the next few days if no improvement in condition

## 2022-03-11 NOTE — ED TRIAGE NOTES
Pt presents to the ED from home with complaint of right sided flank pain which stared x2 days ago; history of kidney stones on same side; states "I thought I threw my hip out because I have been doing a lot of work around the house"; states nausea started today; took Zofran 4 mg PO15 minutes prior to arrival which has "helped" the vomiting; denies vomiting, diarrhea and/or fever at home; denies radiation of pain; denies problems with urination; pt has TENS machine on right flank at this time "which is really helping the pain"

## 2022-03-11 NOTE — ED NOTES
Pt back from CT; no needs at this time; will continue to monitor     Kathleen Singletary RN  03/11/22 4744

## 2022-03-11 NOTE — ED NOTES
Pt verbalized understanding of discharge instructions; vital signs stable at discharge; verbalized understanding of picking up prescription, which was electronically sent to pharmacy of choice; ambulated out of the ED without assistance; uneventful ED visit     Mauro Vela RN  03/11/22 5425

## 2022-05-23 ENCOUNTER — EPISODE CHANGES (OUTPATIENT)
Dept: CASE MANAGEMENT | Facility: OTHER | Age: 67
End: 2022-05-23

## 2022-05-23 ENCOUNTER — PATIENT OUTREACH (OUTPATIENT)
Dept: FAMILY MEDICINE CLINIC | Facility: CLINIC | Age: 67
End: 2022-05-23

## 2022-05-23 NOTE — PROGRESS NOTES
Chart review completed for the following sections:   Recent Vital Signs   Allergies/Contradictions   Medication Review    History    [facility]    Problem List   Immunizations   Past hospitalizations and major procedures, including surgery   Significant past illnesses and treatment history including: History and Physical   Relevant past medications related to the patient's condition    Patient is not eligible for enrollment in the [facility] due to not having a mental health diagnosis

## 2022-06-17 DIAGNOSIS — R42 VERTIGO: ICD-10-CM

## 2022-06-17 RX ORDER — MECLIZINE HYDROCHLORIDE 25 MG/1
25 TABLET ORAL 3 TIMES DAILY PRN
Qty: 90 TABLET | Refills: 0 | Status: SHIPPED | OUTPATIENT
Start: 2022-06-17 | End: 2022-06-20 | Stop reason: SDUPTHER

## 2022-06-20 DIAGNOSIS — R42 VERTIGO: ICD-10-CM

## 2022-06-20 RX ORDER — MECLIZINE HYDROCHLORIDE 25 MG/1
25 TABLET ORAL 3 TIMES DAILY PRN
Qty: 90 TABLET | Refills: 0 | Status: SHIPPED | OUTPATIENT
Start: 2022-06-20

## 2022-07-13 ENCOUNTER — RA CDI HCC (OUTPATIENT)
Dept: OTHER | Facility: HOSPITAL | Age: 67
End: 2022-07-13

## 2022-07-13 NOTE — PROGRESS NOTES
[address]  coding opportunities       Chart reviewed, no opportunity found: CHART REVIEWED, NO OPPORTUNITY FOUND        Patients Insurance     Medicare Insurance: Medicare

## 2022-09-06 ENCOUNTER — OFFICE VISIT (OUTPATIENT)
Dept: FAMILY MEDICINE CLINIC | Facility: CLINIC | Age: 67
End: 2022-09-06
Payer: MEDICARE

## 2022-09-06 ENCOUNTER — TELEPHONE (OUTPATIENT)
Dept: FAMILY MEDICINE CLINIC | Facility: CLINIC | Age: 67
End: 2022-09-06

## 2022-09-06 VITALS
WEIGHT: 293 LBS | RESPIRATION RATE: 16 BRPM | SYSTOLIC BLOOD PRESSURE: 122 MMHG | HEART RATE: 80 BPM | DIASTOLIC BLOOD PRESSURE: 78 MMHG | HEIGHT: 67 IN | BODY MASS INDEX: 45.99 KG/M2 | OXYGEN SATURATION: 96 %

## 2022-09-06 DIAGNOSIS — R73.03 PRE-DIABETES: ICD-10-CM

## 2022-09-06 DIAGNOSIS — J44.9 CHRONIC OBSTRUCTIVE PULMONARY DISEASE, UNSPECIFIED COPD TYPE (HCC): ICD-10-CM

## 2022-09-06 DIAGNOSIS — E66.01 CLASS 3 SEVERE OBESITY DUE TO EXCESS CALORIES WITH SERIOUS COMORBIDITY AND BODY MASS INDEX (BMI) OF 45.0 TO 49.9 IN ADULT (HCC): Primary | ICD-10-CM

## 2022-09-06 DIAGNOSIS — K21.9 GERD WITHOUT ESOPHAGITIS: ICD-10-CM

## 2022-09-06 DIAGNOSIS — E53.8 B12 DEFICIENCY: ICD-10-CM

## 2022-09-06 DIAGNOSIS — M06.9 RHEUMATOID ARTHRITIS, INVOLVING UNSPECIFIED SITE, UNSPECIFIED WHETHER RHEUMATOID FACTOR PRESENT (HCC): ICD-10-CM

## 2022-09-06 DIAGNOSIS — E55.9 VITAMIN D DEFICIENCY: ICD-10-CM

## 2022-09-06 DIAGNOSIS — M54.16 LUMBAR RADICULOPATHY: ICD-10-CM

## 2022-09-06 DIAGNOSIS — I25.118 CORONARY ARTERY DISEASE OF NATIVE ARTERY OF NATIVE HEART WITH STABLE ANGINA PECTORIS (HCC): ICD-10-CM

## 2022-09-06 DIAGNOSIS — G47.33 OSA (OBSTRUCTIVE SLEEP APNEA): ICD-10-CM

## 2022-09-06 DIAGNOSIS — D84.9 IMMUNODEFICIENCY, UNSPECIFIED (HCC): ICD-10-CM

## 2022-09-06 PROBLEM — E78.2 MIXED HYPERLIPIDEMIA: Status: ACTIVE | Noted: 2020-06-04

## 2022-09-06 PROBLEM — U07.1 COVID-19 VIRUS INFECTION: Status: RESOLVED | Noted: 2021-10-06 | Resolved: 2022-09-06

## 2022-09-06 PROCEDURE — 99214 OFFICE O/P EST MOD 30 MIN: CPT | Performed by: FAMILY MEDICINE

## 2022-09-06 RX ORDER — PANTOPRAZOLE SODIUM 40 MG/1
40 TABLET, DELAYED RELEASE ORAL DAILY
Qty: 90 TABLET | Refills: 1 | Status: SHIPPED | OUTPATIENT
Start: 2022-09-06 | End: 2022-09-06 | Stop reason: SDUPTHER

## 2022-09-06 RX ORDER — ONDANSETRON 4 MG/1
4 TABLET, ORALLY DISINTEGRATING ORAL EVERY 6 HOURS PRN
Qty: 30 TABLET | Refills: 2 | Status: SHIPPED | OUTPATIENT
Start: 2022-09-06

## 2022-09-06 RX ORDER — METHYLPREDNISOLONE 4 MG/1
TABLET ORAL
COMMUNITY
Start: 2022-06-02 | End: 2022-09-06

## 2022-09-06 RX ORDER — PANTOPRAZOLE SODIUM 40 MG/1
40 TABLET, DELAYED RELEASE ORAL DAILY
Qty: 90 TABLET | Refills: 1 | Status: SHIPPED | OUTPATIENT
Start: 2022-09-06 | End: 2022-09-07 | Stop reason: SDUPTHER

## 2022-09-06 RX ORDER — OXYCODONE HYDROCHLORIDE 5 MG/1
TABLET ORAL
COMMUNITY
Start: 2022-06-10 | End: 2022-09-06

## 2022-09-06 RX ORDER — METHOCARBAMOL 750 MG/1
TABLET, FILM COATED ORAL
COMMUNITY
Start: 2022-06-02 | End: 2023-03-27

## 2022-09-06 RX ORDER — OMEGA-3 FATTY ACIDS/FISH OIL 300-1000MG
CAPSULE ORAL
COMMUNITY

## 2022-09-06 RX ORDER — ROSUVASTATIN CALCIUM 40 MG/1
TABLET, COATED ORAL
COMMUNITY
Start: 2022-06-17

## 2022-09-06 NOTE — PROGRESS NOTES
Assessment/Plan: We reprinted off her DEXA scan and her mammogram from [facility] and will scan that an to meet those quality measures we also of course reviewed her back pain she is unable to sit for more than a couple minutes or stand for a couple minutes she has been going to PT     1  Class 3 severe obesity due to excess calories with serious comorbidity and body mass index (BMI) of 45 0 to 49 9 in adult [facility])    2  Chronic obstructive pulmonary disease, unspecified COPD type ([place] [address] )    3  DANNY (obstructive sleep apnea)    4  GERD without esophagitis  -     pantoprazole (PROTONIX) 40 mg tablet; Take 1 tablet (40 mg total) by mouth daily  -     ondansetron (Zofran ODT) 4 mg disintegrating tablet; Take 1 tablet (4 mg total) by mouth every 6 (six) hours as needed for nausea or vomiting    5  Pre-diabetes  -     HEMOGLOBIN A1C W/ EAG ESTIMATION; Future    6  Rheumatoid arthritis, involving unspecified site, unspecified whether rheumatoid factor present (HCC)  -     ondansetron (Zofran ODT) 4 mg disintegrating tablet; Take 1 tablet (4 mg total) by mouth every 6 (six) hours as needed for nausea or vomiting    7  Coronary artery disease of native artery of native heart with stable angina pectoris ([place] [address] )  -     Lipid Panel with Direct LDL reflex; Future    8  Lumbar radiculopathy  -     Ambulatory Referral to Pain Management; Future    9  B12 deficiency  -     Vitamin B12; Future    10  Vitamin D deficiency  -     Vitamin D 25 hydroxy; Future    11  Immunodeficiency, unspecified ([place] [address] )       Subjective:      Patient ID: Sai Zendejas is a 77 y o  female  HPI  Here to go over chronic issues and labs / imaging studies if applicable       The following portions of the patient's history were reviewed and updated as appropriate: allergies, current medications, past family history, past medical history, past social history, past surgical history and problem list     Review of Systems   Constitutional: Negative for fever and unexpected weight change  HENT: Negative for nosebleeds and trouble swallowing  Eyes: Negative for visual disturbance  Respiratory: Negative for chest tightness and shortness of breath  Cardiovascular: Negative for chest pain, palpitations and leg swelling  Gastrointestinal: Negative for abdominal pain, constipation, diarrhea and nausea  Endocrine: Negative for cold intolerance  Genitourinary: Negative for dysuria and urgency  Musculoskeletal: Positive for arthralgias, back pain, gait problem and myalgias  Negative for joint swelling  Skin: Negative for rash  Neurological: Negative for tremors, seizures and syncope  Hematological: Does not bruise/bleed easily  Psychiatric/Behavioral: Negative for hallucinations and suicidal ideas  Objective:      /78 (BP Location: Right arm, Patient Position: Sitting, Cuff Size: Large)   Pulse 80   Resp 16   Ht 5' 7" (1 702 m)   Wt (!) 143 kg (316 lb)   SpO2 96%   BMI 49 49 kg/m²     No visits with results within 2 Week(s) from this visit     Latest known visit with results is:   Admission on 03/11/2022, Discharged on 03/11/2022   Component Date Value    WBC 03/11/2022 7 64     RBC 03/11/2022 4 52     Hemoglobin 03/11/2022 13 2     Hematocrit 03/11/2022 40 7     MCV 03/11/2022 90     MCH 03/11/2022 29 2     MCHC 03/11/2022 32 4     RDW 03/11/2022 14 4     MPV 03/11/2022 9 6     Platelets 32/82/1218 263     nRBC 03/11/2022 0     Neutrophils Relative 03/11/2022 41 (A)    Immat GRANS % 03/11/2022 0     Lymphocytes Relative 03/11/2022 46 (A)    Monocytes Relative 03/11/2022 9     Eosinophils Relative 03/11/2022 3     Basophils Relative 03/11/2022 1     Neutrophils Absolute 03/11/2022 3 13     Immature Grans Absolute 03/11/2022 0 02     Lymphocytes Absolute 03/11/2022 3 54     Monocytes Absolute 03/11/2022 0 69     Eosinophils Absolute 03/11/2022 0 19     Basophils Absolute 03/11/2022 0 07     Sodium 03/11/2022 140     Potassium 03/11/2022 3 9     Chloride 03/11/2022 105     CO2 03/11/2022 27     ANION GAP 03/11/2022 8     BUN 03/11/2022 20     Creatinine 03/11/2022 0 80     Glucose 03/11/2022 106     Calcium 03/11/2022 9 1     AST 03/11/2022 18     ALT 03/11/2022 18     Alkaline Phosphatase 03/11/2022 77     Total Protein 03/11/2022 7 3     Albumin 03/11/2022 4 3     Total Bilirubin 03/11/2022 0 32     eGFR 03/11/2022 77     Color, UA 03/11/2022 Yellow     Clarity, UA 03/11/2022 Clear     Specific Gravity, UA 03/11/2022 1 020     pH, UA 03/11/2022 6 0     Leukocytes, UA 03/11/2022 Negative     Nitrite, UA 03/11/2022 Negative     Protein, UA 03/11/2022 Negative     Glucose, UA 03/11/2022 Negative     Ketones, UA 03/11/2022 Negative     Urobilinogen, UA 03/11/2022 0 2     Bilirubin, UA 03/11/2022 Negative     Occult Blood, UA 03/11/2022 Negative           Physical Exam  Vitals and nursing note reviewed  Constitutional:       Appearance: She is well-developed  She is obese  HENT:      Head: Normocephalic and atraumatic  Cardiovascular:      Rate and Rhythm: Normal rate and regular rhythm  Heart sounds: Normal heart sounds  No murmur heard  Pulmonary:      Effort: Pulmonary effort is normal       Breath sounds: Normal breath sounds  No wheezing or rales  Abdominal:      General: Bowel sounds are normal  There is no distension  Palpations: Abdomen is soft  Tenderness: There is no abdominal tenderness  Musculoskeletal:         General: Tenderness present  Normal range of motion  Cervical back: Normal range of motion and neck supple  Lymphadenopathy:      Cervical: No cervical adenopathy  Skin:     General: Skin is warm and dry  Capillary Refill: Capillary refill takes less than 2 seconds  Findings: No rash  Neurological:      Mental Status: She is alert and oriented to person, place, and time  Cranial Nerves: No cranial nerve deficit        Sensory: No sensory deficit  Motor: No abnormal muscle tone  Gait: Gait abnormal    Psychiatric:         Behavior: Behavior normal          Thought Content: Thought content normal          Judgment: Judgment normal          BMI Counseling: Body mass index is 49 49 kg/m²  The BMI is above normal  Nutrition recommendations include decreasing portion sizes, encouraging healthy choices of fruits and vegetables, decreasing fast food intake, consuming healthier snacks and limiting drinks that contain sugar  Exercise recommendations include exercising 3-5 times per week  No pharmacotherapy was ordered  Rationale for BMI follow-up plan is due to patient being overweight or obese  Depression Screening and Follow-up Plan: Patient was screened for depression during today's encounter  They screened negative with a PHQ-2 score of 0  Falls Plan of Care: balance, strength, and gait training instructions were provided  Medications that increase falls were reviewed           Brain MD Theo  [place] [address]

## 2022-09-07 DIAGNOSIS — K21.9 GERD WITHOUT ESOPHAGITIS: ICD-10-CM

## 2022-09-07 RX ORDER — PANTOPRAZOLE SODIUM 40 MG/1
40 TABLET, DELAYED RELEASE ORAL DAILY
Qty: 90 TABLET | Refills: 1 | Status: SHIPPED | OUTPATIENT
Start: 2022-09-07

## 2022-09-21 NOTE — H&P (VIEW-ONLY)
Assessment  1  Lumbar facet arthropathy    2  Lumbar radiculopathy    3  Sacroiliitis ([address] )    4  Myofascial pain syndrome        Plan  No orders of the defined types were placed in this encounter  No orders of the defined types were placed in this encounter  30-year-old female with morbid obesity presenting with worsening axial low back pain as left anterior thigh numbness in the L3 dermatome  On physical exam patient noted to have positive SI joint tenderness, Ruiz's test bilaterally, facet loading bilaterally as well as positive straight leg on the left  Lumbar MRI imaging showing degenerative changes of facet arthrosis throughout the lumbar spine as well as left at L3-L4 moderate neural foraminal narrowing  The etiology of patient's pain presentation is multifactorial in nature and exacerbated by her morbid obesity and generalized deconditioning  Patient likely has elements of lumbar radiculopathy secondary to left-sided neural foraminal narrowing at L3 nerve root level  Patient also has components of sacroiliitis given the physical exam findings as well as facet arthrosis as evident on lumbar x-ray and physical exam       -I explained to the patient that there is no one treat or injection that can provide 100% pain relief and address all the factors contributing to her pain  I explained to her that any interventions we are able to provide is temporary   I did emphasize the importance of weight loss and conditioning as the most important component of pain management  - will plan to proceed with L4-L5 and L5-S1 level bilateral lumbar medial branch blocks as she states the axial low back pain is the worst at this time  - we can consider addressing the moderate at L3-L4 neural foraminal narrowing on the left with an epidural steroid injection the future as well as addressing the sacroiliitis with SI joint injections if needed          My impressions and treatment recommendations were discussed in detail with the patient who verbalized understanding and had no further questions  Discharge instructions were provided  I personally saw and examined the patient and I agree with the above discussed plan of care  History of Present Illness    Laura Frank is a 77 y o  female with extensive past medical history including morbid obesity, a GERD, vertigo, rheumatoid arthritis, sleep apnea, hypertension prior MI who presents as a referral from Dr Elder Hughes for further management of her chronic low back pain    Patient initially was seen at [facility] for her chronic back pain And was managed with pain medication and physical therapy at that time  Last visit was in June 2022  Today she notes that over the past month she has had moderate to severe pain 8/10 which is constant and does not following typical patterns  She notes that the pain is cramping dull, sharp, shooting, pins and needles with associated subjective weakness in the bilateral lower extremity  On the pain location diagram she notes axial low back pain bilaterally  She does admit to numbness of the anterior left thigh that terminates in the mid thigh region  The pain is made worse with bending, sitting and decreased with lying down  In terms of management she notes moderate relief to physical therapy which she has continued as well as to heat or ice  In terms of medications, patient is currently taking diclofenac, aspirin, Tylenol with minimal relief  She denies having any prior injections or surgeries in her lower back  Imaging studies include lumbar MRI on June 11, 2022 which demonstrates degenerative changes throughout the lumbar spine with facet arthropathy at multiple levels as well as moderate left neural foraminal narrowing at L2-L3 with minimal spinal canal narrowing in the lumbar spine        Imaging:  Lumbar XR: 6-10-22:  Impression: 2 views lumbar spine AP lateral reveal 5 nonrib-bearing lumbar   vertebrae   The pedicles are intact   History of degenerative disease at   L2-3   Calcification abdominal aorta   SI joints hip joints not   visualized   Soft tissues obscured by obesity  Lumbar MRI 6-11-22  History: Back pain, disc herniation     Procedure: MRI of the lumbar spine was obtained with the following sequences:   Sagittal T1, sagittal T2, sagittal STIR and axial T2 weighted images  Comparison: X-ray dated 6/10/2022     Findings: For the purposes of this dictation, the lumbar vertebrae are labeled   from a caudal to cranial direction, the first vertebra with lumbar morphology is   labeled as L5  The lumbar lordosis is preserved  Vertebral body heights are maintained  There   are degenerative endplate changes at O4-H5 and L2-L3  The conus terminates at   the L2 level and appears normal in signal on the sagittal sequences  L1-L2: Disc bulge and mild facet arthrosis, with minimal spinal canal narrowing  There is mild bilateral neural foraminal narrowing  L2-L3 : Disc bulge and mild facet arthrosis, with minimal spinal canal   narrowing  Mild right and moderate left neural foraminal narrowing  L3-L4: No spinal stenosis  Mild facet arthrosis with mild right neural foraminal   narrowing  L4-L5: No spinal stenosis or neural foraminal narrowing  Mild facet arthrosis  L5-S1: Facet arthrosis without spinal canal or neural foraminal narrowing  I have personally reviewed and/or updated the patient's past medical history, past surgical history, family history, social history, current medications, allergies, and vital signs today  Review of Systems   Constitutional: Negative for chills, diaphoresis, fatigue, fever and unexpected weight change  Respiratory: Negative for chest tightness  Cardiovascular: Negative for chest pain  Gastrointestinal: Negative for abdominal pain  Genitourinary: Positive for frequency     Musculoskeletal: Positive for back pain, gait problem and joint swelling          Left leg       Patient Active Problem List   Diagnosis    Rheumatoid arthritis ([facility] )    Gastroesophageal reflux disease without esophagitis    DANNY (obstructive sleep apnea)    Vertigo    Mixed hyperlipidemia    Stented coronary artery    Coronary artery disease of native artery of native heart with stable angina pectoris (HCC)    Calculus of ureter    UTI (urinary tract infection)    S/P ureteral stent placement    Hematuria    Calculus of kidney    Class 3 severe obesity due to excess calories with serious comorbidity and body mass index (BMI) of 45 0 to 49 9 in adult ([facility] )    Pre-diabetes    Chronic obstructive pulmonary disease, unspecified COPD type ([place] [address] )    History of COVID-19       Past Medical History:   Diagnosis Date    Abnormal Pap smear of cervix     cin3; hpv non 16/18 +(12/2016); LGSIL 6/2018    Arthritis     Cancer (HCC)     cervical    Coronary artery disease     CPAP (continuous positive airway pressure) dependence     GERD (gastroesophageal reflux disease)     History of pneumonia     2 yrs ago    HPV (human papilloma virus) infection     Hypercholesterolemia     Hypertension     Kidney stone     Morbid obesity ([place] [address] )     Motion sickness     Myocardial infarct ([place] [address] )     X2; 2000 and 2002//[facility] cardiology Dr Sampson Citizen yearly    Pleuritic chest pain     "from constant lifting on the job chest muscle area"    PONV (postoperative nausea and vomiting)     "extremely with the gas"    Rheumatoid arthritis ([place] [address] )     Rheu and cervical    Shortness of breath     moderate activity// pt does work full time/on and off feet in [place] [employer]    Sleep apnea 2017    Vertigo     Wears partial dentures     lower       Past Surgical History:   Procedure Laterality Date    ANGIOPLASTY Right 09/14/2015    right groin / femoral    [facility]  2015    star close - closure system    CARDIAC [facility]TERIZATION  [facility]    CERVICAL CONE BIOPSY      's    COLONOSCOPY      ,     COLONOSCOPY      DILATION AND CURETTAGE OF UTERUS      FL RETROGRADE PYELOGRAM  2020    FL RETROGRADE PYELOGRAM  10/13/2020    MAMMO (HISTORICAL)  2018    Neg    OTHER SURGICAL HISTORY Right 2017    Surgical removal of lypoma shoulder blade    WI CYSTO/URETERO W/LITHOTRIPSY &INDWELL STENT INSRT Right 2020    Procedure: CYSTO, URETEROSCOPY STONE BASKET EXTRACTION , RETROGRADE PYELOGRAM, STENT;  Surgeon: Little Cruz MD;  [facility];  Service: Urology    WI CYSTO/URETERO W/LITHOTRIPSY &INDWELL STENT INSRT Left 10/13/2020    Procedure: CYSTOSCOPY URETEROSCOPY WITH LITHOTRIPSY HOLMIUM LASER, RETROGRADE PYELOGRAM AND INSERTION STENT URETERAL;  Surgeon: Little Cruz MD;  [facility];  Service: Urology    WI CYSTOURETHROSCOPY,URETER CATHETER Right 2020    Procedure: CYSTOSCOPY RETROGRADE PYELOGRAM WITH INSERTION STENT URETERAL;  Surgeon: Keiko Victor MD;  [facility];  Service: Urology    SHOULDER SURGERY Right 2014    had a lympoma done in 2017 also same shoulder    SHOULDER SURGERY Right 2014    TONSILLECTOMY      WISDOM TOOTH EXTRACTION         Family History   Problem Relation Age of Onset    Cancer Mother     Lung cancer Mother     Heart disease Father        Social History     Occupational History    Occupation:    Tobacco Use    Smoking status: Former Smoker     Packs/day: 2 00     Years: 30 00     Pack years: 60 00     Types: Cigarettes     Quit date: 7/15/1998     Years since quittin 2    Smokeless tobacco: Never Used   Vaping Use    Vaping Use: Never used   Substance and Sexual Activity    Alcohol use:  Yes    Drug use: Never    Sexual activity: Not Currently       Current Outpatient Medications on File Prior to Visit   Medication Sig    Ascorbic Acid, Vitamin C, (VITAMIN C) 100 MG tablet Take 1,000 mg by mouth daily    aspirin 81 MG tablet Take 162 mg by mouth daily    Cholecalciferol 50 MCG (2000 UT) CAPS Take 1 capsule by mouth daily    Cranberry 450 MG TABS Take 1 tablet by mouth 2 (two) times a day    Diclofenac Sodium (VOLTAREN) 1 % APPLY 4 GM FOUR TIMES A DAY AS NEEDED TRANSDERMAL 30 DAYS    fenofibrate (TRICOR) 54 MG tablet     folic acid (FOLVITE) 1 mg tablet Take 1,000 mcg by mouth daily    Humira Pen 40 MG/0 4ML PNKT Finishes end of march 2021    meclizine (ANTIVERT) 25 mg tablet Take 1 tablet (25 mg total) by mouth 3 (three) times a day as needed for dizziness    methotrexate 2 5 mg tablet 2 5 mg once a week Takes on Fridays  Last dose10/9/20  8 PILLS ONE TIME A WEEK    metoprolol tartrate (LOPRESSOR) 25 mg tablet Take 12 5 mg by mouth every 12 (twelve) hours     multivitamin-minerals (CENTRUM) tablet Take by mouth daily    OMEGA-3 FATTY ACIDS PO Take 1 g by mouth 2 (two) times a day     ondansetron (Zofran ODT) 4 mg disintegrating tablet Take 1 tablet (4 mg total) by mouth every 6 (six) hours as needed for nausea or vomiting    pantoprazole (PROTONIX) 40 mg tablet Take 1 tablet (40 mg total) by mouth daily    rosuvastatin (CRESTOR) 20 MG tablet Take 20 mg by mouth every evening     rosuvastatin (CRESTOR) 40 MG tablet     diazepam (VALIUM) 5 mg tablet Take 1 tablet (5 mg total) by mouth every 8 (eight) hours as needed for muscle spasms for up to 10 days    Flaxseed, Linseed, (FLAX SEEDS PO) Take 1 tablet by mouth daily (Patient not taking: Reported on 9/23/2022)    Ibuprofen 200 MG CAPS 3 tablet with food as needed (Patient not taking: Reported on 9/23/2022)    loratadine (CLARITIN) 10 mg tablet Take 10 mg by mouth daily (Patient not taking: Reported on 9/23/2022)    methocarbamol (ROBAXIN) 750 mg tablet TAKE 1 TABLET BY MOUTH THREE TIMES DAILY FOR 14 DAYS AS NEEDED FOR PAIN (Patient not taking: Reported on 9/23/2022)     No current facility-administered medications on file prior to visit         Allergies Allergen Reactions    Hydrocodone Vomiting     "can take oxycodone without a problem"    Atorvastatin Myalgia    Caffeine - Food Allergy Tachycardia     And high blood pressure    Diphenhydramine Hcl (Sleep) Itching    Pravastatin Myalgia    Simvastatin Myalgia       Physical Exam    /70   Pulse 97   Ht 5' 7" (1 702 m) Comment: verbal  Wt (!) 143 kg (315 lb 9 6 oz)   BMI 49 43 kg/m²       Palpation:     tenderness over the left paravertebral musculature   tenderness over the right paravertebral musculature    tenderness with palpation of the left SI joint    tenderness with palpation of the right SI joint      No tenderness with palpation of the left greater trochanter    No tenderness with palpation of the right greater trochanter    Sensory:    Intact to light touch grossly in the left lower extremity except decreased sensation to light touch as compared to the anterior lateral mid thigh thigh    Intact to light touch grossly in the right lower extremity    Muscle Strength      Hip flexion Knee flexion Knee extension Foot plantarflexion Foot   dorsiflexion EHL   L 5/5 5/5 5/5 5/5 5/5 5/5   R 5/5 5/5 5/5 5/5 5/5 5/5     DTRs: 2+ patellar, 2+ Achilles and symmetric       Special Tests:     Facet loading Straight leg raise HOWIE FAIR   L Positive Positive Positive    R Positive Negative Positive

## 2022-09-21 NOTE — H&P (VIEW-ONLY)
Assessment  1  Lumbar facet arthropathy    2  Lumbar radiculopathy    3  Sacroiliitis ([address] )    4  Myofascial pain syndrome        Plan  No orders of the defined types were placed in this encounter  No orders of the defined types were placed in this encounter  68-year-old female with morbid obesity presenting with worsening axial low back pain as left anterior thigh numbness in the L3 dermatome  On physical exam patient noted to have positive SI joint tenderness, Ruiz's test bilaterally, facet loading bilaterally as well as positive straight leg on the left  Lumbar MRI imaging showing degenerative changes of facet arthrosis throughout the lumbar spine as well as left at L3-L4 moderate neural foraminal narrowing  The etiology of patient's pain presentation is multifactorial in nature and exacerbated by her morbid obesity and generalized deconditioning  Patient likely has elements of lumbar radiculopathy secondary to left-sided neural foraminal narrowing at L3 nerve root level  Patient also has components of sacroiliitis given the physical exam findings as well as facet arthrosis as evident on lumbar x-ray and physical exam       -I explained to the patient that there is no one treat or injection that can provide 100% pain relief and address all the factors contributing to her pain  I explained to her that any interventions we are able to provide is temporary   I did emphasize the importance of weight loss and conditioning as the most important component of pain management  - will plan to proceed with L4-L5 and L5-S1 level bilateral lumbar medial branch blocks as she states the axial low back pain is the worst at this time  - we can consider addressing the moderate at L3-L4 neural foraminal narrowing on the left with an epidural steroid injection the future as well as addressing the sacroiliitis with SI joint injections if needed          My impressions and treatment recommendations were discussed in detail with the patient who verbalized understanding and had no further questions  Discharge instructions were provided  I personally saw and examined the patient and I agree with the above discussed plan of care  History of Present Illness    Lilli Fisher is a 77 y o  female with extensive past medical history including morbid obesity, a GERD, vertigo, rheumatoid arthritis, sleep apnea, hypertension prior MI who presents as a referral from Dr Kenrick Andrew for further management of her chronic low back pain    Patient initially was [facility] at [facility] for her chronic back pain And was managed with pain medication and physical therapy at that time  Last visit was in June 2022  Today she notes that over the past month she has had moderate to severe pain 8/10 which is constant and does not following typical patterns  She notes that the pain is cramping dull, sharp, shooting, pins and needles with associated subjective weakness in the bilateral lower extremity  On the pain location diagram she notes axial low back pain bilaterally  She does admit to numbness of the anterior left thigh that terminates in the mid thigh region  The pain is made worse with bending, sitting and decreased with lying down  In terms of management she notes moderate relief to physical therapy which she has continued as well as to heat or ice  In terms of medications, patient is currently taking diclofenac, aspirin, Tylenol with minimal relief  She denies having any prior injections or surgeries in her lower back  Imaging studies include lumbar MRI on June 11, 2022 which demonstrates degenerative changes throughout the lumbar spine with facet arthropathy at multiple levels as well as moderate left neural foraminal narrowing at L2-L3 with minimal spinal canal narrowing in the lumbar spine        Imaging:  Lumbar XR: 6-10-22:  Impression: 2 views lumbar spine AP lateral reveal 5 nonrib-bearing lumbar   vertebrae   The pedicles are intact   History of degenerative disease at   L2-3   Calcification abdominal aorta   SI joints hip joints not   visualized   Soft tissues obscured by obesity  Lumbar MRI 6-11-22  History: Back pain, disc herniation     Procedure: MRI of the lumbar spine was obtained with the following sequences:   Sagittal T1, sagittal T2, sagittal STIR and axial T2 weighted images  Comparison: X-ray dated 6/10/2022     Findings: For the purposes of this dictation, the lumbar vertebrae are labeled   from a caudal to cranial direction, the first vertebra with lumbar morphology is   labeled as L5  The lumbar lordosis is preserved  Vertebral body heights are maintained  There   are degenerative endplate changes at O7-W3 and L2-L3  The conus terminates at   the L2 level and appears normal in signal on the sagittal sequences  L1-L2: Disc bulge and mild facet arthrosis, with minimal spinal canal narrowing  There is mild bilateral neural foraminal narrowing  L2-L3 : Disc bulge and mild facet arthrosis, with minimal spinal canal   narrowing  Mild right and moderate left neural foraminal narrowing  L3-L4: No spinal stenosis  Mild facet arthrosis with mild right neural foraminal   narrowing  L4-L5: No spinal stenosis or neural foraminal narrowing  Mild facet arthrosis  L5-S1: Facet arthrosis without spinal canal or neural foraminal narrowing  I have personally reviewed and/or updated the patient's past medical history, past surgical history, family history, social history, current medications, allergies, and vital signs today  Review of Systems   Constitutional: Negative for chills, diaphoresis, fatigue, fever and unexpected weight change  Respiratory: Negative for chest tightness  Cardiovascular: Negative for chest pain  Gastrointestinal: Negative for abdominal pain  Genitourinary: Positive for frequency     Musculoskeletal: Positive for back pain, gait problem and joint swelling          Left leg       Patient Active Problem List   Diagnosis   • Rheumatoid arthritis (HCC)   • Gastroesophageal reflux disease without esophagitis   • DANNY (obstructive sleep apnea)   • Vertigo   • Mixed hyperlipidemia   • Stented coronary artery   • Coronary artery disease of native artery of native heart with stable angina pectoris ([facility])   • Calculus of ureter   • UTI (urinary tract infection)   • S/P ureteral stent placement   • Hematuria   • Calculus of kidney   • Class 3 severe obesity due to excess calories with serious comorbidity and body mass index (BMI) of 45 0 to 49 9 in adult [facility])   • Pre-diabetes   • Chronic obstructive pulmonary disease, unspecified COPD type ([facility] [address] )   • History of COVID-19       Past Medical History:   Diagnosis Date   • Abnormal Pap smear of cervix     cin3; hpv non 16/18 +(12/2016); LGSIL 6/2018   • Arthritis    • Cancer ([facility] [address] )     cervical   • Coronary artery disease    • CPAP (continuous positive airway pressure) dependence    • GERD (gastroesophageal reflux disease)    • History of pneumonia     2 yrs ago   • HPV (human papilloma virus) infection    • Hypercholesterolemia    • Hypertension    • Kidney stone    • Morbid obesity ([facility] [address] )    • Motion sickness    • My[facility] infarct ([facility] )     X2; 2000 and 2002//[facility] cardiology Dr Colette Granado yearly   • Pleuritic chest pain     "from constant lifting on the job chest muscle area"   • PONV (postoperative nausea and vomiting)     "extremely with the gas"   • Rheumatoid arthritis ([facility] [address] )     Rheu and cervical   • Shortness of breath     moderate activity// pt does work full time/on and off feet in warehouse   • Sleep apnea 2017   • Vertigo    • Wears partial dentures     lower       Past Surgical History:   Procedure Laterality Date   • ANGIOPLASTY Right 09/14/2015    right groin / femoral    • BIOPSY CORE NEEDLE  1980   • CARDIAC CATHETERIZATION  2015    star close - closure system   • CARDIAC CATHETERIZATION  2000 [facility]   • CERVICAL CONE BIOPSY         • COLONOSCOPY      ,    • COLONOSCOPY     • DILATION AND CURETTAGE OF UTERUS     • FL RETROGRADE PYELOGRAM  2020   • FL RETROGRADE PYELOGRAM  10/13/2020   • MAMMO (HISTORICAL)  2018    Neg   • OTHER SURGICAL HISTORY Right 2017    Surgical removal of lypoma shoulder blade   • ME CYSTO/URETERO W/LITHOTRIPSY &INDWELL STENT INSRT Right 2020    Procedure: CYSTO, URETEROSCOPY STONE BASKET EXTRACTION , RETROGRADE PYELOGRAM, STENT;  Surgeon: Barbara Sherman MD;  Location: [facility];  Service: Urology   • ME CYSTO/URETERO W/LITHOTRIPSY &INDWELL STENT INSRT Left 10/13/2020    Procedure: CYSTOSCOPY URETEROSCOPY WITH LITHOTRIPSY HOLMIUM LASER, RETROGRADE PYELOGRAM AND INSERTION STENT URETERAL;  Surgeon: Barbara Sherman MD;  [facility]: [facility];  Service: Urology   • ME CYSTOURETHROSCOPY,URETER CATHETER Right 2020    Procedure: CYSTOSCOPY RETROGRADE PYELOGRAM WITH INSERTION STENT URETERAL;  Surgeon: Del Zamora MD;  [facility];  Service: Urology   • SHOULDER SURGERY Right 2014    had a lympoma done in 2017 also same shoulder   • SHOULDER SURGERY Right 2014   • TONSILLECTOMY     • WISDOM TOOTH EXTRACTION         Family History   Problem Relation Age of Onset   • Cancer Mother    • Lung cancer Mother    • Heart disease Father        Social History     Occupational History   • Occupation: [employer]   Tobacco Use   • Smoking status: Former Smoker     Packs/day: 2 00     Years: 30 00     Pack years: 60 00     Types: Cigarettes     Quit date: 7/15/1998     Years since quittin 2   • Smokeless tobacco: Never Used   Vaping Use   • Vaping Use: Never used   Substance and Sexual Activity   • Alcohol use:  Yes   • Drug use: Never   • Sexual activity: Not Currently       Current Outpatient Medications on File Prior to Visit   Medication Sig   • Ascorbic Acid, Vitamin C, (VITAMIN C) 100 MG tablet Take 1,000 mg by mouth daily   • aspirin 81 MG tablet Take 162 mg by mouth daily   • Cholecalciferol 50 MCG (2000 UT) CAPS Take 1 capsule by mouth daily   • Cranberry 450 MG TABS Take 1 tablet by mouth 2 (two) times a day   • Diclofenac Sodium (VOLTAREN) 1 % APPLY 4 GM FOUR TIMES A DAY AS NEEDED TRANSDERMAL 30 DAYS   • fenofibrate (TRICOR) 54 MG tablet    • folic acid (FOLVITE) 1 mg tablet Take 1,000 mcg by mouth daily   • Humira Pen 40 MG/0 4ML PNKT Finishes end of march 2021   • meclizine (ANTIVERT) 25 mg tablet Take 1 tablet (25 mg total) by mouth 3 (three) times a day as needed for dizziness   • methotrexate 2 5 mg tablet 2 5 mg once a week Takes on Fridays  Last dose10/9/20  8 PILLS ONE TIME A WEEK   • metoprolol tartrate (LOPRESSOR) 25 mg tablet Take 12 5 mg by mouth every 12 (twelve) hours    • multivitamin-minerals (CENTRUM) tablet Take by mouth daily   • OMEGA-3 FATTY ACIDS PO Take 1 g by mouth 2 (two) times a day    • ondansetron (Zofran ODT) 4 mg disintegrating tablet Take 1 tablet (4 mg total) by mouth every 6 (six) hours as needed for nausea or vomiting   • pantoprazole (PROTONIX) 40 mg tablet Take 1 tablet (40 mg total) by mouth daily   • rosuvastatin (CRESTOR) 20 MG tablet Take 20 mg by mouth every evening    • rosuvastatin (CRESTOR) 40 MG tablet    • diazepam (VALIUM) 5 mg tablet Take 1 tablet (5 mg total) by mouth every 8 (eight) hours as needed for muscle spasms for up to 10 days   • Flaxseed, Linseed, (FLAX SEEDS PO) Take 1 tablet by mouth daily (Patient not taking: Reported on 9/23/2022)   • Ibuprofen 200 MG CAPS 3 tablet with food as needed (Patient not taking: Reported on 9/23/2022)   • loratadine (CLARITIN) 10 mg tablet Take 10 mg by mouth daily (Patient not taking: Reported on 9/23/2022)   • methocarbamol (ROBAXIN) 750 mg tablet TAKE 1 TABLET BY MOUTH THREE TIMES DAILY FOR 14 DAYS AS NEEDED FOR PAIN (Patient not taking: Reported on 9/23/2022)     No current facility-administered medications on file prior to visit         Allergies Allergen Reactions   • Hydrocodone Vomiting     "can take oxycodone without a problem"   • Atorvastatin Myalgia   • Caffeine - Food Allergy Tachycardia     And high blood pressure   • Diphenhydramine Hcl (Sleep) Itching   • Pravastatin Myalgia   • Simvastatin Myalgia       Physical Exam    /70   Pulse 97   Ht 5' 7" (1 702 m) Comment: verbal  Wt (!) 143 kg (315 lb 9 6 oz)   BMI 49 43 kg/m²       Palpation:     tenderness over the left paravertebral musculature   tenderness over the right paravertebral musculature    tenderness with palpation of the left SI joint    tenderness with palpation of the right SI joint      No tenderness with palpation of the left greater trochanter    No tenderness with palpation of the right greater trochanter    Sensory:    Intact to light touch grossly in the left lower extremity except decreased sensation to light touch as compared to the anterior lateral mid thigh thigh    Intact to light touch grossly in the right lower extremity    Muscle Strength      Hip flexion Knee flexion Knee extension Foot plantarflexion Foot   dorsiflexion EHL   L 5/5 5/5 5/5 5/5 5/5 5/5   R 5/5 5/5 5/5 5/5 5/5 5/5     DTRs: 2+ patellar, 2+ Achilles and symmetric       Special Tests:     Facet loading Straight leg raise HOWIE FAIR   L Positive Positive Positive    R Positive Negative Positive

## 2022-09-21 NOTE — PROGRESS NOTES
Assessment  1  Lumbar facet arthropathy    2  Lumbar radiculopathy    3  Sacroiliitis ([address] )    4  Myofascial pain syndrome        Plan  No orders of the defined types were placed in this encounter  No orders of the defined types were placed in this encounter  59-year-old female with morbid obesity presenting with worsening axial low back pain as left anterior thigh numbness in the L3 dermatome  On physical exam patient noted to have positive SI joint tenderness, Ruiz's test bilaterally, facet loading bilaterally as well as positive straight leg on the left  Lumbar MRI imaging showing degenerative changes of facet arthrosis throughout the lumbar spine as well as left at L3-L4 moderate neural foraminal narrowing  The etiology of patient's pain presentation is multifactorial in nature and exacerbated by her morbid obesity and generalized deconditioning  Patient likely has elements of lumbar radiculopathy secondary to left-sided neural foraminal narrowing at L3 nerve root level  Patient also has components of sacroiliitis given the physical exam findings as well as facet arthrosis as evident on lumbar x-ray and physical exam       -I explained to the patient that there is no one treat or injection that can provide 100% pain relief and address all the factors contributing to her pain  I explained to her that any interventions we are able to provide is temporary   I did emphasize the importance of weight loss and conditioning as the most important component of pain management  - will plan to proceed with L4-L5 and L5-S1 level bilateral lumbar medial branch blocks as she states the axial low back pain is the worst at this time  - we can consider addressing the moderate at L3-L4 neural foraminal narrowing on the left with an epidural steroid injection the future as well as addressing the sacroiliitis with SI joint injections if needed          My impressions and treatment recommendations were discussed in detail with the patient who verbalized understanding and had no further questions  Discharge instructions were provided  I personally saw and examined the patient and I agree with the above discussed plan of care  History of Present Illness    Richard Calles is a 77 y o  female with extensive past medical history including morbid obesity, a GERD, vertigo, rheumatoid arthritis, sleep apnea, hypertension prior MI who presents as a referral from Dr Mary Kate Dan for further management of her chronic low back pain    Patient initially was seen at [facility] for her chronic back pain And was managed with pain medication and physical therapy at that time  Last visit was in June 2022  Today she notes that over the past month she has had moderate to severe pain 8/10 which is constant and does not following typical patterns  She notes that the pain is cramping dull, sharp, shooting, pins and needles with associated subjective weakness in the bilateral lower extremity  On the pain location diagram she notes axial low back pain bilaterally  She does admit to numbness of the anterior left thigh that terminates in the mid thigh region  The pain is made worse with bending, sitting and decreased with lying down  In terms of management she notes moderate relief to physical therapy which she has continued as well as to heat or ice  In terms of medications, patient is currently taking diclofenac, aspirin, Tylenol with minimal relief  She denies having any prior injections or surgeries in her lower back  Imaging studies include lumbar MRI on June 11, 2022 which demonstrates degenerative changes throughout the lumbar spine with facet arthropathy at multiple levels as well as moderate left neural foraminal narrowing at L2-L3 with minimal spinal canal narrowing in the lumbar spine        Imaging:  Lumbar XR: 6-10-22:  Impression: 2 views lumbar spine AP lateral reveal 5 nonrib-bearing lumbar   vertebrae   The pedicles are intact   History of degenerative disease at   L2-3   Calcification abdominal aorta   SI joints hip joints not   visualized   Soft tissues obscured by obesity  Lumbar MRI 6-11-22  History: Back pain, disc herniation     Procedure: MRI of the lumbar spine was obtained with the following sequences:   Sagittal T1, sagittal T2, sagittal STIR and axial T2 weighted images  Comparison: X-ray dated 6/10/2022     Findings: For the purposes of this dictation, the lumbar vertebrae are labeled   from a caudal to cranial direction, the first vertebra with lumbar morphology is   labeled as L5  The lumbar lordosis is preserved  Vertebral body heights are maintained  There   are degenerative endplate changes at N9-R3 and L2-L3  The conus terminates at   the L2 level and appears normal in signal on the sagittal sequences  L1-L2: Disc bulge and mild facet arthrosis, with minimal spinal canal narrowing  There is mild bilateral neural foraminal narrowing  L2-L3 : Disc bulge and mild facet arthrosis, with minimal spinal canal   narrowing  Mild right and moderate left neural foraminal narrowing  L3-L4: No spinal stenosis  Mild facet arthrosis with mild right neural foraminal   narrowing  L4-L5: No spinal stenosis or neural foraminal narrowing  Mild facet arthrosis  L5-S1: Facet arthrosis without spinal canal or neural foraminal narrowing  I have personally reviewed and/or updated the patient's past medical history, past surgical history, family history, social history, current medications, allergies, and vital signs today  Review of Systems   Constitutional: Negative for chills, diaphoresis, fatigue, fever and unexpected weight change  Respiratory: Negative for chest tightness  Cardiovascular: Negative for chest pain  Gastrointestinal: Negative for abdominal pain  Genitourinary: Positive for frequency     Musculoskeletal: Positive for back pain, gait problem and joint swelling          Left leg       Patient Active Problem List   Diagnosis    Rheumatoid arthritis ([facility] )    Gastroesophageal reflux disease without esophagitis    DANNY (obstructive sleep apnea)    Vertigo    Mixed hyperlipidemia    Stented coronary artery    Coronary artery disease of native artery of native heart with stable angina pectoris (HCC)    Calculus of ureter    UTI (urinary tract infection)    S/P ureteral stent placement    Hematuria    Calculus of kidney    Class 3 severe obesity due to excess calories with serious comorbidity and body mass index (BMI) of 45 0 to 49 9 in adult ([place] [address] )    Pre-diabetes    Chronic obstructive pulmonary disease, unspecified COPD type ([place] [address] )    History of COVID-19       Past Medical History:   Diagnosis Date    Abnormal Pap smear of cervix     cin3; hpv non 16/18 +(12/2016); LGSIL 6/2018    Arthritis     Cancer (HCC)     cervical    Coronary artery disease     CPAP (continuous positive airway pressure) dependence     GERD (gastroesophageal reflux disease)     History of pneumonia     2 yrs ago    HPV (human papilloma virus) infection     Hypercholesterolemia     Hypertension     Kidney stone     Morbid obesity ([place] [address] )     Motion sickness     Myocardial infarct ([place] [address] )     X2; 2000 and 2002//[facility] cardiology Dr Acosta Sayres yearly    Pleuritic chest pain     "from constant lifting on the job chest muscle area"    PONV (postoperative nausea and vomiting)     "extremely with the gas"    Rheumatoid arthritis ([place] [address] )     Rheu and cervical    Shortness of breath     moderate activity// pt does work full time/on and off feet in warehouse    Sleep apnea 2017    Vertigo     Wears partial dentures     lower       Past Surgical History:   Procedure Laterality Date    ANGIOPLASTY Right 09/14/2015    right groin / femoral    [facility]  2015    star close - closure system    CARDIAC [facility]HETERIZATION  [facility]    CERVICAL CONE BIOPSY          COLONOSCOPY      ,     COLONOSCOPY      DILATION AND CURETTAGE OF UTERUS      FL RETROGRADE PYELOGRAM  2020    FL RETROGRADE PYELOGRAM  10/13/2020    MAMMO (HISTORICAL)  2018    Neg    OTHER SURGICAL HISTORY Right 2017    Surgical removal of lypoma shoulder blade    LA CYSTO/URETERO W/LITHOTRIPSY &INDWELL STENT INSRT Right 2020    Procedure: CYSTO, URETEROSCOPY STONE BASKET EXTRACTION , RETROGRADE PYELOGRAM, STENT;  Surgeon: Mabelene Kocher, MD;  [facility]: [facility];  Service: Urology    LA CYSTO/URETERO W/LITHOTRIPSY &INDWELL STENT INSRT Left 10/13/2020    Procedure: CYSTOSCOPY URETEROSCOPY WITH LITHOTRIPSY HOLMIUM LASER, RETROGRADE PYELOGRAM AND INSERTION STENT URETERAL;  Surgeon: Mabelene Kocher, MD;  [facility];  Service: Urology    LA CYSTOURETHROSCOPY,URETER CATHETER Right 2020    Procedure: CYSTOSCOPY RETROGRADE PYELOGRAM WITH INSERTION STENT URETERAL;  Surgeon: Grayson Ferrara MD;  [facility];  Service: Urology    SHOULDER SURGERY Right 2014    had a lympoma done in 2017 also same shoulder    SHOULDER SURGERY Right 2014    TONSILLECTOMY      WISDOM TOOTH EXTRACTION         Family History   Problem Relation Age of Onset    Cancer Mother     Lung cancer Mother     Heart disease Father        Social History     Occupational History    Occupation: [employer]   Tobacco Use    Smoking status: Former Smoker     Packs/day: 2 00     Years: 30 00     Pack years: 60 00     Types: Cigarettes     Quit date: 7/15/1998     Years since quittin 2    Smokeless tobacco: Never Used   Vaping Use    Vaping Use: Never used   Substance and Sexual Activity    Alcohol use:  Yes    Drug use: Never    Sexual activity: Not Currently       Current Outpatient Medications on File Prior to Visit   Medication Sig    Ascorbic Acid, Vitamin C, (VITAMIN C) 100 MG tablet Take 1,000 mg by mouth daily    aspirin 81 MG tablet Take 162 mg by mouth daily    Cholecalciferol 50 MCG (2000 UT) CAPS Take 1 capsule by mouth daily    Cranberry 450 MG TABS Take 1 tablet by mouth 2 (two) times a day    Diclofenac Sodium (VOLTAREN) 1 % APPLY 4 GM FOUR TIMES A DAY AS NEEDED TRANSDERMAL 30 DAYS    fenofibrate (TRICOR) 54 MG tablet     folic acid (FOLVITE) 1 mg tablet Take 1,000 mcg by mouth daily    Humira Pen 40 MG/0 4ML PNKT Finishes end of march 2021    meclizine (ANTIVERT) 25 mg tablet Take 1 tablet (25 mg total) by mouth 3 (three) times a day as needed for dizziness    methotrexate 2 5 mg tablet 2 5 mg once a week Takes on Fridays  Last dose10/9/20  8 PILLS ONE TIME A WEEK    metoprolol tartrate (LOPRESSOR) 25 mg tablet Take 12 5 mg by mouth every 12 (twelve) hours     multivitamin-minerals (CENTRUM) tablet Take by mouth daily    OMEGA-3 FATTY ACIDS PO Take 1 g by mouth 2 (two) times a day     ondansetron (Zofran ODT) 4 mg disintegrating tablet Take 1 tablet (4 mg total) by mouth every 6 (six) hours as needed for nausea or vomiting    pantoprazole (PROTONIX) 40 mg tablet Take 1 tablet (40 mg total) by mouth daily    rosuvastatin (CRESTOR) 20 MG tablet Take 20 mg by mouth every evening     rosuvastatin (CRESTOR) 40 MG tablet     diazepam (VALIUM) 5 mg tablet Take 1 tablet (5 mg total) by mouth every 8 (eight) hours as needed for muscle spasms for up to 10 days    Flaxseed, Linseed, (FLAX SEEDS PO) Take 1 tablet by mouth daily (Patient not taking: Reported on 9/23/2022)    Ibuprofen 200 MG CAPS 3 tablet with food as needed (Patient not taking: Reported on 9/23/2022)    loratadine (CLARITIN) 10 mg tablet Take 10 mg by mouth daily (Patient not taking: Reported on 9/23/2022)    methocarbamol (ROBAXIN) 750 mg tablet TAKE 1 TABLET BY MOUTH THREE TIMES DAILY FOR 14 DAYS AS NEEDED FOR PAIN (Patient not taking: Reported on 9/23/2022)     No current facility-administered medications on file prior to visit         Allergies Allergen Reactions    Hydrocodone Vomiting     "can take oxycodone without a problem"    Atorvastatin Myalgia    Caffeine - Food Allergy Tachycardia     And high blood pressure    Diphenhydramine Hcl (Sleep) Itching    Pravastatin Myalgia    Simvastatin Myalgia       Physical Exam    /70   Pulse 97   Ht 5' 7" (1 702 m) Comment: verbal  Wt (!) 143 kg (315 lb 9 6 oz)   BMI 49 43 kg/m²       Palpation:     tenderness over the left paravertebral musculature   tenderness over the right paravertebral musculature    tenderness with palpation of the left SI joint    tenderness with palpation of the right SI joint      No tenderness with palpation of the left greater trochanter    No tenderness with palpation of the right greater trochanter    Sensory:    Intact to light touch grossly in the left lower extremity except decreased sensation to light touch as compared to the anterior lateral mid thigh thigh    Intact to light touch grossly in the right lower extremity    Muscle Strength      Hip flexion Knee flexion Knee extension Foot plantarflexion Foot   dorsiflexion EHL   L 5/5 5/5 5/5 5/5 5/5 5/5   R 5/5 5/5 5/5 5/5 5/5 5/5     DTRs: 2+ patellar, 2+ Achilles and symmetric       Special Tests:     Facet loading Straight leg raise HOWIE FAIR   L Positive Positive Positive    R Positive Negative Positive

## 2022-09-23 ENCOUNTER — TRANSCRIBE ORDERS (OUTPATIENT)
Dept: OBGYN CLINIC | Facility: CLINIC | Age: 67
End: 2022-09-23

## 2022-09-23 ENCOUNTER — CONSULT (OUTPATIENT)
Dept: PAIN MEDICINE | Facility: CLINIC | Age: 67
End: 2022-09-23
Payer: MEDICARE

## 2022-09-23 VITALS
HEART RATE: 97 BPM | SYSTOLIC BLOOD PRESSURE: 118 MMHG | HEIGHT: 67 IN | DIASTOLIC BLOOD PRESSURE: 70 MMHG | BODY MASS INDEX: 45.99 KG/M2 | WEIGHT: 293 LBS

## 2022-09-23 DIAGNOSIS — M47.816 LUMBAR FACET ARTHROPATHY: Primary | ICD-10-CM

## 2022-09-23 DIAGNOSIS — M79.18 MYOFASCIAL PAIN SYNDROME: ICD-10-CM

## 2022-09-23 DIAGNOSIS — M54.16 LUMBAR RADICULOPATHY: ICD-10-CM

## 2022-09-23 DIAGNOSIS — M46.1 SACROILIITIS (HCC): ICD-10-CM

## 2022-09-23 PROCEDURE — 99204 OFFICE O/P NEW MOD 45 MIN: CPT | Performed by: STUDENT IN AN ORGANIZED HEALTH CARE EDUCATION/TRAINING PROGRAM

## 2022-09-26 ENCOUNTER — TELEPHONE (OUTPATIENT)
Dept: PAIN MEDICINE | Facility: CLINIC | Age: 67
End: 2022-09-26

## 2022-09-26 ENCOUNTER — TELEPHONE (OUTPATIENT)
Dept: OBGYN CLINIC | Facility: CLINIC | Age: 67
End: 2022-09-26

## 2022-09-26 NOTE — TELEPHONE ENCOUNTER
----- Message from Atif Colon DO sent at 9/23/2022  2:22 PM EDT -----  Please schedule pt for bilateral L4-L5 and L5-S1 level medial branch blocks

## 2022-09-26 NOTE — TELEPHONE ENCOUNTER
Patient forgot what medication she can or can't take for tomorrow procedure 9/27 at 1 pm     Please advise    Thank you

## 2022-09-26 NOTE — TELEPHONE ENCOUNTER
Pt is scheduled for 9/27/22  Pt takes ASA 81mg, not diabetic  Verbal instructions were given and sent to [facility]   Pt understood

## 2022-09-27 ENCOUNTER — HOSPITAL ENCOUNTER (OUTPATIENT)
Facility: HOSPITAL | Age: 67
Setting detail: OUTPATIENT SURGERY
Discharge: HOME | End: 2022-09-27
Attending: STUDENT IN AN ORGANIZED HEALTH CARE EDUCATION/TRAINING PROGRAM | Admitting: STUDENT IN AN ORGANIZED HEALTH CARE EDUCATION/TRAINING PROGRAM
Payer: MEDICARE

## 2022-09-27 VITALS
DIASTOLIC BLOOD PRESSURE: 80 MMHG | SYSTOLIC BLOOD PRESSURE: 137 MMHG | HEART RATE: 74 BPM | OXYGEN SATURATION: 99 % | TEMPERATURE: 98.1 F | RESPIRATION RATE: 18 BRPM

## 2022-09-27 PROCEDURE — 64493 INJ PARAVERT F JNT L/S 1 LEV: CPT | Performed by: STUDENT IN AN ORGANIZED HEALTH CARE EDUCATION/TRAINING PROGRAM

## 2022-09-27 PROCEDURE — 64494 INJ PARAVERT F JNT L/S 2 LEV: CPT | Performed by: STUDENT IN AN ORGANIZED HEALTH CARE EDUCATION/TRAINING PROGRAM

## 2022-09-27 RX ORDER — BUPIVACAINE HYDROCHLORIDE 2.5 MG/ML
INJECTION, SOLUTION EPIDURAL; INFILTRATION; INTRACAUDAL AS NEEDED
Status: DISCONTINUED | OUTPATIENT
Start: 2022-09-27 | End: 2022-09-27 | Stop reason: HOSPADM

## 2022-09-27 NOTE — DISCHARGE INSTRUCTIONS

## 2022-09-27 NOTE — INTERVAL H&P NOTE
H&P reviewed  After examining the patient I find no changes in the patients condition since the H&P had been written      Vitals:    09/27/22 1249   Pulse: 77   Resp: 18   Temp: 98 1 °F (36 7 °C)   SpO2: 99%

## 2022-09-27 NOTE — OP NOTE
OPERATIVE REPORT  PATIENT NAME: Anette Mcintyre    :  1955  MRN: [id number]  Pt Location: [facility] OR ROOM 01    SURGERY DATE: 2022    Surgeon(s) and Role:     * Jerrell Cramer DO - Primary    Preop Diagnosis:  Lumbar spondylosis [M47 816]    Post-Op Diagnosis Codes:     * Lumbar spondylosis [M47 816]    Procedure(s) (LRB):  BILATERAL L4-S1 MEDIAL BRANCH BLOCK (Bilateral)    Specimen(s):  * No specimens in log *    Estimated Blood Loss:   Minimal    Drains:  Ureteral Drain/Stent Right ureter 6 Fr  (Active)   Number of days: 798       Ureteral Drain/Stent Left ureter 6 Fr  (Active)   Number of days: 714       Anesthesia Type:   Local    Operative Indications:  Lumbar spondylosis [M47 816]      Operative Findings:      Complications:   None    Procedure and Technique:  DATE: 2022  PROCEDURE:  Bilateral Lumbar L4 Medial Branch and L5 Dorsal Ramus Block under Fluoroscopy-Diagnostic #1  PHYSICIAN: Jerrell Sánchez DO  PRE-OPERATIVE DIAGNOSIS: bilateral Lumbar Pain and Spondylosis  POST-OPERATIVE DIAGNOSIS: Same  ANESTHESIA: Local  COMPLICATIONS: None     Indications  Anette Mcintyre is a 77 y o  female with a history of chronic bilateral low back pain who has failed conservative therapy and presents today for diagnostic lumbar medial branch blocks to help with the pain  Informed Consent  The risks, benefits and alternatives of the procedure were discussed with the patient  The patient was given opportunity to ask questions regarding the procedure, its indications and the associated risks  The risks of the procedure discussed include but are not limited to infection, bleeding, allergic reactions, nerve injuries, worsened pain, paralysis, headache, susceptibility to COVID from steroids, and medication side effects  The patient showed understanding and wished to proceed  Informed consent was signed      Preparation  After obtaining informed consent, the patient's chart was reviewed, and the patient's identification was confirmed  The patient was brought to the exam room and placed in the prone position on the exam room table  A time out was performed per hospital policy  Strict sterile technique was used  Skin was prepped with chloraprep solution and draped in the usual manner  Standard monitors were placed  AP and oblique fluoroscopy were used to identify and abdirahman the junction of the sacral superior articular process and sacral ala on the  side as well as the junction of the superior articular process and transverse process at the L4, L5 vertebral level(s)  The overlying skin was anesthetized using a 25 gauge needle and 1% lidocaine  A  needle was inserted and directed to the to the marked location  Once bone was contacted, negative aspiration was confirmed, and 1 cc of a solution containing 6 cc of 0 25% bupivacaine  was injected at each level  No paresthesias were elicited  The procedure was repeated on the right side  The patient was injected with a total of 6 cc 0 25% bupivacaine     The patient tolerated the procedure well and there were no immediate adverse events  The patient was instructed to call with fever, chills, increased pain, redness or swelling at the injection site, numbness or weakness in the leg      Patient Disposition:  PACU       SIGNATURE: [unfilled]  DATE: September 27, 2022  TIME: 1:30 PM

## 2022-09-28 ENCOUNTER — TELEPHONE (OUTPATIENT)
Dept: PAIN MEDICINE | Facility: CLINIC | Age: 67
End: 2022-09-28

## 2022-09-29 NOTE — TELEPHONE ENCOUNTER
Patient is scheduled for 10/11/22  Pt takes ASA 81mg, not diabetic  Verbal instructions were given and sent to [facility]   Pt understood

## 2022-10-07 LAB
25(OH)D3+25(OH)D2 SERPL-MCNC: 25.1 NG/ML (ref 30–100)
CHOLEST SERPL-MCNC: 195 MG/DL (ref 100–199)
EST. AVERAGE GLUCOSE BLD GHB EST-MCNC: 137 MG/DL
HBA1C MFR BLD: 6.4 % (ref 4.8–5.6)
HDLC SERPL-MCNC: 64 MG/DL
LDLC SERPL CALC-MCNC: 102 MG/DL (ref 0–99)
TRIGL SERPL-MCNC: 172 MG/DL (ref 0–149)
VIT B12 SERPL-MCNC: 459 PG/ML (ref 232–1245)

## 2022-10-11 ENCOUNTER — HOSPITAL ENCOUNTER (OUTPATIENT)
Facility: HOSPITAL | Age: 67
Setting detail: OUTPATIENT SURGERY
Discharge: HOME | End: 2022-10-11
Attending: STUDENT IN AN ORGANIZED HEALTH CARE EDUCATION/TRAINING PROGRAM | Admitting: STUDENT IN AN ORGANIZED HEALTH CARE EDUCATION/TRAINING PROGRAM
Payer: MEDICARE

## 2022-10-11 VITALS
TEMPERATURE: 97.4 F | SYSTOLIC BLOOD PRESSURE: 151 MMHG | DIASTOLIC BLOOD PRESSURE: 87 MMHG | RESPIRATION RATE: 18 BRPM | HEART RATE: 70 BPM | OXYGEN SATURATION: 97 %

## 2022-10-11 PROCEDURE — 64494 INJ PARAVERT F JNT L/S 2 LEV: CPT | Performed by: STUDENT IN AN ORGANIZED HEALTH CARE EDUCATION/TRAINING PROGRAM

## 2022-10-11 PROCEDURE — 64493 INJ PARAVERT F JNT L/S 1 LEV: CPT | Performed by: STUDENT IN AN ORGANIZED HEALTH CARE EDUCATION/TRAINING PROGRAM

## 2022-10-11 RX ORDER — BUPIVACAINE HYDROCHLORIDE 2.5 MG/ML
INJECTION, SOLUTION EPIDURAL; INFILTRATION; INTRACAUDAL AS NEEDED
Status: DISCONTINUED | OUTPATIENT
Start: 2022-10-11 | End: 2022-10-11 | Stop reason: HOSPADM

## 2022-10-11 NOTE — OP NOTE
OPERATIVE REPORT  PATIENT NAME: Louis Childress    :  1955  MRN: [id number]  Pt Location: [facility] ROOM 01    SURGERY DATE: 10/11/2022    Surgeon(s) and Role:     * Harsh Price Fabry, DO - Primary    Preop Diagnosis:  Lumbar spondylosis [M47 816]    Post-Op Diagnosis Codes:     * Lumbar spondylosis [M47 816]    Procedure(s) (LRB):  BILATERAL L4-S1 MEDIAL BRANCH BLOCK (35191,75995) (Bilateral)    Specimen(s):  * No specimens in log *    Estimated Blood Loss:   Minimal    Drains:  Ureteral Drain/Stent Right ureter 6 Fr  (Active)   Number of days: 812       Ureteral Drain/Stent Left ureter 6 Fr  (Active)   Number of days: 790       Anesthesia Type:   Local    Operative Indications:  Lumbar spondylosis [W39 576]      Operative Findings:    Complications:   None    Procedure and Technique:  DATE: 2022  PROCEDURE:  Bilateral Lumbar L4 Medial Branch and L5 Dorsal Ramus Block under Fluoroscopy-Diagnostic #  PHYSICIAN: Jerrell Sánchez DO  PRE-OPERATIVE DIAGNOSIS: bilateral Lumbar Pain and Spondylosis  POST-OPERATIVE DIAGNOSIS: Same  ANESTHESIA: Local  COMPLICATIONS: None      Indications  Louis Childress is a 77 y o  female with a history of chronic bilateral low back pain who has failed conservative therapy and presents today for diagnostic lumbar medial branch blocks to help with the pain      Informed Consent  The risks, benefits and alternatives of the procedure were discussed with the patient  The patient was given opportunity to ask questions regarding the procedure, its indications and the associated risks  The risks of the procedure discussed include but are not limited to infection, bleeding, allergic reactions, nerve injuries, worsened pain, paralysis, headache, susceptibility to COVID from steroids, and medication side effects  The patient showed understanding and wished to proceed   Informed consent was signed      Preparation  After obtaining informed consent, the patient's chart was reviewed, and the patient's identification was confirmed  The patient was brought to the exam room and placed in the prone position on the exam room table  A time out was performed per hospital policy  Strict sterile technique was used  Skin was prepped with chloraprep solution and draped in the usual manner  Standard monitors were placed      AP and oblique fluoroscopy were used to identify and abdirahman the junction of the sacral superior articular process and sacral ala on the left  side as well as the junction of the superior articular process and transverse process at the L4, L5 vertebral level(s)  The overlying skin was anesthetized using a 25 gauge needle and 1% lidocaine       A 25 G 5 in needle was inserted and directed to the to the marked location  Once bone was contacted, negative aspiration was confirmed, and 1 cc of a solution containing 6 cc of 0 25% bupivacaine  was injected at each level   No paresthesias were elicited      The procedure was repeated on the right side       The patient was injected with a total of 6 cc 0 25% bupivacaine      The patient tolerated the procedure well and there were no immediate adverse events   The patient was instructed to call with fever, chills, increased pain, redness or swelling at the injection site, numbness or weakness in the leg      Patient Disposition:  PACU            I was present for the entire procedure    Patient Disposition:  PACU         SIGNATURE: Gela Plummer DO  DATE: October 11, 2022  TIME: 12:26 PM

## 2022-10-11 NOTE — DISCHARGE INSTRUCTIONS

## 2022-10-11 NOTE — INTERVAL H&P NOTE
H&P reviewed  After examining the patient I find no changes in the patients condition since the H&P had been written      Vitals:    10/11/22 1139   BP: 151/87   Pulse: 70   Resp: 18   Temp:    SpO2: 97%

## 2022-10-12 ENCOUNTER — TELEPHONE (OUTPATIENT)
Dept: OBGYN CLINIC | Facility: CLINIC | Age: 67
End: 2022-10-12

## 2022-10-12 ENCOUNTER — TRANSCRIBE ORDERS (OUTPATIENT)
Dept: OBGYN CLINIC | Facility: CLINIC | Age: 67
End: 2022-10-12

## 2022-10-18 NOTE — PROGRESS NOTES
Portions of the record may have been created with voice recognition software  Occasional wrong word or "sound a like" substitutions may have occurred due to the inherent limitations of voice recognition software  Read the chart carefully and recognize, using context, where substitutions have occurred  Assessment:  1  Myofascial pain syndrome    2  Sacroiliitis ([address] )    3  Chronic bilateral low back pain without sciatica    4  Lumbar spondylosis        Plan:  No orders of the defined types were placed in this encounter  No orders of the defined types were placed in this encounter  This is a 70-year-old female presenting for follow-up for axial low back and lumbosacral region pain status post 2 diagnostic lumbar medial branch blocks with varied results as noted below  On today's exam, patient tender to palpation on the right lumbar paraspinal region as well as SI joint region with positive Anisha finger, Ruiz's test and SI compression on the right  Etiology of patient's pain presentation is likely multifactorial in setting of lumbar spondylosis, myofascial pain and sacroiliitis     -given that she had good results lumbar medial branch block, we decided not to proceed with RFA at this time     -given the taut muscle band and tenderness to palpation in the lumbar paraspinal region, will proceed with trigger-point injection for low back in hopes of addressing the myofascial pain     -we also discussed the possibility of right-sided sacroiliac iliac joint injection to further help with the patient's presentation  My impressions and treatment recommendations were discussed in detail with the patient who verbalized understanding and had no further questions  Discharge instructions were provided  I personally saw and examined the patient and I agree with the above discussed plan of care        History of Present Illness:  Suzanne Campos is a 79 y o  female who presents for a follow up office visit in regards to Follow-up and Back Pain (Lower lumbar pain that is constant with numbness)  Since initial consultation visit, patient is status post lumbar medial branch block diagnostic 1  And 2 with mixed results  She noted more than 80% improvement for greater than 8 hours with the 1st diagnostic block but less than 80% relief for less than 4 hours with the 2nd injection  We decided to not proceed with radiofrequency ablation and to have a follow-up visit  Today, The patient’s current symptoms include right-sided low back pain that is constant and described as pressure-like and cramping and which worsens with activities including bending, turning twisting  She rates it as 8/10  She does note that after the injections, the left anterior thigh numbness has improved significantly  She has been managing her current pain state with Robaxin and heating pad which provide significant relief  Other Symptoms:  The patient does not have weakness  The patient does not have bladder dysfunction  The patient does not have bowel dysfunction  The patient does not have chills and fever, night sweats or unintentional weight loss  Prior Appt:  9-23-22: Consult-Plan for LMBB and possible SI,SELENE,trigger point injection  PDMP results:  PDMP Review       Value Time User    PDMP Reviewed  Yes 7/2/2020  1:24 PM Wesley Martin MD          Current Pain Medications:  Robaxin, volatren gel, tylenol    Pain Medications Trialed:     Interventional Techniques Employed:  LMBB #1 9-27-22:  More than 80% relief for 8+ hours  LMBB #2: 10-11-22: less than 80% relief for 8+ hours        Imaging:  No MRI cervical spine results found in the past 2 years   No MRI lumbar spine results found in the past 2 years   No MRI thoracic spine results found in the past 2 years   No X-ray cervical spine results found in the past 2 years   No X-ray lumbar spine results found in the past 2 years   No X-ray hip/pelvis results found in the past 2 years   No X-ray knee results found in the past 2 years     Lab Results   Component Value Date    CREATININE 0 80 03/11/2022     Lab Results   Component Value Date    ALT 18 03/11/2022    ALT 22 06/13/2020       I have personally reviewed and/or updated the patient's past medical history, past surgical history, family history, social history, current medications, allergies, and vital signs today  Review of Systems   Musculoskeletal: Positive for back pain, gait problem and joint swelling         Patient Active Problem List   Diagnosis   • Rheumatoid arthritis ([facility] )   • Gastroesophageal reflux disease without esophagitis   • DANNY (obstructive sleep apnea)   • Vertigo   • Mixed hyperlipidemia   • Stented coronary artery   • Coronary artery disease of native artery of native heart with stable angina pectoris (HCC)   • Calculus of ureter   • UTI (urinary tract infection)   • S/P ureteral stent placement   • Hematuria   • Calculus of kidney   • Class 3 severe obesity due to excess calories with serious comorbidity and body mass index (BMI) of 45 0 to 49 9 in adult [facility])   • Pre-diabetes   • Chronic obstructive pulmonary disease, unspecified COPD type ([place] [address] )   • History of COVID-19       Past Medical History:   Diagnosis Date   • Abnormal Pap smear of cervix     cin3; hpv non 16/18 +(12/2016); LGSIL 6/2018   • Arthritis    • Cancer ([facility] )     cervical   • Coronary artery disease    • CPAP (continuous positive airway pressure) dependence    • GERD (gastroesophageal reflux disease)    • History of pneumonia     2 yrs ago   • HPV (human papilloma virus) infection    • Hypercholesterolemia    • Hypertension    • Kidney stone    • Morbid obesity ([place] [address] )    • Motion sickness    • Myocardial infarct ([place] [address] )     X2; 2000 and 2002//[facility] cardiology Dr Carrasco Scarce yearly   • Pleuritic chest pain     "from constant lifting on the job chest muscle area"   • PONV (postoperative nausea and vomiting)     "extremely with the gas"   • Rheumatoid arthritis ([facility] )     Rheu and cervical   • Shortness of breath     moderate activity// pt does work full time/on and off feet in warehouse   • Sleep apnea 2017   • Vertigo    • Wears partial dentures     lower       Past Surgical History:   Procedure Laterality Date   • ANGIOPLASTY Right 09/14/2015    right groin / femoral    • BIOPSY CORE NEEDLE  1980   • CARDIAC CATHETERIZATION  2015    star close - closure system   • CARDIAC CATHETERIZATION  2000    angioplasty/[facility]   • CERVICAL CONE BIOPSY      1980's   • COLONOSCOPY      2006, 2019   • COLONOSCOPY     • DILATION AND CURETTAGE OF UTERUS     • FL RETROGRADE PYELOGRAM  7/2/2020   • FL RETROGRADE PYELOGRAM  10/13/2020   • MAMMO (HISTORICAL)  2018    Neg   • OTHER SURGICAL HISTORY Right 11/2017    Surgical removal of lypoma shoulder blade   • NC CYSTO/URETERO W/LITHOTRIPSY &INDWELL STENT INSRT Right 7/21/2020    Procedure: CYSTO, URETEROSCOPY STONE BASKET EXTRACTION , RETROGRADE PYELOGRAM, STENT;  Surgeon: Sheryl Villavicencio MD;  Location: [facility];  Service: Urology   • NC CYSTO/URETERO W/LITHOTRIPSY &INDWELL STENT INSRT Left 10/13/2020    Procedure: CYSTOSCOPY URETEROSCOPY WITH LITHOTRIPSY HOLMIUM LASER, RETROGRADE PYELOGRAM AND INSERTION STENT URETERAL;  Surgeon: Sheryl Villavicencio MD;  [facility];  Service: Urology   • NC CYSTOURETHROSCOPY,URETER CATHETER Right 7/2/2020    Procedure: CYSTOSCOPY RETROGRADE PYELOGRAM WITH INSERTION STENT URETERAL;  Surgeon: Geraldine Franklin MD;  [facility];  Service: Urology   • NC INJ DX/THER AGNT PARAVERT FACET JOINT,IMG GUIDE,LUMBAR/SAC, 1ST LEVEL Bilateral 9/27/2022    Procedure: BILATERAL L4-S1 MEDIAL BRANCH BLOCK;  Surgeon: Devin Zendejas DO;  Location: [facility];  Service: Pain Management    • NC INJ DX/THER AGNT PARAVERT FACET JOINT,IMG GUIDE,LUMBAR/SAC, 1ST LEVEL Bilateral 10/11/2022    Procedure: BILATERAL L4-S1 MEDIAL BRANCH BLOCK ([id number]);   Surgeon: Devin Zendejas DO;  Location: [facility];  Service: Pain Management    • SHOULDER SURGERY Right 2014    had a lympoma done in 2017 also same shoulder   • SHOULDER SURGERY Right 2014   • TONSILLECTOMY     • WISDOM TOOTH EXTRACTION         Family History   Problem Relation Age of Onset   • Cancer Mother    • Lung cancer Mother    • Heart disease Father        Social History     Occupational History   • Occupation:    Tobacco Use   • Smoking status: Former Smoker     Packs/day: 2 00     Years: 30 00     Pack years: 60 00     Types: Cigarettes     Quit date: 7/15/1998     Years since quittin 2   • Smokeless tobacco: Never Used   Vaping Use   • Vaping Use: Never used   Substance and Sexual Activity   • Alcohol use:  Yes   • Drug use: Never   • Sexual activity: Not Currently       Current Outpatient Medications on File Prior to Visit   Medication Sig   • Ascorbic Acid, Vitamin C, (VITAMIN C) 100 MG tablet Take 1,000 mg by mouth daily   • aspirin 81 MG tablet Take 162 mg by mouth daily   • Cholecalciferol 50 MCG (2000 UT) CAPS Take 1 capsule by mouth daily   • Cranberry 450 MG TABS Take 1 tablet by mouth 2 (two) times a day   • Diclofenac Sodium (VOLTAREN) 1 % APPLY 4 GM FOUR TIMES A DAY AS NEEDED TRANSDERMAL 30 DAYS   • fenofibrate (TRICOR) 54 MG tablet    • folic acid (FOLVITE) 1 mg tablet Take 1,000 mcg by mouth daily   • Humira Pen 40 MG/0 4ML PNKT Finishes end of 2021   • Ibuprofen 200 MG CAPS    • loratadine (CLARITIN) 10 mg tablet Take 10 mg by mouth daily   • meclizine (ANTIVERT) 25 mg tablet Take 1 tablet (25 mg total) by mouth 3 (three) times a day as needed for dizziness   • methocarbamol (ROBAXIN) 750 mg tablet    • methotrexate 2 5 mg tablet 2 5 mg once a week Takes on   Last dose10/9/20  8 PILLS ONE TIME A WEEK   • metoprolol tartrate (LOPRESSOR) 25 mg tablet Take 12 5 mg by mouth every 12 (twelve) hours    • multivitamin-minerals (CENTRUM) tablet Take by mouth daily   • OMEGA-3 FATTY ACIDS PO Take 1 g by mouth 2 (two) times a day    • ondansetron (Zofran ODT) 4 mg disintegrating tablet Take 1 tablet (4 mg total) by mouth every 6 (six) hours as needed for nausea or vomiting   • pantoprazole (PROTONIX) 40 mg tablet Take 1 tablet (40 mg total) by mouth daily   • rosuvastatin (CRESTOR) 20 MG tablet Take 20 mg by mouth every evening    • rosuvastatin (CRESTOR) 40 MG tablet    • diazepam (VALIUM) 5 mg tablet Take 1 tablet (5 mg total) by mouth every 8 (eight) hours as needed for muscle spasms for up to 10 days   • Flaxseed, Linseed, (FLAX SEEDS PO) Take 1 tablet by mouth daily (Patient not taking: No sig reported)     No current facility-administered medications on file prior to visit  Allergies   Allergen Reactions   • Hydrocodone Vomiting     "can take oxycodone without a problem"   • Atorvastatin Myalgia   • Caffeine - Food Allergy Tachycardia     And high blood pressure   • Diphenhydramine Hcl (Sleep) Itching   • Pravastatin Myalgia   • Simvastatin Myalgia       Physical Exam:    /82   Pulse 96   Ht 5' 7" (1 702 m) Comment: verbal  Wt (!) 143 kg (315 lb)   BMI 49 34 kg/m²       Palpation:    No tenderness over the left paravertebral musculature    Moderate tenderness over the right paravertebral musculature    No tenderness with palpation of the left SI joint    Moderate tenderness with palpation of the right SI joint      No tenderness with palpation of the left greater trochanter    No tenderness with palpation of the right greater trochanter    Sensory:    Intact to light touch grossly in the left lower extremity    Intact to light touch grossly in the right lower extremity    Muscle Strength      Hip flexion Knee flexion Knee extension Foot plantarflexion Foot   dorsiflexion EHL   L 5/5 5/5 5/5 5/5 5/5 5/5   R 5/5 5/5 5/5 5/5 5/5 5/5     DTRs: 2+ patellar, 2+ Achilles and symmetric       Special Tests:     Facet loading Straight leg raise HOWIE FAIR   L Negative Negative Negative    R Positive Negative Positive

## 2022-10-21 ENCOUNTER — OFFICE VISIT (OUTPATIENT)
Dept: PAIN MEDICINE | Facility: CLINIC | Age: 67
End: 2022-10-21
Payer: MEDICARE

## 2022-10-21 VITALS
WEIGHT: 293 LBS | BODY MASS INDEX: 45.99 KG/M2 | DIASTOLIC BLOOD PRESSURE: 82 MMHG | HEIGHT: 67 IN | SYSTOLIC BLOOD PRESSURE: 138 MMHG | HEART RATE: 96 BPM

## 2022-10-21 DIAGNOSIS — M54.50 CHRONIC BILATERAL LOW BACK PAIN WITHOUT SCIATICA: ICD-10-CM

## 2022-10-21 DIAGNOSIS — M79.18 MYOFASCIAL PAIN SYNDROME: Primary | ICD-10-CM

## 2022-10-21 DIAGNOSIS — M46.1 SACROILIITIS (HCC): ICD-10-CM

## 2022-10-21 DIAGNOSIS — M47.816 LUMBAR SPONDYLOSIS: ICD-10-CM

## 2022-10-21 DIAGNOSIS — G89.29 CHRONIC BILATERAL LOW BACK PAIN WITHOUT SCIATICA: ICD-10-CM

## 2022-10-21 PROCEDURE — 99214 OFFICE O/P EST MOD 30 MIN: CPT | Performed by: STUDENT IN AN ORGANIZED HEALTH CARE EDUCATION/TRAINING PROGRAM

## 2022-10-28 ENCOUNTER — PROCEDURE VISIT (OUTPATIENT)
Dept: PAIN MEDICINE | Facility: CLINIC | Age: 67
End: 2022-10-28

## 2022-10-28 VITALS
HEART RATE: 98 BPM | BODY MASS INDEX: 45.99 KG/M2 | DIASTOLIC BLOOD PRESSURE: 80 MMHG | SYSTOLIC BLOOD PRESSURE: 140 MMHG | HEIGHT: 67 IN | WEIGHT: 293 LBS

## 2022-10-28 DIAGNOSIS — M79.18 MYOFASCIAL PAIN SYNDROME: Primary | ICD-10-CM

## 2022-10-28 RX ORDER — BUPIVACAINE HYDROCHLORIDE 2.5 MG/ML
10 INJECTION, SOLUTION INFILTRATION; PERINEURAL ONCE
Status: COMPLETED | OUTPATIENT
Start: 2022-10-28 | End: 2022-10-28

## 2022-10-28 RX ADMIN — BUPIVACAINE HYDROCHLORIDE 10 ML: 2.5 INJECTION, SOLUTION INFILTRATION; PERINEURAL at 14:48

## 2022-10-28 NOTE — PROGRESS NOTES
DATE: October 28, 2022  PROCEDURE: Trigger Point Injection  PHYSICIAN: Jerrell Deleon DO  PRE-OPERATIVE DIAGNOSIS: Myofascial pain syndrome  POST-OPERATIVE DIAGNOSIS: Same  ANESTHESIA: None  COMPLICATIONS: None    Indications  Inderjit Ledbetter is a 79 y o  female with a history of myofascial pain syndrome and muscle spasms  The patient presents today for trigger point injection  Informed Consent  The risks, benefits and alternatives of the procedure were discussed with the patient  The patient was given opportunity to ask questions regarding the procedure, its indications and the associated risks  The risks of the procedure discussed included but were not limited to infection, bleeding, allergic reactions, nerve injuries, susceptibility to COVID from steroids, and side effects  The patient showed understanding and wished to proceed  Preparation  After obtaining informed consent, the patient's chart was reviewed, the site of operation was marked, and the patient's identification was confirmed  The patient was placed in the sitting position  The trigger points were marked with the patient's assistance  The trigger points are noted to be in the bilateral lumbar paraspinal, quadratus lumborum and levator scapulae region(s)  A surgical timeout was performed  No skin lesions or signs of cellulitis were noted  Strict sterile technique was used  Skin was prepped with chloraprep solution  A 1 5 in 25 gauge needle was inserted into the first trigger point and a gritty sensation was noted as dry needling was performed  1 cc of a solution containing 10 cc 0 25% bupivacaine  was injected after negative aspiration  This same procedure was performed for all subsequent trigger point injections  The needle was then removed  The area was cleansed and a bandage was placed over the site(s) of needle insertion  A total of 8 cc of 0 25% bupivacaine solution was used to inject an equal number of trigger points       The patient tolerated the procedure well  The patient was informed when to follow up post procedure   The patient was instructed to call with fever, chills, increased pain, or redness or swelling at the injection site

## 2022-11-16 NOTE — H&P (VIEW-ONLY)
Portions of the record may have been created with voice recognition software  Occasional wrong word or "sound a like" substitutions may have occurred due to the inherent limitations of voice recognition software  Read the chart carefully and recognize, using context, where substitutions have occurred  Assessment:  1  Sacroiliitis ([address] )    2  Myofascial pain syndrome    3  Chronic bilateral low back pain without sciatica    4  Lumbar spondylosis        Plan:  No orders of the defined types were placed in this encounter  No orders of the defined types were placed in this encounter  29-year-old female presenting for follow-up for axial low back and lumbosacral region pain, worse on the right than left status post 2 diagnostic lumbar medial branch blocks with varied results as noted below as well as TPI to bilateral lumbar paraspinal region with some improvement on the left side  On today's exam, patient tender to palpation on the r bilateral lumbar paraspinal region as well as SI joint region with positive Anisha finger, Ruiz's test and SI compression, Gaenslen's bilaterally  Etiology patient's pain presentation is likely multifactorial in setting of sacroiliitis, myofascial pain syndrome and lumbar facet arthrosis pain    -will plan for bilateral SI joint injections    -advise the patient to continue with other conservative management including acupuncture, massage as well as topical creams and agents to further help with her chronic pain state  My impressions and treatment recommendations were discussed in detail with the patient who verbalized understanding and had no further questions  Discharge instructions were provided  I personally saw and examined the patient and I agree with the above discussed plan of care        History of Present Illness:  Anette Mcintyre is a 79 y o  female who presents for a follow up office visit in regards to Follow-up and Back Pain (Lumbar pain is still constant )      Since last visit she underwent lumbar trigger point injections bilaterally which provided some relief but noticed return of the axial lumbosacral junction pain after a few days  Right-sided worsened    The patient’s current symptoms include continued 10/10 pain localized to bilateral lumbosacral junction near the SI joints  Right-sided worse than left  The pain is constant  She denies any radiating pain into bilateral lower extremities at this time  She does have chronic left anterior thigh pins and needles sensation but that is not bothering as much at this time  Other Symptoms:  The patient does not have weakness  The patient does not have bladder dysfunction  The patient does not have bowel dysfunction  The patient does not have chills and fever, night sweats or unintentional weight loss  Prior Appts:  10/21/2022: Follow-up-plan for TPI  9-23-22: Consult-Plan for LMBB and possible SI,SELENE,trigger point injection  Specialists Seen:      Current Pain Medications:  Robaxin, volatren gel, tylenol    Pain Medications Trialed:     Interventional Techniques Employed:  LMBB #1 9-27-22:  More than 80% relief for 8+ hours  LMBB #2: 10-11-22: less than 80% relief for 8+ hours  TPI:  10/28/2022:  100% relief for 2 days and then gradual return of pain     Imaging:  No MRI cervical spine results found in the past 2 years   No MRI lumbar spine results found in the past 2 years   No MRI thoracic spine results found in the past 2 years   No X-ray cervical spine results found in the past 2 years   No X-ray lumbar spine results found in the past 2 years   No X-ray hip/pelvis results found in the past 2 years   No X-ray knee results found in the past 2 years     Lab Results   Component Value Date    CREATININE 0 80 03/11/2022     Lab Results   Component Value Date    ALT 18 03/11/2022    ALT 22 06/13/2020       I have personally reviewed and/or updated the patient's past medical history, past surgical history, family history, social history, current medications, allergies, and vital signs today  Review of Systems   Musculoskeletal: Positive for arthralgias, back pain, gait problem and joint swelling         Patient Active Problem List   Diagnosis   • Rheumatoid arthritis ([facility] )   • Gastroesophageal reflux disease without esophagitis   • DANNY (obstructive sleep apnea)   • Vertigo   • Mixed hyperlipidemia   • Stented coronary artery   • Coronary artery disease of native artery of native heart with stable angina pectoris (HCC)   • Calculus of ureter   • UTI (urinary tract infection)   • S/P ureteral stent placement   • Hematuria   • Calculus of kidney   • Class 3 severe obesity due to excess calories with serious comorbidity and body mass index (BMI) of 45 0 to 49 9 in [facility])   • Pre-diabetes   • Chronic obstructive pulmonary disease, unspecified COPD type ([place] [id number] )   • History of COVID-19       Past Medical History:   Diagnosis Date   • Abnormal Pap smear of cervix     cin3; hpv non 16/18 +(12/2016); LGSIL 6/2018   • Arthritis    • Cancer ([facility] )     cervical   • Coronary artery disease    • CPAP (continuous positive airway pressure) dependence    • GERD (gastroesophageal reflux disease)    • History of pneumonia     2 yrs ago   • HPV (human papilloma virus) infection    • Hypercholesterolemia    • Hypertension    • Kidney stone    • Morbid obesity ([facility] )    • Motion sickness    • Myocardial infarct ([place] [address] )     X2; 2000 and 2002//[facility] cardiology Dr Hortencia Dixon yearly   • Pleuritic chest pain     "from constant lifting on the job chest muscle area"   • PONV (postoperative nausea and vomiting)     "extremely with the gas"   • Rheumatoid arthritis ([facility] )     Rheu and cervical   • Shortness of breath     moderate activity// pt does work full time/on and off feet in warehouse   • Sleep apnea 2017   • Vertigo    • Wears partial dentures     lower       Past Surgical History:   Procedure Laterality Date   • ANGIOPLASTY Right 09/14/2015    right groin / femoral    • BIOPSY CORE NEEDLE  1980   • CARDIAC CATHETERIZATION  2015    star close - closure system   • CARDIAC CATHETERIZATION  2000    [facility]   • CERVICAL CONE BIOPSY      1980's   • COLONOSCOPY      2006, 2019   • COLONOSCOPY     • DILATION AND CURETTAGE OF UTERUS     • FL RETROGRADE PYELOGRAM  7/2/2020   • FL RETROGRADE PYELOGRAM  10/13/2020   • MAMMO (HISTORICAL)  2018    Neg   • OTHER SURGICAL HISTORY Right 11/2017    Surgical removal of lypoma shoulder blade   • CO CYSTO/URETERO W/LITHOTRIPSY &INDWELL STENT INSRT Right 7/21/2020    Procedure: CYSTO, URETEROSCOPY STONE BASKET EXTRACTION , RETROGRADE PYELOGRAM, STENT;  Surgeon: Tammy Lugo MD;  Location: [facility];  Service: Urology   • CO CYSTO/URETERO W/LITHOTRIPSY &INDWELL STENT INSRT Left 10/13/2020    Procedure: CYSTOSCOPY URETEROSCOPY WITH LITHOTRIPSY HOLMIUM LASER, RETROGRADE PYELOGRAM AND INSERTION STENT URETERAL;  Surgeon: Tammy Lugo MD;  Location: [facility];  Service: Urology   • CO CYSTOURETHROSCOPY,URETER CATHETER Right 7/2/2020    Procedure: CYSTOSCOPY RETROGRADE PYELOGRAM WITH INSERTION STENT URETERAL;  Surgeon: Neha Dutta MD;  [facility];  Service: Urology   • CO INJ DX/THER AGNT PARAVERT FACET JOINT,IMG GUIDE,LUMBAR/SAC, 1ST LEVEL Bilateral 9/27/2022    Procedure: BILATERAL L4-S1 MEDIAL BRANCH BLOCK;  Surgeon: Atif Colon DO;  Location: [facility];  Service: Pain Management    • CO INJ DX/THER AGNT PARAVERT FACET JOINT,IMG GUIDE,LUMBAR/SAC, 1ST LEVEL Bilateral 10/11/2022    Procedure: BILATERAL L4-S1 MEDIAL BRANCH BLOCK (56307,88392);   Surgeon: Atif Colon DO;  Location: [facility];  Service: Pain Management    • SHOULDER SURGERY Right 2014    had a lympoma done in 2017 also same shoulder   • SHOULDER SURGERY Right 2014   • TONSILLECTOMY     • WISDOM TOOTH EXTRACTION         Family History   Problem Relation Age of Onset   • Cancer Mother    • Lung cancer Mother    • Heart disease Father        Social History     Occupational History   • Occupation: [employer]   Tobacco Use   • Smoking status: Former     Packs/day: 2 00     Years: 30 00     Pack years: 60 00     Types: Cigarettes     Quit date: 7/15/1998     Years since quittin 3   • Smokeless tobacco: Never   Vaping Use   • Vaping Use: Never used   Substance and Sexual Activity   • Alcohol use:  Yes   • Drug use: Never   • Sexual activity: Not Currently       Current Outpatient Medications on File Prior to Visit   Medication Sig   • Ascorbic Acid, Vitamin C, (VITAMIN C) 100 MG tablet Take 1,000 mg by mouth daily   • aspirin 81 MG tablet Take 162 mg by mouth daily   • Cholecalciferol 50 MCG (2000 UT) CAPS Take 1 capsule by mouth daily   • Cranberry 450 MG TABS Take 1 tablet by mouth 2 (two) times a day   • Diclofenac Sodium (VOLTAREN) 1 % APPLY 4 GM FOUR TIMES A DAY AS NEEDED TRANSDERMAL 30 DAYS   • fenofibrate (TRICOR) 54 MG tablet    • folic acid (FOLVITE) 1 mg tablet Take 1,000 mcg by mouth daily   • Humira Pen 40 MG/0 4ML PNKT Finishes end of 2021   • Ibuprofen 200 MG CAPS    • loratadine (CLARITIN) 10 mg tablet Take 10 mg by mouth daily   • meclizine (ANTIVERT) 25 mg tablet Take 1 tablet (25 mg total) by mouth 3 (three) times a day as needed for dizziness   • methocarbamol (ROBAXIN) 750 mg tablet    • methotrexate 2 5 mg tablet 2 5 mg once a week Takes on   Last dose10/9/20  8 PILLS ONE TIME A WEEK   • metoprolol tartrate (LOPRESSOR) 25 mg tablet Take 12 5 mg by mouth every 12 (twelve) hours    • multivitamin-minerals (CENTRUM) tablet Take by mouth daily   • OMEGA-3 FATTY ACIDS PO Take 1 g by mouth 2 (two) times a day    • ondansetron (Zofran ODT) 4 mg disintegrating tablet Take 1 tablet (4 mg total) by mouth every 6 (six) hours as needed for nausea or vomiting   • pantoprazole (PROTONIX) 40 mg tablet Take 1 tablet (40 mg total) by mouth daily   • rosuvastatin (CRESTOR) 20 MG tablet Take 20 mg by mouth every evening    • rosuvastatin (CRESTOR) 40 MG tablet    • diazepam (VALIUM) 5 mg tablet Take 1 tablet (5 mg total) by mouth every 8 (eight) hours as needed for muscle spasms for up to 10 days   • Flaxseed, Linseed, (FLAX SEEDS PO) Take 1 tablet by mouth daily (Patient not taking: Reported on 9/23/2022)     No current facility-administered medications on file prior to visit  Allergies   Allergen Reactions   • Hydrocodone Vomiting     "can take oxycodone without a problem"   • Atorvastatin Myalgia   • Caffeine - Food Allergy Tachycardia     And high blood pressure   • Diphenhydramine Hcl (Sleep) Itching   • Pravastatin Myalgia   • Simvastatin Myalgia       Physical Exam:    /98   Pulse 70   Ht 5' 7" (1 702 m) Comment: verbal  Wt (!) 143 kg (315 lb) Comment: verbal  BMI 49 34 kg/m²     Constitutional:obese  Eyes:anicteric  HEENT:grossly intact  Neck:supple, symmetric, trachea midline and no masses   Pulmonary:even and unlabored  Cardiovascular:No edema or pitting edema present  Skin:Normal without rashes or lesions and well hydrated  Psychiatric:Mood and affect appropriate  Neurologic:Cranial Nerves II-XII grossly intact  Musculoskeletal:normal      Palpation:    Moderate tenderness over the left paravertebral musculature    Moderate tenderness over the right paravertebral musculature    Moderate tenderness with palpation of the left SI joint    Severe tenderness with palpation of the right SI joint    No tenderness with palpation of the left greater trochanter    No tenderness with palpation of the right greater trochanter    Sensory:    Intact to light touch grossly in the left lower extremity    Intact to light touch grossly in the right lower extremity    Muscle Strength      Hip flexion Knee flexion Knee extension Foot plantarflexion Foot   dorsiflexion EHL   L 5/5 5/5 5/5 5/5 5/5 5/5   R 5/5 5/5 5/5 5/5 5/5 5/5     DTRs: 2+ patellar, 2+ Achilles and symmetric       Special Tests:     Facet loading Straight leg raise HOWIE FAIR   L Positive Negative Positive    R Positive Negative Positive      Positive SI compression test bilaterally, positive Gaenslen test bilaterally

## 2022-11-16 NOTE — PROGRESS NOTES
Portions of the record may have been created with voice recognition software  Occasional wrong word or "sound a like" substitutions may have occurred due to the inherent limitations of voice recognition software  Read the chart carefully and recognize, using context, where substitutions have occurred  Assessment:  1  Sacroiliitis ([address] )    2  Myofascial pain syndrome    3  Chronic bilateral low back pain without sciatica    4  Lumbar spondylosis        Plan:  No orders of the defined types were placed in this encounter  No orders of the defined types were placed in this encounter  17-year-old female presenting for follow-up for axial low back and lumbosacral region pain, worse on the right than left status post 2 diagnostic lumbar medial branch blocks with varied results as noted below as well as TPI to bilateral lumbar paraspinal region with some improvement on the left side  On today's exam, patient tender to palpation on the r bilateral lumbar paraspinal region as well as SI joint region with positive Anisha finger, Ruiz's test and SI compression, Gaenslen's bilaterally  Etiology patient's pain presentation is likely multifactorial in setting of sacroiliitis, myofascial pain syndrome and lumbar facet arthrosis pain    -will plan for bilateral SI joint injections    -advise the patient to continue with other conservative management including acupuncture, massage as well as topical creams and agents to further help with her chronic pain state  My impressions and treatment recommendations were discussed in detail with the patient who verbalized understanding and had no further questions  Discharge instructions were provided  I personally saw and examined the patient and I agree with the above discussed plan of care        History of Present Illness:  Marika Hernandez is a 79 y o  female who presents for a follow up office visit in regards to Follow-up and Back Pain (Lumbar pain is still constant )      Since last visit she underwent lumbar trigger point injections bilaterally which provided some relief but noticed return of the axial lumbosacral junction pain after a few days  Right-sided worsened    The patient’s current symptoms include continued 10/10 pain localized to bilateral lumbosacral junction near the SI joints  Right-sided worse than left  The pain is constant  She denies any radiating pain into bilateral lower extremities at this time  She does have chronic left anterior thigh pins and needles sensation but that is not bothering as much at this time  Other Symptoms:  The patient does not have weakness  The patient does not have bladder dysfunction  The patient does not have bowel dysfunction  The patient does not have chills and fever, night sweats or unintentional weight loss  Prior Appts:  10/21/2022: Follow-up-plan for TPI  9-23-22: Consult-Plan for LMBB and possible SI,SELENE,trigger point injection  Specialists Seen:      Current Pain Medications:  Robaxin, volatren gel, tylenol    Pain Medications Trialed:     Interventional Techniques Employed:  LMBB #1 9-27-22:  More than 80% relief for 8+ hours  LMBB #2: 10-11-22: less than 80% relief for 8+ hours  TPI:  10/28/2022:  100% relief for 2 days and then gradual return of pain     Imaging:  No MRI cervical spine results found in the past 2 years   No MRI lumbar spine results found in the past 2 years   No MRI thoracic spine results found in the past 2 years   No X-ray cervical spine results found in the past 2 years   No X-ray lumbar spine results found in the past 2 years   No X-ray hip/pelvis results found in the past 2 years   No X-ray knee results found in the past 2 years     Lab Results   Component Value Date    CREATININE 0 80 03/11/2022     Lab Results   Component Value Date    ALT 18 03/11/2022    ALT 22 06/13/2020       I have personally reviewed and/or updated the patient's past medical history, past surgical history, family history, social history, current medications, allergies, and vital signs today  Review of Systems   Musculoskeletal: Positive for arthralgias, back pain, gait problem and joint swelling         Patient Active Problem List   Diagnosis   • Rheumatoid arthritis ([facility] )   • Gastroesophageal reflux disease without esophagitis   • DANNY (obstructive sleep apnea)   • Vertigo   • Mixed hyperlipidemia   • Stented coronary artery   • Coronary artery disease of native artery of native heart with stable angina pectoris (HCC)   • Calculus of ureter   • UTI (urinary tract infection)   • S/P ureteral stent placement   • Hematuria   • Calculus of kidney   • Class 3 severe obesity due to excess calories with serious comorbidity and body mass index (BMI) of 45 0 to 49 9 in [facility])   • Pre-diabetes   • Chronic obstructive pulmonary disease, unspecified COPD type ([place] [address] )   • History of COVID-19       Past Medical History:   Diagnosis Date   • Abnormal Pap smear of cervix     cin3; hpv non 16/18 +(12/2016); LGSIL 6/2018   • Arthritis    • Cancer ([facility] )     cervical   • Coronary artery disease    • CPAP (continuous positive airway pressure) dependence    • GERD (gastroesophageal reflux disease)    • History of pneumonia     2 yrs ago   • HPV (human papilloma virus) infection    • Hypercholesterolemia    • Hypertension    • Kidney stone    • Morbid obesity ([facility] )    • Motion sickness    • Myocardial infarct ([place] [address] )     X2; 2000 and 2002//[facility] cardiology Dr Jose C Leyva yearly   • Pleuritic chest pain     "from constant lifting on the job chest muscle area"   • PONV (postoperative nausea and vomiting)     "extremely with the gas"   • Rheumatoid arthritis ([facility] )     Rheu and cervical   • Shortness of breath     moderate activity// pt does work full time/on and off feet in [employer]   • Sleep apnea 2017   • Vertigo    • Wears partial dentures     lower       Past Surgical History:   Procedure Laterality Date   • ANGIOPLASTY Right 09/14/2015    right groin / femoral    • BIOPSY CORE NEEDLE  1980   • CARDIAC CATHETERIZATION  2015    star close - closure system   • CARDIAC CATHETERIZATION  2000    [facility]   • CERVICAL CONE BIOPSY      1980's   • COLONOSCOPY      2006, 2019   • COLONOSCOPY     • DILATION AND CURETTAGE OF UTERUS     • FL RETROGRADE PYELOGRAM  7/2/2020   • FL RETROGRADE PYELOGRAM  10/13/2020   • MAMMO (HISTORICAL)  2018    Neg   • OTHER SURGICAL HISTORY Right 11/2017    Surgical removal of lypoma shoulder blade   • KY CYSTO/URETERO W/LITHOTRIPSY &INDWELL STENT INSRT Right 7/21/2020    Procedure: CYSTO, URETEROSCOPY STONE BASKET EXTRACTION , RETROGRADE PYELOGRAM, STENT;  Surgeon: Tracie Mcknight MD;  Location: [facility];  Service: Urology   • KY CYSTO/URETERO W/LITHOTRIPSY &INDWELL STENT INSRT Left 10/13/2020    Procedure: CYSTOSCOPY URETEROSCOPY WITH LITHOTRIPSY HOLMIUM LASER, RETROGRADE PYELOGRAM AND INSERTION STENT URETERAL;  Surgeon: Tracie Mcknight MD;  [facility];  Service: Urology   • KY CYSTOURETHROSCOPY,URETER CATHETER Right 7/2/2020    Procedure: CYSTOSCOPY RETROGRADE PYELOGRAM WITH INSERTION STENT URETERAL;  Surgeon: Corrinne Scriver, MD;  [facility];  Service: Urology   • KY INJ DX/THER AGNT PARAVERT FACET JOINT,IMG GUIDE,LUMBAR/SAC, 1ST LEVEL Bilateral 9/27/2022    Procedure: BILATERAL L4-S1 MEDIAL BRANCH BLOCK;  Surgeon: Bharat Jesus DO;  Location: [facility];  Service: Pain Management    • KY INJ DX/THER AGNT PARAVERT FACET JOINT,IMG GUIDE,LUMBAR/SAC, 1ST LEVEL Bilateral 10/11/2022    Procedure: BILATERAL L4-S1 MEDIAL BRANCH BLOCK (74488,34695);   Surgeon: Bharat Jesus DO;  Location: [facility];  Service: Pain Management    • SHOULDER SURGERY Right 2014    had a lympoma done in 2017 also same shoulder   • SHOULDER SURGERY Right 2014   • TONSILLECTOMY     • WISDOM TOOTH EXTRACTION         Family History   Problem Relation Age of Onset   • Cancer Mother    • Lung cancer Mother    • Heart disease Father        Social History     Occupational History   • Occupation: [employer]   Tobacco Use   • Smoking status: Former     Packs/day: 2 00     Years: 30 00     Pack years: 60 00     Types: Cigarettes     Quit date: 7/15/1998     Years since quittin 3   • Smokeless tobacco: Never   Vaping Use   • Vaping Use: Never used   Substance and Sexual Activity   • Alcohol use:  Yes   • Drug use: Never   • Sexual activity: Not Currently       Current Outpatient Medications on File Prior to Visit   Medication Sig   • Ascorbic Acid, Vitamin C, (VITAMIN C) 100 MG tablet Take 1,000 mg by mouth daily   • aspirin 81 MG tablet Take 162 mg by mouth daily   • Cholecalciferol 50 MCG (2000 UT) CAPS Take 1 capsule by mouth daily   • Cranberry 450 MG TABS Take 1 tablet by mouth 2 (two) times a day   • Diclofenac Sodium (VOLTAREN) 1 % APPLY 4 GM FOUR TIMES A DAY AS NEEDED TRANSDERMAL 30 DAYS   • fenofibrate (TRICOR) 54 MG tablet    • folic acid (FOLVITE) 1 mg tablet Take 1,000 mcg by mouth daily   • Humira Pen 40 MG/0 4ML PNKT Finishes end of 2021   • Ibuprofen 200 MG CAPS    • loratadine (CLARITIN) 10 mg tablet Take 10 mg by mouth daily   • meclizine (ANTIVERT) 25 mg tablet Take 1 tablet (25 mg total) by mouth 3 (three) times a day as needed for dizziness   • methocarbamol (ROBAXIN) 750 mg tablet    • methotrexate 2 5 mg tablet 2 5 mg once a week Takes on   Last dose10/9/20  8 PILLS ONE TIME A WEEK   • metoprolol tartrate (LOPRESSOR) 25 mg tablet Take 12 5 mg by mouth every 12 (twelve) hours    • multivitamin-minerals (CENTRUM) tablet Take by mouth daily   • OMEGA-3 FATTY ACIDS PO Take 1 g by mouth 2 (two) times a day    • ondansetron (Zofran ODT) 4 mg disintegrating tablet Take 1 tablet (4 mg total) by mouth every 6 (six) hours as needed for nausea or vomiting   • pantoprazole (PROTONIX) 40 mg tablet Take 1 tablet (40 mg total) by mouth daily   • rosuvastatin (CRESTOR) 20 MG tablet Take 20 mg by mouth every evening    • rosuvastatin (CRESTOR) 40 MG tablet    • diazepam (VALIUM) 5 mg tablet Take 1 tablet (5 mg total) by mouth every 8 (eight) hours as needed for muscle spasms for up to 10 days   • Flaxseed, Linseed, (FLAX SEEDS PO) Take 1 tablet by mouth daily (Patient not taking: Reported on 9/23/2022)     No current facility-administered medications on file prior to visit  Allergies   Allergen Reactions   • Hydrocodone Vomiting     "can take oxycodone without a problem"   • Atorvastatin Myalgia   • Caffeine - Food Allergy Tachycardia     And high blood pressure   • Diphenhydramine Hcl (Sleep) Itching   • Pravastatin Myalgia   • Simvastatin Myalgia       Physical Exam:    /98   Pulse 70   Ht 5' 7" (1 702 m) Comment: verbal  Wt (!) 143 kg (315 lb) Comment: verbal  BMI 49 34 kg/m²     Constitutional:obese  Eyes:anicteric  HEENT:grossly intact  Neck:supple, symmetric, trachea midline and no masses   Pulmonary:even and unlabored  Cardiovascular:No edema or pitting edema present  Skin:Normal without rashes or lesions and well hydrated  Psychiatric:Mood and affect appropriate  Neurologic:Cranial Nerves II-XII grossly intact  Musculoskeletal:normal      Palpation:    Moderate tenderness over the left paravertebral musculature    Moderate tenderness over the right paravertebral musculature    Moderate tenderness with palpation of the left SI joint    Severe tenderness with palpation of the right SI joint    No tenderness with palpation of the left greater trochanter    No tenderness with palpation of the right greater trochanter    Sensory:    Intact to light touch grossly in the left lower extremity    Intact to light touch grossly in the right lower extremity    Muscle Strength      Hip flexion Knee flexion Knee extension Foot plantarflexion Foot   dorsiflexion EHL   L 5/5 5/5 5/5 5/5 5/5 5/5   R 5/5 5/5 5/5 5/5 5/5 5/5     DTRs: 2+ patellar, 2+ Achilles and symmetric       Special Tests:     Facet loading Straight leg raise HOWIE FAIR   L Positive Negative Positive    R Positive Negative Positive      Positive SI compression test bilaterally, positive Gaenslen test bilaterally

## 2022-11-21 ENCOUNTER — OFFICE VISIT (OUTPATIENT)
Dept: PAIN MEDICINE | Facility: CLINIC | Age: 67
End: 2022-11-21

## 2022-11-21 VITALS
HEART RATE: 70 BPM | BODY MASS INDEX: 45.99 KG/M2 | SYSTOLIC BLOOD PRESSURE: 157 MMHG | DIASTOLIC BLOOD PRESSURE: 98 MMHG | WEIGHT: 293 LBS | HEIGHT: 67 IN

## 2022-11-21 DIAGNOSIS — M79.18 MYOFASCIAL PAIN SYNDROME: ICD-10-CM

## 2022-11-21 DIAGNOSIS — M54.50 CHRONIC BILATERAL LOW BACK PAIN WITHOUT SCIATICA: ICD-10-CM

## 2022-11-21 DIAGNOSIS — M47.816 LUMBAR SPONDYLOSIS: ICD-10-CM

## 2022-11-21 DIAGNOSIS — G89.29 CHRONIC BILATERAL LOW BACK PAIN WITHOUT SCIATICA: ICD-10-CM

## 2022-11-21 DIAGNOSIS — M46.1 SACROILIITIS (HCC): Primary | ICD-10-CM

## 2022-11-28 ENCOUNTER — TELEPHONE (OUTPATIENT)
Dept: PAIN MEDICINE | Facility: MEDICAL CENTER | Age: 67
End: 2022-11-28

## 2022-11-28 NOTE — TELEPHONE ENCOUNTER
Caller: Suha Trejo    Doctor: Dr Sánchez    Reason for call:Patient would like to speak to Dr Sánchez regarding different procedure.    Call back#: [phone]

## 2022-11-28 NOTE — TELEPHONE ENCOUNTER
S/w pt. Pt is scheduled for BL SI joint injection 12/6  Pt asking if she can proceed with RFA instead. Advised per ov note 11/21, SI joint injection is recommended next step by HS.  Per pt, MBB #1 gave her 1 week relief and MBB #2 gave her no relief. Advised on purpose of MBB and if no relief with MBB, RFA would likely not benefit her. Pt is OK proceeding with scheduled injection but wants to make sure it is most appropriate next step

## 2022-11-29 ENCOUNTER — HOSPITAL ENCOUNTER (OUTPATIENT)
Dept: RADIOLOGY | Facility: HOSPITAL | Age: 67
Discharge: HOME | End: 2022-11-29
Attending: STUDENT IN AN ORGANIZED HEALTH CARE EDUCATION/TRAINING PROGRAM

## 2022-11-29 DIAGNOSIS — M47.816 LUMBAR SPONDYLOSIS: ICD-10-CM

## 2022-12-02 ENCOUNTER — HOSPITAL ENCOUNTER (OUTPATIENT)
Dept: RADIOLOGY | Facility: HOSPITAL | Age: 67
Discharge: HOME | End: 2022-12-02
Attending: STUDENT IN AN ORGANIZED HEALTH CARE EDUCATION/TRAINING PROGRAM

## 2022-12-02 DIAGNOSIS — M47.816 LUMBAR SPONDYLOSIS: ICD-10-CM

## 2022-12-06 ENCOUNTER — HOSPITAL ENCOUNTER (OUTPATIENT)
Facility: HOSPITAL | Age: 67
Setting detail: OUTPATIENT SURGERY
Discharge: HOME | End: 2022-12-06
Attending: STUDENT IN AN ORGANIZED HEALTH CARE EDUCATION/TRAINING PROGRAM | Admitting: STUDENT IN AN ORGANIZED HEALTH CARE EDUCATION/TRAINING PROGRAM

## 2022-12-06 ENCOUNTER — HOSPITAL ENCOUNTER (OUTPATIENT)
Dept: RADIOLOGY | Facility: HOSPITAL | Age: 67
Setting detail: OUTPATIENT SURGERY
Discharge: HOME | End: 2022-12-06
Attending: STUDENT IN AN ORGANIZED HEALTH CARE EDUCATION/TRAINING PROGRAM

## 2022-12-06 VITALS
OXYGEN SATURATION: 97 % | SYSTOLIC BLOOD PRESSURE: 144 MMHG | TEMPERATURE: 97.6 F | DIASTOLIC BLOOD PRESSURE: 65 MMHG | RESPIRATION RATE: 22 BRPM | HEART RATE: 74 BPM

## 2022-12-06 DIAGNOSIS — M47.816 LUMBAR SPONDYLOSIS: ICD-10-CM

## 2022-12-06 RX ORDER — BUPIVACAINE HYDROCHLORIDE 2.5 MG/ML
INJECTION, SOLUTION EPIDURAL; INFILTRATION; INTRACAUDAL AS NEEDED
Status: DISCONTINUED | OUTPATIENT
Start: 2022-12-06 | End: 2022-12-06 | Stop reason: HOSPADM

## 2022-12-06 RX ORDER — LIDOCAINE HYDROCHLORIDE 10 MG/ML
INJECTION, SOLUTION EPIDURAL; INFILTRATION; INTRACAUDAL; PERINEURAL AS NEEDED
Status: DISCONTINUED | OUTPATIENT
Start: 2022-12-06 | End: 2022-12-06 | Stop reason: HOSPADM

## 2022-12-06 RX ORDER — METHYLPREDNISOLONE ACETATE 80 MG/ML
INJECTION, SUSPENSION INTRA-ARTICULAR; INTRALESIONAL; INTRAMUSCULAR; SOFT TISSUE AS NEEDED
Status: DISCONTINUED | OUTPATIENT
Start: 2022-12-06 | End: 2022-12-06 | Stop reason: HOSPADM

## 2022-12-06 RX ORDER — SODIUM CHLORIDE 9 MG/ML
INJECTION INTRAVENOUS AS NEEDED
Status: DISCONTINUED | OUTPATIENT
Start: 2022-12-06 | End: 2022-12-06 | Stop reason: HOSPADM

## 2022-12-06 NOTE — DISCHARGE INSTRUCTIONS
Steroid Joint Injection   WHAT YOU NEED TO KNOW:   A steroid joint injection is a procedure to inject steroid medicine into a joint  Steroid medicine decreases pain and inflammation  The injection may also contain an anesthetic (numbing medicine) to decrease pain  It may be done to treat conditions such as arthritis, gout, or carpal tunnel syndrome  The injections may be given in your knee, ankle, shoulder, elbow, wrist, ankle or sacroiliac joint  Do not apply heat to any area that is numb  If you have discomfort or soreness at the injection site, you may apply ice today, 20 minutes on and 20 minutes off  Tomorrow you may use ice or warm, moist heat  Do not apply ice or heat directly to the skin  You may have an increase or change in the discomfort for 36-48 hours after your treatment  Apply ice and continue with any pain medicine you have been prescribed  Do not do anything strenuous today  You may shower, but no tub baths or hot tubs today  You may resume your normal activities tomorrow, but do not “overdo it”  Resume normal activities slowly when you are feeling better  If you experience redness, drainage or swelling at the injection site, or if you develop a fever above 100 degrees, please call [facility] at [phone] or go to the Emergency Room  Continue to take all routine medicines prescribed by your primary care physician unless otherwise instructed by our staff  Most blood thinners should be started again according to your regularly scheduled dosing  If you have any questions, please give our office a call  As no general anesthesia was used in today's procedure, you should not experience any side effects related to anesthesia  If you are diabetic, the steroids used in today's injection may temporarily increase your blood sugar levels after the first few days after your injection   Please keep a close eye on your sugars and alert the doctor who manages your diabetes if your sugars are significantly high from your baseline or you are symptomatic  If you have a problem specifically related to your procedure, please call our office at [phone]  Problems not related to your procedure should be directed to your primary care physician

## 2022-12-07 NOTE — OP NOTE
OPERATIVE REPORT  PATIENT NAME: Anette Mcintyre    :  1955  MRN: [id number]  Pt Location: [facility] OR ROOM 01    SURGERY DATE: 2022    Surgeon(s) and Role:     * Jerrell Mir DO - Primary    Preop Diagnosis:  Sacroiliitis ([address] ) [M46 1]    Post-Op Diagnosis Codes:     * Sacroiliitis ([address] ) [M46 1]    Procedure(s) (LRB):  BILATERAL SACROILIAC JOINT INJECTION (65046) (Bilateral)    Specimen(s):  * No specimens in log *    Estimated Blood Loss:   Minimal    Drains:  Ureteral Drain/Stent Right ureter 6 Fr  (Active)   Number of days: 868       Ureteral Drain/Stent Left ureter 6 Fr  (Active)   Number of days: 784       Anesthesia Type:   Local    Operative Indications:  Sacroiliitis ([address] ) [M46 1]      Operative Findings:      Complications:   None    Procedure and Technique:  DATE: 2022  PROCEDURE: Bilateral Sacroiliac Joint Injection under Fluoroscopy  Physician: Aleksandar Mirza DO  PRE-OPERATIVE DIAGNOSIS: SI joint Arthropathy, Sacroiliitis   POST-OPERATIVE DIAGNOSIS: Same  ANESTHESIA: Local  EBL:  Zero  COMPLICATIONS: None    Indications  Anette Mcintyre is a 79 y o  female with a history of bilateral sacroiliitis who has failed conservative therapy and presents today seeking a more definitive interventional procedure for the pain  Informed Consent  The risks, benefits and alternatives of the procedure were discussed with the patient  The patient was given opportunity to ask questions regarding the procedure, its indications and the associated risks  The risks of the procedure discussed include but are not limited to infection, bleeding, headache, worsened pain, allergic reactions, nerve injuries, susceptibility to COVID from steroids, and side effects  The patient showed understanding and wished to proceed  Informed consent was signed  Preparation  After obtaining informed consent, the patient's chart was reviewed, and the patient's identification was confirmed   The patient was brought to the OR and placed in the prone position on the fluoroscopy table  Routine monitors were applied  A surgical timeout was performed per hospital policy  No skin lesions or signs of cellulitis were noted  Strict sterile technique was used  Skin was prepped with chloraprep solution and draped in sterile manner  The LEFT sacroiliac joint was identified under fluoroscopy  The fluoroscopic beam was then obliqued to align the anterior and posterior margins of sacroiliac joint  The inferior margin of the joint was identified  The skin overlying the joint was anesthetized with 1% Lidocaine using a 25 gauge needle  Afterwards, a 22 gauge 3 5 in needle was inserted through the anesthetized skin, and was advanced under fluoroscopic guidance until the targeted point was reached and the joint space entered  Aspiration was negative for heme and CSF  AP and lateral fluoroscopy were performed as needed confirm needle placement  Then 1 cc Omnipaque 240 contrast dye was injected to confirm appropriate subligamentous/periarticular spread with no vascular uptake  2 cc of a solution containing 3 cc of 0 25% Bupivacaine and 1 cc 80 mg depo was then injected with intermittent aspiration  There was no pain on injection  The procedure was repeated on the RIGHT SIDE  2 cc of a solution containing 3 cc of 0 25% Bupivacaine and 1 cc 80 mg depo was then injected with intermittent aspiration    The patient tolerated the procedure well  Vital signs remained stable throughout  The post-operative exam did not reveal any new neurological deficits  Patient was sent to the recovery room in a stable condition  The patient was informed when to follow up post procedure      Patient Disposition:  PACU         SIGNATURE: Kanika Schwartz DO  DATE: December 6, 2022  TIME: 9:51 PM

## 2022-12-07 NOTE — INTERVAL H&P NOTE
H&P reviewed  After examining the patient I find no changes in the patients condition since the H&P had been written      Vitals:    12/06/22 1023   BP: 144/65   Pulse: 74   Resp: 22   Temp:    SpO2: 97%

## 2022-12-13 ENCOUNTER — TELEPHONE (OUTPATIENT)
Dept: PAIN MEDICINE | Facility: CLINIC | Age: 67
End: 2022-12-13

## 2022-12-13 NOTE — TELEPHONE ENCOUNTER
Caller: patient    Doctor: atilio    Reason for call: pain level 3  Improvement 75%    Call back#:

## 2023-01-03 NOTE — PROGRESS NOTES
Portions of the record may have been created with voice recognition software  Occasional wrong word or "sound a like" substitutions may have occurred due to the inherent limitations of voice recognition software  Read the chart carefully and recognize, using context, where substitutions have occurred  Assessment:  1  Myofascial pain syndrome    2  Chronic right-sided low back pain without sciatica    3  Chronic pain syndrome    4  Lumbar spondylosis    5  Lumbar facet arthropathy    6  Sacroiliitis ([address] )        Plan:  No orders of the defined types were placed in this encounter  New Medications Ordered This Visit   Medications   • ezetimibe (ZETIA) 10 mg tablet   • gabapentin (NEURONTIN) 100 mg capsule     Sig: Take 1 capsule (100 mg total) by mouth daily at bedtime for 5 days, THEN 1 capsule (100 mg total) 2 (two) times a day for 5 days, THEN 1 capsule (100 mg total) 3 (three) times a day  Dispense:  105 capsule     Refill:  [de-identified]      51-year-old female with past medical history of morbid obesity presenting for follow-up in setting of chronic pain syndrome, myofascial pain, sacroiliitis and lumbar spondylosis  She underwent bilateral SI joint injection with significant provement of the left SI joint pain but continued pain in the right  Physical exam no relief with tenderness palpation in right lumbar sacral region     -Discussed with the patient that the cause of her pain is multifactorial in setting of obesity and chronic degenerative spine changes  Advised her to continue to remain active and consider aqua therapy and topical lidocaine or icy hot patch to the right side     -We will start the patient on low-dose gabapentin 100 mg at bedtime for 5 days, twice daily for 5 days and then 3 times daily  We discussed starting gabapentin  The patient understands that this is a seizure medication that may be used for pain   Risks were discussed and included but were not limited to drowsiness, dizziness, loss of coordination, and blurred/double vision  The patient understands that if they have these side effects they should not operate heavy machinery or drive  The patient verbalizes understanding of the risks, benefits, and alternatives to care, and wishes to proceed  -Follow-up in 2 months      My impressions and treatment recommendations were discussed in detail with the patient who verbalized understanding and had no further questions  Discharge instructions were provided  I personally saw and examined the patient and I agree with the above discussed plan of care  History of Present Illness:  Lino Gordon is a 79 y o  female with history of morbid obesity, chronic pain syndrome who presents for a follow up office visit in regards to Follow-up and Back Pain (Lower lumbar pain that radiates down right hip that is constant all day that is achy pain)  Since last visit, she underwent bilateral SI joint injection and noted more than 75% improvement in the left SI joint region but only temporary relief in the right SIJ region  The patient’s current symptoms include continued right lumbosacral region pain 5 out of 10 that is constant and worse when sitting  Pressure-like, throbbing, dull achy  Radiating pain into bilateral lower extremity    Other Symptoms:    The patient does not have bladder dysfunction  The patient does not have bowel dysfunction  The patient does not have chills and fever, night sweats or unintentional weight loss  Prior Appts:  11-: [id number] joint injection  10/21/2022: Follow-up-plan for TPI  9-23-22: Consult-Plan for LMBB and possible SI,SELENE,trigger point injection      Specialists Seen:      Current Pain Medications:  Robaxin, Voltaren gel, Tylenol    Pain Medications Trialed:     Interventional Techniques Employed:  LMBB #1 9-27-22: More than 80% relief for 8+ hours  LMBB #2: 10-11-22: less than 80% relief for 8+ hours     Given inconclusive results, we will not proceed with RFA at this time may consider repeat L MBB in the future  TPI:  10/28/2022:  100% relief for 2 days and then gradual return of pain  Bilateral SI joint injection: 12-6/2022: More than 75% relief on the left SI joint and minimal temporary relief on the right SI J  Imaging:  No MRI cervical spine results found in the past 2 years   No MRI lumbar spine results found in the past 2 years   No MRI thoracic spine results found in the past 2 years   No X-ray cervical spine results found in the past 2 years   No X-ray lumbar spine results found in the past 2 years   No X-ray hip/pelvis results found in the past 2 years   No X-ray knee results found in the past 2 years     Lab Results   Component Value Date    CREATININE 0 80 03/11/2022     Lab Results   Component Value Date    ALT 18 03/11/2022    ALT 22 06/13/2020       I have personally reviewed and/or updated the patient's past medical history, past surgical history, family history, social history, current medications, allergies, and vital signs today  Review of Systems   Musculoskeletal: Positive for back pain, gait problem and joint swelling          Right hip       Patient Active Problem List   Diagnosis   • Rheumatoid arthritis (HCC)   • Gastroesophageal reflux disease without esophagitis   • DANNY (obstructive sleep apnea)   • Vertigo   • Mixed hyperlipidemia   • Stented coronary artery   • Coronary artery disease of native artery of native heart with stable angina pectoris ([facility])   • Calculus of ureter   • UTI (urinary tract infection)   • S/P ureteral stent placement   • Hematuria   • Calculus of kidney   • Class 3 severe obesity due to excess calories with serious comorbidity and body mass index (BMI) of 45 0 to 49 9 in adult [facility])   • Pre-diabetes   • Chronic obstructive pulmonary disease, unspecified COPD type ([facility] [address] )   • History of COVID-19       Past Medical History:   Diagnosis Date   • Abnormal Pap smear of cervix     cin3; hpv non 16/18 +(12/2016); LGSIL 6/2018   • Arthritis    • Cancer ([facility] [address] )     cervical   • Coronary artery disease    • CPAP (continuous positive airway pressure) dependence    • GERD (gastroesophageal reflux disease)    • History of pneumonia     2 yrs ago   • HPV (human papilloma virus) infection    • Hypercholesterolemia    • Hypertension    • Kidney stone    • Morbid obesity ([facility] [address] )    • Motion sickness    • Myocardial infarct ([facility] )     X2; 2000 and 2002//[facility] cardiology Dr Colette Granado yearly   • Pleuritic chest pain     "from constant lifting on the job chest muscle area"   • PONV (postoperative nausea and vomiting)     "extremely with the gas"   • Rheumat[facility] ([facility] )     Rheu and cervical   • Shortness of breath     moderate activity// pt does work full time/on and off feet in warehouse   • Sleep apnea 2017   • Vertigo    • Wears partial dentures     lower       Past Surgical History:   Procedure Laterality Date   • ANGIOPLASTY Right 09/14/2015    right groin / femoral    • BIOPSY CORE NEEDLE  1980   • CARDIAC CATHETERIZATION  2015    star close - closure system   • CARDIAC CATHETERIZATION  2000    angioplasty/[facility]   • CERVICAL CONE BIOPSY      1980's   • COLONOSCOPY      2006, 2019   • COLONOSCOPY     • DILATION AND CURETTAGE OF UTERUS     • FL RETROGRADE PYELOGRAM  7/2/2020   • FL RETROGRADE PYELOGRAM  10/13/2020   • MAMMO (HISTORICAL)  2018    Neg   • OTHER SURGICAL HISTORY Right 11/2017    Surgical removal of lypoma shoulder blade   • MN CYSTO BLADDER W/URETERAL CATHETERIZATION Right 7/2/2020    Procedure: CYSTOSCOPY RETROGRADE PYELOGRAM WITH INSERTION STENT URETERAL;  Surgeon: Jackie Paulino MD;  [facility];  Service: Urology   • MN CYSTO/URETERO W/LITHOTRIPSY &INDWELL STENT INSRT Right 7/21/2020    Procedure: CYSTO, URETEROSCOPY STONE BASKET EXTRACTION , RETROGRADE PYELOGRAM, STENT;  Surgeon: Bishop Hernandez MD;  Location: [facility];  Service: Urology   • MN CYSTO/URETERO W/LITHOTRIPSY &INDWELL STENT INSRT Left 10/13/2020    Procedure: CYSTOSCOPY URETEROSCOPY WITH LITHOTRIPSY HOLMIUM LASER, RETROGRADE PYELOGRAM AND INSERTION STENT URETERAL;  Surgeon: Neftali Dukes MD;  [facility];  Service: Urology   • MA INJECT SI JOINT ARTHRGRPHY&/ANES/STEROID W/REFUGIO Bilateral 2022    Procedure: BILATERAL SACROILIAC JOINT INJECTION ([id number]); Surgeon: Darroll Kocher, DO;  Location: [facility];  Service: Pain Management    • MA NJX DX/THER AGT PVRT FACET JT LMBR/SAC 1 LEVEL Bilateral 2022    Procedure: BILATERAL L4-S1 MEDIAL BRANCH BLOCK;  Surgeon: Darroll Kocher, DO;  [facility]: [facility];  Service: Pain Management    • MA NJX DX/THER AGT PVRT FACET JT LMBR/SAC 1 LEVEL Bilateral 10/11/2022    Procedure: BILATERAL L4-S1 MEDIAL BRANCH BLOCK ([id number],[id number]); Surgeon: Darroll Kocher, DO;  Location: [facility];  Service: Pain Management    • SHOULDER SURGERY Right     had a lympoma done in 2017 also same shoulder   • SHOULDER SURGERY Right 2014   • TONSILLECTOMY     • WISDOM TOOTH EXTRACTION         Family History   Problem Relation Age of Onset   • Cancer Mother    • Lung cancer Mother    • Heart disease Father        Social History     Occupational History   • Occupation:    Tobacco Use   • Smoking status: Former     Packs/day: 2 00     Years: 30 00     Pack years: 60 00     Types: Cigarettes     Quit date: 7/15/1998     Years since quittin 4   • Smokeless tobacco: Never   Vaping Use   • Vaping Use: Never used   Substance and Sexual Activity   • Alcohol use:  Yes   • Drug use: Never   • Sexual activity: Not Currently       Current Outpatient Medications on File Prior to Visit   Medication Sig   • Ascorbic Acid, Vitamin C, (VITAMIN C) 100 MG tablet Take 1,000 mg by mouth daily   • aspirin 81 MG tablet Take 162 mg by mouth daily   • Cholecalciferol 50 MCG (2000 UT) CAPS Take 1 capsule by mouth daily   • Cranberry 450 MG TABS Take 1 tablet by mouth 2 (two) times a day   • Diclofenac Sodium (VOLTAREN) 1 % APPLY 4 GM FOUR TIMES A DAY AS NEEDED TRANSDERMAL 30 DAYS   • ezetimibe (ZETIA) 10 mg tablet    • fenofibrate (TRICOR) 54 MG tablet    • Flaxseed, Linseed, (FLAX SEEDS PO) Take 1 tablet by mouth daily   • folic acid (FOLVITE) 1 mg tablet Take 1,000 mcg by mouth daily   • Humira Pen 40 MG/0 4ML PNKT Finishes end of march 2021   • Ibuprofen 200 MG CAPS    • loratadine (CLARITIN) 10 mg tablet Take 10 mg by mouth daily   • meclizine (ANTIVERT) 25 mg tablet Take 1 tablet (25 mg total) by mouth 3 (three) times a day as needed for dizziness   • methocarbamol (ROBAXIN) 750 mg tablet    • methotrexate 2 5 mg tablet 2 5 mg once a week Takes on Fridays  Last dose10/9/20  8 PILLS ONE TIME A WEEK   • metoprolol tartrate (LOPRESSOR) 25 mg tablet Take 12 5 mg by mouth every 12 (twelve) hours    • multivitamin-minerals (CENTRUM) tablet Take by mouth daily   • OMEGA-3 FATTY ACIDS PO Take 1 g by mouth 2 (two) times a day    • ondansetron (Zofran ODT) 4 mg disintegrating tablet Take 1 tablet (4 mg total) by mouth every 6 (six) hours as needed for nausea or vomiting   • pantoprazole (PROTONIX) 40 mg tablet Take 1 tablet (40 mg total) by mouth daily   • rosuvastatin (CRESTOR) 20 MG tablet Take 20 mg by mouth every evening    • rosuvastatin (CRESTOR) 40 MG tablet    • diazepam (VALIUM) 5 mg tablet Take 1 tablet (5 mg total) by mouth every 8 (eight) hours as needed for muscle spasms for up to 10 days     No current facility-administered medications on file prior to visit         Allergies   Allergen Reactions   • Hydrocodone Vomiting     "can take oxycodone without a problem"   • Atorvastatin Myalgia   • Caffeine - Food Allergy Tachycardia     And high blood pressure   • Diphenhydramine Hcl (Sleep) Itching   • Pravastatin Myalgia   • Simvastatin Myalgia       Physical Exam:    /79   Pulse 87   Ht 5' 7" (1 702 m) Comment: verbal  Wt (!) 143 kg (315 lb)   BMI 49 34 kg/m²     Constitutional:obese   Eyes:anicteric  HEENT:grossly intact  Neck:supple, symmetric, trachea midline and no masses   Pulmonary:even and unlabored  Cardiovascular:No edema or pitting edema present  Skin:Normal without rashes or lesions and well hydrated  Psychiatric:Mood and affect appropriate  Neurologic:Cranial Nerves II-XII grossly intact  Musculoskeletal:normal  Tenderness to palpation of the right lumbosacral and SI joint region but no erythema, hematoma or swelling noted  Minimal tenderness palpation on the left lumbar sacral region  No gross motor or sensory deficits noted

## 2023-01-06 ENCOUNTER — OFFICE VISIT (OUTPATIENT)
Dept: PAIN MEDICINE | Facility: CLINIC | Age: 68
End: 2023-01-06

## 2023-01-06 VITALS
HEIGHT: 67 IN | SYSTOLIC BLOOD PRESSURE: 117 MMHG | WEIGHT: 293 LBS | BODY MASS INDEX: 45.99 KG/M2 | DIASTOLIC BLOOD PRESSURE: 79 MMHG | HEART RATE: 87 BPM

## 2023-01-06 DIAGNOSIS — M54.50 CHRONIC RIGHT-SIDED LOW BACK PAIN WITHOUT SCIATICA: ICD-10-CM

## 2023-01-06 DIAGNOSIS — G89.29 CHRONIC RIGHT-SIDED LOW BACK PAIN WITHOUT SCIATICA: ICD-10-CM

## 2023-01-06 DIAGNOSIS — M47.816 LUMBAR FACET ARTHROPATHY: ICD-10-CM

## 2023-01-06 DIAGNOSIS — M47.816 LUMBAR SPONDYLOSIS: ICD-10-CM

## 2023-01-06 DIAGNOSIS — M46.1 SACROILIITIS (HCC): ICD-10-CM

## 2023-01-06 DIAGNOSIS — G89.4 CHRONIC PAIN SYNDROME: ICD-10-CM

## 2023-01-06 DIAGNOSIS — M79.18 MYOFASCIAL PAIN SYNDROME: Primary | ICD-10-CM

## 2023-01-06 RX ORDER — EZETIMIBE 10 MG/1
TABLET ORAL
COMMUNITY
Start: 2022-12-16

## 2023-01-06 RX ORDER — GABAPENTIN 100 MG/1
CAPSULE ORAL
Qty: 105 CAPSULE | Refills: 0 | Status: SHIPPED | OUTPATIENT
Start: 2023-01-06 | End: 2023-02-15

## 2023-02-21 DIAGNOSIS — K21.9 GERD WITHOUT ESOPHAGITIS: ICD-10-CM

## 2023-03-06 ENCOUNTER — OFFICE VISIT (OUTPATIENT)
Dept: FAMILY MEDICINE CLINIC | Facility: CLINIC | Age: 68
End: 2023-03-06

## 2023-03-06 VITALS
RESPIRATION RATE: 16 BRPM | HEART RATE: 81 BPM | HEIGHT: 67 IN | SYSTOLIC BLOOD PRESSURE: 124 MMHG | DIASTOLIC BLOOD PRESSURE: 80 MMHG | OXYGEN SATURATION: 96 % | BODY MASS INDEX: 45.99 KG/M2 | WEIGHT: 293 LBS

## 2023-03-06 DIAGNOSIS — J44.9 CHRONIC OBSTRUCTIVE PULMONARY DISEASE, UNSPECIFIED COPD TYPE (HCC): ICD-10-CM

## 2023-03-06 DIAGNOSIS — R73.03 PRE-DIABETES: Primary | ICD-10-CM

## 2023-03-06 DIAGNOSIS — I25.118 CORONARY ARTERY DISEASE OF NATIVE ARTERY OF NATIVE HEART WITH STABLE ANGINA PECTORIS (HCC): ICD-10-CM

## 2023-03-06 DIAGNOSIS — E55.9 VITAMIN D DEFICIENCY: ICD-10-CM

## 2023-03-06 DIAGNOSIS — D84.9 IMMUNODEFICIENCY, UNSPECIFIED (HCC): ICD-10-CM

## 2023-03-06 DIAGNOSIS — M06.9 RHEUMATOID ARTHRITIS, INVOLVING UNSPECIFIED SITE, UNSPECIFIED WHETHER RHEUMATOID FACTOR PRESENT (HCC): ICD-10-CM

## 2023-03-06 DIAGNOSIS — G47.33 OSA (OBSTRUCTIVE SLEEP APNEA): ICD-10-CM

## 2023-03-06 DIAGNOSIS — E66.01 CLASS 3 SEVERE OBESITY DUE TO EXCESS CALORIES WITH SERIOUS COMORBIDITY AND BODY MASS INDEX (BMI) OF 50.0 TO 59.9 IN ADULT (HCC): ICD-10-CM

## 2023-03-06 DIAGNOSIS — E78.2 MIXED HYPERLIPIDEMIA: ICD-10-CM

## 2023-03-06 DIAGNOSIS — R42 VERTIGO: ICD-10-CM

## 2023-03-06 DIAGNOSIS — Z00.00 WELL ADULT EXAM: ICD-10-CM

## 2023-03-06 DIAGNOSIS — Z79.899 OTHER LONG TERM (CURRENT) DRUG THERAPY: ICD-10-CM

## 2023-03-06 DIAGNOSIS — E53.8 B12 DEFICIENCY: ICD-10-CM

## 2023-03-06 PROBLEM — N20.0 CALCULUS OF KIDNEY: Status: RESOLVED | Noted: 2020-07-31 | Resolved: 2023-03-06

## 2023-03-06 PROBLEM — N20.1 CALCULUS OF URETER: Status: RESOLVED | Noted: 2020-07-01 | Resolved: 2023-03-06

## 2023-03-06 PROBLEM — Z96.0 S/P URETERAL STENT PLACEMENT: Status: RESOLVED | Noted: 2020-07-27 | Resolved: 2023-03-06

## 2023-03-06 PROBLEM — R31.9 HEMATURIA: Status: RESOLVED | Noted: 2020-07-27 | Resolved: 2023-03-06

## 2023-03-06 PROBLEM — N39.0 UTI (URINARY TRACT INFECTION): Status: RESOLVED | Noted: 2020-07-01 | Resolved: 2023-03-06

## 2023-03-06 NOTE — PROGRESS NOTES
Assessment and Plan:   Cont with heather and pulmonary   We can run the labs now   Call ins about wegovy   Consider bariatrics     Problem List Items Addressed This Visit        Respiratory    DANNY (obstructive sleep apnea)    Chronic obstructive pulmonary disease, unspecified COPD type ([place] [address] )       Cardiovascular and Mediastinum    Coronary artery disease of native artery of native heart with stable angina pectoris ([facility] )       Musculoskeletal and Integument    Rheumatoid arthritis ([place] [address] )       Other    Vertigo    Mixed hyperlipidemia    Relevant Orders    Lipid Panel with Direct LDL reflex    Pre-diabetes - Primary    Immunodeficiency, unspecified ([place] [address] )   Other Visit Diagnoses     Vitamin D deficiency        taking VMI and otc D  2,000    Relevant Orders    Vitamin D 25 hydroxy    Well adult exam        Class 3 severe obesity due to excess calories with serious comorbidity and body mass index (BMI) of 50 0 to 59 9 in adult [facility])        Other long term (current) drug therapy        Relevant Orders    HEMOGLOBIN A1C W/ EAG ESTIMATION    B12 deficiency        Relevant Orders    Vitamin B12        BMI Counseling: Body mass index is 51 06 kg/m²  The BMI is above normal  Nutrition recommendations include decreasing portion sizes, encouraging healthy choices of fruits and vegetables, decreasing fast food intake, consuming healthier snacks and limiting drinks that contain sugar  Exercise recommendations include exercising 3-5 times per week  No pharmacotherapy was ordered  Rationale for BMI follow-up plan is due to patient being overweight or obese  Depression Screening and Follow-up Plan: Patient was screened for depression during today's encounter  They screened negative with a PHQ-2 score of 0  Falls Plan of Care: balance, strength, and gait training instructions were provided  Medications that increase falls were reviewed         Preventive health issues were discussed with patient, and age appropriate screening tests were ordered as noted in patient's After Visit Summary  Personalized health advice and appropriate referrals for health education or preventive services given if needed, as noted in patient's After Visit Summary  History of Present Illness:     Patient presents for a Medicare Wellness Visit    Here to go over chronic issues and labs / imaging studies if applicable  Seeing PM    And durback - on humira     Wt loss is impt   Does have gym membership   Can do the pool   Cant do the tredmil   Vertigo worsens the eliptical        Patient Care Team:  Justo Moran MD as PCP - General     Review of Systems:     Review of Systems   Constitutional: Negative for fever and unexpected weight change  HENT: Negative for nosebleeds and trouble swallowing  Eyes: Negative for visual disturbance  Respiratory: Negative for chest tightness and shortness of breath  Cardiovascular: Negative for chest pain, palpitations and leg swelling  Gastrointestinal: Negative for abdominal pain, constipation, diarrhea and nausea  Endocrine: Negative for cold intolerance  Genitourinary: Negative for dysuria and urgency  Musculoskeletal: Positive for arthralgias, back pain and gait problem  Negative for joint swelling and myalgias  Skin: Negative for rash  Neurological: Negative for tremors, seizures and syncope  Hematological: Does not bruise/bleed easily  Psychiatric/Behavioral: Negative for hallucinations and suicidal ideas          Problem List:     Patient Active Problem List   Diagnosis   • Rheumatoid arthritis ([address] )   • Gastroesophageal reflux disease without esophagitis   • DANNY (obstructive sleep apnea)   • Vertigo   • Mixed hyperlipidemia   • Stented coronary artery   • Coronary artery disease of native artery of native heart with stable angina pectoris (HCC)   • Class 3 severe obesity due to excess calories with serious comorbidity and body mass index (BMI) of 45 0 to 49 9 in adult [facility])   • Pre-diabetes   • Chronic obstructive pulmonary disease, unspecified COPD type ([place] [address] )   • History of COVID-19   • Immunodeficiency, unspecified ([place] [address] )      Past Medical and Surgical History:     Past Medical History:   Diagnosis Date   • Abnormal Pap smear of cervix     cin3; hpv non 16/18 +(12/2016); LGSIL 6/2018   • Arthritis    • Cancer ([place] [address] )     cervical   • Coronary artery disease    • CPAP (continuous positive airway pressure) dependence    • GERD (gastroesophageal reflux disease)    • History of pneumonia     2 yrs ago   • HPV (human papilloma virus) infection    • Hypercholesterolemia    • Hypertension    • Kidney stone    • Morbid obesity ([place] [address] )    • Motion sickness    • Myocardial infarct ([place] [address] )     X2; 2000 and 2002//[facility] cardiology Dr Gregg Rout yearly   • Pleuritic chest pain     "from constant lifting on the job chest muscle area"   • PONV (postoperative nausea and vomiting)     "extremely with the gas"   • Rheumatoid arthritis ([place] [address] )     Rheu and cervical   • Shortness of breath     moderate activity// pt does work full time/on and off feet in warehouse   • Sleep apnea 2017   • Vertigo    • Wears partial dentures     lower     Past Surgical History:   Procedure Laterality Date   • ANGIOPLASTY Right 09/14/2015    right groin / femoral    • BIOPSY CORE NEEDLE  1980   • CARDIAC CATHETERIZATION  2015    star close - closure system   • CARDIAC CATHETERIZATION  2000    angioplasty/[facility]   • CERVICAL CONE BIOPSY      1980's   • COLONOSCOPY      2006, 2019   • COLONOSCOPY     • DILATION AND CURETTAGE OF UTERUS     • FL RETROGRADE PYELOGRAM  7/2/2020   • FL RETROGRADE PYELOGRAM  10/13/2020   • MAMMO (HISTORICAL)  2018    Neg   • OTHER SURGICAL HISTORY Right 11/2017    Surgical removal of lypoma shoulder blade   • WA CYSTO BLADDER W/URETERAL CATHETERIZATION Right 7/2/2020    Procedure: CYSTOSCOPY RETROGRADE PYELOGRAM WITH INSERTION STENT URETERAL;  Surgeon: Tremayne Davalos MD;  [facility];  Service: Urology   • WA CYSTO/URETERO W/LITHOTRIPSY &INDWELL STENT INSRT Right 2020    Procedure: CYSTO, URETEROSCOPY STONE BASKET EXTRACTION , RETROGRADE PYELOGRAM, STENT;  Surgeon: Wayne Johnson MD;  [facility];  Service: Urology   • [place] CYSTO/URETERO W/LITHOTRIPSY &INDWELL STENT INSRT Left 10/13/2020    Procedure: CYSTOSCOPY URETEROSCOPY WITH LITHOTRIPSY HOLMIUM LASER, RETROGRADE PYELOGRAM AND INSERTION STENT URETERAL;  Surgeon: Wayne Johnson MD;  [facility];  Service: Urology   • KY INJECT SI JOINT ARTHRGRPHY&/ANES/STEROID W/REFUGIO Bilateral 2022    Procedure: BILATERAL SACROILIAC JOINT INJECTION (26590); Surgeon: Chevy Enriquez DO;  Location: [facility];  Service: Pain Management    • [place] NJX DX/THER AGT PVRT FACET JT LMBR/SAC 1 LEVEL Bilateral 2022    Procedure: BILATERAL L4-S1 MEDIAL BRANCH BLOCK;  Surgeon: Chevy Enriquez DO;  [facility];  Service: Pain Management    • [place] NJX DX/THER AGT PVRT FACET JT LMBR/SAC 1 LEVEL Bilateral 10/11/2022    Procedure: BILATERAL L4-S1 MEDIAL BRANCH BLOCK (02695,65865);   Surgeon: Chevy Enriquez DO;  [facility];  Service: Pain Management    • SHOULDER SURGERY Right 2014    had a lympoma done in 2017 also same shoulder   • SHOULDER SURGERY Right 2014   • TONSILLECTOMY     • WISDOM TOOTH EXTRACTION        Family History:     Family History   Problem Relation Age of Onset   • Cancer Mother    • Lung cancer Mother    • Heart disease Father       Social History:     Social History     Socioeconomic History   • Marital status:      Spouse name: None   • Number of children: 0   • Years of education: 15   • Highest education level: High school graduate   Occupational History   • Occupation:    Tobacco Use   • Smoking status: Former     Packs/day: 2 00     Years: 30 00     Pack years: 60 00     Types: Cigarettes     Quit date: 7/15/1998     Years since quittin 6   • Smokeless tobacco: Never   Vaping Use   • Vaping Use: Never used   Substance and Sexual Activity   • Alcohol use: Yes   • Drug use: Never   • Sexual activity: Not Currently   Other Topics Concern   • None   Social History Narrative    Most recent tobacco use screenin-    Do you currently or have you served in [employer]: No    Occupation: neetu    Relationship status:     Live alone or with others: alone    Number of children: 0    Siblings: 5    Has smoked since age: 15    Chewing tobacco: none    Alcohol intake: Occasional    Caffeine intake: None    Illicit drugs: none    Diet: Regular    Exercise level: None    Family history of heart disease: Yes    Positive for CAD    Overweight: Yes    Obese: Yes    Advance directive: Yes    Blood Transfusion: No    Marital status:     High cholesterol: Yes    High blood pressure: Yes    Diabetes: No    General stress level: Low    Presence of domestic violence: No    Guns present in home: No    Seat belts used routinely: Yes    Sexual orientation: Heterosexual    Sunscreen used routinely: Yes    Seat belt/car seat used routinely: Yes    Exercise-How often do you exercise: moderate    Exercise- What type of exercise do you do: work    Do you have regular medical check ups: Yes    Do you have regular dental check ups: Yes    Illicit drugs-years of use: 0    Smoke alarm in home: Yes    International travel: NEG    Pets: Yes    Passive smoke exposure: No    Asbestos exposure: No    TB exposure: No    Environmental exposure: Yes    just dust    Animal exposure: Yes     Social Determinants of Health     Financial Resource Strain: Low Risk    • Difficulty of Paying Living Expenses: Not hard at all   Food Insecurity: Not on file   Transportation Needs: No Transportation Needs   • Lack of Transportation (Medical): No   • Lack of Transportation (Non-Medical):  No   Physical Activity: Not on file   Stress: Not on file   Social Connections: Not on file   Intimate Partner Violence: Not on file   Housing Stability: Not on file Medications and Allergies:     Current Outpatient Medications   Medication Sig Dispense Refill   • Ascorbic Acid, Vitamin C, (VITAMIN C) 100 MG tablet Take 1,000 mg by mouth daily     • aspirin 81 MG tablet Take 162 mg by mouth daily     • Cholecalciferol 50 MCG (2000 UT) CAPS Take 1 capsule by mouth daily     • Cranberry 450 MG TABS Take 1 tablet by mouth 2 (two) times a day     • Diclofenac Sodium (VOLTAREN) 1 % APPLY 4 GM FOUR TIMES A DAY AS NEEDED TRANSDERMAL 30 DAYS     • ezetimibe (ZETIA) 10 mg tablet      • fenofibrate (TRICOR) 54 MG tablet      • Flaxseed, Linseed, (FLAX SEEDS PO) Take 1 tablet by mouth daily     • folic acid (FOLVITE) 1 mg tablet Take 1,000 mcg by mouth daily     • gabapentin (NEURONTIN) 100 mg capsule Take 1 capsule (100 mg total) by mouth 3 (three) times a day 90 capsule 0   • Humira Pen 40 MG/0 4ML PNKT Finishes end of march 2021     • Ibuprofen 200 MG CAPS      • loratadine (CLARITIN) 10 mg tablet Take 10 mg by mouth daily     • meclizine (ANTIVERT) 25 mg tablet Take 1 tablet (25 mg total) by mouth 3 (three) times a day as needed for dizziness 90 tablet 0   • methocarbamol (ROBAXIN) 750 mg tablet      • methotrexate 2 5 mg tablet 2 5 mg once a week Takes on Fridays  Last dose10/9/20  8 PILLS ONE TIME A WEEK     • metoprolol tartrate (LOPRESSOR) 25 mg tablet Take 12 5 mg by mouth every 12 (twelve) hours      • multivitamin-minerals (CENTRUM) tablet Take by mouth daily     • OMEGA-3 FATTY ACIDS PO Take 1 g by mouth 2 (two) times a day      • ondansetron (Zofran ODT) 4 mg disintegrating tablet Take 1 tablet (4 mg total) by mouth every 6 (six) hours as needed for nausea or vomiting 30 tablet 2   • pantoprazole (PROTONIX) 40 mg tablet Take 1 tablet (40 mg total) by mouth daily 90 tablet 1   • rosuvastatin (CRESTOR) 20 MG tablet Take 20 mg by mouth every evening      • rosuvastatin (CRESTOR) 40 MG tablet      • diazepam (VALIUM) 5 mg tablet Take 1 tablet (5 mg total) by mouth every 8 (eight) hours as needed for muscle spasms for up to 10 days 12 tablet 0     No current facility-administered medications for this visit  Allergies   Allergen Reactions   • Hydrocodone Vomiting     "can take oxycodone without a problem"   • Atorvastatin Myalgia   • Caffeine - Food Allergy Tachycardia     And high blood pressure   • Diphenhydramine Hcl (Sleep) Itching   • Pravastatin Myalgia   • Simvastatin Myalgia      Immunizations:     Immunization History   Administered Date(s) Administered   • COVID-19 MODERNA VACC 0 5 ML IM 01/18/2022   • COVID-19 PFIZER VACCINE 0 3 ML IM 03/29/2021, 04/20/2021   • INFLUENZA 10/18/2007, 10/06/2017, 09/24/2018, 09/01/2019, 10/17/2022   • Influenza Quadrivalent Preservative Free 3 years and older IM 09/15/2020   • Influenza, Seasonal Vaccine, Quadrivalent, Adjuvanted,   5e 10/17/2022   • Influenza, high dose seasonal 0 7 mL 10/08/2021   • Influenza, injectable, quadrivalent, preservative free 0 5 mL 09/16/2020   • Influenza, recombinant, quadrivalent,injectable, preservative free 09/01/2019   • Influenza, seasonal, injectable 10/18/2007   • Pneumococcal Conjugate PCV 7 10/02/2017   • Pneumococcal Polysaccharide PPV23 10/12/2020      Health Maintenance:         Topic Date Due   • Hepatitis C Screening  Never done   • Breast Cancer Screening: Mammogram  02/17/2023   • DXA SCAN  01/18/2027   • Colorectal Cancer Screening  01/17/2029         Topic Date Due   • Pneumococcal Vaccine: 65+ Years (2 - PCV) 10/12/2021   • COVID-19 Vaccine (4 - Booster) 03/15/2022      Medicare Screening Tests and Risk Assessments:     Julián Hylton is here for her Subsequent Wellness visit  Last Medicare Wellness visit information reviewed, patient interviewed and updates made to the record today  Health Risk Assessment:   Patient rates overall health as good  Patient feels that their physical health rating is same  Patient is very satisfied with their life  Eyesight was rated as same   Hearing was rated as same  Patient feels that their emotional and mental health rating is same  Patients states they are never, rarely angry  Patient states they are never, rarely unusually tired/fatigued  Pain experienced in the last 7 days has been none  Patient states that she has experienced no weight loss or gain in last 6 months  Depression Screening:   PHQ-2 Score: 0      Fall Risk Screening: In the past year, patient has experienced: no history of falling in past year      Urinary Incontinence Screening:   Patient has leaked urine accidently in the last six months  Home Safety:  Patient does not have trouble with stairs inside or outside of their home  Patient has working smoke alarms and has working carbon monoxide detector  Home safety hazards include: none  Nutrition:   Current diet is Regular  Medications:   Patient is currently taking over-the-counter supplements  OTC medications include: see medication list  Patient is able to manage medications  Activities of Daily Living (ADLs)/Instrumental Activities of Daily Living (IADLs):   Walk and transfer into and out of bed and chair?: Yes  Dress and groom yourself?: Yes    Bathe or shower yourself?: Yes    Feed yourself? Yes  Do your laundry/housekeeping?: Yes  Manage your money, pay your bills and track your expenses?: Yes  Make your own meals?: Yes    Do your own shopping?: Yes    Previous Hospitalizations:   Any hospitalizations or ED visits within the last 12 months?: Yes    How many hospitalizations have you had in the last year?: 3-4    Advance Care Planning:   Living will: Yes    Durable POA for healthcare:  Yes    Advanced directive: Yes    Five wishes given: No      Cognitive Screening:   Provider or family/friend/caregiver concerned regarding cognition?: No    PREVENTIVE SCREENINGS      Cardiovascular Screening:    General: Screening Not Indicated and History Lipid Disorder    Due for: Lipid Panel      Diabetes Screening:     General: Screening Current    Due for: Blood Glucose      Colorectal Cancer Screening:     General: Screening Current      Breast Cancer Screening:     General: Screening Current      Cervical Cancer Screening:    General: Screening Not Indicated      Osteoporosis Screening:    General: Screening Current      Abdominal Aortic Aneurysm (AAA) Screening:        General: Screening Not Indicated      Lung Cancer Screening:     General: Screening Not Indicated      Hepatitis C Screening:      Hep C Screening Accepted: No     Screening, Brief Intervention, and Referral to Treatment (SBIRT)    Screening  Typical number of drinks in a day: 0  Typical number of drinks in a week: 0  Interpretation: Low risk drinking behavior  Single Item Drug Screening:  How often have you used an illegal drug (including marijuana) or a prescription medication for non-medical reasons in the past year? never    Single Item Drug Screen Score: 0  Interpretation: Negative screen for possible drug use disorder    Brief Intervention  Alcohol & drug use screenings were reviewed  No concerns regarding substance use disorder identified  No results found       Physical Exam:     /80 (BP Location: Right arm, Patient Position: Sitting, Cuff Size: Large)   Pulse 81   Resp 16   Ht 5' 7" (1 702 m)   Wt (!) 148 kg (326 lb)   SpO2 96%   BMI 51 06 kg/m²     Physical Exam     Cherry Rosado MD

## 2023-03-06 NOTE — PATIENT INSTRUCTIONS
Medicare Preventive Visit Patient Instructions  Thank you for completing your Welcome to Medicare Visit or Medicare Annual Wellness Visit today  Your next wellness visit will be due in one year (3/6/2024)  The screening/preventive services that you may require over the next 5-10 years are detailed below  Some tests may not apply to you based off risk factors and/or age  Screening tests ordered at today's visit but not completed yet may show as past due  Also, please note that scanned in results may not display below  Preventive Screenings:  Service Recommendations Previous Testing/Comments   Colorectal Cancer Screening  * Colonoscopy    * Fecal Occult Blood Test (FOBT)/Fecal Immunochemical Test (FIT)  * Fecal DNA/Cologuard Test  * Flexible Sigmoidoscopy Age: 39-70 years old   Colonoscopy: every 10 years (may be performed more frequently if at higher risk)  OR  FOBT/FIT: every 1 year  OR  Cologuard: every 3 years  OR  Sigmoidoscopy: every 5 years  Screening may be recommended earlier than age 39 if at higher risk for colorectal cancer  Also, an individualized decision between you and your healthcare provider will decide whether screening between the ages of 74-80 would be appropriate  Colonoscopy: 01/17/2019  FOBT/FIT: Not on file  Cologuard: Not on file  Sigmoidoscopy: Not on file    Screening Current     Breast Cancer Screening Age: 36 years old  Frequency: every 1-2 years  Not required if history of left and right mastectomy Mammogram: 02/17/2022    Screening Current   Cervical Cancer Screening Between the ages of 21-29, pap smear recommended once every 3 years  Between the ages of 33-67, can perform pap smear with HPV co-testing every 5 years     Recommendations may differ for women with a history of total hysterectomy, cervical cancer, or abnormal pap smears in past  Pap Smear: 02/03/2020    Screening Not Indicated   Hepatitis C Screening Once for adults born between 1945 and 1965  More frequently in patients at high risk for Hepatitis C Hep C Antibody: Not on file        Diabetes Screening 1-2 times per year if you're at risk for diabetes or have pre-diabetes Fasting glucose: No results in last 5 years (No results in last 5 years)  A1C: 6 4 % (10/6/2022)  Screening Current   Cholesterol Screening Once every 5 years if you don't have a lipid disorder  May order more often based on risk factors  Lipid panel: 10/06/2022    Screening Not Indicated  History Lipid Disorder     Other Preventive Screenings Covered by Medicare:  1  Abdominal Aortic Aneurysm (AAA) Screening: covered once if your at risk  You're considered to be at risk if you have a family history of AAA  2  Lung Cancer Screening: covers low dose CT scan once per year if you meet all of the following conditions: (1) Age 50-69; (2) No signs or symptoms of lung cancer; (3) Current smoker or have quit smoking within the last 15 years; (4) You have a tobacco smoking history of at least 20 pack years (packs per day multiplied by number of years you smoked); (5) You get a written order from a healthcare provider  3  Glaucoma Screening: covered annually if you're considered high risk: (1) You have diabetes OR (2) Family history of glaucoma OR (3)  aged 48 and older OR (3)  American aged 72 and older  3  Osteoporosis Screening: covered every 2 years if you meet one of the following conditions: (1) You're estrogen deficient and at risk for osteoporosis based off medical history and other findings; (2) Have a vertebral abnormality; (3) On glucocorticoid therapy for more than 3 months; (4) Have primary hyperparathyroidism; (5) On osteoporosis medications and need to assess response to drug therapy  · Last bone density test (DXA Scan): 01/18/2022   5  HIV Screening: covered annually if you're between the age of 15-65  Also covered annually if you are younger than 13 and older than 72 with risk factors for HIV infection   For pregnant patients, it is covered up to 3 times per pregnancy  Immunizations:  Immunization Recommendations   Influenza Vaccine Annual influenza vaccination during flu season is recommended for all persons aged >= 6 months who do not have contraindications   Pneumococcal Vaccine   * Pneumococcal conjugate vaccine = PCV13 (Prevnar 13), PCV15 (Vaxneuvance), PCV20 (Prevnar 20)  * Pneumococcal polysaccharide vaccine = PPSV23 (Pneumovax) Adults 25-60 years old: 1-3 doses may be recommended based on certain risk factors  Adults 72 years old: 1-2 doses may be recommended based off what pneumonia vaccine you previously received   Hepatitis B Vaccine 3 dose series if at intermediate or high risk (ex: diabetes, end stage renal disease, liver disease)   Tetanus (Td) Vaccine - COST NOT COVERED BY MEDICARE PART B Following completion of primary series, a booster dose should be given every 10 years to maintain immunity against tetanus  Td may also be given as tetanus wound prophylaxis  Tdap Vaccine - COST NOT COVERED BY MEDICARE PART B Recommended at least once for all adults  For pregnant patients, recommended with each pregnancy  Shingles Vaccine (Shingrix) - COST NOT COVERED BY MEDICARE PART B  2 shot series recommended in those aged 48 and above     Health Maintenance Due:      Topic Date Due   • Hepatitis C Screening  Never done   • Breast Cancer Screening: Mammogram  02/17/2023   • DXA SCAN  01/18/2027   • Colorectal Cancer Screening  01/17/2029     Immunizations Due:      Topic Date Due   • Pneumococcal Vaccine: 65+ Years (2 - PCV) 10/12/2021   • COVID-19 Vaccine (4 - Booster) 03/15/2022     Advance Directives   What are advance directives? Advance directives are legal documents that state your wishes and plans for medical care  These plans are made ahead of time in case you lose your ability to make decisions for yourself   Advance directives can apply to any medical decision, such as the treatments you want, and if you want to donate organs  What are the types of advance directives? There are many types of advance directives, and each state has rules about how to use them  You may choose a combination of any of the following:  · Living will: This is a written record of the treatment you want  You can also choose which treatments you do not want, which to limit, and which to stop at a certain time  This includes surgery, medicine, IV fluid, and tube feedings  · Durable power of  for healthcare [facility]): This is a written record that states who you want to make healthcare choices for you when you are unable to make them for yourself  This person, called a proxy, is usually a family member or a friend  You may choose more than 1 proxy  · Do not resuscitate (DNR) order:  A DNR order is used in case your heart stops beating or you stop breathing  It is a request not to have certain forms of treatment, such as CPR  A DNR order may be included in other types of advance directives  · Medical directive: This covers the care that you want if you are in a coma, near death, or unable to make decisions for yourself  You can list the treatments you want for each condition  Treatment may include pain medicine, surgery, blood transfusions, dialysis, IV or tube feedings, and a ventilator (breathing machine)  · Values history: This document has questions about your views, beliefs, and how you feel and think about life  This information can help others choose the care that you would choose  Why are advance directives important? An advance directive helps you control your care  Although spoken wishes may be used, it is better to have your wishes written down  Spoken wishes can be misunderstood, or not followed  Treatments may be given even if you do not want them  An advance directive may make it easier for your family to make difficult choices about your care     Urinary Incontinence   Urinary incontinence (UI)  is when you lose control of your bladder  UI develops because your bladder cannot store or empty urine properly  The 3 most common types of UI are stress incontinence, urge incontinence, or both  Medicines:   · May be given to help strengthen your bladder control  Report any side effects of medication to your healthcare provider  Do pelvic muscle exercises often:  Your pelvic muscles help you stop urinating  Squeeze these muscles tight for 5 seconds, then relax for 5 seconds  Gradually work up to squeezing for 10 seconds  Do 3 sets of 15 repetitions a day, or as directed  This will help strengthen your pelvic muscles and improve bladder control  Train your bladder:  Go to the bathroom at set times, such as every 2 hours, even if you do not feel the urge to go  You can also try to hold your urine when you feel the urge to go  For example, hold your urine for 5 minutes when you feel the urge to go  As that becomes easier, hold your urine for 10 minutes  Self-care:   · Keep a UI record  Write down how often you leak urine and how much you leak  Make a note of what you were doing when you leaked urine  · Drink liquids as directed  You may need to limit the amount of liquid you drink to help control your urine leakage  Do not drink any liquid right before you go to bed  Limit or do not have drinks that contain caffeine or alcohol  · Prevent constipation  Eat a variety of high-fiber foods  Good examples are high-fiber cereals, beans, vegetables, and whole-grain breads  Walking is the best way to trigger your intestines to have a bowel movement  · Exercise regularly and maintain a healthy weight  Weight loss and exercise will decrease pressure on your bladder and help you control your leakage  · Use a catheter as directed  to help empty your bladder  A catheter is a tiny, plastic tube that is put into your bladder to drain your urine  · Go to behavior therapy as directed    Behavior therapy may be used to help you learn to control your urge to urinate  Weight Management   Why it is important to manage your weight:  Being overweight increases your risk of health conditions such as heart disease, high blood pressure, type 2 diabetes, and certain types of cancer  It can also increase your risk for osteoarthritis, sleep apnea, and other respiratory problems  Aim for a slow, steady weight loss  Even a small amount of weight loss can lower your risk of health problems  How to lose weight safely:  A safe and healthy way to lose weight is to eat fewer calories and get regular exercise  You can lose up about 1 pound a week by decreasing the number of calories you eat by 500 calories each day  Healthy meal plan for weight management:  A healthy meal plan includes a variety of foods, contains fewer calories, and helps you stay healthy  A healthy meal plan includes the following:  · Eat whole-grain foods more often  A healthy meal plan should contain fiber  Fiber is the part of grains, fruits, and vegetables that is not broken down by your body  Whole-grain foods are healthy and provide extra fiber in your diet  Some examples of whole-grain foods are whole-wheat breads and pastas, oatmeal, brown rice, and bulgur  · Eat a variety of vegetables every day  Include dark, leafy greens such as spinach, kale, katey greens, and mustard greens  Eat yellow and orange vegetables such as carrots, sweet potatoes, and winter squash  · Eat a variety of fruits every day  Choose fresh or canned fruit (canned in its own juice or light syrup) instead of juice  Fruit juice has very little or no fiber  · Eat low-fat dairy foods  Drink fat-free (skim) milk or 1% milk  Eat fat-free yogurt and low-fat cottage cheese  Try low-fat cheeses such as mozzarella and other reduced-fat cheeses  · Choose meat and other protein foods that are low in fat  Choose beans or other legumes such as split peas or lentils   Choose fish, skinless poultry (chicken or turkey), or lean cuts of red meat (beef or pork)  Before you cook meat or poultry, cut off any visible fat  · Use less fat and oil  Try baking foods instead of frying them  Add less fat, such as margarine, sour cream, regular salad dressing and mayonnaise to foods  Eat fewer high-fat foods  Some examples of high-fat foods include french fries, doughnuts, ice cream, and cakes  · Eat fewer sweets  Limit foods and drinks that are high in sugar  This includes candy, cookies, regular soda, and sweetened drinks  Exercise:  Exercise at least 30 minutes per day on most days of the week  Some examples of exercise include walking, biking, dancing, and swimming  You can also fit in more physical activity by taking the stairs instead of the elevator or parking farther away from stores  Ask your healthcare provider about the best exercise plan for you  © Copyright [employer] 2018 Information is for End User's use only and may not be sold, redistributed or otherwise used for commercial purposes   All illustrations and images included in CareNotes® are the copyrighted property of [employer]  or [address] [place] [address]

## 2023-03-07 NOTE — PROGRESS NOTES
Portions of the record may have been created with voice recognition software  Occasional wrong word or "sound a like" substitutions may have occurred due to the inherent limitations of voice recognition software  Read the chart carefully and recognize, using context, where substitutions have occurred  Assessment:  1  Myofascial pain syndrome    2  Chronic pain syndrome    3  Chronic bilateral low back pain without sciatica    4  Sacroiliitis ([address] )        Plan:  No orders of the defined types were placed in this encounter  No orders of the defined types were placed in this encounter  60-year-old female with past medical history of morbid obesity presenting for follow-up in setting of chronic pain syndrome, myofascial pain, sacroiliitis and lumbar spondylosis  Overall she is doing significantly better than in the past   Now the pain is intermittent and manageable  She is more active and will be joining the gym to focus on weight loss  No new onset motor or sensory changes of the lower extremity  She is currently taking minimal medications with only Tylenol as needed  At this time she will follow-up as needed  My impressions and treatment recommendations were discussed in detail with the patient who verbalized understanding and had no further questions  Discharge instructions were provided  I personally saw and examined the patient and I agree with the above discussed plan of care  History of Present Illness:  Janay Singh is a 79 y o  female with history of morbid obesity, chronic pain syndrome who presents for a follow up office visit in regards to Follow-up and Back Pain (Lower lumbar pain that is intermittent with shooting and stiffness)  Since last visit, she is doing significantly better with 4 out of 10 pain currently that is intermittent and with stiffness  She only takes Tylenol as needed which provides relief  No new onset motor or sensory symptoms      Other Symptoms:    The patient does not have bladder dysfunction  The patient does not have bowel dysfunction  The patient does not have chills and fever, night sweats or unintentional weight loss  Current Pain Medications:  Tylenol    Pain Medications Trialed:     Interventional Techniques Employed:  LMBB #1 9-27-22: More than 80% relief for 8+ hours  LMBB #2: 10-11-22: less than 80% relief for 8+ hours     Given inconclusive results, we will not proceed with RFA at this time may consider repeat L MBB in the future  TPI:  10/28/2022:    Bilateral SI joint injection: 12-6/2022:     Imaging:  No MRI cervical spine results found in the past 2 years   No MRI lumbar spine results found in the past 2 years   No MRI thoracic spine results found in the past 2 years   No X-ray cervical spine results found in the past 2 years   No X-ray lumbar spine results found in the past 2 years   No X-ray hip/pelvis results found in the past 2 years   No X-ray knee results found in the past 2 years     Lab Results   Component Value Date    CREATININE 0 80 03/11/2022     Lab Results   Component Value Date    ALT 18 03/11/2022    ALT 22 06/13/2020       I have personally reviewed and/or updated the patient's past medical history, past surgical history, family history, social history, current medications, allergies, and vital signs today  Review of Systems   Musculoskeletal: Positive for back pain, gait problem and joint swelling          Right hip       Patient Active Problem List   Diagnosis   • Rheumatoid arthritis (HCC)   • Gastroesophageal reflux disease without esophagitis   • DANNY (obstructive sleep apnea)   • Vertigo   • Mixed hyperlipidemia   • Stented coronary artery   • Coronary artery disease of native artery of native heart with stable angina pectoris (HCC)   • Class 3 severe obesity due to excess calories with serious comorbidity and body mass index (BMI) of 45 0 to 49 9 in [facility])   • Pre-diabetes   • Chronic obstructive pulmonary disease, unspecified COPD type ([place] [address] )   • History of COVID-19   • Immunodeficiency, unspecified ([place] [address] )       Past Medical History:   Diagnosis Date   • Abnormal Pap smear of cervix     cin3; hpv non 16/18 +(12/2016); LGSIL 6/2018   • Arthritis    • Cancer [facility])     cervical   • Coronary artery disease    • CPAP (continuous positive airway pressure) dependence    • GERD (gastroesophageal reflux disease)    • History of pneumonia     2 yrs ago   • HPV (human papilloma virus) infection    • Hypercholesterolemia    • Hypertension    • Kidney stone    • Morbid obesity ([place] [address] )    • Motion sickness    • Myocardial infarct ([place] [address] )     X2; 2000 and 2002//[facility] cardiology Dr Sarita Betancourt yearly   • Pleuritic chest pain     "from constant lifting on the job chest muscle area"   • PONV (postoperative nausea and vomiting)     "extremely with the gas"   • Rheumatoid arthritis ([place] [address] )     Rheu and cervical   • Shortness of breath     moderate activity// pt does work full time/on and off feet in warehouse   • Sleep apnea 2017   • Vertigo    • Wears partial dentures     lower       Past Surgical History:   Procedure Laterality Date   • ANGIOPLASTY Right 09/14/2015    right groin / femoral    • BIOPSY CORE NEEDLE  1980   • CARDIAC CATHETERIZATION  2015    star close - closure system   • CARDIAC CATHETERIZATION  2000    angioplasty/[facility]   • CERVICAL CONE BIOPSY      1980's   • COLONOSCOPY      2006, 2019   • COLONOSCOPY     • DILATION AND CURETTAGE OF UTERUS     • FL RETROGRADE PYELOGRAM  7/2/2020   • FL RETROGRADE PYELOGRAM  10/13/2020   • MAMMO (HISTORICAL)  2018    Neg   • OTHER SURGICAL HISTORY Right 11/2017    Surgical removal of lypoma shoulder blade   • VT CYSTO BLADDER W/URETERAL CATHETERIZATION Right 7/2/2020    Procedure: CYSTOSCOPY RETROGRADE PYELOGRAM WITH INSERTION STENT URETERAL;  Surgeon: Kinjal Nagel MD;  [facility];  Service: Urology   • VT CYSTO/URETERO W/LITHOTRIPSY &INDWELL STENT INSRT Right 2020    Procedure: CYSTO, URETEROSCOPY STONE BASKET EXTRACTION , RETROGRADE PYELOGRAM, STENT;  Surgeon: Ry Avalos MD;  [facility];  Service: Urology   • ME CYSTO/URETERO W/LITHOTRIPSY &INDWELL STENT INSRT Left 10/13/2020    Procedure: CYSTOSCOPY URETEROSCOPY WITH LITHOTRIPSY HOLMIUM LASER, RETROGRADE PYELOGRAM AND INSERTION STENT URETERAL;  Surgeon: Ry Avalos MD;  [facility];  Service: Urology   • ME INJECT SI JOINT ARTHRGRPHY&/ANES/STEROID W/REFUGIO Bilateral 2022    Procedure: BILATERAL SACROILIAC JOINT INJECTION (77067); Surgeon: Cayetano Rob DO;  Location: [facility];  Service: Pain Management    • ME NJX DX/THER AGT PVRT FACET JT LMBR/SAC 1 LEVEL Bilateral 2022    Procedure: BILATERAL L4-S1 MEDIAL BRANCH BLOCK;  Surgeon: Cayetano Rob DO;  [facility];  Service: Pain Management    • ME NJX DX/THER AGT PVRT FACET JT LMBR/SAC 1 LEVEL Bilateral 10/11/2022    Procedure: BILATERAL L4-S1 MEDIAL BRANCH BLOCK (06931,17210); Surgeon: Cayetano Rob DO;  [facility];  Service: Pain Management    • SHOULDER SURGERY Right 2014    had a lympoma done in 2017 also same shoulder   • SHOULDER SURGERY Right 2014   • TONSILLECTOMY     • WISDOM TOOTH EXTRACTION         Family History   Problem Relation Age of Onset   • Cancer Mother    • Lung cancer Mother    • Heart disease Father        Social History     Occupational History   • Occupation: [employer]   Tobacco Use   • Smoking status: Former     Packs/day: 2 00     Years: 30 00     Pack years: 60 00     Types: Cigarettes     Quit date: 7/15/1998     Years since quittin 6   • Smokeless tobacco: Never   Vaping Use   • Vaping Use: Never used   Substance and Sexual Activity   • Alcohol use:  Yes   • Drug use: Never   • Sexual activity: Not Currently       Current Outpatient Medications on File Prior to Visit   Medication Sig   • Ascorbic Acid, Vitamin C, (VITAMIN C) 100 MG tablet Take 1,000 mg by mouth daily • aspirin 81 MG tablet Take 162 mg by mouth daily   • Cholecalciferol 50 MCG (2000 UT) CAPS Take 1 capsule by mouth daily   • Cranberry 450 MG TABS Take 1 tablet by mouth 2 (two) times a day   • Diclofenac Sodium (VOLTAREN) 1 % APPLY 4 GM FOUR TIMES A DAY AS NEEDED TRANSDERMAL 30 DAYS   • ezetimibe (ZETIA) 10 mg tablet    • fenofibrate (TRICOR) 54 MG tablet    • Flaxseed, Linseed, (FLAX SEEDS PO) Take 1 tablet by mouth daily   • folic acid (FOLVITE) 1 mg tablet Take 1,000 mcg by mouth daily   • Humira Pen 40 MG/0 4ML PNKT Finishes end of march 2021   • loratadine (CLARITIN) 10 mg tablet Take 10 mg by mouth daily   • meclizine (ANTIVERT) 25 mg tablet Take 1 tablet (25 mg total) by mouth 3 (three) times a day as needed for dizziness   • methotrexate 2 5 mg tablet 2 5 mg once a week Takes on Fridays  Last dose10/9/20  8 PILLS ONE TIME A WEEK   • metoprolol tartrate (LOPRESSOR) 25 mg tablet Take 12 5 mg by mouth every 12 (twelve) hours    • multivitamin-minerals (CENTRUM) tablet Take by mouth daily   • OMEGA-3 FATTY ACIDS PO Take 1 g by mouth 2 (two) times a day    • ondansetron (Zofran ODT) 4 mg disintegrating tablet Take 1 tablet (4 mg total) by mouth every 6 (six) hours as needed for nausea or vomiting   • pantoprazole (PROTONIX) 40 mg tablet Take 1 tablet (40 mg total) by mouth daily   • rosuvastatin (CRESTOR) 20 MG tablet Take 20 mg by mouth every evening    • rosuvastatin (CRESTOR) 40 MG tablet    • diazepam (VALIUM) 5 mg tablet Take 1 tablet (5 mg total) by mouth every 8 (eight) hours as needed for muscle spasms for up to 10 days   • gabapentin (NEURONTIN) 100 mg capsule Take 1 capsule (100 mg total) by mouth 3 (three) times a day (Patient not taking: Reported on 3/10/2023)   • Ibuprofen 200 MG CAPS  (Patient not taking: Reported on 3/10/2023)   • methocarbamol (ROBAXIN) 750 mg tablet  (Patient not taking: Reported on 3/10/2023)     No current facility-administered medications on file prior to visit  Allergies   Allergen Reactions   • Hydrocodone Vomiting     "can take oxycodone without a problem"   • Atorvastatin Myalgia   • Caffeine - Food Allergy Tachycardia     And high blood pressure   • Diphenhydramine Hcl (Sleep) Itching   • Pravastatin Myalgia   • Simvastatin Myalgia       Physical Exam:    /84   Pulse 80   Ht 5' 7" (1 702 m) Comment: verbal  Wt (!) 148 kg (326 lb)   BMI 51 06 kg/m²     Constitutional:obese   Eyes:anicteric  HEENT:grossly intact  Neck:supple, symmetric, trachea midline and no masses   Pulmonary:even and unlabored  Cardiovascular:No edema or pitting edema present  Skin:Normal without rashes or lesions and well hydrated  Psychiatric:Mood and affect appropriate  Neurologic:Cranial Nerves II-XII grossly intact  Musculoskeletal:normal  Minimal tenderness to palpation bilateral lumbar and SI joint region    No gross motor or sensory deficits lower extremities

## 2023-03-08 ENCOUNTER — TELEPHONE (OUTPATIENT)
Dept: FAMILY MEDICINE CLINIC | Facility: CLINIC | Age: 68
End: 2023-03-08

## 2023-03-08 NOTE — TELEPHONE ENCOUNTER
Patient called and stated Dr Elie Rojas asked her to let us know when and where she had her last set of labs done   The labs were done on 2/10 at [facility]

## 2023-03-10 ENCOUNTER — TELEPHONE (OUTPATIENT)
Dept: ADMINISTRATIVE | Facility: OTHER | Age: 68
End: 2023-03-10

## 2023-03-10 ENCOUNTER — TELEPHONE (OUTPATIENT)
Dept: OBGYN CLINIC | Facility: CLINIC | Age: 68
End: 2023-03-10

## 2023-03-10 ENCOUNTER — OFFICE VISIT (OUTPATIENT)
Dept: PAIN MEDICINE | Facility: CLINIC | Age: 68
End: 2023-03-10

## 2023-03-10 VITALS
WEIGHT: 293 LBS | DIASTOLIC BLOOD PRESSURE: 84 MMHG | HEART RATE: 80 BPM | BODY MASS INDEX: 45.99 KG/M2 | HEIGHT: 67 IN | SYSTOLIC BLOOD PRESSURE: 138 MMHG

## 2023-03-10 DIAGNOSIS — M54.50 CHRONIC BILATERAL LOW BACK PAIN WITHOUT SCIATICA: ICD-10-CM

## 2023-03-10 DIAGNOSIS — M46.1 SACROILIITIS (HCC): ICD-10-CM

## 2023-03-10 DIAGNOSIS — G89.29 CHRONIC BILATERAL LOW BACK PAIN WITHOUT SCIATICA: ICD-10-CM

## 2023-03-10 DIAGNOSIS — G89.4 CHRONIC PAIN SYNDROME: ICD-10-CM

## 2023-03-10 DIAGNOSIS — M79.18 MYOFASCIAL PAIN SYNDROME: Primary | ICD-10-CM

## 2023-03-10 NOTE — TELEPHONE ENCOUNTER
Upon review of the In Basket request we were able to locate, review, and update the patient chart as requested for Mammogram     Any additional questions or concerns should be emailed to the Practice Liaisons via the appropriate education email address, please do not reply via In Basket      Thank you  Mala Perkins

## 2023-03-10 NOTE — TELEPHONE ENCOUNTER
----- Message from Charity Allred MA sent at 3/9/2023  8:10 AM EST -----  Regarding: Mammogram  03/09/23 8:10 AM    Hello, our patient attached above has had Mammogram completed/performed  Please assist in updating the patient chart by pulling the Care Everywhere (CE) document  The date of service is 02/20/2023 at [facility]       Thank you,  Charity STUART FORKS

## 2023-03-22 NOTE — PROGRESS NOTES
"Portions of the record may have been created with voice recognition software  Occasional wrong word or \"sound a like\" substitutions may have occurred due to the inherent limitations of voice recognition software  Read the chart carefully and recognize, using context, where substitutions have occurred  Assessment:  1  Myofascial pain syndrome    2  Mid back pain    3  Chronic pain syndrome        Plan:  No orders of the defined types were placed in this encounter  New Medications Ordered This Visit   Medications   • methylPREDNISolone 4 MG tablet therapy pack     Sig: Use as directed on package     Dispense:  21 tablet     Refill:  0   • methocarbamol (ROBAXIN) 500 mg tablet     Sig: Take 1 tablet (500 mg total) by mouth 3 (three) times a day as needed for muscle spasms for up to 10 days     Dispense:  30 tablet     Refill:  [de-identified]      57-year-old female with past medical history of morbid obesity presenting for follow-up in setting of sudden onset left-sided mid back pain while sleeping without any radiating pain into the anterior chest wall or any motor or sensory deficits upper extremity  Patient is inpatient likely secondary to muscle strain  We will start patient on Medrol Dosepak in setting of acute inflammatory pain as well as muscle relaxant Robaxin 500 mg twice daily as needed  Patient advised not to take medication while driving  If she continues to experience pain does not improve over the next few weeks, will consider imaging and further evaluation  My impressions and treatment recommendations were discussed in detail with the patient who verbalized understanding and had no further questions  Discharge instructions were provided  I personally saw and examined the patient and I agree with the above discussed plan of care        History of Present Illness:  Jad Seymour is a 79 y o  female with history of morbid obesity, chronic pain syndrome who presents for a follow up office visit in " regards to Follow-up (Upper back pain on left side pain that is constant and sharp )  Patient presents today for sudden onset left mid back and flank pain that started without any accidents or injuries  Started at night suddenly and has been worsening over the past week  Tenderness to palpation but no significant erythema, hematoma or rash noted  No recent fevers or chills  Motor or sensory symptoms of bilateral upper extremity or anterior chest wall  Has been utilizing topical creams, TENS unit, Tylenol without significant relief  Other Symptoms:    The patient does not have bladder dysfunction  The patient does not have bowel dysfunction  The patient does not have chills and fever, night sweats or unintentional weight loss  Current Pain Medications:  Tylenol    Pain Medications Trialed:     Interventional Techniques Employed:  LMBB #1 9-27-22: More than 80% relief for 8+ hours  LMBB #2: 10-11-22: less than 80% relief for 8+ hours     Given inconclusive results, we will not proceed with RFA at this time may consider repeat L MBB in the future  TPI:  10/28/2022:    Bilateral SI joint injection: 12-6/2022:     Imaging:  No MRI cervical spine results found in the past 2 years   No MRI lumbar spine results found in the past 2 years   No MRI thoracic spine results found in the past 2 years   No X-ray cervical spine results found in the past 2 years   No X-ray lumbar spine results found in the past 2 years   No X-ray hip/pelvis results found in the past 2 years   No X-ray knee results found in the past 2 years     Lab Results   Component Value Date    CREATININE 0 80 03/11/2022     Lab Results   Component Value Date    ALT 18 03/11/2022    ALT 22 06/13/2020       I have personally reviewed and/or updated the patient's past medical history, past surgical history, family history, social history, current medications, allergies, and vital signs today       Review of Systems   Musculoskeletal: Positive for "back pain, gait problem and joint swelling          Right hip       Patient Active Problem List   Diagnosis   • Rheumatoid arthritis (HCC)   • Gastroesophageal reflux disease without esophagitis   • DANNY (obstructive sleep apnea)   • Vertigo   • Mixed hyperlipidemia   • Stented coronary artery   • Coronary artery disease of native artery of native heart with stable angina pectoris (HCC)   • Class 3 severe obesity due to excess calories with serious comorbidity and body mass index (BMI) of 45 0 to 49 9 in adult [facility])   • Pre-diabetes   • Chronic obstructive pulmonary disease, unspecified COPD type ([place] [address] )   • History of COVID-19   • Immunodeficiency, unspecified ([place] [address] )       Past Medical History:   Diagnosis Date   • Abnormal Pap smear of cervix     cin3; hpv non 16/18 +(12/2016); LGSIL 6/2018   • Arthritis    • Cancer ([place] [address] )     cervical   • Coronary artery disease    • GERD (gastroesophageal reflux disease)    • History of pneumonia     2 yrs ago   • HPV (human papilloma virus) infection    • Hypercholesterolemia    • Hypertension    • Kidney stone    • Morbid obesity ([place] [address] )    • Motion sickness    • Myocardial infarct ([place] [address] )     X2; 2000 and 2002//[facility] cardiology Dr Mayur Jackson yearly   • Pleuritic chest pain     \"from constant lifting on the job chest muscle area\"   • PONV (postoperative nausea and vomiting)     \"extremely with the gas\"   • [facility]     Rheu and cervical   • Shortness of breath     moderate activity// pt does work full time/on and off feet in warehouse   • Vertigo    • Wears partial dentures     lower       Past Surgical History:   Procedure Laterality Date   • ANGIOPLASTY Right 09/14/2015    right groin / femoral    • BIOPSY CORE NEEDLE  1980   • CARDIAC CATHETERIZATION  2015    star close - closure system   • CARDIAC CATHETERIZATION  2000    angioplasty/[facility]   • CERVICAL CONE BIOPSY      1980's   • COLONOSCOPY      2006, 2019   • COLONOSCOPY     • DILATION AND CURETTAGE OF " UTERUS     • FL RETROGRADE PYELOGRAM  7/2/2020   • FL RETROGRADE PYELOGRAM  10/13/2020   • MAMMO (HISTORICAL)  2018    Neg   • OTHER SURGICAL HISTORY Right 11/2017    Surgical removal of lypoma shoulder blade   • WA CYSTO BLADDER W/URETERAL CATHETERIZATION Right 7/2/2020    Procedure: CYSTOSCOPY RETROGRADE PYELOGRAM WITH INSERTION STENT URETERAL;  Surgeon: Laureen Canales MD;  [facility];  Service: Urology   • WA CYSTO/URETERO W/LITHOTRIPSY &INDWELL STENT INSRT Right 7/21/2020    Procedure: CYSTO, URETEROSCOPY STONE BASKET EXTRACTION , RETROGRADE PYELOGRAM, STENT;  Surgeon: Yovanny Pepper MD;  [facility];  Service: Urology   • WA CYSTO/URETERO W/LITHOTRIPSY &INDWELL STENT INSRT Left 10/13/2020    Procedure: CYSTOSCOPY URETEROSCOPY WITH LITHOTRIPSY HOLMIUM LASER, RETROGRADE PYELOGRAM AND INSERTION STENT URETERAL;  Surgeon: Yovanny Pepper MD;  [facility];  Service: Urology   • WA INJECT SI JOINT ARTHRGRPHY&/ANES/STEROID W/REFUGIO Bilateral 12/6/2022    Procedure: BILATERAL SACROILIAC JOINT INJECTION (66023); Surgeon: Amna Ellison DO;  Location: [facility];  Service: Pain Management    • WA NJX DX/THER AGT PVRT FACET JT LMBR/SAC 1 LEVEL Bilateral 9/27/2022    Procedure: BILATERAL L4-S1 MEDIAL BRANCH BLOCK;  Surgeon: Amna Ellison DO;  [facility]: [facility];  Service: Pain Management    • WA NJX DX/THER AGT PVRT FACET JT LMBR/SAC 1 LEVEL Bilateral 10/11/2022    Procedure: BILATERAL L4-S1 MEDIAL BRANCH BLOCK (00300,88070);   Surgeon: Amna Ellison DO;  [facility];  Service: Pain Management    • SHOULDER SURGERY Right 2014    had a lympoma done in 2017 also same shoulder   • SHOULDER SURGERY Right 2014   • TONSILLECTOMY     • WISDOM TOOTH EXTRACTION         Family History   Problem Relation Age of Onset   • Cancer Mother    • Lung cancer Mother    • Heart disease Father        Social History     Occupational History   • Occupation:    Tobacco Use   • Smoking status: Former Packs/day: 2 00     Years: 30 00     Pack years: 60 00     Types: Cigarettes     Quit date: 7/15/1998     Years since quittin 7   • Smokeless tobacco: Never   Vaping Use   • Vaping Use: Never used   Substance and Sexual Activity   • Alcohol use:  Yes   • Drug use: Never   • Sexual activity: Not Currently       Current Outpatient Medications on File Prior to Visit   Medication Sig   • Ascorbic Acid, Vitamin C, (VITAMIN C) 100 MG tablet Take 1,000 mg by mouth daily   • aspirin 81 MG tablet Take 162 mg by mouth daily   • Cholecalciferol 50 MCG (2000 UT) CAPS Take 1 capsule by mouth daily   • Cranberry 450 MG TABS Take 1 tablet by mouth 2 (two) times a day   • Diclofenac Sodium (VOLTAREN) 1 % APPLY 4 GM FOUR TIMES A DAY AS NEEDED TRANSDERMAL 30 DAYS   • ezetimibe (ZETIA) 10 mg tablet    • fenofibrate (TRICOR) 54 MG tablet    • Flaxseed, Linseed, (FLAX SEEDS PO) Take 1 tablet by mouth daily   • folic acid (FOLVITE) 1 mg tablet Take 1,000 mcg by mouth daily   • gabapentin (NEURONTIN) 100 mg capsule Take 1 capsule (100 mg total) by mouth 3 (three) times a day   • Humira Pen 40 MG/0 4ML PNKT Finishes end of 2021   • Ibuprofen 200 MG CAPS    • meclizine (ANTIVERT) 25 mg tablet Take 1 tablet (25 mg total) by mouth 3 (three) times a day as needed for dizziness   • methotrexate 2 5 mg tablet 2 5 mg once a week Takes on   Last dose10/9/20  8 PILLS ONE TIME A WEEK   • metoprolol tartrate (LOPRESSOR) 25 mg tablet Take 12 5 mg by mouth every 12 (twelve) hours    • multivitamin-minerals (CENTRUM) tablet Take by mouth daily   • OMEGA-3 FATTY ACIDS PO Take 1 g by mouth 2 (two) times a day    • ondansetron (Zofran ODT) 4 mg disintegrating tablet Take 1 tablet (4 mg total) by mouth every 6 (six) hours as needed for nausea or vomiting   • pantoprazole (PROTONIX) 40 mg tablet Take 1 tablet (40 mg total) by mouth daily   • rosuvastatin (CRESTOR) 20 MG tablet Take 20 mg by mouth every evening    • [DISCONTINUED] "methocarbamol (ROBAXIN) 750 mg tablet    • diazepam (VALIUM) 5 mg tablet Take 1 tablet (5 mg total) by mouth every 8 (eight) hours as needed for muscle spasms for up to 10 days   • rosuvastatin (CRESTOR) 40 MG tablet      No current facility-administered medications on file prior to visit  Allergies   Allergen Reactions   • Hydrocodone Vomiting     \"can take oxycodone without a problem\"   • Atorvastatin Myalgia   • Caffeine - Food Allergy Tachycardia     And high blood pressure   • Diphenhydramine Hcl (Sleep) Itching   • Pravastatin Myalgia   • Simvastatin Myalgia       Physical Exam:    /80   Pulse 81   Ht 5' 7\" (1 702 m) Comment: verbal  Wt (!) 150 kg (331 lb)   BMI 51 84 kg/m²     Constitutional:obese   Eyes:anicteric  HEENT:grossly intact  Neck:supple, symmetric, trachea midline and no masses   Pulmonary:even and unlabored  Cardiovascular:No edema or pitting edema present  Skin:Normal without rashes or lesions and well hydrated  Psychiatric:Mood and affect appropriate  Neurologic:Cranial Nerves II-XII grossly intact  Musculoskeletal:normal  Localized tenderness to palpation thoracic paraspinal and subscapular region without any erythema, hematoma or rash  No gross motor or sensory deficit in upper extremities    "

## 2023-03-23 ENCOUNTER — OFFICE VISIT (OUTPATIENT)
Dept: PULMONOLOGY | Facility: CLINIC | Age: 68
End: 2023-03-23

## 2023-03-23 VITALS
SYSTOLIC BLOOD PRESSURE: 110 MMHG | HEIGHT: 67 IN | OXYGEN SATURATION: 90 % | HEART RATE: 49 BPM | DIASTOLIC BLOOD PRESSURE: 80 MMHG | BODY MASS INDEX: 45.99 KG/M2 | TEMPERATURE: 97.1 F | WEIGHT: 293 LBS

## 2023-03-23 DIAGNOSIS — G47.33 OSA (OBSTRUCTIVE SLEEP APNEA): Primary | ICD-10-CM

## 2023-03-23 LAB

## 2023-03-23 NOTE — PROGRESS NOTES
Assessment/Plan:    DANNY (obstructive sleep apnea)  Still using CPAP therapy effectively  Patient is asymptomatic with regard to sleep disorders while using this device  No technical problems  Patient's machine should be 11years old in May so we will request a replacement  Advised to continue  Co morbidity is coronary artery disease  Call if problems or questions  Diagnoses and all orders for this visit:    DANNY (obstructive sleep apnea)  -     Cpap New DME          Subjective:      Patient ID: Rachelle Purvis is a 79 y o  female  This patient returns for evaluation obstructive sleep apnea  She was last here 2 years ago  Uses the CPAP device faithfully every night  Has been on it since May 2018  No technical problems with the use of this device  She does not have any daytime symptoms with regard to sleepiness while on this device  No headache  Does have nocturia twice per night but drinks a lot of water in the evening  No other new issues with regard to sleep apnea  Patient's machine will be 11years old and may I requested a new machine  Undergoing injections for back pain with some relief  Note weight gain of 17 pounds since her last visit here 2 years ago  10-minute visit all with discussion      The following portions of the patient's history were reviewed and updated as appropriate: allergies, current medications, past family history, past medical history, past social history, past surgical history and problem list     Review of Systems   Constitutional: Positive for unexpected weight change  Negative for activity change  Weight up 17 pounds since her last visit here 2 years ago   HENT: Negative for congestion  Respiratory: Positive for apnea  Genitourinary: Positive for enuresis  Musculoskeletal: Positive for arthralgias and back pain  Neurological: Negative for headaches           Objective:      /80   Pulse (!) 49   Temp (!) 97 1 °F (36 2 °C)   Ht 5' 7" (1 702 m)   Wt (!) 150 kg (331 lb)   SpO2 90%   BMI 51 84 kg/m²          Physical Exam  Vitals reviewed  Constitutional:       Appearance: She is obese  She is not ill-appearing  Neurological:      Mental Status: She is alert and oriented to person, place, and time     Psychiatric:         Mood and Affect: Mood normal          Behavior: Behavior normal

## 2023-03-23 NOTE — ASSESSMENT & PLAN NOTE
Still using CPAP therapy effectively  Patient is asymptomatic with regard to sleep disorders while using this device  No technical problems  Patient's machine should be 11years old in May so we will request a replacement  Advised to continue  Co morbidity is coronary artery disease  Call if problems or questions

## 2023-03-27 ENCOUNTER — OFFICE VISIT (OUTPATIENT)
Dept: PAIN MEDICINE | Facility: CLINIC | Age: 68
End: 2023-03-27

## 2023-03-27 VITALS
SYSTOLIC BLOOD PRESSURE: 132 MMHG | HEIGHT: 67 IN | BODY MASS INDEX: 45.99 KG/M2 | WEIGHT: 293 LBS | DIASTOLIC BLOOD PRESSURE: 80 MMHG | HEART RATE: 81 BPM

## 2023-03-27 DIAGNOSIS — G89.4 CHRONIC PAIN SYNDROME: ICD-10-CM

## 2023-03-27 DIAGNOSIS — M79.18 MYOFASCIAL PAIN SYNDROME: Primary | ICD-10-CM

## 2023-03-27 DIAGNOSIS — M54.9 MID BACK PAIN: ICD-10-CM

## 2023-03-27 RX ORDER — METHOCARBAMOL 500 MG/1
500 TABLET, FILM COATED ORAL 3 TIMES DAILY PRN
Qty: 30 TABLET | Refills: 0 | Status: SHIPPED | OUTPATIENT
Start: 2023-03-27 | End: 2023-04-06

## 2023-03-27 RX ORDER — METHYLPREDNISOLONE 4 MG/1
TABLET ORAL
Qty: 21 TABLET | Refills: 0 | Status: SHIPPED | OUTPATIENT
Start: 2023-03-27

## 2023-03-30 DIAGNOSIS — K21.9 GERD WITHOUT ESOPHAGITIS: ICD-10-CM

## 2023-03-30 RX ORDER — PANTOPRAZOLE SODIUM 40 MG/1
40 TABLET, DELAYED RELEASE ORAL DAILY
Qty: 90 TABLET | Refills: 1 | Status: SHIPPED | OUTPATIENT
Start: 2023-03-30

## 2023-04-17 ENCOUNTER — TELEPHONE (OUTPATIENT)
Dept: FAMILY MEDICINE CLINIC | Facility: CLINIC | Age: 68
End: 2023-04-17

## 2023-04-17 PROBLEM — R23.3 BRUISES EASILY: Status: ACTIVE | Noted: 2023-04-17

## 2023-04-17 NOTE — TELEPHONE ENCOUNTER
Crista Ronald called back to say that she had bloodwork done this morning at [facility] that was ordered by Dr Joyce Swan and he ordered a CBC and differential   She said she wants to wait to get anymore lab work done before the results come back and also wait to see the Hematologist after the labs come back from Joyce Swan

## 2023-04-24 ENCOUNTER — OFFICE VISIT (OUTPATIENT)
Dept: FAMILY MEDICINE CLINIC | Facility: CLINIC | Age: 68
End: 2023-04-24

## 2023-04-24 VITALS
WEIGHT: 293 LBS | DIASTOLIC BLOOD PRESSURE: 80 MMHG | OXYGEN SATURATION: 99 % | SYSTOLIC BLOOD PRESSURE: 124 MMHG | RESPIRATION RATE: 16 BRPM | HEIGHT: 67 IN | BODY MASS INDEX: 45.99 KG/M2 | HEART RATE: 72 BPM

## 2023-04-24 DIAGNOSIS — R42 VERTIGO: ICD-10-CM

## 2023-04-24 DIAGNOSIS — M06.9 RHEUMATOID ARTHRITIS, INVOLVING UNSPECIFIED SITE, UNSPECIFIED WHETHER RHEUMATOID FACTOR PRESENT (HCC): ICD-10-CM

## 2023-04-24 DIAGNOSIS — R73.03 PRE-DIABETES: Primary | ICD-10-CM

## 2023-04-24 DIAGNOSIS — E78.2 MIXED HYPERLIPIDEMIA: ICD-10-CM

## 2023-04-24 RX ORDER — METFORMIN HYDROCHLORIDE 500 MG/1
500 TABLET, EXTENDED RELEASE ORAL
Qty: 90 TABLET | Refills: 1 | Status: SHIPPED | OUTPATIENT
Start: 2023-04-24

## 2023-04-24 RX ORDER — MECLIZINE HYDROCHLORIDE 25 MG/1
25 TABLET ORAL 3 TIMES DAILY PRN
Qty: 90 TABLET | Refills: 0 | Status: SHIPPED | OUTPATIENT
Start: 2023-04-24

## 2023-04-24 NOTE — PROGRESS NOTES
"Assessment/Plan:  a1c in the office in 3 months     1  Pre-diabetes  -     metFORMIN (GLUCOPHAGE-XR) 500 mg 24 hr tablet; Take 1 tablet (500 mg total) by mouth daily with dinner    2  Vertigo  -     meclizine (ANTIVERT) 25 mg tablet; Take 1 tablet (25 mg total) by mouth 3 (three) times a day as needed for dizziness    3  Mixed hyperlipidemia    4  Rheumatoid arthritis, involving unspecified site, unspecified whether rheumatoid factor present ([facility]     Thin skin due to prednisone use  Is back on the gabpentin for her back pain   Sat night was really bad       Subjective:      Patient ID: Casimir Goltz is a 79 y o  female  HPI  Here to go over chronic issues and labs / imaging studies if applicable       The following portions of the patient's history were reviewed and updated as appropriate: allergies, current medications, past family history, past medical history, past social history, past surgical history and problem list     Review of Systems      Objective:      /80 (BP Location: Right arm, Patient Position: Sitting, Cuff Size: Large)   Pulse 72   Resp 16   Ht 5' 7\" (1 702 m)   Wt (!) 151 kg (332 lb)   SpO2 99%   BMI 52 00 kg/m²     Orders Only on 04/17/2023   Component Date Value   • Cholesterol, Total 04/17/2023 168    • Triglycerides 04/17/2023 170 (H)    • HDL 04/17/2023 58    • LDL Calculated 04/17/2023 81    • Hemoglobin A1C 04/17/2023 7 0 (H)    • Estimated Average Glucose 04/17/2023 154    • 25-HYDROXY VIT D 04/17/2023 26 4 (L)    • Vitamin B-12 04/17/2023 549           Physical Exam        Weston Heredia MD  [facility]   "

## 2023-05-02 ENCOUNTER — TELEPHONE (OUTPATIENT)
Dept: HEMATOLOGY ONCOLOGY | Facility: CLINIC | Age: 68
End: 2023-05-02

## 2023-05-02 NOTE — TELEPHONE ENCOUNTER
I called Matteo Saha to schedule a new patient consultation with [facility] in response to a referral sent to our department  I left a voicemail making patient aware of their referral and instructing them to call [facility] at [phone] to schedule  Another attempt will be made to schedule patient

## 2023-05-03 ENCOUNTER — TELEPHONE (OUTPATIENT)
Dept: HEMATOLOGY ONCOLOGY | Facility: CLINIC | Age: 68
End: 2023-05-03

## 2023-05-03 NOTE — TELEPHONE ENCOUNTER
I called Luma Silver to schedule a new patient consultation with [facility] in response to a referral sent to our department  Patient decline to schedule a appointment  Patient states that a appointment is not needed  Patient declined scheduling  The referral has been closed

## 2023-05-09 ENCOUNTER — HOSPITAL ENCOUNTER (OUTPATIENT)
Dept: RADIOLOGY | Facility: HOSPITAL | Age: 68
Discharge: HOME | End: 2023-05-09

## 2023-05-09 DIAGNOSIS — M25.561 RIGHT KNEE PAIN, UNSPECIFIED CHRONICITY: ICD-10-CM

## 2023-05-30 ENCOUNTER — OFFICE VISIT (OUTPATIENT)
Dept: DERMATOLOGY | Facility: CLINIC | Age: 68
End: 2023-05-30

## 2023-05-30 VITALS — WEIGHT: 293 LBS | TEMPERATURE: 97.4 F | BODY MASS INDEX: 45.99 KG/M2 | HEIGHT: 67 IN

## 2023-05-30 DIAGNOSIS — L82.1 SEBORRHEIC KERATOSIS: ICD-10-CM

## 2023-05-30 DIAGNOSIS — L57.0 KERATOSIS, ACTINIC: Primary | ICD-10-CM

## 2023-05-30 DIAGNOSIS — D22.5 MULTIPLE BENIGN MELANOCYTIC NEVI OF UPPER AND LOWER EXTREMITIES AND TRUNK: ICD-10-CM

## 2023-05-30 DIAGNOSIS — D23.9 DERMATOFIBROMA: ICD-10-CM

## 2023-05-30 DIAGNOSIS — D22.70 MULTIPLE BENIGN MELANOCYTIC NEVI OF UPPER AND LOWER EXTREMITIES AND TRUNK: ICD-10-CM

## 2023-05-30 DIAGNOSIS — D22.60 MULTIPLE BENIGN MELANOCYTIC NEVI OF UPPER AND LOWER EXTREMITIES AND TRUNK: ICD-10-CM

## 2023-05-30 DIAGNOSIS — D18.01 CHERRY ANGIOMA: ICD-10-CM

## 2023-05-30 DIAGNOSIS — L72.0 MILIA: ICD-10-CM

## 2023-05-30 DIAGNOSIS — L81.4 SOLAR LENTIGO: ICD-10-CM

## 2023-05-30 RX ORDER — FLUCONAZOLE 150 MG/1
TABLET ORAL
COMMUNITY
Start: 2023-05-26

## 2023-05-30 NOTE — PATIENT INSTRUCTIONS
ACTINIC KERATOSIS    Assessment and Plan:  Based on a thorough discussion of this condition and the management approach to it (including a comprehensive discussion of the known risks, side effects and potential benefits of treatment), the patient (family) agrees to implement the following specific plan:    Precancerous nature of these lesions reviewed  Risk of progression to [facility] if untreated/unresolved after therapy reviewed  Lesions treated today in the office with liquid nitrogen x 2 cycles per site  Patient counseled to return to the office in 4-6 weeks for recheck if not resolved at which time retreatment of biopsy to rule out SCC will be determined based on clinic findings    Actinic keratoses are very common on sites repeatedly exposed to the sun, especially the backs of the hands and the face, most often affecting the ears, nose, cheeks, upper lip, vermilion of the lower lip, temples, forehead and balding scalp  In severely chronically sun-damaged individuals, they may also be found on the upper trunk, upper and lower limbs, and dorsum of feet  We discussed the theoretical premalignant (“pre-cancerous”) nature and etiology of these growths  We discussed the prevailing notion that actinic keratoses are a reflection of abnormal skin cell development due to DNA damage by short wavelength UVB  They are more likely to appear if the immune function is poor, due to aging, recent sun exposure, predisposing disease or certain drugs  We discussed that the main concern is that actinic keratoses may predispose to squamous cell carcinoma  It is rare for a solitary actinic keratosis to evolve to squamous cell carcinoma (SCC), but the risk of SCC occurring at some stage in a patient with more than 10 actinic keratoses is thought to be about 10 to 15%  A tender, thickened, ulcerated or enlarging actinic keratosis is suspicious of SCC  Actinic keratoses may be prevented by strict sun protection   If already present, keratoses may improve with a very high sun protection factor (50+) broad-spectrum sunscreen applied at least daily to affected areas, year-round  We recommend that UPF-rated clothing and hats and sunglasses be worn whenever possible and that a sunscreen-moisturizer combination product such as Neutrogena Daily Defense be applied at least three times a day  We performed a thorough discussion of treatment options and specific risk/benefits/alternatives including but not limited to medical “field” treatment with medications such as the following:    Topical “field area” medications such as 5-fluorouracil or Aldara (specifically, the trouble with long-term compliance, blistering and local skin reaction versus the convenience of at-home therapy and that field therapy “gets what is not yet seen”)  Cryotherapy (specifically, local pain, scarring, dyspigmentation, blistering, possible superinfection, and treats “only what we see” versus directed treatment today)  Photodynamic therapy (specifically, local pain, scarring, dyspigmentation, blistering, possible superinfection, need to schedule for a later date, and time spent in the office versus field therapy that “gets what is not yet seen”)  PROCEDURE:  DESTRUCTION OF PRE-MALIGNANT LESIONS  After a thorough discussion of treatment options and risk/benefits/alternatives (including but not limited to local pain, scarring, dyspigmentation, blistering, and possible superinfection), verbal and written consent were obtained and the aforementioned lesions were treated on with cryotherapy using liquid nitrogen x 1 cycle for 5-10 seconds  The patient tolerated the procedure well, and after-care instructions were provided  What is skin cancer? Skin cancer is unfortunately very common  That's why we are here to help you on your journey to healthy happy skin! There are two main types of skin cancer: melanoma and non-melanoma skin cancer   Melanoma is a form of skin cancer that often arises within an existing nevus or mole  However, this is not always the case  Melanoma can arise anywhere (not only where you have moles right now)  Melanoma can run in families, so letting us know about your family history is important  Non-melanoma skin cancer is the most common type of cancer in the [place]  The two main types of non-melanoma skin cancers are basal cell carcinomas (BCC) and squamous cell carcinoma (SCC)  These cancers tend to be less aggressive than melanomas but are still important to look for and treat  What can I do to prevent skin cancer? One of the largest risk factors for skin cancer is sun exposure or UV radiation  Therefore, sun protection is yi! Here are some great tips for protecting yourself! Try to avoid direct sun exposure during peak sun hours (10 AM to 2 PM)  Remember you get A LOT of sun even under cloud coverage and through care windows! When choosing a sunscreen, look for one that says “broad spectrum” sunscreen  This means it protects you from more of the harmful UV rays  Choose a sunscreen that is SPF 30 or greater for best protection  Apply sunscreen to all sun-exposed skin and reapply every 2 hours  Consider sun protective clothing! Great additions to your sun protective clothing wardrobe include broad brimmed hats, sunglasses, UPF clothing  Avoid tanning salons  These have been shown to be very harmful in terms of your risk of skin cancer  Avoid “base tans”  We now know that tans are dangerous (not just sun burns)  If you want to have a tan for a trip, consider a spray tan! Should I check my skin at home between my dermatology appointments? Yes! It's always a great idea to look at your skin on a regular basis  Here are some things to look for when monitoring your skin  For melanoma, look for the ABCDE's! A = Asymmetry  Look for a spot where one half does not match the other! B = Borders   Look for a spot that has jagged, ragged or irregular borders  C = Color  Look for a spot that is not evenly colored and often includes multiple colors, especially true black, red, white, blue, grey  D = Diameter  Look for a spot that is larger than the size of a pencil eraser  E = Evolution  If you ever have a spot that is changing in shape, color, size or symptoms (becomes itchy, painful or starts to bleed), always call us! For non-melanoma skin cancers, look for a new, pink spot that is not going away, especially one that is itchy, painful or bleeding  What should I do if I see a spot that is concerning for melanoma or non-melanoma skin cancer? If you are ever concerned, call us! Do not wait for your next appointment  We want to help!

## 2023-05-30 NOTE — PROGRESS NOTES
"[facility] Note     Patient Name: Iban Daly  Encounter Date: 5/30/2023    Have you been cared for by a [facility] in the last 3 years and, if so, which description applies to you? NO  I am considered a \"new\" patient and must complete all patient intake questions  I am FEMALE/of child-bearing potential     REVIEW OF SYSTEMS:  Have you recently had or currently have any of the following? · Recent fever or chills? No  · Any non-healing wound? No  · Are you pregnant or planning to become pregnant? No  · Are you currently or planning to be nursing or breast feeding? No   PAST MEDICAL HISTORY:  Have you personally ever had or currently have any of the following? If \"YES,\" then please provide more detail  · Skin cancer (such as Melanoma, Basal Cell Carcinoma, Squamous Cell Carcinoma? No  · Tuberculosis, HIV/AIDS, Hepatitis B or C: No  · Systemic Immunosuppression such as Diabetes, Biologic or Immunotherapy, Chemotherapy, Organ Transplantation, Bone Marrow Transplantation No, Patient is currently on Metformin, but says the glucose is being monitor because she was on Methylprednisolone and she is revisiting sugar levels with her PCP very soon  · Radiation Treatment No   FAMILY HISTORY:  Any \"first degree relatives\" (parent, brother, sister, or child) with the following? • Skin Cancer, Pancreatic or Other Cancer? YES, Brother and sister have hx of skin cancer ( MELANOMA)  Brother has hx of breast cancer  PATIENT EXPERIENCE:    • Do you want the Dermatologist to perform a COMPLETE skin exam today including a clinical examination under the \"bra and underwear\" areas? Yes  • If necessary, do we have your permission to call and leave a detailed message on your Preferred Phone number that includes your specific medical information?   Yes      Allergies   Allergen Reactions   • Hydrocodone Vomiting     \"can take oxycodone without a problem\"   • Atorvastatin Myalgia   • " Caffeine - Food Allergy Tachycardia     And high blood pressure   • Diphenhydramine Hcl (Sleep) Itching   • Pravastatin Myalgia   • Simvastatin Myalgia      Current Outpatient Medications:   •  Ascorbic Acid, Vitamin C, (VITAMIN C) 100 MG tablet, Take 1,000 mg by mouth daily, Disp: , Rfl:   •  aspirin 81 MG tablet, Take 162 mg by mouth daily, Disp: , Rfl:   •  Cholecalciferol 50 MCG (2000 UT) CAPS, Take 1 capsule by mouth daily, Disp: , Rfl:   •  Cranberry 450 MG TABS, Take 1 tablet by mouth 2 (two) times a day, Disp: , Rfl:   •  diazepam (VALIUM) 5 mg tablet, Take 1 tablet (5 mg total) by mouth every 8 (eight) hours as needed for muscle spasms for up to 10 days, Disp: 12 tablet, Rfl: 0  •  Diclofenac Sodium (VOLTAREN) 1 %, APPLY 4 GM FOUR TIMES A DAY AS NEEDED TRANSDERMAL 30 DAYS, Disp: , Rfl:   •  ezetimibe (ZETIA) 10 mg tablet, , Disp: , Rfl:   •  fenofibrate (TRICOR) 54 MG tablet, , Disp: , Rfl:   •  Flaxseed, Linseed, (FLAX SEEDS PO), Take 1 tablet by mouth daily, Disp: , Rfl:   •  fluconazole (DIFLUCAN) 150 mg tablet, TAKE 1 TABLET BY MOUTH NOW AND AN ADDITIONAL TABLET IN 3 DAYS IF SYMPTOMS REMAIN, Disp: , Rfl:   •  folic acid (FOLVITE) 1 mg tablet, Take 1,000 mcg by mouth daily, Disp: , Rfl:   •  gabapentin (NEURONTIN) 100 mg capsule, Take 1 capsule (100 mg total) by mouth 3 (three) times a day, Disp: 90 capsule, Rfl: 0  •  Humira Pen 40 MG/0 4ML PNKT, Finishes end of march 2021, Disp: , Rfl:   •  Ibuprofen 200 MG CAPS, , Disp: , Rfl:   •  meclizine (ANTIVERT) 25 mg tablet, Take 1 tablet (25 mg total) by mouth 3 (three) times a day as needed for dizziness, Disp: 90 tablet, Rfl: 0  •  metFORMIN (GLUCOPHAGE-XR) 500 mg 24 hr tablet, Take 1 tablet (500 mg total) by mouth daily with dinner, Disp: 90 tablet, Rfl: 1  •  methocarbamol (ROBAXIN) 500 mg tablet, Take 1 tablet (500 mg total) by mouth 3 (three) times a day as needed for muscle spasms for up to 10 days, Disp: 30 tablet, Rfl: 0  •  methotrexate 2 5 mg tablet, 2 5 mg once a week Takes on Fridays  Last dose10/9/20 8 PILLS ONE TIME A WEEK, Disp: , Rfl:   •  methylPREDNISolone 4 MG tablet therapy pack, Use as directed on package (Patient not taking: Reported on 5/30/2023), Disp: 21 tablet, Rfl: 0  •  metoprolol tartrate (LOPRESSOR) 25 mg tablet, Take 12 5 mg by mouth every 12 (twelve) hours , Disp: , Rfl:   •  multivitamin-minerals (CENTRUM) tablet, Take by mouth daily, Disp: , Rfl:   •  OMEGA-3 FATTY ACIDS PO, Take 1 g by mouth 2 (two) times a day , Disp: , Rfl:   •  ondansetron (Zofran ODT) 4 mg disintegrating tablet, Take 1 tablet (4 mg total) by mouth every 6 (six) hours as needed for nausea or vomiting, Disp: 30 tablet, Rfl: 2  •  pantoprazole (PROTONIX) 40 mg tablet, Take 1 tablet (40 mg total) by mouth daily, Disp: 90 tablet, Rfl: 1  •  rosuvastatin (CRESTOR) 20 MG tablet, Take 20 mg by mouth every evening , Disp: , Rfl:   •  rosuvastatin (CRESTOR) 40 MG tablet, , Disp: , Rfl:           • Whom besides the patient is providing clinical information about today's encounter?   o NO ADDITIONAL HISTORIAN (patient alone provided history)    Physical Exam and Assessment/Plan by Diagnosis:    ACTINIC KERATOSIS    Physical Exam:  • Anatomic Location Affected:  Right Temple, Right shoulder  • Morphological Description:  Erythematous scaly macules without palpable dermal component    Additional History of Present Condition:  Present for approximately a year  No previously treated  Assessment and Plan:  Based on a thorough discussion of this condition and the management approach to it (including a comprehensive discussion of the known risks, side effects and potential benefits of treatment), the patient (family) agrees to implement the following specific plan:    • Precancerous nature of these lesions reviewed   Risk of progression to [facility] if untreated/unresolved after therapy reviewed  • Lesions treated today in the office with liquid nitrogen x 2 cycles per site  • Patient counseled to return to the office in 4-6 weeks for recheck if not resolved at which time retreatment of biopsy to rule out SCC will be determined based on clinic findings    Actinic keratoses are very common on sites repeatedly exposed to the sun, especially the backs of the hands and the face, most often affecting the ears, nose, cheeks, upper lip, vermilion of the lower lip, temples, forehead and balding scalp  In severely chronically sun-damaged individuals, they may also be found on the upper trunk, upper and lower limbs, and dorsum of feet  We discussed the theoretical premalignant (“pre-cancerous”) nature and etiology of these growths  We discussed the prevailing notion that actinic keratoses are a reflection of abnormal skin cell development due to DNA damage by short wavelength UVB  They are more likely to appear if the immune function is poor, due to aging, recent sun exposure, predisposing disease or certain drugs  We discussed that the main concern is that actinic keratoses may predispose to squamous cell carcinoma  It is rare for a solitary actinic keratosis to evolve to squamous cell carcinoma (SCC), but the risk of SCC occurring at some stage in a patient with more than 10 actinic keratoses is thought to be about 10 to 15%  A tender, thickened, ulcerated or enlarging actinic keratosis is suspicious of SCC  Actinic keratoses may be prevented by strict sun protection  If already present, keratoses may improve with a very high sun protection factor (50+) broad-spectrum sunscreen applied at least daily to affected areas, year-round  We recommend that UPF-rated clothing and hats and sunglasses be worn whenever possible and that a sunscreen-moisturizer combination product such as Neutrogena Daily Defense be applied at least three times a day      We performed a thorough discussion of treatment options and specific risk/benefits/alternatives including but not limited to medical “field” treatment with medications such as the following:    • Topical “field area” medications such as 5-fluorouracil or Aldara (specifically, the trouble with long-term compliance, blistering and local skin reaction versus the convenience of at-home therapy and that field therapy “gets what is not yet seen”)  • Cryotherapy (specifically, local pain, scarring, dyspigmentation, blistering, possible superinfection, and treats “only what we see” versus directed treatment today)  • Photodynamic therapy (specifically, local pain, scarring, dyspigmentation, blistering, possible superinfection, need to schedule for a later date, and time spent in the office versus field therapy that “gets what is not yet seen”)  PROCEDURE:  DESTRUCTION OF PRE-MALIGNANT LESIONS  After a thorough discussion of treatment options and risk/benefits/alternatives (including but not limited to local pain, scarring, dyspigmentation, blistering, and possible superinfection), verbal and written consent were obtained and the aforementioned lesions were treated on with cryotherapy using liquid nitrogen x 1 cycle for 5-10 seconds  • TOTAL NUMBER of 2 pre-malignant lesions were treated today on the ANATOMIC LOCATION: Right [place] and right shoulder  The patient tolerated the procedure well, and after-care instructions were provided  MILIUM     Physical Exam:  • Anatomic Location Affected:  Forehead   • Morphological Description:  1-2 mm pearly cystic papules  • Pertinent Positives:  • Pertinent Negatives: Additional History of Present Condition:  Present for months  Not previously treated       Assessment and Plan:  Based on a thorough discussion of this condition and the management approach to it (including a comprehensive discussion of the known risks, side effects and potential benefits of treatment), the patient (family) agrees to implement the following specific plan:  • Reassured benign   • Discussed cosmetic visit  $150 to treat up to 10 lesions, and if over 10 lesions, then additional $10/lesion thereafter  Assessment and Plan  A milium is a small cyst containing keratin (the skin protein); they are usually multiple and are then known as milia  These harmless cysts present as tiny pearly-white bumps just under the surface of the skin  Milia are common in all ages and both sexes  They most often arise on the face and are particularly prominent on the eyelids and cheeks, but they may occur elsewhere  There are various kinds of milia  •  milia: Affect 40-50% of  babies, few to numerous lesions, often seen on the nose, but may also arise inside the mouth on the mucosa (Prosper pearls) or palate (Issa nodules) or more widely on the scalp, face and upper trunk, Heal spontaneously within a few weeks of birth  • Primary milia in children and adults: found around eyelids, cheeks, forehead and genitalia, in young children, a row of milia may appear along the nasal crease, may clear in a few weeks or persist for months or longer  • Juvenile milia: associated with Rombo syndrome, basal cell naevus syndrome, Famyd-Jiatk-Udrwywdt syndrome, pachyonychia congenita, Townsend syndrome and other genetic disorders, may be congenital (present at birth) or appear later in life  • Milia en plaque: multiple milia appear on within an inflamed plaque up to several centimeters in diameter, usually found on an eyelid, behind the ear, on a cheek or jaw  3  Affect children and adults, especially middle-aged women  4  Sometimes associated with another skin disease including pseudoxanthoma elasticum, discoid lupus erythematosus, lichen planus  • Multiple eruptive milia: crops of numerous milia appear over a few weeks to months, lesions may be asymptomatic or itchy, most often affect the face, upper arms and upper trunk    • Traumatic milia: occur at the site of injury as skin heals, arise from eccrine sweat ducts, examples include "thermal burns, dermabrasion, blistering rashes such as bullous pemphigoid, often seen on the back of hands and fingers in porphyria cutanea tarda, a milia-like calcified nodule may develop after  heel stick blood test    • Milia associated with drugs: may rarely follow the use of topical medication, such as phenols, hydroquinone, 5-fluorouracil cream, and a corticosteroid  Milia have a characteristic appearance  However, on occasion, a skin biopsy may be performed  This shows a small epidermoid cyst coming from a vellus hair follicle  Milia should be distinguished from other types of cyst, comedones, xanthelasma and syringomas  Colloid milia are blunt coloured bumps on cheeks and temples associated with excessive exposure to sunlight  They should also be distinguished from milia-like cysts noted on dermoscopy in seborrhoeic keratoses, papillomatous moles and some basal cell carcinomas  Milia do not need to be treated unless they are a cause for concern for the patient  They often clear up by themselves within a few months  Where possible, further trauma should be minimised to reduce the development of new lesions  • The lesion may be de-roofed using a sterile needle or blade and the contents squeezed or pricked out  • They may be destroyed using diathermy and curettage, or cryotherapy  • For widespread lesions, topical retinoids may be helpful  • Chemical peels, dermabrasion and laser ablation have been reported to be effective when used for very extensive milia  • Milia en plaque may improve with minocycline (a tetracycline antibiotic)  ACROCHORDON (\"SKIN TAG\")    Physical Exam:  • Anatomic Location Affected:  Neck  • Morphological Description:  Skin colored and brown pedunculated papules  • Pertinent Positives:  • Pertinent Negatives: Additional History of Present Condition:  Present for years  Not previously treated       Assessment and Plan:  Based on a thorough discussion of this " condition and the management approach to it (including a comprehensive discussion of the known risks, side effects and potential benefits of treatment), the patient (family) agrees to implement the following specific plan:  • Reassured benign  • Discussed cosmetic visit  $150 to treat up to 10 lesions, and if over 10 lesions, then additional $10/lesion thereafter  Skin tags are common, soft, harmless skin lesions that are also called, in the appropriate settings, papillomas, fibroepithelial polyps, and soft fibromas  They are made up of loosely arranged collagen fibers and blood vessels surrounded by a thickened or thinned-out epidermis  Skin tags tend to develop in both men and women as we grow older  They are usually found on the skin folds (neck, armpits, groin)  It is not known what specifically causes skin tags  Certain factors, though, do appear to play a role:  • Chaffing and irritation from skin rubbing together  • High levels of growth factors (as seen, for example, in pregnancy or in acromegaly/gigantism)  • Insulin resistance  • Human papillomavirus (wart virus)    We discussed that most skin tags do not need to be treated unless they are specifically causing the patient physical distress or limitation or pose a risk for a larger problem such as an infection that forms secondary to excoriation or chronic irritation      We had a thorough discussion of treatment options and specific risks (including that any procedural treatment may not be covered by insurance and would then be the patient's responsibility) and benefits/alternatives including but not limited to the following:  • Cryotherapy (freezing)  • Shave removal  • Surgical excision (snip excision with scissors)  • Electrosurgery  • Ligation (we do not do this procedure and counseled against it due to risk of tissue necrosis and infection)    DERMATOFIBROMA    Physical Exam:  • Anatomic Location Affected & Description: tan to brown firm "papule, + dimple sign with lateral pressure on the Left upper arm  • Pertinent Positives:  • Pertinent Negatives: Additional History of Present Condition:  None  Assessment and Plan:  Based on a thorough discussion of this condition and the management approach to it (including a comprehensive discussion of the known risks, side effects and potential benefits of treatment), the patient (family) agrees to implement the following specific plan:  •  Pt reassured of benign diagnosis, no treatment required  •  Pt aware that if lesions ever have concerning changes such as rapid enlargement, start to become non healing areas or frequently traumatized, please call to let us know as these changes may require biopsy or we can discuss more definitve removal     DERMATOFIBROMA, PATIENT INFORMATION:  A dermatofibroma is a common benign fibrous nodule that most often arises on the skin of the lower legs  A dermatofibroma is also called a \"cutaneous fibrous histiocytoma  \"  Dermatofibromas occur at all ages and in people of every ethnicity  They are more common in women than in men  It is not clear if dermatofibroma is a reactive process or if it is a neoplasm  The lesions are made up of proliferating fibroblasts  Histiocytes may also be involved  They are sometimes attributed to an insect bite or ingrownhair or local trauma, but not consistently  They may be more numerous in patients with altered immunity  Dermatofibromas most often occur on the legs and arms, but may also arise on the trunk or any site of the body  Typical clinical features include the following:  • People may have 1 or up to 15 lesions  • Size varies from 0 5-1 5 cm diameter; most lesions are 7-10 mm diameter  • They are firm nodules tethered to the skin surface and mobile over subcutaneous tissue  • The skin \"dimples\" on pinching the lesion    • Color may be pink to light brown in white skin, and dark brown to black in dark skin; some appear " "paler in the center  • They do not usually cause symptoms, but they are sometimes painful or itchy  • Because they are often raised lesions, they may be traumatized, for example by a razor  • Occasionally dozens may erupt within a few months, usually in the setting of immunosuppression (for example autoimmune disease, cancer or certain medications)  • Dermatofibroma does not give rise to cancer  However, occasionally, it may be mistaken for dermatofibrosarcoma or desmoplastic melanoma  A dermatofibroma is harmless and seldom causes any symptoms  Usually, only reassurance is needed  If it is nuisance or causing concern, the lesion can be removed surgically, resulting in a scar that is, by definition, usually longer in diameter than the widest portion of the dermatofibroma  Cryotherapy, shave biopsy and laser surgery are rarely completely successful  Skin punch biopsy or incisional biopsy may be undertaken if there is an atypical feature such as recent enlargement, ulceration, or asymmetrical structures and colours on dermatoscopy  BLUE NEVUS     Physical Exam:  • Anatomic Location Affected:  Bilateral dorsal hands  • Right dorsal hand: 4 x 3 mm - not changing    • Left dorsal hand:  1 mm not changing     Additional History of Present Condition:  Noticed during examination     Assessment and Plan:  Based on a thorough discussion of this condition and the management approach to it (including a comprehensive discussion of the known risks, side effects and potential benefits of treatment), the patient (family) agrees to implement the following specific plan:  • Reassured benign   • When outside we recommend using a wide brim hat, sunglasses, long sleeve and pants, sunscreen with SPF 45+ with reapplication every 2 hours, or SPF specific clothing       MELANOCYTIC NEVI (\"Moles\")    Physical Exam:  • Anatomic Location Affected:   Mostly on sun-exposed areas of the trunk and extremities  • Morphological " "Description:  Scattered, 1-4mm round to ovoid, symmetric and evenly bordered, regularly pigmented macules/papules without outliers other than if noted elsewhere in today's note  • Pertinent Positives:  • Pertinent Negatives: Additional History of Present Condition:  Noticed during examination  Assessment and Plan:  Based on a thorough discussion of this condition and the management approach to it (including a comprehensive discussion of the known risks, side effects and potential benefits of treatment), the patient (family) agrees to implement the following specific plan:  • The patient was encouraged to use an SPF30+ broad spectrum sunscreen daily and re-apply every 2-3 outdoors while outside  The importance of sun protection, self-skin exams, and sun avoidance was emphasized  An annual full body skin exam is recommended, and the patient was encouraged to return to the office sooner for any new or changing lesions of concerns  • Benign, reassured  • Annual skin check - especially monitor skin lesion on right inner thigh  Melanocytic Nevi  Melanocytic nevi (\"moles\") are tan or brown, raised or flat areas of the skin which have an increased number of melanocytes  Melanocytes are the cells in our body which make pigment and account for skin color  Some moles are present at birth (I e , \"congenital nevi\"), while others come up later in life (i e , \"acquired nevi\")  The sun can stimulate the body to make more moles  Sunburns are not the only thing that triggers more moles  Chronic sun exposure can do it too  Clinically distinguishing a healthy mole from melanoma may be difficult, even for experienced dermatologists  The \"ABCDE's\" of moles have been suggested as a means of helping to alert a person to a suspicious mole and the possible increased risk of melanoma    The suggestions for raising alert are as follows:    Asymmetry: Healthy moles tend to be symmetric, while melanomas are often " "asymmetric  Asymmetry means if you draw a line through the mole, the two halves do not match in color, size, shape, or surface texture  Asymmetry can be a result of rapid enlargement of a mole, the development of a raised area on a previously flat lesion, scaling, ulceration, bleeding or scabbing within the mole  Any mole that starts to demonstrate \"asymmetry\" should be examined promptly by a board certified dermatologist      Border: Healthy moles tend to have discrete, even borders  The border of a melanoma often blends into the normal skin and does not sharply delineate the mole from normal skin  Any mole that starts to demonstrate \"uneven borders\" should be examined promptly by a board certified dermatologist      Color: Healthy moles tend to be one color throughout  Melanomas tend to be made up of different colors ranging from dark black, blue, white, or red  Any mole that demonstrates a color change should be examined promptly by a board certified dermatologist      Diameter: Healthy moles tend to be smaller than 0 6 cm in size; an exception are \"congenital nevi\" that can be larger  Melanomas tend to grow and can often be greater than 0 6 cm (1/4 of an inch, or the size of a pencil eraser)  This is only a guideline, and many normal moles may be larger than 0 6 cm without being unhealthy  Any mole that starts to change in size (small to bigger or bigger to smaller) should be examined promptly by a board certified dermatologist      Evolving: Healthy moles tend to \"stay the same  \"  Melanomas may often show signs of change or evolution such as a change in size, shape, color, or elevation    Any mole that starts to itch, bleed, crust, burn, hurt, or ulcerate or demonstrate a change or evolution should be examined promptly by a board certified dermatologist         LENTIGO    Physical Exam:  • Anatomic Location Affected:  Trunk  • Morphological Description:  Light brown well demarcated macules on sun exposed " skin with reassuring dermoscopy  • Pertinent Positives:  • Pertinent Negatives: Additional History of Present Condition:      Assessment and Plan:  Based on a thorough discussion of this condition and the management approach to it (including a comprehensive discussion of the known risks, side effects and potential benefits of treatment), the patient (family) agrees to implement the following specific plan:  • When outside we recommend using a wide brim hat, sunglasses, long sleeve and pants, sunscreen with SPF 58+ with reapplication every 2 hours, or SPF specific clothing       What is a lentigo? A lentigo is a pigmented flat or slightly raised lesion with a clearly defined edge  Unlike an ephelis (freckle), it does not fade in the winter months  There are several kinds of lentigo  The name lentigo originally referred to its appearance resembling a small lentil  The plural of lentigo is lentigines, although “lentigos” is also in common use  Who gets lentigines? Lentigines can affect males and females of all ages and races  Solar lentigines are especially prevalent in fair skinned adults  Lentigines associated with syndromes are present at birth or arise during childhood  What causes lentigines? Common forms of lentigo are due to exposure to ultraviolet radiation:  • Sun damage including sunburn   • Indoor tanning   • Phototherapy, especially photochemotherapy (PUVA)    Ionizing radiation, eg radiation therapy, can also cause lentigines  Several familial syndromes associated with widespread lentigines originate from mutations in Fuentes-MAP kinase, mTOR signaling and PTEN pathways  What is the treatment for lentigines? Most lentigines are left alone  Attempts to lighten them may not be successful   The following approaches are used:  • SPF 50+ broad-spectrum sunscreen   • Hydroquinone bleaching cream   • Alpha hydroxy acids   • Vitamin C   • Retinoids   • Azelaic acid   • Chemical peels  Individual "lesions can be permanently removed using:  • Cryotherapy   • Intense pulsed light   • Pigment lasers    How can lentigines be prevented? Lentigines associated with exposure ultraviolet radiation can be prevented by very careful sun protection  Clothing is more successful at preventing new lentigines than are sunscreens  What is the outlook for lentigines? Lentigines usually persist  They may increase in number with age and sun exposure  Some in sun-protected sites may fade and disappear  HDEZ ANGIOMAS    Physical Exam:  • Anatomic Location Affected:  trunk  • Morphological Description:  Scattered cherry red, variably sized papules  • Pertinent Positives:  • Pertinent Negatives: Additional History of Present Condition:      Assessment and Plan:  Based on a thorough discussion of this condition and the management approach to it (including a comprehensive discussion of the known risks, side effects and potential benefits of treatment), the patient (family) agrees to implement the following specific plan:  • Monitor for changes  • Benign, reassured  •     Assessment and Plan:    Cherry angioma, also known as Chevis Galea spots, are benign vascular skin lesions  A \"cherry angioma\" is a firm red, blue or purple papule, 0 1-1 cm in diameter  When thrombosed, they can appear black in colour until evaluated with a dermatoscope when the red or purple colour is more easily seen  Cherry angioma may develop on any part of the body but most often appear on the scalp, face, lips and trunk  An angioma is due to proliferating endothelial cells; these are the cells that line the inside of a blood vessel  Angiomas can arise in early life or later in life; the most common type of angioma is a cherry angioma  Cherry angiomas are very common in males and females of any age or race  They are more noticeable in white skin than in skin of colour  They markedly increase in number from about the age of 36   There may " "be a family history of similar lesions  Eruptive cherry angiomas have been rarely reported to be associated with internal malignancy  The cause of angiomas is unknown  Genetic analysis of cherry angiomas has shown that they frequently carry specific somatic missense mutations in the GNAQ and GNA11 (Q209H) genes, which are involved in other vascular and melanocytic proliferations  SEBORRHEIC KERATOSIS; NON-INFLAMED    Physical Exam:  • Anatomic Location Affected:  trunk  • Morphological Description:  Brown waxy variably sized \"stuck-on\" appearing papules with reassuring dermoscopy  • Pertinent Positives:  • Pertinent Negatives: Additional History of Present Condition:      Assessment and Plan:  Based on a thorough discussion of this condition and the management approach to it (including a comprehensive discussion of the known risks, side effects and potential benefits of treatment), the patient (family) agrees to implement the following specific plan:  • Monitor for changes  • Benign, reassured  •     Seborrheic Keratosis  A seborrheic keratosis is a harmless warty spot that appears during adult life as a common sign of skin aging  Seborrheic keratoses can arise on any area of skin, covered or uncovered, with the usual exception of the palms and soles  They do not arise from mucous membranes  Seborrheic keratoses can have highly variable appearance  Seborrheic keratoses are extremely common  It has been estimated that over 90% of adults over the age of 61 years have one or more of them  They occur in males and females of all races, typically beginning to erupt in the 35s or 45s  They are uncommon under the age of 21 years  The precise cause of seborrhoeic keratoses is not known  Seborrhoeic keratoses are considered degenerative in nature  As time goes by, seborrheic keratoses tend to become more numerous   Some people inherit a tendency to develop a very large number of them; some people may have " "hundreds of them  There is no easy way to remove multiple lesions on a single occasion  Unless a specific lesion is \"inflamed\" and is causing pain or stinging/burning or is bleeding, most insurance companies do not authorize treatment  Scribe Attestation    I,:  Chloe Gill am acting as a scribe while in the presence of the attending physician :       I,:  Bruno Yang MD personally performed the services described in this documentation    as scribed in my presence  :           "

## 2023-08-21 ENCOUNTER — HOSPITAL ENCOUNTER (OUTPATIENT)
Dept: MRI IMAGING | Facility: HOSPITAL | Age: 68
Discharge: HOME | End: 2023-08-21
Attending: INTERNAL MEDICINE
Payer: MEDICARE

## 2023-08-21 DIAGNOSIS — M17.11 UNILATERAL PRIMARY OSTEOARTHRITIS, RIGHT KNEE: ICD-10-CM

## 2023-08-21 PROCEDURE — 73721 MRI JNT OF LWR EXTRE W/O DYE: CPT

## 2023-09-06 ENCOUNTER — RA CDI HCC (OUTPATIENT)
Dept: OTHER | Facility: HOSPITAL | Age: 68
End: 2023-09-06

## 2023-09-11 RX ORDER — NYSTATIN 100000 U/G
OINTMENT TOPICAL
COMMUNITY
Start: 2023-07-24

## 2023-09-11 RX ORDER — ACETAMINOPHEN 325 MG/1
TABLET ORAL
COMMUNITY

## 2023-09-11 RX ORDER — TRIAMTERENE AND HYDROCHLOROTHIAZIDE 37.5; 25 MG/1; MG/1
CAPSULE ORAL
COMMUNITY
Start: 2023-08-14

## 2023-09-12 ENCOUNTER — OFFICE VISIT (OUTPATIENT)
Dept: FAMILY MEDICINE CLINIC | Facility: CLINIC | Age: 68
End: 2023-09-12
Payer: MEDICARE

## 2023-09-12 ENCOUNTER — TELEPHONE (OUTPATIENT)
Age: 68
End: 2023-09-12

## 2023-09-12 VITALS
BODY MASS INDEX: 45.99 KG/M2 | HEART RATE: 88 BPM | OXYGEN SATURATION: 97 % | DIASTOLIC BLOOD PRESSURE: 80 MMHG | RESPIRATION RATE: 16 BRPM | WEIGHT: 293 LBS | HEIGHT: 67 IN | SYSTOLIC BLOOD PRESSURE: 124 MMHG

## 2023-09-12 DIAGNOSIS — E78.2 MIXED HYPERLIPIDEMIA: ICD-10-CM

## 2023-09-12 DIAGNOSIS — R73.03 PRE-DIABETES: ICD-10-CM

## 2023-09-12 DIAGNOSIS — R23.3 BRUISES EASILY: ICD-10-CM

## 2023-09-12 DIAGNOSIS — M06.9 RHEUMATOID ARTHRITIS, INVOLVING UNSPECIFIED SITE, UNSPECIFIED WHETHER RHEUMATOID FACTOR PRESENT (HCC): ICD-10-CM

## 2023-09-12 DIAGNOSIS — Z23 NEED FOR VACCINATION: ICD-10-CM

## 2023-09-12 DIAGNOSIS — Z79.899 OTHER LONG TERM (CURRENT) DRUG THERAPY: Primary | ICD-10-CM

## 2023-09-12 DIAGNOSIS — G47.33 OSA (OBSTRUCTIVE SLEEP APNEA): ICD-10-CM

## 2023-09-12 DIAGNOSIS — E55.9 VITAMIN D DEFICIENCY: ICD-10-CM

## 2023-09-12 DIAGNOSIS — I25.118 CORONARY ARTERY DISEASE OF NATIVE ARTERY OF NATIVE HEART WITH STABLE ANGINA PECTORIS (HCC): ICD-10-CM

## 2023-09-12 PROCEDURE — 90662 IIV NO PRSV INCREASED AG IM: CPT | Performed by: FAMILY MEDICINE

## 2023-09-12 PROCEDURE — 99214 OFFICE O/P EST MOD 30 MIN: CPT | Performed by: FAMILY MEDICINE

## 2023-09-12 PROCEDURE — G0008 ADMIN INFLUENZA VIRUS VAC: HCPCS | Performed by: FAMILY MEDICINE

## 2023-09-12 RX ORDER — METFORMIN HYDROCHLORIDE 500 MG/1
500 TABLET, EXTENDED RELEASE ORAL
Qty: 90 TABLET | Refills: 1 | Status: SHIPPED | OUTPATIENT
Start: 2023-09-12

## 2023-09-12 NOTE — TELEPHONE ENCOUNTER
Spoke with patient. Regarding labs. Pt stated she contacted Medicare and she said as long as  Her PCP ordered the blood work she does not have to wait to have it done.     Pt wanted to let provider know

## 2023-09-12 NOTE — PROGRESS NOTES
Assessment/Plan:  1. Other long term (current) drug therapy  -     HEMOGLOBIN A1C W/ EAG ESTIMATION; Future    2. Coronary artery disease of native artery of native heart with stable angina pectoris ([facility])    3. Mixed hyperlipidemia  -     Lipid Panel with Direct LDL reflex; Future    4. Bruises easily    5. Pre-diabetes  -     metFORMIN (GLUCOPHAGE-XR) 500 mg 24 hr tablet; Take 1 tablet (500 mg total) by mouth daily with dinner    6. Vitamin D deficiency  -     Vitamin D 25 hydroxy; Future    7. Need for vaccination  -     influenza vaccine, high-dose, PF 0.7 mL (FLUZONE HIGH-DOSE)    8. DANNY (obstructive sleep apnea)    9. Rheumatoid arthritis, involving unspecified site, unspecified whether rheumatoid factor present ([facility])      I advised her to take one baby aspirin. I advised her to have her COVID-19 booster in 2 weeks at [facility].   I advised her to pass by the laboratory for blood work for blood glucose, vitamin D and cholesterol    I will see her back in 6 months. Subjective:      Patient ID: Anjelica Gomez is a 79 y.o. female. Ms. Kadie Neely is 58-year-old female who present today for a follow-up visit. She has a history of rheumatoid arthritis, coronary artery disease, status post stent morbid obesity, chronic obstructive pulmonary diseases, hyperlipidemia and prediabetes. She consents to use ROSA for today. She has a meniscus tear, a broken toe and bruising. She denies using prednisone. She uses Voltaren gel on her knee. She does no take diclofenac pills for the reason it could cause some heart problems. Coronary artery disease. She takes two baby aspirin. She had angioplasty in 2000. She does not belive she has a stent placed     She had taken 325 mg baby aspirin for her first myocardial infarction but had headaches. Weight loss  She takes metformin. Hyperlipidemia  She takes rosuvastatin 40mg  She takes fenofibrate  She takes Zetia 10mg twice a week. She increased her vitamin D. She uses a CPAP. She went to a dermatologist and had some procedures done. She had her flu injection today. She just had her blood work done for the kidneys and liver. She discontinued her gabapentin. She has a family history of skin cancer. The following portions of the patient's history were reviewed and updated as appropriate: allergies, current medications, past family history, past medical history, past social history, past surgical history and problem list.    Review of Systems   Constitutional: Negative for fever and unexpected weight change. HENT: Negative for nosebleeds and trouble swallowing. Eyes: Negative for visual disturbance. Respiratory: Negative for chest tightness and shortness of breath. Cardiovascular: Negative for chest pain, palpitations, and leg swelling. Gastrointestinal: Negative for abdominal pain, constipation, diarrhea, and nausea. Endocrine: Negative for cold intolerance. Genitourinary: Negative for dysuria and urgency. Musculoskeletal: Negative for joint swelling and myalgias. Skin: Negative for rash. Neurological: Negative for tremors, seizures, and syncope. Hematological: Does not bruise/bleed easily. Psychiatric/Behavioral: Negative for hallucinations and suicidal ideas. Objective:     /80 (BP Location: Left arm, Patient Position: Sitting, Cuff Size: Large)   Pulse 88   Resp 16   Ht 5' 7" (1.702 m)   Wt (!) 144 kg (317 lb)   SpO2 97%   BMI 49.65 kg/m²    Physical Exam  Vitals and nursing note reviewed. Weight is 117 pounds. Constitutional:     Appearance: Well-developed. Positive for obesity  HENT:     Head: Normocephalic and atraumatic. Cardiovascular:     Rate and Rhythm: Normal rate and regular rhythm. Heart sounds: Normal heart sounds. No murmur heard. Pulmonary:     Effort: Pulmonary effort is normal.     Breath sounds: Normal breath sounds. No wheezing or rales.   Abdominal: General: Bowel sounds are normal. There is no distension. Palpations: Abdomen is soft. Tenderness: There is no abdominal tenderness. Musculoskeletal:     General: No tenderness. Normal range of motion. Cervical back: Normal range of motion and neck supple. Upper extremity: Ecchymoses on the left forearm. Lymphadenopathy:     Cervical: No cervical adenopathy. Skin:     General: Skin is warm and dry. Capillary Refill: Capillary refill takes less than 2 seconds. Findings: No rash. Neurological:     Mental Status: Alert and oriented to person, place, and time. Cranial Nerves: No cranial nerve deficit. Sensory: No sensory deficit. Motor: No abnormal muscle tone. Psychiatric:     Behavior: Behavior normal.     Thought Content: Thought content normal.     Judgment: Judgment normal.      No visits with results within 2 Week(s) from this visit. Latest known visit with results is:   Orders Only on 04/17/2023   Component Date Value   • Cholesterol, Total 04/17/2023 168    • Triglycerides 04/17/2023 170 (H)    • HDL 04/17/2023 58    • LDL Calculated 04/17/2023 81    • Hemoglobin A1C 04/17/2023 7.0 (H)    • Estimated Average Glucose 04/17/2023 154    • 25-HYDROXY VIT D 04/17/2023 26.4 (L)    • Vitamin B-12 04/17/2023 549      I have personally reviewed results with the patient. BMI Counseling: Body mass index is 49.65 kg/m². The BMI is above normal. Nutrition recommendations include decreasing portion sizes, encouraging healthy choices of fruits and vegetables, decreasing fast food intake, consuming healthier snacks and limiting drinks that contain sugar. Exercise recommendations include exercising 3-5 times per week. No pharmacotherapy was ordered. Rationale for BMI follow-up plan is due to patient being overweight or obese. Depression Screening and Follow-up Plan: Patient was screened for depression during today's encounter.  They screened negative with a PHQ-2 score of 0.    Falls Plan of Care: balance, strength, and gait training instructions were provided. Medications that increase falls were reviewed. Ana Meeks MD   [address]     Transcribed for Ana Meeks MD, by Meena Tenorio on 09/13/23 at 5:33 AM. Powered by [employer]ana Palacios.

## 2023-10-06 ENCOUNTER — HOSPITAL ENCOUNTER (EMERGENCY)
Facility: HOSPITAL | Age: 68
Discharge: HOME | End: 2023-10-06
Attending: EMERGENCY MEDICINE
Payer: MEDICARE

## 2023-10-06 ENCOUNTER — APPOINTMENT (EMERGENCY)
Dept: CT IMAGING | Facility: HOSPITAL | Age: 68
End: 2023-10-06
Payer: MEDICARE

## 2023-10-06 ENCOUNTER — NURSE TRIAGE (OUTPATIENT)
Age: 68
End: 2023-10-06

## 2023-10-06 VITALS
TEMPERATURE: 98 F | BODY MASS INDEX: 45.99 KG/M2 | WEIGHT: 293 LBS | OXYGEN SATURATION: 99 % | DIASTOLIC BLOOD PRESSURE: 66 MMHG | HEART RATE: 93 BPM | HEIGHT: 67 IN | SYSTOLIC BLOOD PRESSURE: 149 MMHG | RESPIRATION RATE: 18 BRPM

## 2023-10-06 DIAGNOSIS — N20.1 URETEROLITHIASIS: Primary | ICD-10-CM

## 2023-10-06 DIAGNOSIS — R30.0 DYSURIA: ICD-10-CM

## 2023-10-06 DIAGNOSIS — R10.32 LEFT LOWER QUADRANT ABDOMINAL PAIN: ICD-10-CM

## 2023-10-06 LAB
ALBUMIN SERPL BCP-MCNC: 3.8 G/DL (ref 3.5–5)
ALP SERPL-CCNC: 66 U/L (ref 34–104)
ALT SERPL W P-5'-P-CCNC: 32 U/L (ref 7–52)
ANION GAP SERPL CALCULATED.3IONS-SCNC: 8 MMOL/L
AST SERPL W P-5'-P-CCNC: 21 U/L (ref 13–39)
BACTERIA UR QL AUTO: ABNORMAL /HPF
BASOPHILS # BLD AUTO: 0.06 THOUSANDS/ÂΜL (ref 0–0.1)
BASOPHILS NFR BLD AUTO: 1 % (ref 0–1)
BILIRUB SERPL-MCNC: 0.43 MG/DL (ref 0.2–1)
BILIRUB UR QL STRIP: NEGATIVE
BUN SERPL-MCNC: 14 MG/DL (ref 5–25)
CALCIUM SERPL-MCNC: 9 MG/DL (ref 8.4–10.2)
CHLORIDE SERPL-SCNC: 103 MMOL/L (ref 96–108)
CLARITY UR: CLEAR
CO2 SERPL-SCNC: 30 MMOL/L (ref 21–32)
COLOR UR: ABNORMAL
CREAT SERPL-MCNC: 1.09 MG/DL (ref 0.6–1.3)
EOSINOPHIL # BLD AUTO: 0.13 THOUSAND/ÂΜL (ref 0–0.61)
EOSINOPHIL NFR BLD AUTO: 2 % (ref 0–6)
ERYTHROCYTE [DISTWIDTH] IN BLOOD BY AUTOMATED COUNT: 15.3 % (ref 11.6–15.1)
GFR SERPL CREATININE-BSD FRML MDRD: 52 ML/MIN/1.73SQ M
GLUCOSE SERPL-MCNC: 142 MG/DL (ref 65–140)
GLUCOSE UR STRIP-MCNC: NEGATIVE MG/DL
HCT VFR BLD AUTO: 37 % (ref 34.8–46.1)
HGB BLD-MCNC: 11.8 G/DL (ref 11.5–15.4)
HGB UR QL STRIP.AUTO: ABNORMAL
IMM GRANULOCYTES # BLD AUTO: 0.03 THOUSAND/UL (ref 0–0.2)
IMM GRANULOCYTES NFR BLD AUTO: 0 % (ref 0–2)
KETONES UR STRIP-MCNC: NEGATIVE MG/DL
LEUKOCYTE ESTERASE UR QL STRIP: ABNORMAL
LYMPHOCYTES # BLD AUTO: 3.33 THOUSANDS/ÂΜL (ref 0.6–4.47)
LYMPHOCYTES NFR BLD AUTO: 38 % (ref 14–44)
MCH RBC QN AUTO: 29 PG (ref 26.8–34.3)
MCHC RBC AUTO-ENTMCNC: 31.9 G/DL (ref 31.4–37.4)
MCV RBC AUTO: 91 FL (ref 82–98)
MONOCYTES # BLD AUTO: 0.91 THOUSAND/ÂΜL (ref 0.17–1.22)
MONOCYTES NFR BLD AUTO: 10 % (ref 4–12)
NEUTROPHILS # BLD AUTO: 4.28 THOUSANDS/ÂΜL (ref 1.85–7.62)
NEUTS SEG NFR BLD AUTO: 49 % (ref 43–75)
NITRITE UR QL STRIP: NEGATIVE
NON-SQ EPI CELLS URNS QL MICRO: ABNORMAL /HPF
NRBC BLD AUTO-RTO: 0 /100 WBCS
PH UR STRIP.AUTO: 7.5 [PH]
PLATELET # BLD AUTO: 188 THOUSANDS/UL (ref 149–390)
PMV BLD AUTO: 9.5 FL (ref 8.9–12.7)
POTASSIUM SERPL-SCNC: 3.2 MMOL/L (ref 3.5–5.3)
PROT SERPL-MCNC: 6.4 G/DL (ref 6.4–8.4)
PROT UR STRIP-MCNC: NEGATIVE MG/DL
RBC # BLD AUTO: 4.07 MILLION/UL (ref 3.81–5.12)
RBC #/AREA URNS AUTO: ABNORMAL /HPF
SODIUM SERPL-SCNC: 141 MMOL/L (ref 135–147)
SP GR UR STRIP.AUTO: 1.01 (ref 1–1.03)
UROBILINOGEN UR QL STRIP.AUTO: 0.2 E.U./DL
WBC # BLD AUTO: 8.74 THOUSAND/UL (ref 4.31–10.16)
WBC #/AREA URNS AUTO: ABNORMAL /HPF

## 2023-10-06 PROCEDURE — 74177 CT ABD & PELVIS W/CONTRAST: CPT

## 2023-10-06 PROCEDURE — G1004 CDSM NDSC: HCPCS

## 2023-10-06 PROCEDURE — 99285 EMERGENCY DEPT VISIT HI MDM: CPT | Performed by: EMERGENCY MEDICINE

## 2023-10-06 PROCEDURE — 99284 EMERGENCY DEPT VISIT MOD MDM: CPT

## 2023-10-06 PROCEDURE — 81001 URINALYSIS AUTO W/SCOPE: CPT | Performed by: EMERGENCY MEDICINE

## 2023-10-06 PROCEDURE — 80053 COMPREHEN METABOLIC PANEL: CPT | Performed by: EMERGENCY MEDICINE

## 2023-10-06 PROCEDURE — 36415 COLL VENOUS BLD VENIPUNCTURE: CPT | Performed by: EMERGENCY MEDICINE

## 2023-10-06 PROCEDURE — 85025 COMPLETE CBC W/AUTO DIFF WBC: CPT | Performed by: EMERGENCY MEDICINE

## 2023-10-06 RX ORDER — TAMSULOSIN HYDROCHLORIDE 0.4 MG/1
0.4 CAPSULE ORAL
Qty: 7 CAPSULE | Refills: 0 | Status: SHIPPED | OUTPATIENT
Start: 2023-10-06 | End: 2023-10-13

## 2023-10-06 RX ORDER — POTASSIUM CHLORIDE 20 MEQ/1
40 TABLET, EXTENDED RELEASE ORAL ONCE
Status: COMPLETED | OUTPATIENT
Start: 2023-10-06 | End: 2023-10-06

## 2023-10-06 RX ADMIN — IOHEXOL 100 ML: 350 INJECTION, SOLUTION INTRAVENOUS at 12:54

## 2023-10-06 RX ADMIN — POTASSIUM CHLORIDE 40 MEQ: 1500 TABLET, EXTENDED RELEASE ORAL at 13:25

## 2023-10-06 NOTE — TELEPHONE ENCOUNTER
Regarding: blood in urine  ----- Message from Harrison Torrez sent at 10/6/2023 11:03 AM EDT -----  Chyna Ynes is calling stating she has blood in her urine. She stated that it started alitltle yesterday. She does not want to come to office if we are just going to send her to the ER. She had kidney stones 2 years ago and is nervous this is what it is again. Can we call to triage patient please.   Thank you

## 2023-10-06 NOTE — DISCHARGE INSTRUCTIONS
Follow-up with your primary care physician as well as urology. You can take Tylenol/acetaminophen every 6 hours as needed for pain. Take the prescribed Flomax as directed. Stay well-hydrated. Please return to the emergency department if you develop worsening symptoms, uncontrolled pain, vomiting, fever, or anything else concerning to you.

## 2023-10-06 NOTE — TELEPHONE ENCOUNTER
Reason for Disposition  • Unable to urinate (or only a few drops) > 4 hours and bladder feels very full (e.g., palpable bladder or strong urge to urinate)    Answer Assessment - Initial Assessment Questions  1. COLOR of URINE: "Describe the color of the urine."  (e.g., tea-colored, pink, red, blood clots, bloody)         Blood in urine yesterday pressure and  Not urinating drops at a time      2. ONSET: "When did the bleeding start?"        Small amount of blood     3. EPISODES: "How many times has there been blood in the urine?" or "How many times today?"          Each time urinates      4. PAIN with URINATION: "Is there any pain with passing your urine?" If Yes, ask: "How bad is the pain?"  (Scale 1-10; or mild, moderate, severe)     - MILD - complains slightly about urination hurting     - MODERATE - interferes with normal activities       - SEVERE - excruciating, unwilling or unable to urinate because of the pain        Constantly urge to go /pressure     5. FEVER: "Do you have a fever?" If Yes, ask: "What is your temperature, how was it measured, and when did it start?"       Denies    6. ASSOCIATED SYMPTOMS: "Are you passing urine more frequently than usual?"      Yes the pressure    7.  OTHER SYMPTOMS: "Do you have any other symptoms?" (e.g., back/flank pain, abdominal pain, vomiting)      Blood in urine denies N/V    Protocols used: URINE - BLOOD IN-ADULT-OH

## 2023-10-06 NOTE — ED PROVIDER NOTES
History  Chief Complaint   Patient presents with   • Difficulty Urinating     Two days of increased feeling of need to urinate but having difficulty passing urine, pt also reports some L lower abd pain and states she has prior history of kidney stones which required lithotripsy to remove. 60-year-old female with history of ureterolithiasis, CAD, GERD, hyperlipidemia, hypertension, rheumatoid arthritis who presents for evaluation of abdominal pain and hematuria. Patient reports 2 days ago, she developed urinary frequency and urgency. She states she felt a pressure in her lower abdomen as if she had to urinate. Yesterday, she noted a small amount of blood in her urine which has persisted today. She also developed left side and left lower quadrant abdominal pain. She denies any flank pain. She does have mild dysuria. She reports a history of 2 kidney stones both of which required lithotripsy. She had 1 episode of loose stools yesterday. She otherwise denies fevers, nausea, vomiting, chest pain, shortness of breath. She has not been taking any medications for symptoms at home. Prior to Admission Medications   Prescriptions Last Dose Informant Patient Reported? Taking?    Ascorbic Acid, Vitamin C, (VITAMIN C) 100 MG tablet  Self Yes No   Sig: Take 1,000 mg by mouth daily   Cholecalciferol 50 MCG (2000 UT) CAPS  Self Yes No   Sig: Take 1 capsule by mouth daily   Cranberry 450 MG TABS  Self Yes No   Sig: Take 1 tablet by mouth 2 (two) times a day   Diclofenac Sodium (VOLTAREN) 1 %  Self Yes No   Sig: APPLY 4 GM FOUR TIMES A DAY AS NEEDED TRANSDERMAL 30 DAYS   Flaxseed, Linseed, (FLAX SEEDS PO)  Self Yes No   Sig: Take 1 tablet by mouth daily   Humira Pen 40 MG/0.4ML PNKT  Self Yes No   Sig: Finishes end of 2021   OMEGA-3 FATTY ACIDS PO  Self Yes No   Sig: Take 1 g by mouth 2 (two) times a day    acetaminophen (Tylenol) 325 mg tablet   Yes No   Si tablet as needed Orally TID PRN   aspirin 81 MG tablet  Self Yes No   Sig: Take 162 mg by mouth daily   ezetimibe (ZETIA) 10 mg tablet  Self Yes No   fenofibrate (TRICOR) 54 MG tablet  Self Yes No   folic acid (FOLVITE) 1 mg tablet  Self Yes No   Sig: Take 1,000 mcg by mouth daily   meclizine (ANTIVERT) 25 mg tablet   No No   Sig: Take 1 tablet (25 mg total) by mouth 3 (three) times a day as needed for dizziness   metFORMIN (GLUCOPHAGE-XR) 500 mg 24 hr tablet   No No   Sig: Take 1 tablet (500 mg total) by mouth daily with dinner   methotrexate 2.5 mg tablet  Self Yes No   Si.5 mg once a week Takes on   Last dose10/9/20  8 PILLS ONE TIME A WEEK   metoprolol tartrate (LOPRESSOR) 25 mg tablet  Self Yes No   Sig: Take 12.5 mg by mouth every 12 (twelve) hours    multivitamin-minerals (CENTRUM) tablet  Self Yes No   Sig: Take by mouth daily   nystatin (MYCOSTATIN) ointment   Yes No   Sig: APPLY TOPICALLY TO THE AFFECTED AREA TWICE DAILY   pantoprazole (PROTONIX) 40 mg tablet   No No   Sig: Take 1 tablet (40 mg total) by mouth daily   rosuvastatin (CRESTOR) 40 MG tablet  Self Yes No   triamterene-hydrochlorothiazide (DYAZIDE) 37.5-25 mg per capsule   Yes No      Facility-Administered Medications: None       Past Medical History:   Diagnosis Date   • Abnormal Pap smear of cervix     cin3; hpv non 16/18 +(2016); LGSIL 2018   • Arthritis    • Cancer (HCC)     cervical   • Coronary artery disease    • GERD (gastroesophageal reflux disease)    • History of pneumonia     2 yrs ago   • HPV (human papilloma virus) infection    • Hypercholesterolemia    • Hypertension    • Kidney stone    • Morbid obesity (HCC)    • Motion sickness    • Myocardial infarct ([address])     X2;  and 2002//[facility] cardiology Dr Montilla Simla yearly   • Pleuritic chest pain     "from constant lifting on the job chest muscle area"   • PONV (postoperative nausea and vomiting)     "extremely with the gas"   • Rheumatoid arthritis ([address])     Rheu and cervical   • Shortness of breath     moderate activity// pt does work full time/on and off feet in warehouse   • Vertigo    • Wears partial dentures     lower       Past Surgical History:   Procedure Laterality Date   • ANGIOPLASTY Right 09/14/2015    right groin / femoral.   • BIOPSY CORE NEEDLE  1980   • CARDIAC CATHETERIZATION  2015    [facility] - closure system   • CARDIAC CATHETERIZATION  2000    angioplasty/[facility]   • CERVICAL CONE BIOPSY      1980's   • COLONOSCOPY      2006, 2019   • COLONOSCOPY     • DILATION AND CURETTAGE OF UTERUS     • FL RETROGRADE PYELOGRAM  7/2/2020   • FL RETROGRADE PYELOGRAM  10/13/2020   • MAMMO (HISTORICAL)  2018    Neg   • OTHER SURGICAL HISTORY Right 11/2017    Surgical removal of lypoma shoulder blade   • NM CYSTO BLADDER W/URETERAL CATHETERIZATION Right 7/2/2020    Procedure: CYSTOSCOPY RETROGRADE PYELOGRAM WITH INSERTION STENT URETERAL;  Surgeon: Alize Garza MD;  [facility];  Service: Urology   • NM CYSTO/URETERO W/LITHOTRIPSY &INDWELL STENT INSRT Right 7/21/2020    Procedure: CYSTO, URETEROSCOPY STONE BASKET EXTRACTION , RETROGRADE PYELOGRAM, STENT;  Surgeon: Beto Brooks MD;  Location[facility];  Service: Urology   • NM CYSTO/URETERO W/LITHOTRIPSY &INDWELL STENT INSRT Left 10/13/2020    Procedure: CYSTOSCOPY URETEROSCOPY WITH LITHOTRIPSY HOLMIUM LASER, RETROGRADE PYELOGRAM AND INSERTION STENT URETERAL;  Surgeon: Beto Brooks MD;  [facility];  Service: Urology   • NM INJECT SI JOINT ARTHRGRPHY&/ANES/STEROID W/REFUGIO Bilateral 12/6/2022    Procedure: BILATERAL SACROILIAC JOINT INJECTION (77548);   Surgeon: Yakelin Joya DO;  Location: [facility];  Service: Pain Management    • NM NJX DX/THER AGT PVRT FACET JT LMBR/SAC 1 LEVEL Bilateral 9/27/2022    Procedure: BILATERAL L4-S1 MEDIAL BRANCH BLOCK;  Surgeon: Yakelin Joya DO;  Location: [facility];  Service: Pain Management    • NM NJX DX/THER AGT PVRT FACET JT LMBR/SAC 1 LEVEL Bilateral 10/11/2022    Procedure: BILATERAL L4-S1 MEDIAL BRANCH BLOCK (58264,36367); Surgeon: Sung Hinojosa DO;  Location:  MAIN OR;  Service: Pain Management    • SHOULDER SURGERY Right 2014    had a lympoma done in 2017 also same shoulder   • SHOULDER SURGERY Right 2014   • TONSILLECTOMY     • WISDOM TOOTH EXTRACTION         Family History   Problem Relation Age of Onset   • Cancer Mother    • Lung cancer Mother    • Heart disease Father      I have reviewed and agree with the history as documented. E-Cigarette/Vaping   • E-Cigarette Use Never User      E-Cigarette/Vaping Substances   • Nicotine No    • THC No    • CBD No    • Flavoring No    • Other No    • Unknown No      Social History     Tobacco Use   • Smoking status: Former     Packs/day: 2.00     Years: 30.00     Total pack years: 60.00     Types: Cigarettes     Quit date: 7/15/1998     Years since quittin.2   • Smokeless tobacco: Never   Vaping Use   • Vaping Use: Never used   Substance Use Topics   • Alcohol use: Not Currently   • Drug use: Never       Review of Systems   Constitutional: Negative for chills and fever. Respiratory: Negative for shortness of breath. Cardiovascular: Negative for chest pain. Gastrointestinal: Positive for abdominal pain and diarrhea. Negative for constipation, nausea and vomiting. Genitourinary: Positive for dysuria, frequency, hematuria and urgency. Negative for flank pain. Musculoskeletal: Negative for gait problem. Skin: Negative for rash. Neurological: Negative for weakness and light-headedness. All other systems reviewed and are negative. Physical Exam  Physical Exam  Vitals and nursing note reviewed. Constitutional:       General: She is not in acute distress. Appearance: She is well-developed. She is not ill-appearing. HENT:      Head: Normocephalic and atraumatic.       Nose: Nose normal.      Mouth/Throat:      Mouth: Mucous membranes are moist.   Eyes:      Conjunctiva/sclera: Conjunctivae normal.   Cardiovascular:      Rate and Rhythm: Normal rate and regular rhythm. Heart sounds: No murmur heard. No friction rub. No gallop. Pulmonary:      Effort: Pulmonary effort is normal.      Breath sounds: Normal breath sounds. No wheezing, rhonchi or rales. Abdominal:      General: There is no distension. Palpations: Abdomen is soft. Tenderness: There is no abdominal tenderness. There is no right CVA tenderness or left CVA tenderness. Musculoskeletal:         General: No swelling or tenderness. Normal range of motion. Cervical back: Normal range of motion and neck supple. Skin:     General: Skin is warm and dry. Coloration: Skin is not pale. Findings: No rash. Neurological:      General: No focal deficit present. Mental Status: She is alert and oriented to person, place, and time.    Psychiatric:         Behavior: Behavior normal.         Vital Signs  ED Triage Vitals [10/06/23 1146]   Temperature Pulse Respirations Blood Pressure SpO2   98 °F (36.7 °C) 93 18 149/66 99 %      Temp Source Heart Rate Source Patient Position - Orthostatic VS BP Location FiO2 (%)   Oral Monitor Sitting Left arm --      Pain Score       5           Vitals:    10/06/23 1146   BP: 149/66   Pulse: 93   Patient Position - Orthostatic VS: Sitting         Visual Acuity      ED Medications  Medications   potassium chloride (K-DUR,KLOR-CON) CR tablet 40 mEq (40 mEq Oral Given 10/6/23 1325)   iohexol (OMNIPAQUE) 350 MG/ML injection (SINGLE-DOSE) 100 mL (100 mL Intravenous Given 10/6/23 1254)       Diagnostic Studies  Results Reviewed     Procedure Component Value Units Date/Time    Urine Microscopic [[id number]]  (Abnormal) Collected: 10/06/23 1209    Lab Status: Final result Specimen: Urine, Clean Catch Updated: 10/06/23 1256     RBC, UA None Seen /hpf      WBC, UA 4-10 /hpf      Epithelial Cells Occasional /hpf      Bacteria, UA Occasional /hpf     Comprehensive metabolic panel [[id number]]  (Abnormal) Collected: 10/06/23 1208 Lab Status: Final result Specimen: Blood from Arm, Left Updated: 10/06/23 1247     Sodium 141 mmol/L      Potassium 3.2 mmol/L      Chloride 103 mmol/L      CO2 30 mmol/L      ANION GAP 8 mmol/L      BUN 14 mg/dL      Creatinine 1.09 mg/dL      Glucose 142 mg/dL      Calcium 9.0 mg/dL      AST 21 U/L      ALT 32 U/L      Alkaline Phosphatase 66 U/L      Total Protein 6.4 g/dL      Albumin 3.8 g/dL      Total Bilirubin 0.43 mg/dL      eGFR 52 ml/min/1.73sq m     Narrative:      National Kidney Disease Foundation guidelines for Chronic Kidney Disease (CKD):   •  Stage 1 with normal or high GFR (GFR > 90 mL/min/1.73 square meters)  •  Stage 2 Mild CKD (GFR = 60-89 mL/min/1.73 square meters)  •  Stage 3A Moderate CKD (GFR = 45-59 mL/min/1.73 square meters)  •  Stage 3B Moderate CKD (GFR = 30-44 mL/min/1.73 square meters)  •  Stage 4 Severe CKD (GFR = 15-29 mL/min/1.73 square meters)  •  Stage 5 End Stage CKD (GFR <15 mL/min/1.73 square meters)  Note: GFR calculation is accurate only with a steady state creatinine    UA w Reflex to Microscopic w Reflex to Culture [[id number]]  (Abnormal) Collected: 10/06/23 1209    Lab Status: Final result Specimen: Urine, Clean Catch Updated: 10/06/23 1241     Color, UA Straw     Clarity, UA Clear     Specific Gravity, UA 1.010     pH, UA 7.5     Leukocytes, UA Trace     Nitrite, UA Negative     Protein, UA Negative mg/dl      Glucose, UA Negative mg/dl      Ketones, UA Negative mg/dl      Urobilinogen, UA 0.2 E.U./dl      Bilirubin, UA Negative     Occult Blood, UA 3+    CBC and differential [[id number]]  (Abnormal) Collected: 10/06/23 1208    Lab Status: Final result Specimen: Blood from Arm, Left Updated: 10/06/23 1227     WBC 8.74 Thousand/uL      RBC 4.07 Million/uL      Hemoglobin 11.8 g/dL      Hematocrit 37.0 %      MCV 91 fL      MCH 29.0 pg      MCHC 31.9 g/dL      RDW 15.3 %      MPV 9.5 fL      Platelets 312 Thousands/uL      nRBC 0 /100 WBCs      Neutrophils Relative 49 % Immat GRANS % 0 %      Lymphocytes Relative 38 %      Monocytes Relative 10 %      Eosinophils Relative 2 %      Basophils Relative 1 %      Neutrophils Absolute 4.28 Thousands/µL      Immature Grans Absolute 0.03 Thousand/uL      Lymphocytes Absolute 3.33 Thousands/µL      Monocytes Absolute 0.91 Thousand/µL      Eosinophils Absolute 0.13 Thousand/µL      Basophils Absolute 0.06 Thousands/µL                  CT abdomen pelvis with contrast   Final Result by Calos Burns MD (10/06 1327)      3 mm calculus in the intramural portion of the left ureterovesical junction, and additional 3 mm calculus slightly more proximally at the uterovesical junction causing mild left hydroureteronephrosis. Tiny left intrarenal calculus. Colonic diverticulosis. Workstation performed: [id number]                    Procedures  Procedures         ED Course  ED Course as of 10/06/23 1756   Fri Oct 06, 2023   1227 CBC and differential(!)   1242 UA w Reflex to Microscopic w Reflex to Culture(!)   1249 Comprehensive metabolic panel(!)  Mild hypokalemia, will orally replete. 1400 Patient updated on labs and CT findings of 2 ureteral stones. Her pain is controlled at this time. She does not desire pain medications to be sent to the pharmacy. She is amenable to flomax and urology follow up. Medical Decision Making  45-year-old female presenting for evaluation of left lower abdominal pain and urinary symptoms. Vital signs stable on arrival.  Patient with mild lower abdominal tenderness on the left side but no peritonitic signs. Differential diagnoses include but not limited to ureteral stone, UTI/pyelonephritis, diverticulitis, colitis, obstruction, perforation. Labs significant for mild hypokalemia which was orally repleted. CT showing 2 ureteral stones near the UVJ on the left. No evidence of superimposed UTI on UA.   Patient was discussed with urology GIOVANNA Tijerina, patient is stable for outpatient follow-up. Recommending Flomax and hydration along with pain control. Pain control was discussed with patient, she does not wish to have any pain medication sent to the pharmacy and plans on taking Tylenol at home. She did not require any pain medication here. Referral true follow-up with urology was placed. Prescription for Flomax was sent to patient's pharmacy. Return precautions discussed. Dysuria: acute illness or injury  Left lower quadrant abdominal pain: acute illness or injury  Ureterolithiasis: acute illness or injury  Amount and/or Complexity of Data Reviewed  Labs: ordered. Decision-making details documented in ED Course. Radiology: ordered. Risk  Prescription drug management. Disposition  Final diagnoses:   Ureterolithiasis   Left lower quadrant abdominal pain   Dysuria     Time reflects when diagnosis was documented in both MDM as applicable and the Disposition within this note     Time User Action Codes Description Comment    10/6/2023  2:01 PM Dorean Ana Add [N20.1] Ureterolithiasis     10/6/2023  2:01 PM Dorean Ana Add [R10.32] Left lower quadrant abdominal pain     10/6/2023  2:01 PM Dorean Ana Add [R30.0] Dysuria       ED Disposition     ED Disposition   Discharge    Condition   Stable    Date/Time   Fri Oct 6, 2023  2:01 PM    [address] discharge to home/self care.                Follow-up Information     Follow up With Specialties Details Why Contact Info    Dean Calderón MD Family Medicine In 2 days As needed [address] [facility]  [phone]            Discharge Medication List as of 10/6/2023  2:02 PM      START taking these medications    Details   tamsulosin (FLOMAX) 0.4 mg Take 1 capsule (0.4 mg total) by mouth daily with dinner for 7 days, Starting Fri 10/6/2023, Until Fri 10/13/2023, Normal         CONTINUE these medications which have NOT CHANGED    Details   acetaminophen (Tylenol) 325 mg tablet 2 tablet as needed Orally TID PRN, Historical Med      Ascorbic Acid, Vitamin C, (VITAMIN C) 100 MG tablet Take 1,000 mg by mouth daily, Historical Med      aspirin 81 MG tablet Take 162 mg by mouth daily, Historical Med      Cholecalciferol 50 MCG (2000 UT) CAPS Take 1 capsule by mouth daily, Historical Med      Cranberry 450 MG TABS Take 1 tablet by mouth 2 (two) times a day, Historical Med      Diclofenac Sodium (VOLTAREN) 1 % APPLY 4 GM FOUR TIMES A DAY AS NEEDED TRANSDERMAL 30 DAYS, Historical Med      ezetimibe (ZETIA) 10 mg tablet Starting Fri 12/16/2022, Historical Med      fenofibrate (TRICOR) 54 MG tablet Starting Wed 6/30/2021, Historical Med      Flaxseed, Linseed, (FLAX SEEDS PO) Take 1 tablet by mouth daily, Historical Med      folic acid (FOLVITE) 1 mg tablet Take 1,000 mcg by mouth daily, Starting Thu 3/5/2020, Historical Med      Humira Pen 40 MG/0.4ML PNKT Finishes end of march 2021, Starting Tue 2/16/2021, Historical Med      meclizine (ANTIVERT) 25 mg tablet Take 1 tablet (25 mg total) by mouth 3 (three) times a day as needed for dizziness, Starting Mon 4/24/2023, Normal      metFORMIN (GLUCOPHAGE-XR) 500 mg 24 hr tablet Take 1 tablet (500 mg total) by mouth daily with dinner, Starting Tue 9/12/2023, Normal      methotrexate 2.5 mg tablet 2.5 mg once a week Takes on Fridays  Last dose10/9/20  8 PILLS ONE TIME A WEEK, Starting Thu 2/6/2020, Historical Med      metoprolol tartrate (LOPRESSOR) 25 mg tablet Take 12.5 mg by mouth every 12 (twelve) hours , Starting Thu 3/5/2020, Historical Med      multivitamin-minerals (CENTRUM) tablet Take by mouth daily, Historical Med      nystatin (MYCOSTATIN) ointment APPLY TOPICALLY TO THE AFFECTED AREA TWICE DAILY, Historical Med      OMEGA-3 FATTY ACIDS PO Take 1 g by mouth 2 (two) times a day , Historical Med      pantoprazole (PROTONIX) 40 mg tablet Take 1 tablet (40 mg total) by mouth daily, Starting Thu 3/30/2023, Normal rosuvastatin (CRESTOR) 40 MG tablet Starting Fri 6/17/2022, Historical Med      triamterene-hydrochlorothiazide (DYAZIDE) 37.5-25 mg per capsule Starting Mon 8/14/2023, Historical Med                 PDMP Review       Value Time User    PDMP Reviewed  Yes 7/2/2020  1:24 PM Sreekanth Baires MD          ED Provider  Electronically Signed by           Tanika Sinclair MD  10/06/23 6787

## 2023-10-06 NOTE — TELEPHONE ENCOUNTER
Patient calls in with hx of kidney stones and feeling constant pressure like she has to urinate but only urinates drops at a time. Also started with blood in urine. Denies flank pain ,fever or N/V. Recommend ER evaluation.

## 2023-10-09 DIAGNOSIS — K21.9 GERD WITHOUT ESOPHAGITIS: ICD-10-CM

## 2023-10-09 RX ORDER — PANTOPRAZOLE SODIUM 40 MG/1
40 TABLET, DELAYED RELEASE ORAL DAILY
Qty: 90 TABLET | Refills: 1 | Status: SHIPPED | OUTPATIENT
Start: 2023-10-09

## 2023-10-09 NOTE — TELEPHONE ENCOUNTER
Reason for call:   [x] Refill   [] Prior Auth  [] Other:     Office:   [x] PCP/Provider -   [] Speciality/Provider -     Medication: protonix  Dose/Frequency: 40mg    Quantity: 90     [facility]  Does the patient have enough for 3 days?    [x] Yes   [] No - Send as HP to POD

## 2023-10-11 ENCOUNTER — TELEPHONE (OUTPATIENT)
Age: 68
End: 2023-10-11

## 2023-10-11 NOTE — TELEPHONE ENCOUNTER
New Patient    What is the reason for the patient’s appointment?:Patient called stating patient was in the ER on 10/06/23 with flank pain and ct scan showed     3 mm calculus in the intramural portion of the left ureterovesical junction, and additional 3 mm calculus slightly more proximally at the uterovesical junction causing mild left hydroureteronephrosis. Tiny left intrarenal calculus. She stated she passed the stone on Sunday . She passed both stones. What office location does the patient prefer?:    Does patient have Imaging/Lab Results:    Have patient records been requested?:  If No, are the records showing in Epic:       INSURANCE:   Do we accept the patient's insurance or is the patient Self-Pay?:    Insurance Provider:Medicare /[employer]   Plan Type/Number:   Member ID#:       HISTORY:   Has the patient had any previous Urologist(s)? :yes .   Dr Tal Olsen 10/2020    Was the patient seen in the ED?:10/06/23    Has the patient had any outside testing done?:    Does the patient have a personal history of cancer?:

## 2023-10-13 ENCOUNTER — NURSE TRIAGE (OUTPATIENT)
Age: 68
End: 2023-10-13

## 2023-10-13 ENCOUNTER — TELEPHONE (OUTPATIENT)
Age: 68
End: 2023-10-13

## 2023-10-13 ENCOUNTER — APPOINTMENT (OUTPATIENT)
Dept: LAB | Facility: HOSPITAL | Age: 68
End: 2023-10-13
Payer: MEDICARE

## 2023-10-13 DIAGNOSIS — N20.0 NEPHROLITHIASIS: ICD-10-CM

## 2023-10-13 DIAGNOSIS — N20.0 NEPHROLITHIASIS: Primary | ICD-10-CM

## 2023-10-13 LAB
BACTERIA UR QL AUTO: ABNORMAL /HPF
BILIRUB UR QL STRIP: NEGATIVE
CLARITY UR: ABNORMAL
COLOR UR: ABNORMAL
GLUCOSE UR STRIP-MCNC: NEGATIVE MG/DL
HGB UR QL STRIP.AUTO: ABNORMAL
KETONES UR STRIP-MCNC: NEGATIVE MG/DL
LEUKOCYTE ESTERASE UR QL STRIP: ABNORMAL
NITRITE UR QL STRIP: NEGATIVE
NON-SQ EPI CELLS URNS QL MICRO: ABNORMAL /HPF
PH UR STRIP.AUTO: 7 [PH]
PROT UR STRIP-MCNC: ABNORMAL MG/DL
RBC #/AREA URNS AUTO: ABNORMAL /HPF
SP GR UR STRIP.AUTO: 1.01 (ref 1–1.03)
UROBILINOGEN UR QL STRIP.AUTO: 0.2 E.U./DL
WBC #/AREA URNS AUTO: ABNORMAL /HPF

## 2023-10-13 PROCEDURE — 81001 URINALYSIS AUTO W/SCOPE: CPT

## 2023-10-13 NOTE — TELEPHONE ENCOUNTER
----- Message from Pascale Selby sent at 10/13/2023  9:51 AM EDT -----  Adela Parada to patient, patient had stated that she has had some blood in the urine. A good call back number for the patient is [phone].

## 2023-10-13 NOTE — TELEPHONE ENCOUNTER
Pt called and stated that she has a burning and a little bit of blood in her urine for the last couple of days. Today was more noticeable. She is not sure what to do.  She has an appointment  on 11/07/23    Pt can be reached at [phone]

## 2023-10-13 NOTE — TELEPHONE ENCOUNTER
Patient states she passed kidney stones on Sunday. Over the past few days she has been having blood in urine and burning with urination. She recently finished flomax. Patient requesting provider place urine orders at this time. Please call back patient if orders are placed. Reason for Disposition   Blood in urine  (Exception: could be normal menstrual bleeding)    Answer Assessment - Initial Assessment Questions  1. COLOR of URINE: "Describe the color of the urine."  (e.g., tea-colored, pink, red, blood clots, bloody)      Blood in urine    2. ONSET: "When did the bleeding start?"       This week    3. EPISODES: "How many times has there been blood in the urine?" or "How many times today?"      Couple times this week    4. PAIN with URINATION: "Is there any pain with passing your urine?" If Yes, ask: "How bad is the pain?"  (Scale 1-10; or mild, moderate, severe)     - MILD - complains slightly about urination hurting     Cramping in front    5. FEVER: "Do you have a fever?" If Yes, ask: "What is your temperature, how was it measured, and when did it start?"      Denies    6. ASSOCIATED SYMPTOMS: "Are you passing urine more frequently than usual?"      Denies    7. OTHER SYMPTOMS: "Do you have any other symptoms?" (e.g., back/flank pain, abdominal pain, vomiting)      Mild burning with urination    8.  PREGNANCY: "Is there any chance you are pregnant?" "When was your last menstrual period?"      N/A    Protocols used: Urine - Blood In-ADULT-

## 2023-10-13 NOTE — TELEPHONE ENCOUNTER
I spoke to client reports they passed both stones on Sunday. Started with burning intermittently having frequency/urgency, and blood in underwear pad, has not seen blood in urine. Denies pain, fever, chills, N/V, or blood clots. Reports staying hydrated with over 64oz. water/day. Has consult scheduled for 11/07/23 with Carlos Watson in [facility] office to re-establish with out office. Transferred to PCP for urine testing orders as we can not order them as client technically NP. Client will have done today to r/o infection. Advised of ED precautions.

## 2023-10-21 LAB
25(OH)D3+25(OH)D2 SERPL-MCNC: 36.2 NG/ML (ref 30–100)
CHOLEST SERPL-MCNC: 143 MG/DL (ref 100–199)
EST. AVERAGE GLUCOSE BLD GHB EST-MCNC: 151 MG/DL
HBA1C MFR BLD: 6.9 % (ref 4.8–5.6)
HDLC SERPL-MCNC: 58 MG/DL
LDLC SERPL CALC-MCNC: 53 MG/DL (ref 0–99)
TRIGL SERPL-MCNC: 198 MG/DL (ref 0–149)

## 2023-10-23 ENCOUNTER — TELEPHONE (OUTPATIENT)
Dept: FAMILY MEDICINE CLINIC | Facility: CLINIC | Age: 68
End: 2023-10-23

## 2023-10-23 NOTE — TELEPHONE ENCOUNTER
----- Message from Shashank Butler MD sent at 10/23/2023  8:06 AM EDT -----  LDL is down   Total is down  The trigs went up a bit but continue to watch the fats / fried foods/ red meats /cheeses   A1c is about the same a bit better   Watch the white rice, white bread, white Potato, and pasta. Maybe try the brown /whole wheat versions, or just limit how much and how often. Also be careful of the juices and sodas, and of course the sweets.    Vit D is good so cont with daily supplementation

## 2023-10-24 DIAGNOSIS — R73.03 PRE-DIABETES: ICD-10-CM

## 2023-10-24 RX ORDER — METFORMIN HYDROCHLORIDE 500 MG/1
500 TABLET, EXTENDED RELEASE ORAL
Qty: 90 TABLET | Refills: 0 | Status: SHIPPED | OUTPATIENT
Start: 2023-10-24

## 2023-10-24 NOTE — TELEPHONE ENCOUNTER
Reason for call:   [x] Refill   [] Prior Auth  [] Other:     Office:   [x] PCP/Provider -   [] Specialty/Provider -     Medication: metformin     Dose/Frequency: 500 mg/ daily     Quantity: 90 day supply     Pharmacy: [address] [place] [address] Road     Does the patient have enough for 3 days?    [x] Yes   [] No - Send as HP to POD

## 2023-11-07 ENCOUNTER — CONSULT (OUTPATIENT)
Dept: UROLOGY | Facility: CLINIC | Age: 68
End: 2023-11-07
Payer: MEDICARE

## 2023-11-07 ENCOUNTER — TELEPHONE (OUTPATIENT)
Age: 68
End: 2023-11-07

## 2023-11-07 VITALS
SYSTOLIC BLOOD PRESSURE: 130 MMHG | HEIGHT: 67 IN | OXYGEN SATURATION: 97 % | WEIGHT: 293 LBS | BODY MASS INDEX: 45.99 KG/M2 | HEART RATE: 85 BPM | DIASTOLIC BLOOD PRESSURE: 86 MMHG

## 2023-11-07 DIAGNOSIS — N20.1 URETEROLITHIASIS: Primary | ICD-10-CM

## 2023-11-07 DIAGNOSIS — R30.0 DYSURIA: ICD-10-CM

## 2023-11-07 LAB
SL AMB  POCT GLUCOSE, UA: NORMAL
SL AMB LEUKOCYTE ESTERASE,UA: NORMAL
SL AMB POCT BILIRUBIN,UA: NORMAL
SL AMB POCT BLOOD,UA: 7
SL AMB POCT CLARITY,UA: NORMAL
SL AMB POCT COLOR,UA: YELLOW
SL AMB POCT KETONES,UA: 1010
SL AMB POCT NITRITE,UA: NORMAL
SL AMB POCT PH,UA: NORMAL
SL AMB POCT SPECIFIC GRAVITY,UA: NORMAL
SL AMB POCT URINE PROTEIN: 0.2
SL AMB POCT UROBILINOGEN: NORMAL

## 2023-11-07 PROCEDURE — 87186 SC STD MICRODIL/AGAR DIL: CPT | Performed by: PHYSICIAN ASSISTANT

## 2023-11-07 PROCEDURE — 87086 URINE CULTURE/COLONY COUNT: CPT | Performed by: PHYSICIAN ASSISTANT

## 2023-11-07 PROCEDURE — 99203 OFFICE O/P NEW LOW 30 MIN: CPT | Performed by: PHYSICIAN ASSISTANT

## 2023-11-07 PROCEDURE — 81002 URINALYSIS NONAUTO W/O SCOPE: CPT | Performed by: PHYSICIAN ASSISTANT

## 2023-11-07 PROCEDURE — 87077 CULTURE AEROBIC IDENTIFY: CPT | Performed by: PHYSICIAN ASSISTANT

## 2023-11-07 RX ORDER — PHENAZOPYRIDINE HYDROCHLORIDE 100 MG/1
100 TABLET, FILM COATED ORAL 3 TIMES DAILY PRN
Qty: 10 TABLET | Refills: 0 | Status: SHIPPED | OUTPATIENT
Start: 2023-11-07

## 2023-11-07 RX ORDER — CEPHALEXIN 500 MG/1
500 CAPSULE ORAL EVERY 8 HOURS SCHEDULED
Qty: 15 CAPSULE | Refills: 0 | Status: SHIPPED | OUTPATIENT
Start: 2023-11-07 | End: 2023-11-12

## 2023-11-07 RX ORDER — PHENAZOPYRIDINE HYDROCHLORIDE 100 MG/1
100 TABLET, FILM COATED ORAL 3 TIMES DAILY PRN
Qty: 10 TABLET | Refills: 0 | Status: SHIPPED | OUTPATIENT
Start: 2023-11-07 | End: 2023-11-07 | Stop reason: SDUPTHER

## 2023-11-07 RX ORDER — CEPHALEXIN 500 MG/1
500 CAPSULE ORAL EVERY 8 HOURS SCHEDULED
Qty: 15 CAPSULE | Refills: 0 | Status: SHIPPED | OUTPATIENT
Start: 2023-11-07 | End: 2023-11-07 | Stop reason: SDUPTHER

## 2023-11-07 NOTE — PROGRESS NOTES
1. Ureterolithiasis  Ambulatory Referral to Urology    POCT urine dip    US kidney and bladder    XR abdomen 1 view kub    Urine culture      2. Dysuria  cephalexin (KEFLEX) 500 mg capsule    phenazopyridine (PYRIDIUM) 100 mg tablet    Urine culture    DISCONTINUED: phenazopyridine (PYRIDIUM) 100 mg tablet    DISCONTINUED: cephalexin (KEFLEX) 500 mg capsule            Assessment and plan:       1. Nephrolithiasis  - passed recent stones  - hydration and dietary modifications  - urine will be submitted for testing - keflex and pyridium in the interim  - f/u imaging 6-9 months       [address] [facility]      Chief Complaint     Nephrolithiasis     History of Present Illness     Shelva Aase is a 76 y.o. female patient presenting today for consultation. Hx of right ureteroscopy on 07/21/2020 for an obstructing ureteral stone. More recently had a CT 10/6/23 with two 3mm left UVJ stones and mild left hydronephrosis. Patient reports that she saw passage of the stones in the toilet approximately 2 days thereafter. No residual flank pain. She does endorse urinary frequency and nocturia every 2 nights. She has sleep apnea but wears her CPAP. Mild lower extremity swelling, wearing compression stockings. Urine dip leukocyte positive, nitrite and blood negative. Laboratory     Lab Results   Component Value Date    CREATININE 1.09 10/06/2023       Review of Systems     Review of Systems   Constitutional:  Negative for chills and fever. HENT: Negative. Eyes: Negative. Respiratory:  Negative for cough and shortness of breath. Cardiovascular:  Negative for chest pain. Gastrointestinal:  Negative for constipation, diarrhea, nausea and vomiting. Endocrine: Negative. Genitourinary:  Negative for difficulty urinating, dysuria, enuresis, flank pain, frequency, hematuria and urgency. Musculoskeletal: Negative. Skin: Negative.                   Allergies     Allergies   Allergen Reactions    Hydrocodone Vomiting     "can take oxycodone without a problem"    Atorvastatin Myalgia    Caffeine - Food Allergy Tachycardia     And high blood pressure    Diphenhydramine Hcl (Sleep) Itching    Pravastatin Myalgia    Simvastatin Myalgia       Physical Exam     Physical Exam  Vitals and nursing note reviewed. Constitutional:       General: She is not in acute distress. Appearance: Normal appearance. She is well-developed. She is obese. She is not ill-appearing, toxic-appearing or diaphoretic. HENT:      Head: Normocephalic and atraumatic. Right Ear: External ear normal.      Left Ear: External ear normal.   Eyes:      General: No scleral icterus. Right eye: No discharge. Left eye: No discharge. Cardiovascular:      Rate and Rhythm: Normal rate and regular rhythm. Pulses: Normal pulses. Heart sounds: Normal heart sounds. Pulmonary:      Effort: Pulmonary effort is normal.      Breath sounds: Normal breath sounds. Abdominal:      Tenderness: There is no right CVA tenderness or left CVA tenderness. Musculoskeletal:      Comments: Ambulates independently   Skin:     General: Skin is warm and dry. Neurological:      Mental Status: She is alert and oriented to person, place, and time. Psychiatric:         Mood and Affect: Mood normal.         Behavior: Behavior normal.         Thought Content:  Thought content normal.         Judgment: Judgment normal.           Vital Signs     Vitals:    11/07/23 0938   BP: 130/86   BP Location: Left arm   Patient Position: Sitting   Cuff Size: Standard   Pulse: 85   SpO2: 97%   Weight: (!) 141 kg (310 lb)   Height: 5' 7" (1.702 m)         Current Medications       Current Outpatient Medications:     acetaminophen (Tylenol) 325 mg tablet, 2 tablet as needed Orally TID PRN, Disp: , Rfl:     Ascorbic Acid, Vitamin C, (VITAMIN C) 100 MG tablet, Take 1,000 mg by mouth daily, Disp: , Rfl:     aspirin 81 MG tablet, Take 162 mg by mouth daily, Disp: , Rfl:     cephalexin (KEFLEX) 500 mg capsule, Take 1 capsule (500 mg total) by mouth every 8 (eight) hours for 5 days, Disp: 15 capsule, Rfl: 0    Cholecalciferol 50 MCG (2000 UT) CAPS, Take 1 capsule by mouth daily, Disp: , Rfl:     Cranberry 450 MG TABS, Take 1 tablet by mouth 2 (two) times a day, Disp: , Rfl:     Diclofenac Sodium (VOLTAREN) 1 %, APPLY 4 GM FOUR TIMES A DAY AS NEEDED TRANSDERMAL 30 DAYS, Disp: , Rfl:     ezetimibe (ZETIA) 10 mg tablet, , Disp: , Rfl:     fenofibrate (TRICOR) 54 MG tablet, , Disp: , Rfl:     Flaxseed, Linseed, (FLAX SEEDS PO), Take 1 tablet by mouth daily, Disp: , Rfl:     folic acid (FOLVITE) 1 mg tablet, Take 1,000 mcg by mouth daily, Disp: , Rfl:     Humira Pen 40 MG/0.4ML PNKT, Finishes end of march 2021, Disp: , Rfl:     meclizine (ANTIVERT) 25 mg tablet, Take 1 tablet (25 mg total) by mouth 3 (three) times a day as needed for dizziness, Disp: 90 tablet, Rfl: 0    metFORMIN (GLUCOPHAGE-XR) 500 mg 24 hr tablet, Take 1 tablet (500 mg total) by mouth daily with dinner, Disp: 90 tablet, Rfl: 0    methotrexate 2.5 mg tablet, 2.5 mg once a week Takes on Fridays  Last dose10/9/20 8 PILLS ONE TIME A WEEK, Disp: , Rfl:     metoprolol tartrate (LOPRESSOR) 25 mg tablet, Take 12.5 mg by mouth every 12 (twelve) hours , Disp: , Rfl:     multivitamin-minerals (CENTRUM) tablet, Take by mouth daily, Disp: , Rfl:     nystatin (MYCOSTATIN) ointment, APPLY TOPICALLY TO THE AFFECTED AREA TWICE DAILY, Disp: , Rfl:     OMEGA-3 FATTY ACIDS PO, Take 1 g by mouth 2 (two) times a day , Disp: , Rfl:     pantoprazole (PROTONIX) 40 mg tablet, Take 1 tablet (40 mg total) by mouth daily, Disp: 90 tablet, Rfl: 1    phenazopyridine (PYRIDIUM) 100 mg tablet, Take 1 tablet (100 mg total) by mouth 3 (three) times a day as needed for bladder spasms, Disp: 10 tablet, Rfl: 0    rosuvastatin (CRESTOR) 40 MG tablet, , Disp: , Rfl:     triamterene-hydrochlorothiazide (DYAZIDE) 37.5-25 mg per capsule, , Disp: , Rfl:     tamsulosin (FLOMAX) 0.4 mg, Take 1 capsule (0.4 mg total) by mouth daily with dinner for 7 days, Disp: 7 capsule, Rfl: 0      Active Problems     Patient Active Problem List   Diagnosis    Rheumatoid arthritis ([address])    Gastroesophageal reflux disease without esophagitis    DANNY (obstructive sleep apnea)    Vertigo    Mixed hyperlipidemia    Stented coronary artery    Coronary artery disease of native artery of native heart with stable angina pectoris (HCC)    Class 3 severe obesity due to excess calories with serious comorbidity and body mass index (BMI) of 45.0 to 49.9 in adult [facility])    Pre-diabetes    Chronic obstructive pulmonary disease, unspecified COPD type ([address])    History of COVID-19    Immunodeficiency, unspecified ([address])    Bruises easily         Past Medical History     Past Medical History:   Diagnosis Date    Abnormal Pap smear of cervix     cin3; hpv non 16/18 +(12/2016); LGSIL 6/2018    Arthritis     Cancer ([address])     cervical    Coronary artery disease     GERD (gastroesophageal reflux disease)     History of pneumonia     2 yrs ago    HPV (human papilloma virus) infection     Hypercholesterolemia     Hypertension     Kidney stone     Morbid obesity ([address])     Motion sickness     Myocardial infarct ([address])     X2; 2000 and 2002//[facility] cardiology Dr Dilcia Wang yearly    Pleuritic chest pain     "from constant lifting on the job chest muscle area"    PONV (postoperative nausea and vomiting)     "extremely with the gas"    Rheumatoid arthritis ([address])     Rheu and cervical    Shortness of breath     moderate activity// pt does work full time/on and off feet in warehouse    Vertigo     Wears partial dentures     lower         Surgical History     Past Surgical History:   Procedure Laterality Date    ANGIOPLASTY Right 09/14/2015    right groin / femoral.    [address] [facility]  2015    star close - closure system    CARDIAC CATHETERIZATION  [address] [facility]    CERVICAL CONE BIOPSY      1980's    COLONOSCOPY      2006, 2019    COLONOSCOPY      DILATION AND CURETTAGE OF UTERUS      FL RETROGRADE PYELOGRAM  7/2/2020    FL RETROGRADE PYELOGRAM  10/13/2020    MAMMO (HISTORICAL)  2018    Neg    OTHER SURGICAL HISTORY Right 11/2017    Surgical removal of lypoma shoulder blade    NV CYSTO BLADDER W/URETERAL CATHETERIZATION Right 7/2/2020    Procedure: CYSTOSCOPY RETROGRADE PYELOGRAM WITH INSERTION STENT URETERAL;  Surgeon: Jluis Vega MD;  [facility];  Service: Urology    NV CYSTO/URETERO W/LITHOTRIPSY &INDWELL STENT INSRT Right 7/21/2020    Procedure: CYSTO, URETEROSCOPY STONE BASKET EXTRACTION , RETROGRADE PYELOGRAM, STENT;  Surgeon: Angel Jacobs MD;  [facility];  Service: Urology    NV CYSTO/URETERO W/LITHOTRIPSY &INDWELL STENT INSRT Left 10/13/2020    Procedure: CYSTOSCOPY URETEROSCOPY WITH LITHOTRIPSY HOLMIUM LASER, RETROGRADE PYELOGRAM AND INSERTION STENT URETERAL;  Surgeon: Angel Jacobs MD;  [facility];  Service: Urology    NV INJECT SI JOINT ARTHRGRPHY&/ANES/STEROID W/REFUGIO Bilateral 12/6/2022    Procedure: BILATERAL SACROILIAC JOINT INJECTION (75458); Surgeon: Abraham Goodell, DO;  [facility];  Service: Pain Management     NV NJX DX/THER AGT PVRT FACET JT LMBR/SAC 1 LEVEL Bilateral 9/27/2022    Procedure: BILATERAL L4-S1 MEDIAL BRANCH BLOCK;  Surgeon: Abraham Goodell, DO;  [facility];  Service: Pain Management     NV NJX DX/THER AGT PVRT FACET JT LMBR/SAC 1 LEVEL Bilateral 10/11/2022    Procedure: BILATERAL L4-S1 MEDIAL BRANCH BLOCK (20680,[id number]);   Surgeon: Abraham Goodell, DO;  [facility];  Service: Pain Management     SHOULDER SURGERY Right 2014    had a lympoma done in 2017 also same shoulder    SHOULDER SURGERY Right 2014    TONSILLECTOMY      WISDOM TOOTH EXTRACTION           Family History     Family History   Problem Relation Age of Onset    Cancer Mother     Lung cancer Mother Heart disease Father          Social History     Social History       Radiology

## 2023-11-09 ENCOUNTER — TELEPHONE (OUTPATIENT)
Dept: UROLOGY | Facility: CLINIC | Age: 68
End: 2023-11-09

## 2023-11-09 DIAGNOSIS — R30.0 DYSURIA: ICD-10-CM

## 2023-11-09 DIAGNOSIS — R30.0 DYSURIA: Primary | ICD-10-CM

## 2023-11-09 LAB
BACTERIA UR CULT: ABNORMAL
BACTERIA UR CULT: ABNORMAL

## 2023-11-09 RX ORDER — SULFAMETHOXAZOLE AND TRIMETHOPRIM 800; 160 MG/1; MG/1
1 TABLET ORAL 2 TIMES DAILY
Qty: 10 TABLET | Refills: 0 | Status: SHIPPED | OUTPATIENT
Start: 2023-11-09 | End: 2023-11-09 | Stop reason: SDUPTHER

## 2023-11-09 RX ORDER — SULFAMETHOXAZOLE AND TRIMETHOPRIM 800; 160 MG/1; MG/1
1 TABLET ORAL 2 TIMES DAILY
Qty: 10 TABLET | Refills: 0 | Status: SHIPPED | OUTPATIENT
Start: 2023-11-09 | End: 2023-11-14

## 2023-11-09 NOTE — TELEPHONE ENCOUNTER
I called patient and left a message for her to call the office back. Urine culture positive.     **WHEN PATIENT CALL BACK PLEASE RELAY MESSAGE:     Peyton Jarvis PA-C  11/9/2023 10:49 AM EST Back to Top      Antibiotic sent

## 2023-11-09 NOTE — TELEPHONE ENCOUNTER
Pt called and wanted to know if she should keep taking the medications of keflex and pyridium that was given to her on 11/07/23.       Pt can be reached at [phone]

## 2023-11-24 ENCOUNTER — TELEPHONE (OUTPATIENT)
Age: 68
End: 2023-11-24

## 2023-11-24 ENCOUNTER — APPOINTMENT (OUTPATIENT)
Dept: LAB | Facility: HOSPITAL | Age: 68
End: 2023-11-24
Payer: MEDICARE

## 2023-11-24 DIAGNOSIS — N39.0 URINARY TRACT INFECTION WITHOUT HEMATURIA, SITE UNSPECIFIED: Primary | ICD-10-CM

## 2023-11-24 DIAGNOSIS — R30.0 DYSURIA: ICD-10-CM

## 2023-11-24 DIAGNOSIS — R30.0 DYSURIA: Primary | ICD-10-CM

## 2023-11-24 LAB
BACTERIA UR QL AUTO: NORMAL /HPF
BILIRUB UR QL STRIP: NEGATIVE
CLARITY UR: CLEAR
COLOR UR: YELLOW
GLUCOSE UR STRIP-MCNC: NEGATIVE MG/DL
HGB UR QL STRIP.AUTO: NEGATIVE
KETONES UR STRIP-MCNC: NEGATIVE MG/DL
LEUKOCYTE ESTERASE UR QL STRIP: NEGATIVE
NITRITE UR QL STRIP: NEGATIVE
NON-SQ EPI CELLS URNS QL MICRO: NORMAL /HPF
PH UR STRIP.AUTO: 6.5 [PH]
PROT UR STRIP-MCNC: NEGATIVE MG/DL
RBC #/AREA URNS AUTO: NORMAL /HPF
SP GR UR STRIP.AUTO: <=1.005 (ref 1–1.03)
UROBILINOGEN UR QL STRIP.AUTO: 0.2 E.U./DL
WBC #/AREA URNS AUTO: NORMAL /HPF

## 2023-11-24 PROCEDURE — 81001 URINALYSIS AUTO W/SCOPE: CPT

## 2023-11-24 PROCEDURE — 87186 SC STD MICRODIL/AGAR DIL: CPT

## 2023-11-24 PROCEDURE — 87077 CULTURE AEROBIC IDENTIFY: CPT

## 2023-11-24 PROCEDURE — 87086 URINE CULTURE/COLONY COUNT: CPT

## 2023-11-24 RX ORDER — CIPROFLOXACIN 500 MG/1
500 TABLET, FILM COATED ORAL EVERY 12 HOURS SCHEDULED
Qty: 14 TABLET | Refills: 0 | Status: SHIPPED | OUTPATIENT
Start: 2023-11-24 | End: 2023-12-01

## 2023-11-24 NOTE — TELEPHONE ENCOUNTER
S/w Chyna Quick, the patient, she was returning a call to the office. Let her know the doctor sent in a prescription for her to her pharmacy.  She would like a call back from the office when the results of the Urine culture come in    [address] [place] [address] Rd

## 2023-11-24 NOTE — TELEPHONE ENCOUNTER
Pt called and stated UTI symptoms are still present. Pt is having pressure, burning when urinating and urine is cloudy.  Pt is requesting script for urine testing to be added in her chart     Pt call [phone]

## 2023-11-24 NOTE — TELEPHONE ENCOUNTER
Called patient l/m to to call back I put orders in for micro and culture. Wanted to triage patients symptoms better please.   Patient can go to any [facility] lab to get testing done

## 2023-11-26 LAB — BACTERIA UR CULT: ABNORMAL

## 2023-11-27 NOTE — TELEPHONE ENCOUNTER
VM left for patient    When patient calls back please advise UC came back positive and the medication she was prescribed is the correct one and she should just continue it as it is ordered.

## 2023-11-28 NOTE — TELEPHONE ENCOUNTER
Pt called regarding missed call from clinical I reviewed clinical documentation and relayed to her which she understood no further action required.

## 2024-01-15 ENCOUNTER — OFFICE VISIT (OUTPATIENT)
Dept: FAMILY MEDICINE CLINIC | Facility: CLINIC | Age: 69
End: 2024-01-15
Payer: MEDICARE

## 2024-01-15 VITALS
HEIGHT: 67 IN | HEART RATE: 72 BPM | RESPIRATION RATE: 16 BRPM | WEIGHT: 293 LBS | DIASTOLIC BLOOD PRESSURE: 78 MMHG | BODY MASS INDEX: 45.99 KG/M2 | OXYGEN SATURATION: 98 % | SYSTOLIC BLOOD PRESSURE: 118 MMHG | TEMPERATURE: 99.9 F

## 2024-01-15 DIAGNOSIS — J06.9 VIRAL UPPER RESPIRATORY TRACT INFECTION: Primary | ICD-10-CM

## 2024-01-15 DIAGNOSIS — J44.9 CHRONIC OBSTRUCTIVE PULMONARY DISEASE, UNSPECIFIED COPD TYPE (HCC): ICD-10-CM

## 2024-01-15 DIAGNOSIS — M06.9 RHEUMATOID ARTHRITIS, INVOLVING UNSPECIFIED SITE, UNSPECIFIED WHETHER RHEUMATOID FACTOR PRESENT (HCC): ICD-10-CM

## 2024-01-15 LAB
SL AMB POCT RAPID FLU A: NORMAL
SL AMB POCT RAPID FLU B: NORMAL

## 2024-01-15 PROCEDURE — 87804 INFLUENZA ASSAY W/OPTIC: CPT | Performed by: FAMILY MEDICINE

## 2024-01-15 PROCEDURE — 99214 OFFICE O/P EST MOD 30 MIN: CPT | Performed by: FAMILY MEDICINE

## 2024-01-15 RX ORDER — AZELASTINE 1 MG/ML
1 SPRAY, METERED NASAL 2 TIMES DAILY
Qty: 30 ML | Refills: 1 | Status: SHIPPED | OUTPATIENT
Start: 2024-01-15

## 2024-01-15 NOTE — ASSESSMENT & PLAN NOTE
- Patient performed home COVID-19 test which was negative.  Rapid flu performed here in the office was also negative    -Patient likely has some other type of viral infection.  RSV would also be a possibility    -Advised on supportive care measures.  Limited with which she can take due to history of heart disease    -Prescription provided for Astelin nasal spray.  Patient does have history of vertigo and does have as needed meclizine at home.  I advised her that this would help to dry up some of her nasal congestion as well    -Follow-up in the office if no improvement or resolution of symptoms towards the end of the week

## 2024-01-15 NOTE — PROGRESS NOTES
"  Subjective:      Patient ID: Suha Trejo is a 68 y.o. female.    68-year-old female presents to the office with 2-day history of nasal congestion, sneezing, sinus pressure.  No fever.  Has been using humidifier with Vicks.  No coughing or shortness of breath.  Patient did perform home COVID-19 test which was negative.  She did receive flu vaccination.  Has not received RSV vaccination.  Lives alone.  Friends have had COVID-19 but she has tried to stay away from anyone who is sick.  She does have history of heart disease        Past Medical History:   Diagnosis Date   • Abnormal Pap smear of cervix     cin3; hpv non 16/18 +(12/2016); LGSIL 6/2018   • Arthritis    • Cancer (HCC)     cervical   • Coronary artery disease    • GERD (gastroesophageal reflux disease)    • History of pneumonia     2 yrs ago   • HPV (human papilloma virus) infection    • Hypercholesterolemia    • Hypertension    • Kidney stone    • Morbid obesity (HCC)    • Motion sickness    • Myocardial infarct (HCC)     X2; 2000 and 2002//[facility] cardiology Dr Quintana yearly   • Pleuritic chest pain     \"from constant lifting on the job chest muscle area\"   • PONV (postoperative nausea and vomiting)     \"extremely with the gas\"   • Rheumatoid arthritis (HCC)     Rheu and cervical   • Shortness of breath     moderate activity// pt does work full time/on and off feet in warehouse   • Vertigo    • Wears partial dentures     lower       Family History   Problem Relation Age of Onset   • Cancer Mother    • Lung cancer Mother    • Heart disease Father        Past Surgical History:   Procedure Laterality Date   • ANGIOPLASTY Right 09/14/2015    right groin / femoral.   • BIOPSY CORE NEEDLE  1980   • CARDIAC CATHETERIZATION  2015    star close - closure system   • CARDIAC CATHETERIZATION  2000    angioplasty/[facility]   • CERVICAL CONE BIOPSY      1980's   • COLONOSCOPY      2006, 2019   • COLONOSCOPY     • DILATION AND CURETTAGE OF UTERUS     • FL " RETROGRADE PYELOGRAM  7/2/2020   • FL RETROGRADE PYELOGRAM  10/13/2020   • MAMMO (HISTORICAL)  2018    Neg   • OTHER SURGICAL HISTORY Right 11/2017    Surgical removal of lypoma shoulder blade   • AR CYSTO BLADDER W/URETERAL CATHETERIZATION Right 7/2/2020    Procedure: CYSTOSCOPY RETROGRADE PYELOGRAM WITH INSERTION STENT URETERAL;  Surgeon: Raphael Briggs MD;  [facility];  Service: Urology   • AR CYSTO/URETERO W/LITHOTRIPSY &INDWELL STENT INSRT Right 7/21/2020    Procedure: CYSTO, URETEROSCOPY STONE BASKET EXTRACTION , RETROGRADE PYELOGRAM, STENT;  Surgeon: Lonnie Bean MD;  [facility];  Service: Urology   • AR CYSTO/URETERO W/LITHOTRIPSY &INDWELL STENT INSRT Left 10/13/2020    Procedure: CYSTOSCOPY URETEROSCOPY WITH LITHOTRIPSY HOLMIUM LASER, RETROGRADE PYELOGRAM AND INSERTION STENT URETERAL;  Surgeon: Lonnei Bean MD;  [facility];  Service: Urology   • AR INJECT SI JOINT ARTHRGRPHY&/ANES/STEROID W/REFUGIO Bilateral 12/6/2022    Procedure: BILATERAL SACROILIAC JOINT INJECTION (08440);  Surgeon: Jerrell Sánchez DO;  [facility];  Service: Pain Management    • AR NJX DX/THER AGT PVRT FACET JT LMBR/SAC 1 LEVEL Bilateral 9/27/2022    Procedure: BILATERAL L4-S1 MEDIAL BRANCH BLOCK;  Surgeon: Jerrell Sánchez DO;  [facility];  Service: Pain Management    • AR NJX DX/THER AGT PVRT FACET JT LMBR/SAC 1 LEVEL Bilateral 10/11/2022    Procedure: BILATERAL L4-S1 MEDIAL BRANCH BLOCK (81047,[id number]);  Surgeon: Jerrell Sánchez DO;  [facility];  Service: Pain Management    • SHOULDER SURGERY Right 2014    had a lympoma done in 2017 also same shoulder   • SHOULDER SURGERY Right 2014   • TONSILLECTOMY     • WISDOM TOOTH EXTRACTION          reports that she quit smoking about 25 years ago. Her smoking use included cigarettes. She started smoking about 55 years ago. She has a 60.0 pack-year smoking history. She has never used smokeless tobacco. She reports that she does not currently  use alcohol. She reports that she does not use drugs.      Current Outpatient Medications:   •  acetaminophen (Tylenol) 325 mg tablet, 2 tablet as needed Orally TID PRN, Disp: , Rfl:   •  Ascorbic Acid, Vitamin C, (VITAMIN C) 100 MG tablet, Take 1,000 mg by mouth daily, Disp: , Rfl:   •  aspirin 81 MG tablet, Take 162 mg by mouth daily, Disp: , Rfl:   •  azelastine (ASTELIN) 0.1 % nasal spray, 1 spray into each nostril 2 (two) times a day Use in each nostril as directed, Disp: 30 mL, Rfl: 1  •  Cholecalciferol 50 MCG (2000 UT) CAPS, Take 1 capsule by mouth daily, Disp: , Rfl:   •  Cranberry 450 MG TABS, Take 1 tablet by mouth 2 (two) times a day, Disp: , Rfl:   •  Diclofenac Sodium (VOLTAREN) 1 %, APPLY 4 GM FOUR TIMES A DAY AS NEEDED TRANSDERMAL 30 DAYS, Disp: , Rfl:   •  ezetimibe (ZETIA) 10 mg tablet, , Disp: , Rfl:   •  fenofibrate (TRICOR) 54 MG tablet, , Disp: , Rfl:   •  Flaxseed, Linseed, (FLAX SEEDS PO), Take 1 tablet by mouth daily, Disp: , Rfl:   •  folic acid (FOLVITE) 1 mg tablet, Take 1,000 mcg by mouth daily, Disp: , Rfl:   •  Humira Pen 40 MG/0.4ML PNKT, Finishes end of march 2021, Disp: , Rfl:   •  meclizine (ANTIVERT) 25 mg tablet, Take 1 tablet (25 mg total) by mouth 3 (three) times a day as needed for dizziness, Disp: 90 tablet, Rfl: 0  •  metFORMIN (GLUCOPHAGE-XR) 500 mg 24 hr tablet, Take 1 tablet (500 mg total) by mouth daily with dinner, Disp: 90 tablet, Rfl: 0  •  methotrexate 2.5 mg tablet, 2.5 mg once a week Takes on Fridays  Last dose10/9/20 8 PILLS ONE TIME A WEEK, Disp: , Rfl:   •  metoprolol tartrate (LOPRESSOR) 25 mg tablet, Take 12.5 mg by mouth every 12 (twelve) hours , Disp: , Rfl:   •  multivitamin-minerals (CENTRUM) tablet, Take by mouth daily, Disp: , Rfl:   •  nystatin (MYCOSTATIN) ointment, APPLY TOPICALLY TO THE AFFECTED AREA TWICE DAILY, Disp: , Rfl:   •  OMEGA-3 FATTY ACIDS PO, Take 1 g by mouth 2 (two) times a day , Disp: , Rfl:   •  pantoprazole (PROTONIX) 40 mg tablet,  "Take 1 tablet (40 mg total) by mouth daily, Disp: 90 tablet, Rfl: 1  •  phenazopyridine (PYRIDIUM) 100 mg tablet, Take 1 tablet (100 mg total) by mouth 3 (three) times a day as needed for bladder spasms, Disp: 10 tablet, Rfl: 0  •  rosuvastatin (CRESTOR) 40 MG tablet, , Disp: , Rfl:   •  triamterene-hydrochlorothiazide (DYAZIDE) 37.5-25 mg per capsule, , Disp: , Rfl:   •  tamsulosin (FLOMAX) 0.4 mg, Take 1 capsule (0.4 mg total) by mouth daily with dinner for 7 days, Disp: 7 capsule, Rfl: 0    The following portions of the patient's history were reviewed and updated as appropriate: allergies, current medications, past family history, past medical history, past social history, past surgical history and problem list.    Review of Systems   Constitutional:  Positive for fatigue. Negative for activity change, appetite change, chills, diaphoresis, fever and unexpected weight change.   HENT:  Positive for congestion, postnasal drip, rhinorrhea and sore throat. Negative for ear pain.    Respiratory:  Negative for cough, chest tightness, shortness of breath and wheezing.    Cardiovascular: Negative.  Negative for leg swelling.   All other systems reviewed and are negative.          Objective:    /78   Pulse 72   Temp 99.9 °F (37.7 °C)   Resp 16   Ht 5' 7\" (1.702 m)   Wt (!) 141 kg (310 lb)   SpO2 98%   BMI 48.55 kg/m²      Physical Exam  Vitals and nursing note reviewed.   Constitutional:       General: She is not in acute distress.     Appearance: She is well-developed. She is obese. She is ill-appearing. She is not toxic-appearing or diaphoretic.   HENT:      Head: Normocephalic and atraumatic.      Right Ear: Hearing, tympanic membrane, ear canal and external ear normal.      Left Ear: Hearing, tympanic membrane and external ear normal.      Nose: Mucosal edema, congestion and rhinorrhea present. No nasal deformity or septal deviation.      Right Sinus: No maxillary sinus tenderness or frontal sinus " tenderness.      Left Sinus: No maxillary sinus tenderness or frontal sinus tenderness.      Mouth/Throat:      Mouth: Mucous membranes are moist.      Pharynx: Posterior oropharyngeal erythema present.   Cardiovascular:      Rate and Rhythm: Normal rate and regular rhythm.   Pulmonary:      Effort: Pulmonary effort is normal. No respiratory distress.      Breath sounds: Normal breath sounds. No wheezing, rhonchi or rales.   Musculoskeletal:      Cervical back: Normal range of motion and neck supple.   Neurological:      Mental Status: She is alert.           No results found for this or any previous visit (from the past 1008 hour(s)).    Assessment/Plan:    Viral upper respiratory tract infection  - Patient performed home COVID-19 test which was negative.  Rapid flu performed here in the office was also negative    -Patient likely has some other type of viral infection.  RSV would also be a possibility    -Advised on supportive care measures.  Limited with which she can take due to history of heart disease    -Prescription provided for Astelin nasal spray.  Patient does have history of vertigo and does have as needed meclizine at home.  I advised her that this would help to dry up some of her nasal congestion as well    -Follow-up in the office if no improvement or resolution of symptoms towards the end of the week    Chronic obstructive pulmonary disease, unspecified COPD type (HCC)  Patient has history of COPD.  She is not presently on any medications for COPD.  Lung examination is clear at today's visit    Rheumatoid arthritis (HCC)  Patient is immunocompromised.  She is on Humira    -Patient did receive most current COVID-19 vaccination, had flu vaccine this year    -With multiple comorbidities I did recommend that she receive RSV vaccination (which she may actually have now after her current) illness has resolved          Problem List Items Addressed This Visit        Respiratory    Chronic obstructive  pulmonary disease, unspecified COPD type (HCC)     Patient has history of COPD.  She is not presently on any medications for COPD.  Lung examination is clear at today's visit         Relevant Medications    azelastine (ASTELIN) 0.1 % nasal spray    Viral upper respiratory tract infection - Primary     - Patient performed home COVID-19 test which was negative.  Rapid flu performed here in the office was also negative    -Patient likely has some other type of viral infection.  RSV would also be a possibility    -Advised on supportive care measures.  Limited with which she can take due to history of heart disease    -Prescription provided for Astelin nasal spray.  Patient does have history of vertigo and does have as needed meclizine at home.  I advised her that this would help to dry up some of her nasal congestion as well    -Follow-up in the office if no improvement or resolution of symptoms towards the end of the week         Relevant Medications    azelastine (ASTELIN) 0.1 % nasal spray    Other Relevant Orders    POCT rapid flu A and B       Musculoskeletal and Integument    Rheumatoid arthritis (HCC)     Patient is immunocompromised.  She is on Humira    -Patient did receive most current COVID-19 vaccination, had flu vaccine this year    -With multiple comorbidities I did recommend that she receive RSV vaccination (which she may actually have now after her current) illness has resolved

## 2024-01-15 NOTE — ASSESSMENT & PLAN NOTE
Patient is immunocompromised.  She is on Humira    -Patient did receive most current COVID-19 vaccination, had flu vaccine this year    -With multiple comorbidities I did recommend that she receive RSV vaccination (which she may actually have now after her current) illness has resolved

## 2024-01-20 DIAGNOSIS — R73.03 PRE-DIABETES: ICD-10-CM

## 2024-01-22 DIAGNOSIS — R42 VERTIGO: ICD-10-CM

## 2024-01-22 RX ORDER — MECLIZINE HYDROCHLORIDE 25 MG/1
25 TABLET ORAL 3 TIMES DAILY PRN
Qty: 90 TABLET | Refills: 5 | Status: SHIPPED | OUTPATIENT
Start: 2024-01-22

## 2024-01-22 RX ORDER — METFORMIN HYDROCHLORIDE 500 MG/1
TABLET, EXTENDED RELEASE ORAL
Qty: 90 TABLET | Refills: 1 | Status: SHIPPED | OUTPATIENT
Start: 2024-01-22

## 2024-01-22 NOTE — TELEPHONE ENCOUNTER
Reason for call:   [x] Refill   [] Prior Auth  [] Other:     Office:   [x] PCP/Provider -   [] Specialty/Provider -     Medication: meclizine (ANTIVERT) 25 mg tablet     Quantity: #90    Pharmacy: [id number] - [place] - [address] [facility] [phone]    Does the patient have enough for 3 days?   [] Yes   [x] No - Send as HP to POD

## 2024-02-21 PROBLEM — J06.9 VIRAL UPPER RESPIRATORY TRACT INFECTION: Status: RESOLVED | Noted: 2024-01-15 | Resolved: 2024-02-21

## 2024-03-12 DIAGNOSIS — K21.9 GERD WITHOUT ESOPHAGITIS: ICD-10-CM

## 2024-03-12 RX ORDER — PANTOPRAZOLE SODIUM 40 MG/1
40 TABLET, DELAYED RELEASE ORAL DAILY
Qty: 90 TABLET | Refills: 1 | Status: SHIPPED | OUTPATIENT
Start: 2024-03-12

## 2024-04-15 ENCOUNTER — HOSPITAL ENCOUNTER (EMERGENCY)
Facility: HOSPITAL | Age: 69
Discharge: HOME | End: 2024-04-15
Attending: EMERGENCY MEDICINE
Payer: MEDICARE

## 2024-04-15 ENCOUNTER — APPOINTMENT (EMERGENCY)
Dept: RADIOLOGY | Facility: HOSPITAL | Age: 69
End: 2024-04-15
Payer: MEDICARE

## 2024-04-15 VITALS
RESPIRATION RATE: 16 BRPM | DIASTOLIC BLOOD PRESSURE: 83 MMHG | HEART RATE: 92 BPM | OXYGEN SATURATION: 99 % | TEMPERATURE: 99 F | SYSTOLIC BLOOD PRESSURE: 126 MMHG

## 2024-04-15 DIAGNOSIS — J18.9 PNEUMONIA: Primary | ICD-10-CM

## 2024-04-15 DIAGNOSIS — R09.81 NASAL CONGESTION: ICD-10-CM

## 2024-04-15 DIAGNOSIS — J02.9 SORE THROAT: ICD-10-CM

## 2024-04-15 DIAGNOSIS — R05.9 COUGH: ICD-10-CM

## 2024-04-15 LAB
ALBUMIN SERPL BCP-MCNC: 4.1 G/DL (ref 3.5–5)
ALP SERPL-CCNC: 73 U/L (ref 34–104)
ALT SERPL W P-5'-P-CCNC: 36 U/L (ref 7–52)
ANION GAP SERPL CALCULATED.3IONS-SCNC: 11 MMOL/L (ref 4–13)
AST SERPL W P-5'-P-CCNC: 42 U/L (ref 13–39)
BASOPHILS # BLD AUTO: 0.06 THOUSANDS/ÂΜL (ref 0–0.1)
BASOPHILS NFR BLD AUTO: 1 % (ref 0–1)
BILIRUB SERPL-MCNC: 0.48 MG/DL (ref 0.2–1)
BUN SERPL-MCNC: 18 MG/DL (ref 5–25)
CALCIUM SERPL-MCNC: 9 MG/DL (ref 8.4–10.2)
CHLORIDE SERPL-SCNC: 105 MMOL/L (ref 96–108)
CO2 SERPL-SCNC: 24 MMOL/L (ref 21–32)
CREAT SERPL-MCNC: 0.96 MG/DL (ref 0.6–1.3)
EOSINOPHIL # BLD AUTO: 0.04 THOUSAND/ÂΜL (ref 0–0.61)
EOSINOPHIL NFR BLD AUTO: 0 % (ref 0–6)
ERYTHROCYTE [DISTWIDTH] IN BLOOD BY AUTOMATED COUNT: 15.7 % (ref 11.6–15.1)
FLUAV RNA RESP QL NAA+PROBE: NEGATIVE
FLUBV RNA RESP QL NAA+PROBE: NEGATIVE
GFR SERPL CREATININE-BSD FRML MDRD: 60 ML/MIN/1.73SQ M
GLUCOSE SERPL-MCNC: 168 MG/DL (ref 65–140)
HCT VFR BLD AUTO: 39.5 % (ref 34.8–46.1)
HGB BLD-MCNC: 12.5 G/DL (ref 11.5–15.4)
IMM GRANULOCYTES # BLD AUTO: 0.03 THOUSAND/UL (ref 0–0.2)
IMM GRANULOCYTES NFR BLD AUTO: 0 % (ref 0–2)
LYMPHOCYTES # BLD AUTO: 1.57 THOUSANDS/ÂΜL (ref 0.6–4.47)
LYMPHOCYTES NFR BLD AUTO: 17 % (ref 14–44)
MCH RBC QN AUTO: 28.5 PG (ref 26.8–34.3)
MCHC RBC AUTO-ENTMCNC: 31.6 G/DL (ref 31.4–37.4)
MCV RBC AUTO: 90 FL (ref 82–98)
MONOCYTES # BLD AUTO: 1.16 THOUSAND/ÂΜL (ref 0.17–1.22)
MONOCYTES NFR BLD AUTO: 12 % (ref 4–12)
NEUTROPHILS # BLD AUTO: 6.6 THOUSANDS/ÂΜL (ref 1.85–7.62)
NEUTS SEG NFR BLD AUTO: 70 % (ref 43–75)
NRBC BLD AUTO-RTO: 0 /100 WBCS
PLATELET # BLD AUTO: 265 THOUSANDS/UL (ref 149–390)
PMV BLD AUTO: 9.3 FL (ref 8.9–12.7)
POTASSIUM SERPL-SCNC: 3.7 MMOL/L (ref 3.5–5.3)
PROT SERPL-MCNC: 6.9 G/DL (ref 6.4–8.4)
RBC # BLD AUTO: 4.38 MILLION/UL (ref 3.81–5.12)
RSV RNA RESP QL NAA+PROBE: NEGATIVE
S PYO DNA THROAT QL NAA+PROBE: NOT DETECTED
SARS-COV-2 RNA RESP QL NAA+PROBE: NEGATIVE
SODIUM SERPL-SCNC: 140 MMOL/L (ref 135–147)
WBC # BLD AUTO: 9.46 THOUSAND/UL (ref 4.31–10.16)

## 2024-04-15 PROCEDURE — 96375 TX/PRO/DX INJ NEW DRUG ADDON: CPT

## 2024-04-15 PROCEDURE — 96374 THER/PROPH/DIAG INJ IV PUSH: CPT

## 2024-04-15 PROCEDURE — 99283 EMERGENCY DEPT VISIT LOW MDM: CPT

## 2024-04-15 PROCEDURE — 87651 STREP A DNA AMP PROBE: CPT | Performed by: EMERGENCY MEDICINE

## 2024-04-15 PROCEDURE — 99284 EMERGENCY DEPT VISIT MOD MDM: CPT | Performed by: EMERGENCY MEDICINE

## 2024-04-15 PROCEDURE — 36415 COLL VENOUS BLD VENIPUNCTURE: CPT | Performed by: EMERGENCY MEDICINE

## 2024-04-15 PROCEDURE — 85025 COMPLETE CBC W/AUTO DIFF WBC: CPT | Performed by: EMERGENCY MEDICINE

## 2024-04-15 PROCEDURE — 71046 X-RAY EXAM CHEST 2 VIEWS: CPT

## 2024-04-15 PROCEDURE — 80053 COMPREHEN METABOLIC PANEL: CPT | Performed by: EMERGENCY MEDICINE

## 2024-04-15 PROCEDURE — 96361 HYDRATE IV INFUSION ADD-ON: CPT

## 2024-04-15 PROCEDURE — 0241U HB NFCT DS VIR RESP RNA 4 TRGT: CPT | Performed by: EMERGENCY MEDICINE

## 2024-04-15 RX ORDER — AZITHROMYCIN 500 MG/1
500 TABLET, FILM COATED ORAL ONCE
Status: COMPLETED | OUTPATIENT
Start: 2024-04-15 | End: 2024-04-15

## 2024-04-15 RX ORDER — AMOXICILLIN AND CLAVULANATE POTASSIUM 875; 125 MG/1; MG/1
1 TABLET, FILM COATED ORAL ONCE
Status: COMPLETED | OUTPATIENT
Start: 2024-04-15 | End: 2024-04-15

## 2024-04-15 RX ORDER — AMOXICILLIN AND CLAVULANATE POTASSIUM 875; 125 MG/1; MG/1
1 TABLET, FILM COATED ORAL EVERY 12 HOURS
Qty: 9 TABLET | Refills: 0 | Status: SHIPPED | OUTPATIENT
Start: 2024-04-15 | End: 2024-04-20

## 2024-04-15 RX ORDER — ACETAMINOPHEN 325 MG/1
975 TABLET ORAL ONCE
Status: COMPLETED | OUTPATIENT
Start: 2024-04-15 | End: 2024-04-15

## 2024-04-15 RX ORDER — ONDANSETRON 2 MG/ML
4 INJECTION INTRAMUSCULAR; INTRAVENOUS ONCE
Status: COMPLETED | OUTPATIENT
Start: 2024-04-15 | End: 2024-04-15

## 2024-04-15 RX ORDER — AZITHROMYCIN 250 MG/1
TABLET, FILM COATED ORAL
Qty: 4 TABLET | Refills: 0 | Status: SHIPPED | OUTPATIENT
Start: 2024-04-15 | End: 2024-04-16

## 2024-04-15 RX ORDER — KETOROLAC TROMETHAMINE 30 MG/ML
15 INJECTION, SOLUTION INTRAMUSCULAR; INTRAVENOUS ONCE
Status: COMPLETED | OUTPATIENT
Start: 2024-04-15 | End: 2024-04-15

## 2024-04-15 RX ADMIN — ONDANSETRON 4 MG: 2 INJECTION INTRAMUSCULAR; INTRAVENOUS at 05:14

## 2024-04-15 RX ADMIN — ACETAMINOPHEN 975 MG: 325 TABLET, FILM COATED ORAL at 05:14

## 2024-04-15 RX ADMIN — AZITHROMYCIN 500 MG: 500 TABLET, FILM COATED ORAL at 06:52

## 2024-04-15 RX ADMIN — AMOXICILLIN AND CLAVULANATE POTASSIUM 1 TABLET: 875; 125 TABLET, FILM COATED ORAL at 06:52

## 2024-04-15 RX ADMIN — SODIUM CHLORIDE 1000 ML: 0.9 INJECTION, SOLUTION INTRAVENOUS at 05:11

## 2024-04-15 RX ADMIN — KETOROLAC TROMETHAMINE 15 MG: 30 INJECTION, SOLUTION INTRAMUSCULAR at 06:52

## 2024-04-15 NOTE — ED PROVIDER NOTES
History  Chief Complaint   Patient presents with    Flu Symptoms     Patient stating last night with sore throat took aspirin, fever increasing over night. Vomiting this morning, SOB, denies CP     67 y/o female with hx of HTN, HLD, RA, and CAD with angioplasty presents to the ED for evaluation of flu-like symptoms that started yesterday.  She states that she started with a sore throat yesterday morning as well as a subjective fever that increased throughout the day.  She also reports occasional non-productive cough and bodyaches.  She started experiencing nausea overnight and had an episode of nonbloody, nonbilious emesis this morning.  she denies any wheezing, dyspnea, chest pain, abdominal pain, diarrhea, or urinary symptoms.        Prior to Admission Medications   Prescriptions Last Dose Informant Patient Reported? Taking?   Ascorbic Acid, Vitamin C, (VITAMIN C) 100 MG tablet  Self Yes No   Sig: Take 1,000 mg by mouth daily   Cholecalciferol 50 MCG ( UT) CAPS  Self Yes No   Sig: Take 1 capsule by mouth daily   Cranberry 450 MG TABS  Self Yes No   Sig: Take 1 tablet by mouth 2 (two) times a day   Diclofenac Sodium (VOLTAREN) 1 %  Self Yes No   Sig: APPLY 4 GM FOUR TIMES A DAY AS NEEDED TRANSDERMAL 30 DAYS   Flaxseed, Linseed, (FLAX SEEDS PO)  Self Yes No   Sig: Take 1 tablet by mouth daily   Humira Pen 40 MG/0.4ML PNKT  Self Yes No   Sig: Finishes end of 2021   OMEGA-3 FATTY ACIDS PO  Self Yes No   Sig: Take 1 g by mouth 2 (two) times a day    acetaminophen (Tylenol) 325 mg tablet  Self Yes No   Si tablet as needed Orally TID PRN   aspirin 81 MG tablet  Self Yes No   Sig: Take 162 mg by mouth daily   azelastine (ASTELIN) 0.1 % nasal spray   No No   Si spray into each nostril 2 (two) times a day Use in each nostril as directed   ezetimibe (ZETIA) 10 mg tablet  Self Yes No   fenofibrate (TRICOR) 54 MG tablet  Self Yes No   folic acid (FOLVITE) 1 mg tablet  Self Yes No   Sig: Take 1,000 mcg by  "mouth daily   meclizine (ANTIVERT) 25 mg tablet   No No   Sig: Take 1 tablet (25 mg total) by mouth 3 (three) times a day as needed for dizziness   metFORMIN (GLUCOPHAGE-XR) 500 mg 24 hr tablet   No No   Sig: TAKE 1 TABLET(500 MG) BY MOUTH DAILY WITH DINNER   methotrexate 2.5 mg tablet  Self Yes No   Si.5 mg once a week Takes on   Last dose10/9/20  8 PILLS ONE TIME A WEEK   metoprolol tartrate (LOPRESSOR) 25 mg tablet  Self Yes No   Sig: Take 12.5 mg by mouth every 12 (twelve) hours    multivitamin-minerals (CENTRUM) tablet  Self Yes No   Sig: Take by mouth daily   nystatin (MYCOSTATIN) ointment  Self Yes No   Sig: APPLY TOPICALLY TO THE AFFECTED AREA TWICE DAILY   pantoprazole (PROTONIX) 40 mg tablet   No No   Sig: TAKE 1 TABLET DAILY   phenazopyridine (PYRIDIUM) 100 mg tablet   No No   Sig: Take 1 tablet (100 mg total) by mouth 3 (three) times a day as needed for bladder spasms   rosuvastatin (CRESTOR) 40 MG tablet  Self Yes No   tamsulosin (FLOMAX) 0.4 mg   No No   Sig: Take 1 capsule (0.4 mg total) by mouth daily with dinner for 7 days   triamterene-hydrochlorothiazide (DYAZIDE) 37.5-25 mg per capsule  Self Yes No      Facility-Administered Medications: None       Past Medical History:   Diagnosis Date    Abnormal Pap smear of cervix     cin3; hpv non 16/18 +(2016); LGSIL 2018    Arthritis     Cancer (HCC)     cervical    Coronary artery disease     GERD (gastroesophageal reflux disease)     History of pneumonia     2 yrs ago    HPV (human papilloma virus) infection     Hypercholesterolemia     Hypertension     Kidney stone     Morbid obesity (HCC)     Motion sickness     Myocardial infarct (HCC)     X2;  and //[facility] cardiology Dr Quintana yearly    Pleuritic chest pain     \"from constant lifting on the job chest muscle area\"    PONV (postoperative nausea and vomiting)     \"extremely with the gas\"    Rheumatoid arthritis (HCC)     Rheu and cervical    Shortness of breath     moderate " activity// pt does work full time/on and off feet in [employer]    Vertigo     Wears partial dentures     lower       Past Surgical History:   Procedure Laterality Date    ANGIOPLASTY Right 09/14/2015    right groin / femoral.    BIOPSY CORE NEEDLE  1980    CARDIAC CATHETERIZATION  2015    star close - closure system    CARDIAC CATHETERIZATION  2000    angioplasty/[facility]    CERVICAL CONE BIOPSY      1980's    COLONOSCOPY      2006, 2019    COLONOSCOPY      DILATION AND CURETTAGE OF UTERUS      FL RETROGRADE PYELOGRAM  7/2/2020    FL RETROGRADE PYELOGRAM  10/13/2020    MAMMO (HISTORICAL)  2018    Neg    OTHER SURGICAL HISTORY Right 11/2017    Surgical removal of lypoma shoulder blade    WV CYSTO BLADDER W/URETERAL CATHETERIZATION Right 7/2/2020    Procedure: CYSTOSCOPY RETROGRADE PYELOGRAM WITH INSERTION STENT URETERAL;  Surgeon: Raphael Briggs MD;  [facility];  Service: Urology    WV CYSTO/URETERO W/LITHOTRIPSY &INDWELL STENT INSRT Right 7/21/2020    Procedure: CYSTO, URETEROSCOPY STONE BASKET EXTRACTION , RETROGRADE PYELOGRAM, STENT;  Surgeon: Lonnie Bean MD;  Location[facility];  Service: Urology    WV CYSTO/URETERO W/LITHOTRIPSY &INDWELL STENT INSRT Left 10/13/2020    Procedure: CYSTOSCOPY URETEROSCOPY WITH LITHOTRIPSY HOLMIUM LASER, RETROGRADE PYELOGRAM AND INSERTION STENT URETERAL;  Surgeon: Lonnie Bean MD;  [facility];  Service: Urology    WV INJECT SI JOINT ARTHRGRPHY&/ANES/STEROID W/REFUGIO Bilateral 12/6/2022    Procedure: BILATERAL SACROILIAC JOINT INJECTION (85661);  Surgeon: Jerrell Sánchez DO;  Location: [facility];  Service: Pain Management     WV NJX DX/THER AGT PVRT FACET JT LMBR/SAC 1 LEVEL Bilateral 9/27/2022    Procedure: BILATERAL L4-S1 MEDIAL BRANCH BLOCK;  Surgeon: Jerrell Sánchez DO;  Location: [facility];  Service: Pain Management     WV NJX DX/THER AGT PVRT FACET JT LMBR/SAC 1 LEVEL Bilateral 10/11/2022    Procedure: BILATERAL L4-S1 MEDIAL BRANCH BLOCK  (14134,26675);  Surgeon: Jerrell Sánchez DO;  Location:  MAIN OR;  Service: Pain Management     SHOULDER SURGERY Right 2014    had a lympoma done in 2017 also same shoulder    SHOULDER SURGERY Right 2014    TONSILLECTOMY      WISDOM TOOTH EXTRACTION         Family History   Problem Relation Age of Onset    Cancer Mother     Lung cancer Mother     Heart disease Father      I have reviewed and agree with the history as documented.    E-Cigarette/Vaping    E-Cigarette Use Never User      E-Cigarette/Vaping Substances    Nicotine No     THC No     CBD No     Flavoring No     Other No     Unknown No      Social History     Tobacco Use    Smoking status: Former     Current packs/day: 0.00     Average packs/day: 2.0 packs/day for 30.0 years (60.0 ttl pk-yrs)     Types: Cigarettes     Start date: 7/15/1968     Quit date: 7/15/1998     Years since quittin.7    Smokeless tobacco: Never   Vaping Use    Vaping status: Never Used   Substance Use Topics    Alcohol use: Not Currently    Drug use: Never       Review of Systems   Constitutional:  Positive for chills and fever.   HENT:  Positive for congestion and sore throat.    Respiratory:  Positive for cough. Negative for shortness of breath.    Cardiovascular:  Negative for chest pain and palpitations.   Gastrointestinal:  Positive for nausea and vomiting. Negative for abdominal pain and diarrhea.   Genitourinary:  Negative for dysuria and hematuria.   Musculoskeletal:  Positive for myalgias. Negative for back pain and neck pain.   Neurological:  Negative for dizziness, weakness, light-headedness, numbness and headaches.   All other systems reviewed and are negative.      Physical Exam  Physical Exam  Vitals and nursing note reviewed.   Constitutional:       General: She is not in acute distress.     Appearance: Normal appearance. She is obese. She is not ill-appearing, toxic-appearing or diaphoretic.   HENT:      Head: Normocephalic and atraumatic.      Right Ear: External  ear normal.      Left Ear: External ear normal.      Nose: Nose normal. No congestion or rhinorrhea.      Mouth/Throat:      Mouth: Mucous membranes are moist.      Pharynx: Oropharynx is clear. No oropharyngeal exudate or posterior oropharyngeal erythema.   Eyes:      Extraocular Movements: Extraocular movements intact.      Conjunctiva/sclera: Conjunctivae normal.      Pupils: Pupils are equal, round, and reactive to light.   Cardiovascular:      Rate and Rhythm: Regular rhythm. Tachycardia present.      Pulses: Normal pulses.      Heart sounds: Normal heart sounds. No murmur heard.  Pulmonary:      Effort: Pulmonary effort is normal. No respiratory distress.      Breath sounds: Normal breath sounds. No wheezing or rales.   Abdominal:      General: Abdomen is flat. Bowel sounds are normal. There is no distension.      Palpations: Abdomen is soft.      Tenderness: There is no abdominal tenderness. There is no right CVA tenderness, left CVA tenderness or guarding.   Musculoskeletal:         General: No swelling or tenderness. Normal range of motion.      Cervical back: Normal range of motion and neck supple. No tenderness.   Skin:     General: Skin is warm and dry.      Capillary Refill: Capillary refill takes less than 2 seconds.   Neurological:      General: No focal deficit present.      Mental Status: She is alert and oriented to person, place, and time.         Vital Signs  ED Triage Vitals   Temperature Pulse Respirations Blood Pressure SpO2   04/15/24 0459 04/15/24 0459 04/15/24 0459 04/15/24 0459 04/15/24 0459   99 °F (37.2 °C) (!) 119 18 132/82 96 %      Temp Source Heart Rate Source Patient Position - Orthostatic VS BP Location FiO2 (%)   04/15/24 0459 04/15/24 0459 04/15/24 0703 04/15/24 0703 --   Oral Monitor Sitting Left arm       Pain Score       04/15/24 0514       8           Vitals:    04/15/24 0459 04/15/24 0703   BP: 132/82 126/83   Pulse: (!) 119 92   Patient Position - Orthostatic VS:  Sitting          Visual Acuity      ED Medications  Medications   sodium chloride 0.9 % bolus 1,000 mL (0 mL Intravenous Stopped 4/15/24 0611)   acetaminophen (TYLENOL) tablet 975 mg (975 mg Oral Given 4/15/24 0514)   ondansetron (ZOFRAN) injection 4 mg (4 mg Intravenous Given 4/15/24 0514)   amoxicillin-clavulanate (AUGMENTIN) 875-125 mg per tablet 1 tablet (1 tablet Oral Given 4/15/24 0652)   azithromycin (ZITHROMAX) tablet 500 mg (500 mg Oral Given 4/15/24 0652)   ketorolac (TORADOL) injection 15 mg (15 mg Intravenous Given 4/15/24 0652)       Diagnostic Studies  Results Reviewed       Procedure Component Value Units Date/Time    FLU/RSV/COVID - if FLU/RSV clinically relevant [[id number]]  (Normal) Collected: 04/15/24 0508    Lab Status: Final result Specimen: Nares from Nose Updated: 04/15/24 0636     SARS-CoV-2 Negative     INFLUENZA A PCR Negative     INFLUENZA B PCR Negative     RSV PCR Negative    Narrative:      FOR PEDIATRIC PATIENTS - copy/paste COVID Guidelines URL to browser: [email or web address]    SARS-CoV-2 assay is a Nucleic Acid Amplification assay intended for the  qualitative detection of nucleic acid from SARS-CoV-2 in nasopharyngeal  swabs. Results are for the presumptive identification of SARS-CoV-2 RNA.    Positive results are indicative of infection with SARS-CoV-2, the virus  causing COVID-19, but do not rule out bacterial infection or co-infection  with other viruses. Laboratories within the [place] and its  territories are required to report all positive results to the appropriate  public health authorities. Negative results do not preclude SARS-CoV-2  infection and should not be used as the sole basis for treatment or other  patient management decisions. Negative results must be combined with  clinical observations, patient history, and epidemiological information.  This test has not been FDA cleared or approved.    This test has been  authorized by FDA under an Emergency Use Authorization  (EUA). This test is only authorized for the duration of time the  declaration that circumstances exist justifying the authorization of the  emergency use of an in vitro diagnostic tests for detection of SARS-CoV-2  virus and/or diagnosis of COVID-19 infection under section 564(b)(1) of  the Act, 21 U.S.C. 360bbb-3(b)(1), unless the authorization is terminated  or revoked sooner. The test has been validated but independent review by FDA  and CLIA is pending.    Test performed using [employer]: This RT-PCR assay targets N2,  a region unique to SARS-CoV-2. A conserved region in the E-gene was chosen  for pan-Sarbecovirus detection which includes SARS-CoV-2.    According to CMS-2020-01-R, this platform meets the definition of high-throughput technology.    Strep A PCR [[id number]]  (Normal) Collected: 04/15/24 0508    Lab Status: Final result Specimen: Throat Updated: 04/15/24 0623     STREP A PCR Not Detected    Comprehensive metabolic panel [[id number]]  (Abnormal) Collected: 04/15/24 0508    Lab Status: Final result Specimen: Blood from Arm, Left Updated: 04/15/24 0542     Sodium 140 mmol/L      Potassium 3.7 mmol/L      Chloride 105 mmol/L      CO2 24 mmol/L      ANION GAP 11 mmol/L      BUN 18 mg/dL      Creatinine 0.96 mg/dL      Glucose 168 mg/dL      Calcium 9.0 mg/dL      AST 42 U/L      ALT 36 U/L      Alkaline Phosphatase 73 U/L      Total Protein 6.9 g/dL      Albumin 4.1 g/dL      Total Bilirubin 0.48 mg/dL      eGFR 60 ml/min/1.73sq m     Narrative:      National Kidney Disease Foundation guidelines for Chronic Kidney Disease (CKD):     Stage 1 with normal or high GFR (GFR > 90 mL/min/1.73 square meters)    Stage 2 Mild CKD (GFR = 60-89 mL/min/1.73 square meters)    Stage 3A Moderate CKD (GFR = 45-59 mL/min/1.73 square meters)    Stage 3B Moderate CKD (GFR = 30-44 mL/min/1.73 square meters)    Stage 4 Severe CKD (GFR = 15-29 mL/min/1.73 square  meters)    Stage 5 End Stage CKD (GFR <15 mL/min/1.73 square meters)  Note: GFR calculation is accurate only with a steady state creatinine    CBC and differential [[id number]]  (Abnormal) Collected: 04/15/24 0508    Lab Status: Final result Specimen: Blood from Arm, Left Updated: 04/15/24 0524     WBC 9.46 Thousand/uL      RBC 4.38 Million/uL      Hemoglobin 12.5 g/dL      Hematocrit 39.5 %      MCV 90 fL      MCH 28.5 pg      MCHC 31.6 g/dL      RDW 15.7 %      MPV 9.3 fL      Platelets 265 Thousands/uL      nRBC 0 /100 WBCs      Segmented % 70 %      Immature Grans % 0 %      Lymphocytes % 17 %      Monocytes % 12 %      Eosinophils Relative 0 %      Basophils Relative 1 %      Absolute Neutrophils 6.60 Thousands/µL      Absolute Immature Grans 0.03 Thousand/uL      Absolute Lymphocytes 1.57 Thousands/µL      Absolute Monocytes 1.16 Thousand/µL      Eosinophils Absolute 0.04 Thousand/µL      Basophils Absolute 0.06 Thousands/µL                    XR chest 2 views   ED Interpretation by Demetri Patton MD (04/15 0638)   Small left lower lobe infiltrate on my interpretation of chest radiograph.                 Procedures  Procedures         ED Course  ED Course as of 04/15/24 0709   Mon Apr 15, 2024   0631 CBC and differential(!)  Normal WBC, hemoglobin, hematocrit, and platelet count.  No acute actionable abnormality.   0632 Comprehensive metabolic panel(!)  Slightly elevated serum glucose 168.  AST 42, slightly elevated.  No other acute actionable abnormalities.   0632 STREP A PCR: Not Detected  Negative   0637 FLU/RSV/COVID - if FLU/RSV clinically relevant  All negative   0638 XR chest 2 views  Small left lower lobe infiltrate on my interpretation of chest radiograph.                                             Medical Decision Making  69 y/o female with hx of HTN, HLD, RA, and CAD with angioplasty presents to the ED for evaluation of flu-like symptoms that started yesterday.  She states that she started with a  sore throat yesterday morning as well as a subjective fever that increased throughout the day.  She also reports occasional non-productive cough and bodyaches.  She started experiencing nausea overnight and had an episode of nonbloody, nonbilious emesis this morning.  she denies any wheezing, dyspnea, chest pain, abdominal pain, diarrhea, or urinary symptoms.    Vital signs reviewed.  See physical exam documentation for exam findings.  Differential diagnosis includes but is not limited to viral URI, strep pharyngitis, pneumonia, anemia, and/or electrolyte disturbance.  Will treat symptomatically and obtain CBC, chemistry, strep PCR, chest x-ray, and viral URI testing.  See ED course for independent interpretation of results.  Strep PCR and viral URI testing negative.  On my interpretation, chest radiograph shows mild left lower lobe infiltrate.  Will treat for pneumonia with course of Augmentin and azithromycin. I discussed all findings, treatment, red flags/return precautions, and outpatient follow-up and the patient/family understand and agree. Stable for discharge.    Amount and/or Complexity of Data Reviewed  Labs: ordered. Decision-making details documented in ED Course.  Radiology: ordered and independent interpretation performed. Decision-making details documented in ED Course.    Risk  OTC drugs.  Prescription drug management.             Disposition  Final diagnoses:   Pneumonia   Cough   Nasal congestion   Sore throat     Time reflects when diagnosis was documented in both MDM as applicable and the Disposition within this note       Time User Action Codes Description Comment    4/15/2024  6:47 AM Demetri Patton [J18.9] Pneumonia     4/15/2024  6:47 AM Demetri Patton [R05.9] Cough     4/15/2024  6:47 AM Demetri Patton [R09.81] Nasal congestion     4/15/2024  6:47 AM Demetri Patton [J02.9] Sore throat           ED Disposition       ED Disposition   Discharge    Condition   Stable    Date/Time    Mon Apr 15, 2024  6:47 AM    Comment   Suha Trejo discharge to home/self care.                   Follow-up Information       Follow up With Specialties Details Why Contact Info Additional Information    Seth Torre MD Family Medicine Call in 1 week For follow-up [address]  [address]  [place] [facility] [address]  [phone]       [facility] Emergency Department Emergency Medicine Go to  If symptoms worsen [address] [address] [phone] [facility] Emergency Department, [address] [address]            Discharge Medication List as of 4/15/2024  6:52 AM        START taking these medications    Details   amoxicillin-clavulanate (AUGMENTIN) 875-125 mg per tablet Take 1 tablet by mouth every 12 (twelve) hours for 5 days, Starting Mon 4/15/2024, Until Sat 4/20/2024, Normal      azithromycin (ZITHROMAX) 250 mg tablet Take 1 tablet daily x 4 days, Normal           CONTINUE these medications which have NOT CHANGED    Details   acetaminophen (Tylenol) 325 mg tablet 2 tablet as needed Orally TID PRN, Historical Med      Ascorbic Acid, Vitamin C, (VITAMIN C) 100 MG tablet Take 1,000 mg by mouth daily, Historical Med      aspirin 81 MG tablet Take 162 mg by mouth daily, Historical Med      azelastine (ASTELIN) 0.1 % nasal spray 1 spray into each nostril 2 (two) times a day Use in each nostril as directed, Starting Mon 1/15/2024, Normal      Cholecalciferol 50 MCG (2000 UT) CAPS Take 1 capsule by mouth daily, Historical Med      Cranberry 450 MG TABS Take 1 tablet by mouth 2 (two) times a day, Historical Med      Diclofenac Sodium (VOLTAREN) 1 % APPLY 4 GM FOUR TIMES A DAY AS NEEDED TRANSDERMAL 30 DAYS, Historical Med      ezetimibe (ZETIA) 10 mg tablet Starting Fri 12/16/2022, Historical Med      fenofibrate (TRICOR) 54 MG tablet Starting Wed 6/30/2021, Historical Med      Flaxseed, Linseed, (FLAX SEEDS PO) Take 1 tablet by mouth daily,  Historical Med      folic acid (FOLVITE) 1 mg tablet Take 1,000 mcg by mouth daily, Starting Thu 3/5/2020, Historical Med      Humira Pen 40 MG/0.4ML PNKT Finishes end of march 2021, Starting Tue 2/16/2021, Historical Med      meclizine (ANTIVERT) 25 mg tablet Take 1 tablet (25 mg total) by mouth 3 (three) times a day as needed for dizziness, Starting Mon 1/22/2024, Normal      metFORMIN (GLUCOPHAGE-XR) 500 mg 24 hr tablet TAKE 1 TABLET(500 MG) BY MOUTH DAILY WITH DINNER, Normal      methotrexate 2.5 mg tablet 2.5 mg once a week Takes on Fridays  Last dose10/9/20  8 PILLS ONE TIME A WEEK, Starting Thu 2/6/2020, Historical Med      metoprolol tartrate (LOPRESSOR) 25 mg tablet Take 12.5 mg by mouth every 12 (twelve) hours , Starting Thu 3/5/2020, Historical Med      multivitamin-minerals (CENTRUM) tablet Take by mouth daily, Historical Med      nystatin (MYCOSTATIN) ointment APPLY TOPICALLY TO THE AFFECTED AREA TWICE DAILY, Historical Med      OMEGA-3 FATTY ACIDS PO Take 1 g by mouth 2 (two) times a day , Historical Med      pantoprazole (PROTONIX) 40 mg tablet TAKE 1 TABLET DAILY, Starting Tue 3/12/2024, Normal      phenazopyridine (PYRIDIUM) 100 mg tablet Take 1 tablet (100 mg total) by mouth 3 (three) times a day as needed for bladder spasms, Starting Tue 11/7/2023, Normal      rosuvastatin (CRESTOR) 40 MG tablet Starting Fri 6/17/2022, Historical Med      tamsulosin (FLOMAX) 0.4 mg Take 1 capsule (0.4 mg total) by mouth daily with dinner for 7 days, Starting Fri 10/6/2023, Until Fri 10/13/2023, Normal      triamterene-hydrochlorothiazide (DYAZIDE) 37.5-25 mg per capsule Starting Mon 8/14/2023, Historical Med             No discharge procedures on file.    PDMP Review         Value Time User    PDMP Reviewed  Yes 7/2/2020  1:24 PM Raphael Briggs MD            ED Provider  Electronically Signed by             Demetri Patton MD  04/26/24 0556

## 2024-04-22 ENCOUNTER — OFFICE VISIT (OUTPATIENT)
Dept: FAMILY MEDICINE CLINIC | Facility: CLINIC | Age: 69
End: 2024-04-22
Payer: MEDICARE

## 2024-04-22 ENCOUNTER — TELEPHONE (OUTPATIENT)
Age: 69
End: 2024-04-22

## 2024-04-22 VITALS
WEIGHT: 293 LBS | RESPIRATION RATE: 16 BRPM | HEART RATE: 66 BPM | SYSTOLIC BLOOD PRESSURE: 130 MMHG | HEIGHT: 67 IN | DIASTOLIC BLOOD PRESSURE: 82 MMHG | OXYGEN SATURATION: 98 % | BODY MASS INDEX: 45.99 KG/M2

## 2024-04-22 DIAGNOSIS — E66.9 TYPE 2 DIABETES MELLITUS WITH OBESITY  (HCC): ICD-10-CM

## 2024-04-22 DIAGNOSIS — E11.65 TYPE 2 DIABETES MELLITUS WITH HYPERGLYCEMIA, WITHOUT LONG-TERM CURRENT USE OF INSULIN (HCC): ICD-10-CM

## 2024-04-22 DIAGNOSIS — E66.01 CLASS 3 SEVERE OBESITY DUE TO EXCESS CALORIES WITH SERIOUS COMORBIDITY AND BODY MASS INDEX (BMI) OF 50.0 TO 59.9 IN ADULT (HCC): ICD-10-CM

## 2024-04-22 DIAGNOSIS — D84.9 IMMUNODEFICIENCY, UNSPECIFIED (HCC): ICD-10-CM

## 2024-04-22 DIAGNOSIS — E11.69 TYPE 2 DIABETES MELLITUS WITH OBESITY  (HCC): ICD-10-CM

## 2024-04-22 DIAGNOSIS — J18.9 COMMUNITY ACQUIRED PNEUMONIA, UNSPECIFIED LATERALITY: ICD-10-CM

## 2024-04-22 DIAGNOSIS — I25.118 CORONARY ARTERY DISEASE OF NATIVE ARTERY OF NATIVE HEART WITH STABLE ANGINA PECTORIS (HCC): ICD-10-CM

## 2024-04-22 DIAGNOSIS — J18.9 COMMUNITY ACQUIRED PNEUMONIA, UNSPECIFIED LATERALITY: Primary | ICD-10-CM

## 2024-04-22 PROCEDURE — G2211 COMPLEX E/M VISIT ADD ON: HCPCS | Performed by: FAMILY MEDICINE

## 2024-04-22 PROCEDURE — 99214 OFFICE O/P EST MOD 30 MIN: CPT | Performed by: FAMILY MEDICINE

## 2024-04-22 PROCEDURE — G0439 PPPS, SUBSEQ VISIT: HCPCS | Performed by: FAMILY MEDICINE

## 2024-04-22 RX ORDER — CODEINE PHOSPHATE/GUAIFENESIN 10-100MG/5
5 LIQUID (ML) ORAL 3 TIMES DAILY PRN
Qty: 160 ML | Refills: 0 | Status: SHIPPED | OUTPATIENT
Start: 2024-04-22 | End: 2024-04-22 | Stop reason: SDUPTHER

## 2024-04-22 RX ORDER — MELOXICAM 15 MG/1
1 TABLET ORAL DAILY
COMMUNITY
Start: 2023-12-13

## 2024-04-22 RX ORDER — CEFPODOXIME PROXETIL 200 MG/1
200 TABLET, FILM COATED ORAL 2 TIMES DAILY
Qty: 14 TABLET | Refills: 0 | Status: SHIPPED | OUTPATIENT
Start: 2024-04-22 | End: 2024-04-22 | Stop reason: SDUPTHER

## 2024-04-22 NOTE — ASSESSMENT & PLAN NOTE
Lab Results   Component Value Date    HGBA1C 6.9 (H) 10/20/2023   -stable/controlled, continue same medication. Will evaluate again next visit

## 2024-04-22 NOTE — TELEPHONE ENCOUNTER
Patient called in stating the pharmacy that scripts were originally sent to are having computer issues and patient is currently on her way to the [facility]. Patient is requesting scripts to be resent there. Please advise.

## 2024-04-22 NOTE — TELEPHONE ENCOUNTER
Not a duplilcate, needs to be sent to a different pharmacy    Reason for call:   [x] Refill   [] Prior Auth  [] Other:     Office:   [x] PCP/Provider - Torre  [] Specialty/Provider -     Medication:   cefpodoxime (VANTIN) 200 mg tablet       guaifenesin-codeine (GUAIFENESIN AC) 100-10 MG/5ML liquid          Dose/Frequency:   200 mg, Oral, 2 times daily   5 mL, Oral, 3 times daily PRN     Quantity:   14  160ml    Pharmacy: [id number] [place], [place] - [facility] [facility] [phone]     Does the patient have enough for 3 days?   [] Yes   [x] No - Send as HP to POD

## 2024-04-22 NOTE — PROGRESS NOTES
Assessment and Plan:     Problem List Items Addressed This Visit       Coronary artery disease of native artery of native heart with stable angina pectoris (Formerly [facility])     -stable/controlled, continue same medication. Will evaluate again next visit            Class 3 severe obesity due to excess calories with serious comorbidity and body mass index (BMI) of 45.0 to 49.9 in adult ([facility])     -stable/controlled, continue same medication. Will evaluate again next visit            Immunodeficiency, unspecified ([facility])     Do the antibiotic          Relevant Medications    meloxicam (MOBIC) 15 mg tablet    Type 2 diabetes mellitus with hyperglycemia, without long-term current use of insulin ([facility])       Lab Results   Component Value Date    HGBA1C 6.9 (H) 10/20/2023   -stable/controlled, continue same medication. Will evaluate again next visit            Type 2 diabetes mellitus with obesity  ([facility]ly [facility])       Lab Results   Component Value Date    HGBA1C 6.9 (H) 10/20/2023   -stable/controlled, continue same medication. Will evaluate again next visit             Other Visit Diagnoses       Community acquired pneumonia, unspecified laterality    -  Primary    Relevant Medications    guaifenesin-codeine (GUAIFENESIN AC) 100-10 MG/5ML liquid    cefpodoxime (VANTIN) 200 mg tablet           1. Community acquired pneumonia, unspecified laterality  -     guaifenesin-codeine (GUAIFENESIN AC) 100-10 MG/5ML liquid; Take 5 mL by mouth 3 (three) times a day as needed for cough  -     cefpodoxime (VANTIN) 200 mg tablet; Take 1 tablet (200 mg total) by mouth 2 (two) times a day for 7 days    2. Type 2 diabetes mellitus with hyperglycemia, without long-term current use of insulin ([facility])  Assessment & Plan:    Lab Results   Component Value Date    HGBA1C 6.9 (H) 10/20/2023   -stable/controlled, continue same medication. Will evaluate again next visit         3. Type 2 diabetes mellitus with obesity  ([facility])  Assessment & Plan:    Lab Results   Component  Value Date    HGBA1C 6.9 (H) 10/20/2023   -stable/controlled, continue same medication. Will evaluate again next visit         4. Immunodeficiency, unspecified (HCC)  Assessment & Plan:  Do the antibiotic       5. Class 3 severe obesity due to excess calories with serious comorbidity and body mass index (BMI) of 50.0 to 59.9 in adult (HCC)  Assessment & Plan:  -stable/controlled, continue same medication. Will evaluate again next visit         6. Coronary artery disease of native artery of native heart with stable angina pectoris ([facility])  Assessment & Plan:  -stable/controlled, continue same medication. Will evaluate again next visit             Preventive health issues were discussed with patient, and age appropriate screening tests were ordered as noted in patient's After Visit Summary.  Personalized health advice and appropriate referrals for health education or preventive services given if needed, as noted in patient's After Visit Summary.     History of Present Illness:     Patient presents for a Medicare Wellness Visit    HPI   History of Present Illness  The patient is a 68-year-old female who presents for evaluation of congestion, cough, and ear pain.    The patient was recently diagnosed with pneumonia at the Emergency Room, following a chest x-ray revealing a spot on her left lung. Despite completing a course of Zithromax 500 and Augmentin, she continues to experience congestion, cough, and otalgia. She hypothesizes that her symptoms may be attributed to her allergy medication and cough syrup, as they have been causing rhinorrhea. She has been adhering to a regimen of probiotics and zinc for the past 2 years. Previous attempts to alleviate her symptoms with Robitussin were unsuccessful. She expresses concern about potential contagion due to her sister's impending travel on Wednesday.      Patient Care Team:  Seth Torre MD as PCP - General     Review of Systems:     Review of Systems   Constitutional:   Negative for fever and unexpected weight change.   HENT:  Negative for nosebleeds and trouble swallowing.    Eyes:  Negative for visual disturbance.   Respiratory:  Positive for cough. Negative for chest tightness and shortness of breath.    Cardiovascular:  Negative for chest pain, palpitations and leg swelling.   Gastrointestinal:  Negative for abdominal pain, constipation, diarrhea and nausea.   Endocrine: Negative for cold intolerance.   Genitourinary:  Negative for dysuria and urgency.   Musculoskeletal:  Negative for joint swelling and myalgias.   Skin:  Negative for rash.   Neurological:  Negative for tremors, seizures and syncope.   Hematological:  Does not bruise/bleed easily.   Psychiatric/Behavioral:  Negative for hallucinations and suicidal ideas.         Problem List:     Patient Active Problem List   Diagnosis    Rheumatoid arthritis (HCC)    Gastroesophageal reflux disease without esophagitis    DANNY (obstructive sleep apnea)    Vertigo    Mixed hyperlipidemia    Stented coronary artery    Coronary artery disease of native artery of native heart with stable angina pectoris ([facility])    Class 3 severe obesity due to excess calories with serious comorbidity and body mass index (BMI) of 45.0 to 49.9 in adult ([facility])    Pre-diabetes    Chronic obstructive pulmonary disease, unspecified COPD type ([facility])    History of COVID-19    Immunodeficiency, unspecified ([facility])    Bruises easily    Type 2 diabetes mellitus with hyperglycemia, without long-term current use of insulin ([facility])    Type 2 diabetes mellitus with obesity  ([facility])      Past Medical and Surgical History:     Past Medical History:   Diagnosis Date    Abnormal Pap smear of cervix     cin3; hpv non 16/18 +(12/2016); LGSIL 6/2018    Arthritis     Cancer (HCC)     cervical    Coronary artery disease     GERD (gastroesophageal reflux disease)     History of pneumonia     2 yrs ago    HPV (human papilloma virus) infection     Hypercholesterolemia     Hypertension   "   Kidney stone     Morbid obesity (HCC)     Motion sickness     Myocardial infarct (HCC)     X2; 2000 and 2002//[facility] cardiology Dr Quintana yearly    Pleuritic chest pain     \"from constant lifting on the job chest muscle area\"    PONV (postoperative nausea and vomiting)     \"extremely with the gas\"    Rheumatoid arthritis (HCC)     Rheu and cervical    Shortness of breath     moderate activity// pt does work full time/on and off feet in warehouse    Vertigo     Wears partial dentures     lower     Past Surgical History:   Procedure Laterality Date    ANGIOPLASTY Right 09/14/2015    right groin / femoral.    BIOPSY CORE NEEDLE  1980    CARDIAC CATHETERIZATION  2015    star close - closure system    CARDIAC CATHETERIZATION  2000    angioplasty/[facility]    CERVICAL CONE BIOPSY      1980's    COLONOSCOPY      2006, 2019    COLONOSCOPY      DILATION AND CURETTAGE OF UTERUS      FL RETROGRADE PYELOGRAM  7/2/2020    FL RETROGRADE PYELOGRAM  10/13/2020    MAMMO (HISTORICAL)  2018    Neg    OTHER SURGICAL HISTORY Right 11/2017    Surgical removal of lypoma shoulder blade    CT CYSTO BLADDER W/URETERAL CATHETERIZATION Right 7/2/2020    Procedure: CYSTOSCOPY RETROGRADE PYELOGRAM WITH INSERTION STENT URETERAL;  Surgeon: Raphael Briggs MD;  [facility];  Service: Urology    CT CYSTO/URETERO W/LITHOTRIPSY &INDWELL STENT INSRT Right 7/21/2020    Procedure: CYSTO, URETEROSCOPY STONE BASKET EXTRACTION , RETROGRADE PYELOGRAM, STENT;  Surgeon: Lonnie Bean MD;  Location[facility];  Service: Urology    CT CYSTO/URETERO W/LITHOTRIPSY &INDWELL STENT INSRT Left 10/13/2020    Procedure: CYSTOSCOPY URETEROSCOPY WITH LITHOTRIPSY HOLMIUM LASER, RETROGRADE PYELOGRAM AND INSERTION STENT URETERAL;  Surgeon: Lonnie Bean MD;  [facility];  Service: Urology    CT INJECT SI JOINT ARTHRGRPHY&/ANES/STEROID W/REFUGIO Bilateral 12/6/2022    Procedure: BILATERAL SACROILIAC JOINT INJECTION (14756);  Surgeon: Jerrell Sánchez, DO; "  Location: [facility];  Service: Pain Management     FL NJX DX/THER AGT PVRT FACET JT LMBR/SAC 1 LEVEL Bilateral 2022    Procedure: BILATERAL L4-S1 MEDIAL BRANCH BLOCK;  Surgeon: Jerrell Sánchez DO;  Location: [facility];  Service: Pain Management     FL NJX DX/THER AGT PVRT FACET JT LMBR/SAC 1 LEVEL Bilateral 10/11/2022    Procedure: BILATERAL L4-S1 MEDIAL BRANCH BLOCK (69761,[id number]);  Surgeon: Jerrell Sánchez DO;  Location: [facility];  Service: Pain Management     SHOULDER SURGERY Right 2014    had a lympoma done in 2017 also same shoulder    SHOULDER SURGERY Right 2014    TONSILLECTOMY      WISDOM TOOTH EXTRACTION        Family History:     Family History   Problem Relation Age of Onset    Cancer Mother     Lung cancer Mother     Heart disease Father       Social History:     Social History     Socioeconomic History    Marital status:      Spouse name: None    Number of children: 0    Years of education: 12    Highest education level: High school graduate   Occupational History    Occupation:    Tobacco Use    Smoking status: Former     Current packs/day: 0.00     Average packs/day: 2.0 packs/day for 30.0 years (60.0 ttl pk-yrs)     Types: Cigarettes     Start date: 7/15/1968     Quit date: 7/15/1998     Years since quittin.7    Smokeless tobacco: Never   Vaping Use    Vaping status: Never Used   Substance and Sexual Activity    Alcohol use: Not Currently    Drug use: Never    Sexual activity: Not Currently   Other Topics Concern    None   Social History Narrative    Most recent tobacco use screenin-    Do you currently or have you served in the [employer]: No    Occupation: [email or web address]    Relationship status:     Live alone or with others: alone    Number of children: 0    Siblings: 5    Has smoked since age: 14    Chewing tobacco: none    Alcohol intake: Occasional    Caffeine intake: None    Illicit drugs: none    Diet: Regular    Exercise level: None    Family history  of heart disease: Yes    Positive for CAD    Overweight: Yes    Obese: Yes    Advance directive: Yes    Blood Transfusion: No    Marital status:     High cholesterol: Yes    High blood pressure: Yes    Diabetes: No    General stress level: Low    Presence of domestic violence: No    Guns present in home: No    Seat belts used routinely: Yes    Sexual orientation: Heterosexual    Sunscreen used routinely: Yes    Seat belt/car seat used routinely: Yes    Exercise-How often do you exercise: moderate    Exercise- What type of exercise do you do: work    Do you have regular medical check ups: Yes    Do you have regular dental check ups: Yes    Illicit drugs-years of use: 0    Smoke alarm in home: Yes    International travel: NEG    Pets: Yes    Passive smoke exposure: No    Asbestos exposure: No    TB exposure: No    Environmental exposure: Yes    just dust    Animal exposure: Yes     Social Determinants of Health     Financial Resource Strain: Low Risk  (3/6/2023)    Overall Financial Resource Strain (CARDIA)     Difficulty of Paying Living Expenses: Not hard at all   Food Insecurity: No Food Insecurity (4/22/2024)    Hunger Vital Sign     Worried About Running Out of Food in the Last Year: Never true     Ran Out of Food in the Last Year: Never true   Transportation Needs: No Transportation Needs (4/22/2024)    PRAPARE - Transportation     Lack of Transportation (Medical): No     Lack of Transportation (Non-Medical): No   Physical Activity: Not on file   Stress: Not on file   Social Connections: Not on file   Intimate Partner Violence: Not on file   Housing Stability: Low Risk  (4/22/2024)    Housing Stability Vital Sign     Unable to Pay for Housing in the Last Year: No     Number of Places Lived in the Last Year: 1     Unstable Housing in the Last Year: No      Medications and Allergies:     Current Outpatient Medications   Medication Sig Dispense Refill    acetaminophen (Tylenol) 325 mg tablet 2 tablet as  needed Orally TID PRN      Ascorbic Acid, Vitamin C, (VITAMIN C) 100 MG tablet Take 1,000 mg by mouth daily      aspirin 81 MG tablet Take 162 mg by mouth daily      azelastine (ASTELIN) 0.1 % nasal spray 1 spray into each nostril 2 (two) times a day Use in each nostril as directed 30 mL 1    cefpodoxime (VANTIN) 200 mg tablet Take 1 tablet (200 mg total) by mouth 2 (two) times a day for 7 days 14 tablet 0    Cholecalciferol 50 MCG (2000 UT) CAPS Take 1 capsule by mouth daily      Cranberry 450 MG TABS Take 1 tablet by mouth 2 (two) times a day      Diclofenac Sodium (VOLTAREN) 1 % APPLY 4 GM FOUR TIMES A DAY AS NEEDED TRANSDERMAL 30 DAYS      ezetimibe (ZETIA) 10 mg tablet       fenofibrate (TRICOR) 54 MG tablet       Flaxseed, Linseed, (FLAX SEEDS PO) Take 1 tablet by mouth daily      folic acid (FOLVITE) 1 mg tablet Take 1,000 mcg by mouth daily      guaifenesin-codeine (GUAIFENESIN AC) 100-10 MG/5ML liquid Take 5 mL by mouth 3 (three) times a day as needed for cough 160 mL 0    Humira Pen 40 MG/0.4ML PNKT Finishes end of march 2021      meclizine (ANTIVERT) 25 mg tablet Take 1 tablet (25 mg total) by mouth 3 (three) times a day as needed for dizziness 90 tablet 5    meloxicam (MOBIC) 15 mg tablet Take 1 tablet by mouth daily      metFORMIN (GLUCOPHAGE-XR) 500 mg 24 hr tablet TAKE 1 TABLET(500 MG) BY MOUTH DAILY WITH DINNER 90 tablet 1    methotrexate 2.5 mg tablet 2.5 mg once a week Takes on Fridays  Last dose10/9/20  8 PILLS ONE TIME A WEEK      metoprolol tartrate (LOPRESSOR) 25 mg tablet Take 12.5 mg by mouth every 12 (twelve) hours       multivitamin-minerals (CENTRUM) tablet Take by mouth daily      nystatin (MYCOSTATIN) ointment APPLY TOPICALLY TO THE AFFECTED AREA TWICE DAILY      OMEGA-3 FATTY ACIDS PO Take 1 g by mouth 2 (two) times a day       pantoprazole (PROTONIX) 40 mg tablet TAKE 1 TABLET DAILY 90 tablet 1    phenazopyridine (PYRIDIUM) 100 mg tablet Take 1 tablet (100 mg total) by mouth 3 (three)  "times a day as needed for bladder spasms 10 tablet 0    rosuvastatin (CRESTOR) 40 MG tablet       triamterene-hydrochlorothiazide (DYAZIDE) 37.5-25 mg per capsule        No current facility-administered medications for this visit.     Allergies   Allergen Reactions    Hydrocodone Vomiting     \"can take oxycodone without a problem\"    Atorvastatin Myalgia    Caffeine - Food Allergy Tachycardia     And high blood pressure    Diphenhydramine Hcl (Sleep) Itching    Pravastatin Myalgia    Simvastatin Myalgia      Immunizations:     Immunization History   Administered Date(s) Administered    COVID-19 MODERNA VACC 0.5 ML IM 01/18/2022    COVID-19 PFIZER VACCINE 0.3 ML IM 03/29/2021, 04/20/2021    COVID-19 Pfizer mRNA vacc PF yahir-sucrose 12 yr and older (Comirnaty) 10/25/2023    INFLUENZA 10/18/2007, 10/06/2017, 09/24/2018, 09/01/2019, 09/15/2020, 10/17/2022    Influenza Quadrivalent Preservative Free 3 years and older IM 09/15/2020    Influenza, Seasonal Vaccine, Quadrivalent, Adjuvanted, .5e 10/17/2022    Influenza, high dose seasonal 0.7 mL 10/08/2021, 09/12/2023    Influenza, injectable, quadrivalent, preservative free 0.5 mL 09/16/2020    Influenza, recombinant, quadrivalent,injectable, preservative free 09/01/2019    Influenza, seasonal, injectable 10/18/2007    Pneumococcal Conjugate PCV 7 10/02/2017    Pneumococcal Polysaccharide PPV23 10/12/2020    Zoster Vaccine Recombinant 01/31/2022, 04/01/2022      Health Maintenance:         Topic Date Due    Hepatitis C Screening  Never done    Breast Cancer Screening: Mammogram  02/27/2025    DXA SCAN  01/18/2027    Colorectal Cancer Screening  01/17/2029         Topic Date Due    Pneumococcal Vaccine: 65+ Years (2 of 2 - PCV) 10/12/2021    COVID-19 Vaccine (4 - 2023-24 season) 12/20/2023      Medicare Screening Tests and Risk Assessments:     Suha is here for her Subsequent Wellness visit. Last Medicare Wellness visit information reviewed, patient interviewed and updates " made to the record today.      Health Risk Assessment:   Patient rates overall health as good. Patient feels that their physical health rating is same. Patient is very satisfied with their life. Eyesight was rated as same. Hearing was rated as same. Patient feels that their emotional and mental health rating is same. Patients states they are never, rarely angry. Patient states they are never, rarely unusually tired/fatigued. Pain experienced in the last 7 days has been none. Patient states that she has experienced no weight loss or gain in last 6 months.     Depression Screening:   PHQ-2 Score: 0      Fall Risk Screening:   In the past year, patient has experienced: no history of falling in past year      Urinary Incontinence Screening:   Patient has leaked urine accidently in the last six months.     Home Safety:  Patient does not have trouble with stairs inside or outside of their home. Patient has working smoke alarms and has working carbon monoxide detector. Home safety hazards include: none.     Nutrition:   Current diet is Regular.     Medications:   Patient is currently taking over-the-counter supplements. OTC medications include: see medication list. Patient is able to manage medications.     Activities of Daily Living (ADLs)/Instrumental Activities of Daily Living (IADLs):   Walk and transfer into and out of bed and chair?: Yes  Dress and groom yourself?: Yes    Bathe or shower yourself?: Yes    Feed yourself? Yes  Do your laundry/housekeeping?: Yes  Manage your money, pay your bills and track your expenses?: Yes  Make your own meals?: Yes    Do your own shopping?: Yes    Previous Hospitalizations:   Any hospitalizations or ED visits within the last 12 months?: Yes    How many hospitalizations have you had in the last year?: 1-2    Advance Care Planning:   Living will: Yes    Durable POA for healthcare: Yes    Advanced directive: Yes    Five wishes given: No      Cognitive Screening:   Provider or  "family/friend/caregiver concerned regarding cognition?: No    PREVENTIVE SCREENINGS      Cardiovascular Screening:    General: Screening Not Indicated and History Lipid Disorder    Due for: Lipid Panel      Diabetes Screening:     General: Screening Current    Due for: Blood Glucose      Colorectal Cancer Screening:     General: Screening Current      Breast Cancer Screening:     General: Screening Current      Cervical Cancer Screening:    General: Screening Not Indicated      Osteoporosis Screening:    General: Screening Current      Abdominal Aortic Aneurysm (AAA) Screening:        General: Screening Not Indicated      Lung Cancer Screening:     General: Screening Not Indicated      Hepatitis C Screening:      Hep C Screening Accepted: No     Screening, Brief Intervention, and Referral to Treatment (SBIRT)    Screening  Typical number of drinks in a day: 0  Typical number of drinks in a week: 0  Interpretation: Low risk drinking behavior.    AUDIT-C Screenin) How often did you have a drink containing alcohol in the past year? never  2) How many drinks did you have on a typical day when you were drinking in the past year? 0  3) How often did you have 6 or more drinks on one occasion in the past year? never    AUDIT-C Score: 0  Interpretation: Score 0-2 (female): Negative screen for alcohol misuse    Single Item Drug Screening:  How often have you used an illegal drug (including marijuana) or a prescription medication for non-medical reasons in the past year? never    Single Item Drug Screen Score: 0  Interpretation: Negative screen for possible drug use disorder    Brief Intervention  Alcohol & drug use screenings were reviewed. No concerns regarding substance use disorder identified.     No results found.     Physical Exam:     /82 (BP Location: Right arm, Patient Position: Sitting, Cuff Size: Standard)   Pulse 66   Resp 16   Ht 5' 7\" (1.702 m)   Wt (!) 141 kg (310 lb)   SpO2 98%   BMI 48.55 " kg/m²     Physical Exam  Vitals and nursing note reviewed.   Constitutional:       Appearance: She is well-developed.   HENT:      Head: Normocephalic and atraumatic.      Right Ear: External ear normal.      Left Ear: External ear normal.      Nose: Nose normal.   Eyes:      Conjunctiva/sclera: Conjunctivae normal.      Pupils: Pupils are equal, round, and reactive to light.   Cardiovascular:      Rate and Rhythm: Normal rate and regular rhythm.      Heart sounds: Normal heart sounds. No murmur heard.  Pulmonary:      Effort: Pulmonary effort is normal.      Breath sounds: Wheezing present.   Abdominal:      General: Bowel sounds are normal.      Palpations: Abdomen is soft.   Musculoskeletal:         General: No tenderness. Normal range of motion.      Cervical back: Normal range of motion and neck supple.   Lymphadenopathy:      Cervical: No cervical adenopathy.   Skin:     General: Skin is warm and dry.      Capillary Refill: Capillary refill takes less than 2 seconds.   Neurological:      Mental Status: She is alert and oriented to person, place, and time.   Psychiatric:         Behavior: Behavior normal.         Thought Content: Thought content normal.         Judgment: Judgment normal.          Seth Torre MD

## 2024-04-22 NOTE — TELEPHONE ENCOUNTER
Pt. Called in again wanted to know where her meds were at as she still waiting for them to be filled by the pharmacist. Wanted to know if script was sent to the other pharmacy on [address] I confirmed that it was.

## 2024-04-22 NOTE — PATIENT INSTRUCTIONS
Medicare Preventive Visit Patient Instructions  Thank you for completing your Welcome to Medicare Visit or Medicare Annual Wellness Visit today. Your next wellness visit will be due in one year (4/23/2025).  The screening/preventive services that you may require over the next 5-10 years are detailed below. Some tests may not apply to you based off risk factors and/or age. Screening tests ordered at today's visit but not completed yet may show as past due. Also, please note that scanned in results may not display below.  Preventive Screenings:  Service Recommendations Previous Testing/Comments   Colorectal Cancer Screening  * Colonoscopy    * Fecal Occult Blood Test (FOBT)/Fecal Immunochemical Test (FIT)  * Fecal DNA/Cologuard Test  * Flexible Sigmoidoscopy Age: 45-75 years old   Colonoscopy: every 10 years (may be performed more frequently if at higher risk)  OR  FOBT/FIT: every 1 year  OR  Cologuard: every 3 years  OR  Sigmoidoscopy: every 5 years  Screening may be recommended earlier than age 45 if at higher risk for colorectal cancer. Also, an individualized decision between you and your healthcare provider will decide whether screening between the ages of 76-85 would be appropriate. Colonoscopy: 01/17/2019  FOBT/FIT: Not on file  Cologuard: Not on file  Sigmoidoscopy: Not on file    Screening Current     Breast Cancer Screening Age: 40+ years old  Frequency: every 1-2 years  Not required if history of left and right mastectomy Mammogram: 02/27/2024    Screening Current   Cervical Cancer Screening Between the ages of 21-29, pap smear recommended once every 3 years.   Between the ages of 30-65, can perform pap smear with HPV co-testing every 5 years.   Recommendations may differ for women with a history of total hysterectomy, cervical cancer, or abnormal pap smears in past. Pap Smear: 02/03/2020    Screening Not Indicated   Hepatitis C Screening Once for adults born between 1945 and 1965  More frequently in  patients at high risk for Hepatitis C Hep C Antibody: Not on file        Diabetes Screening 1-2 times per year if you're at risk for diabetes or have pre-diabetes Fasting glucose: No results in last 5 years (No results in last 5 years)  A1C: 6.9 % (10/20/2023)  Screening Current  Due for Blood Glucose   Cholesterol Screening Once every 5 years if you don't have a lipid disorder. May order more often based on risk factors. Lipid panel: 10/20/2023    Screening Not Indicated  History Lipid Disorder  Due for Lipid Panel     Other Preventive Screenings Covered by Medicare:  Abdominal Aortic Aneurysm (AAA) Screening: covered once if your at risk. You're considered to be at risk if you have a family history of AAA.  Lung Cancer Screening: covers low dose CT scan once per year if you meet all of the following conditions: (1) Age 55-77; (2) No signs or symptoms of lung cancer; (3) Current smoker or have quit smoking within the last 15 years; (4) You have a tobacco smoking history of at least 20 pack years (packs per day multiplied by number of years you smoked); (5) You get a written order from a healthcare provider.  Glaucoma Screening: covered annually if you're considered high risk: (1) You have diabetes OR (2) Family history of glaucoma OR (3)  aged 50 and older OR (4)  American aged 65 and older  Osteoporosis Screening: covered every 2 years if you meet one of the following conditions: (1) You're estrogen deficient and at risk for osteoporosis based off medical history and other findings; (2) Have a vertebral abnormality; (3) On glucocorticoid therapy for more than 3 months; (4) Have primary hyperparathyroidism; (5) On osteoporosis medications and need to assess response to drug therapy.   Last bone density test (DXA Scan): 01/18/2022.  HIV Screening: covered annually if you're between the age of 15-65. Also covered annually if you are younger than 15 and older than 65 with risk factors for HIV  infection. For pregnant patients, it is covered up to 3 times per pregnancy.    Immunizations:  Immunization Recommendations   Influenza Vaccine Annual influenza vaccination during flu season is recommended for all persons aged >= 6 months who do not have contraindications   Pneumococcal Vaccine   * Pneumococcal conjugate vaccine = PCV13 (Prevnar 13), PCV15 (Vaxneuvance), PCV20 (Prevnar 20)  * Pneumococcal polysaccharide vaccine = PPSV23 (Pneumovax) Adults 19-63 yo with certain risk factors or if 65+ yo  If never received any pneumonia vaccine: recommend Prevnar 20 (PCV20)  Give PCV20 if previously received 1 dose of PCV13 or PPSV23   Hepatitis B Vaccine 3 dose series if at intermediate or high risk (ex: diabetes, end stage renal disease, liver disease)   Respiratory syncytial virus (RSV) Vaccine - COVERED BY MEDICARE PART D  * RSVPreF3 (Arexvy) CDC recommends that adults 60 years of age and older may receive a single dose of RSV vaccine using shared clinical decision-making (SCDM)   Tetanus (Td) Vaccine - COST NOT COVERED BY MEDICARE PART B Following completion of primary series, a booster dose should be given every 10 years to maintain immunity against tetanus. Td may also be given as tetanus wound prophylaxis.   Tdap Vaccine - COST NOT COVERED BY MEDICARE PART B Recommended at least once for all adults. For pregnant patients, recommended with each pregnancy.   Shingles Vaccine (Shingrix) - COST NOT COVERED BY MEDICARE PART B  2 shot series recommended in those 19 years and older who have or will have weakened immune systems or those 50 years and older     Health Maintenance Due:      Topic Date Due   • Hepatitis C Screening  Never done   • Breast Cancer Screening: Mammogram  02/27/2025   • DXA SCAN  01/18/2027   • Colorectal Cancer Screening  01/17/2029     Immunizations Due:      Topic Date Due   • Pneumococcal Vaccine: 65+ Years (2 of 2 - PCV) 10/12/2021   • COVID-19 Vaccine (4 - 2023-24 season) 12/20/2023      Advance Directives   What are advance directives?  Advance directives are legal documents that state your wishes and plans for medical care. These plans are made ahead of time in case you lose your ability to make decisions for yourself. Advance directives can apply to any medical decision, such as the treatments you want, and if you want to donate organs.   What are the types of advance directives?  There are many types of advance directives, and each state has rules about how to use them. You may choose a combination of any of the following:  Living will:  This is a written record of the treatment you want. You can also choose which treatments you do not want, which to limit, and which to stop at a certain time. This includes surgery, medicine, IV fluid, and tube feedings.   Durable power of  for healthcare (DPAHC):  This is a written record that states who you want to make healthcare choices for you when you are unable to make them for yourself. This person, called a proxy, is usually a family member or a friend. You may choose more than 1 proxy.  Do not resuscitate (DNR) order:  A DNR order is used in case your heart stops beating or you stop breathing. It is a request not to have certain forms of treatment, such as CPR. A DNR order may be included in other types of advance directives.  Medical directive:  This covers the care that you want if you are in a coma, near death, or unable to make decisions for yourself. You can list the treatments you want for each condition. Treatment may include pain medicine, surgery, blood transfusions, dialysis, IV or tube feedings, and a ventilator (breathing machine).  Values history:  This document has questions about your views, beliefs, and how you feel and think about life. This information can help others choose the care that you would choose.  Why are advance directives important?  An advance directive helps you control your care. Although spoken wishes may  be used, it is better to have your wishes written down. Spoken wishes can be misunderstood, or not followed. Treatments may be given even if you do not want them. An advance directive may make it easier for your family to make difficult choices about your care.   Urinary Incontinence   Urinary incontinence (UI)  is when you lose control of your bladder. UI develops because your bladder cannot store or empty urine properly. The 3 most common types of UI are stress incontinence, urge incontinence, or both.  Medicines:   May be given to help strengthen your bladder control. Report any side effects of medication to your healthcare provider.  Do pelvic muscle exercises often:  Your pelvic muscles help you stop urinating. Squeeze these muscles tight for 5 seconds, then relax for 5 seconds. Gradually work up to squeezing for 10 seconds. Do 3 sets of 15 repetitions a day, or as directed. This will help strengthen your pelvic muscles and improve bladder control.  Train your bladder:  Go to the bathroom at set times, such as every 2 hours, even if you do not feel the urge to go. You can also try to hold your urine when you feel the urge to go. For example, hold your urine for 5 minutes when you feel the urge to go. As that becomes easier, hold your urine for 10 minutes.   Self-care:   Keep a UI record.  Write down how often you leak urine and how much you leak. Make a note of what you were doing when you leaked urine.  Drink liquids as directed. You may need to limit the amount of liquid you drink to help control your urine leakage. Do not drink any liquid right before you go to bed. Limit or do not have drinks that contain caffeine or alcohol.   Prevent constipation.  Eat a variety of high-fiber foods. Good examples are high-fiber cereals, beans, vegetables, and whole-grain breads. Walking is the best way to trigger your intestines to have a bowel movement.  Exercise regularly and maintain a healthy weight.  Weight loss and  exercise will decrease pressure on your bladder and help you control your leakage.   Use a catheter as directed  to help empty your bladder. A catheter is a tiny, plastic tube that is put into your bladder to drain your urine.   Go to behavior therapy as directed.  Behavior therapy may be used to help you learn to control your urge to urinate.    Weight Management   Why it is important to manage your weight:  Being overweight increases your risk of health conditions such as heart disease, high blood pressure, type 2 diabetes, and certain types of cancer. It can also increase your risk for osteoarthritis, sleep apnea, and other respiratory problems. Aim for a slow, steady weight loss. Even a small amount of weight loss can lower your risk of health problems.  How to lose weight safely:  A safe and healthy way to lose weight is to eat fewer calories and get regular exercise. You can lose up about 1 pound a week by decreasing the number of calories you eat by 500 calories each day.   Healthy meal plan for weight management:  A healthy meal plan includes a variety of foods, contains fewer calories, and helps you stay healthy. A healthy meal plan includes the following:  Eat whole-grain foods more often.  A healthy meal plan should contain fiber. Fiber is the part of grains, fruits, and vegetables that is not broken down by your body. Whole-grain foods are healthy and provide extra fiber in your diet. Some examples of whole-grain foods are whole-wheat breads and pastas, oatmeal, brown rice, and bulgur.  Eat a variety of vegetables every day.  Include dark, leafy greens such as spinach, kale, katey greens, and mustard greens. Eat yellow and orange vegetables such as carrots, sweet potatoes, and winter squash.   Eat a variety of fruits every day.  Choose fresh or canned fruit (canned in its own juice or light syrup) instead of juice. Fruit juice has very little or no fiber.  Eat low-fat dairy foods.  Drink fat-free  (skim) milk or 1% milk. Eat fat-free yogurt and low-fat cottage cheese. Try low-fat cheeses such as mozzarella and other reduced-fat cheeses.  Choose meat and other protein foods that are low in fat.  Choose beans or other legumes such as split peas or lentils. Choose fish, skinless poultry (chicken or turkey), or lean cuts of red meat (beef or pork). Before you cook meat or poultry, cut off any visible fat.   Use less fat and oil.  Try baking foods instead of frying them. Add less fat, such as margarine, sour cream, regular salad dressing and mayonnaise to foods. Eat fewer high-fat foods. Some examples of high-fat foods include french fries, doughnuts, ice cream, and cakes.  Eat fewer sweets.  Limit foods and drinks that are high in sugar. This includes candy, cookies, regular soda, and sweetened drinks.  Exercise:  Exercise at least 30 minutes per day on most days of the week. Some examples of exercise include walking, biking, dancing, and swimming. You can also fit in more physical activity by taking the stairs instead of the elevator or parking farther away from stores. Ask your healthcare provider about the best exercise plan for you.      © Copyright [employer] 2018 Information is for End User's use only and may not be sold, redistributed or otherwise used for commercial purposes. All illustrations and images included in CareNotes® are the copyrighted property of [employer] or [employer]

## 2024-04-23 RX ORDER — CEFPODOXIME PROXETIL 200 MG/1
200 TABLET, FILM COATED ORAL 2 TIMES DAILY
Qty: 14 TABLET | Refills: 0 | Status: SHIPPED | OUTPATIENT
Start: 2024-04-23 | End: 2024-04-30

## 2024-04-23 RX ORDER — CODEINE PHOSPHATE/GUAIFENESIN 10-100MG/5
5 LIQUID (ML) ORAL 3 TIMES DAILY PRN
Qty: 160 ML | Refills: 0 | Status: SHIPPED | OUTPATIENT
Start: 2024-04-23

## 2024-04-23 NOTE — TELEPHONE ENCOUNTER
Please calm her down  There was issues at [facility]   And I thought the pod sent the med over to the [facility]. I was confused with how it looked in the refill basket / message sent over     Anyway I sent it over now to the one on [address]

## 2024-04-23 NOTE — TELEPHONE ENCOUNTER
Patient called in this AM very upset because she has not received her medication yet. Stated she is upset that she has to keep calling the office and the pharmacy to see what's going on since no one has contacted her. Asks that it gets sent today since she was supposed to start it yesterday and that she be contacted once it sent over.    Callback #: [id number]

## 2024-04-26 ENCOUNTER — OFFICE VISIT (OUTPATIENT)
Dept: DERMATOLOGY | Facility: CLINIC | Age: 69
End: 2024-04-26
Payer: MEDICARE

## 2024-04-26 VITALS — BODY MASS INDEX: 45.99 KG/M2 | WEIGHT: 293 LBS | HEIGHT: 67 IN

## 2024-04-26 DIAGNOSIS — D48.5 NEOPLASM OF UNCERTAIN BEHAVIOR OF SKIN: Primary | ICD-10-CM

## 2024-04-26 PROCEDURE — 17000 DESTRUCT PREMALG LESION: CPT | Performed by: NURSE PRACTITIONER

## 2024-04-26 PROCEDURE — 88305 TISSUE EXAM BY PATHOLOGIST: CPT | Performed by: STUDENT IN AN ORGANIZED HEALTH CARE EDUCATION/TRAINING PROGRAM

## 2024-04-26 PROCEDURE — 99214 OFFICE O/P EST MOD 30 MIN: CPT | Performed by: NURSE PRACTITIONER

## 2024-04-26 PROCEDURE — 11102 TANGNTL BX SKIN SINGLE LES: CPT | Performed by: NURSE PRACTITIONER

## 2024-04-26 NOTE — PROGRESS NOTES
"[facility] Note     Patient Name: Suha Trejo  Encounter Date: 04/26/2024     Have you been cared for by a [facility] Dermatologist in the last 3 years and, if so, which description applies to you?    Yes.  I have been here within the last 3 years, and my medical history has NOT changed since that time.  I am FEMALE/of child-bearing potential.    REVIEW OF SYSTEMS:  Have you recently had or currently have any of the following? No changes in my recent health.   PAST MEDICAL HISTORY:  Have you personally ever had or currently have any of the following?  If \"YES,\" then please provide more detail. No changes in my medical history.   HISTORY OF IMMUNOSUPPRESSION: Do you have a history of any of the following:  Systemic Immunosuppression such as Diabetes, Biologic or Immunotherapy, Chemotherapy, Organ Transplantation, Bone Marrow Transplantation?  YES, Diabetes      Answering \"YES\" requires the addition of the dotphrase \"IMMUNOSUPPRESSED\" as the first diagnosis of the patient's visit.   FAMILY HISTORY:  Any \"first degree relatives\" (parent, brother, sister, or child) with the following?    No changes in my family's known health.   PATIENT EXPERIENCE:    Do you want the Dermatologist to perform a COMPLETE skin exam today including a clinical examination under the \"bra and underwear\" areas?  Yes  If necessary, do we have your permission to call and leave a detailed message on your Preferred Phone number that includes your specific medical information?  Yes      Allergies   Allergen Reactions    Hydrocodone Vomiting     \"can take oxycodone without a problem\"    Atorvastatin Myalgia    Caffeine - Food Allergy Tachycardia     And high blood pressure    Diphenhydramine Hcl (Sleep) Itching    Pravastatin Myalgia    Simvastatin Myalgia      Current Outpatient Medications:     acetaminophen (Tylenol) 325 mg tablet, 2 tablet as needed Orally TID PRN, Disp: , Rfl:     Ascorbic Acid, Vitamin C, (VITAMIN C) " 100 MG tablet, Take 1,000 mg by mouth daily, Disp: , Rfl:     aspirin 81 MG tablet, Take 162 mg by mouth daily, Disp: , Rfl:     azelastine (ASTELIN) 0.1 % nasal spray, 1 spray into each nostril 2 (two) times a day Use in each nostril as directed, Disp: 30 mL, Rfl: 1    cefpodoxime (VANTIN) 200 mg tablet, Take 1 tablet (200 mg total) by mouth 2 (two) times a day for 7 days, Disp: 14 tablet, Rfl: 0    Cholecalciferol 50 MCG (2000 UT) CAPS, Take 1 capsule by mouth daily, Disp: , Rfl:     Cranberry 450 MG TABS, Take 1 tablet by mouth 2 (two) times a day, Disp: , Rfl:     ezetimibe (ZETIA) 10 mg tablet, , Disp: , Rfl:     fenofibrate (TRICOR) 54 MG tablet, , Disp: , Rfl:     Flaxseed, Linseed, (FLAX SEEDS PO), Take 1 tablet by mouth daily, Disp: , Rfl:     folic acid (FOLVITE) 1 mg tablet, Take 1,000 mcg by mouth daily, Disp: , Rfl:     guaifenesin-codeine (GUAIFENESIN AC) 100-10 MG/5ML liquid, Take 5 mL by mouth 3 (three) times a day as needed for cough, Disp: 160 mL, Rfl: 0    Humira Pen 40 MG/0.4ML PNKT, Finishes end of march 2021, Disp: , Rfl:     meclizine (ANTIVERT) 25 mg tablet, Take 1 tablet (25 mg total) by mouth 3 (three) times a day as needed for dizziness, Disp: 90 tablet, Rfl: 5    meloxicam (MOBIC) 15 mg tablet, Take 1 tablet by mouth daily, Disp: , Rfl:     metFORMIN (GLUCOPHAGE-XR) 500 mg 24 hr tablet, TAKE 1 TABLET(500 MG) BY MOUTH DAILY WITH DINNER, Disp: 90 tablet, Rfl: 1    methotrexate 2.5 mg tablet, 2.5 mg once a week Takes on Fridays  Last dose10/9/20 8 PILLS ONE TIME A WEEK, Disp: , Rfl:     metoprolol tartrate (LOPRESSOR) 25 mg tablet, Take 12.5 mg by mouth every 12 (twelve) hours , Disp: , Rfl:     multivitamin-minerals (CENTRUM) tablet, Take by mouth daily, Disp: , Rfl:     OMEGA-3 FATTY ACIDS PO, Take 1 g by mouth 2 (two) times a day , Disp: , Rfl:     pantoprazole (PROTONIX) 40 mg tablet, TAKE 1 TABLET DAILY, Disp: 90 tablet, Rfl: 1    phenazopyridine (PYRIDIUM) 100 mg tablet, Take 1 tablet  "(100 mg total) by mouth 3 (three) times a day as needed for bladder spasms, Disp: 10 tablet, Rfl: 0    rosuvastatin (CRESTOR) 40 MG tablet, , Disp: , Rfl:     triamterene-hydrochlorothiazide (DYAZIDE) 37.5-25 mg per capsule, , Disp: , Rfl:     Diclofenac Sodium (VOLTAREN) 1 %, APPLY 4 GM FOUR TIMES A DAY AS NEEDED TRANSDERMAL 30 DAYS, Disp: , Rfl:     nystatin (MYCOSTATIN) ointment, APPLY TOPICALLY TO THE AFFECTED AREA TWICE DAILY (Patient not taking: Reported on 4/26/2024), Disp: , Rfl:           Whom besides the patient is providing clinical information about today's encounter?   NO ADDITIONAL HISTORIAN (patient alone provided history)    Physical Exam and Assessment/Plan by Diagnosis:        NEOPLASM OF UNCERTAIN BEHAVIOR OF SKIN    Physical Exam:  (Anatomic Location); (Size and Morphological Description); (Differential Diagnosis):  Specimen A;  left calf; shave; skin; 68 year old female with a 1.0 cm pink plaque; Ddx; actinic keratosis vs. SCC          Pertinent Positives:  Pertinent Negatives:    Additional History of Present Condition:  patient states spot has been present for a few weeks.  Has tried otc neosporin.  Denies any itch, pain, or bleeding.      Assessment and Plan:  I have discussed with the patient that a sample of skin via a \"skin biopsy” would be potentially helpful to further make a specific diagnosis under the microscope.  Based on a thorough discussion of this condition and the management approach to it (including a comprehensive discussion of the known risks, side effects and potential benefits of treatment), the patient (family) agrees to implement the following specific plan:    Procedure:  Skin Biopsy.  After a thorough discussion of treatment options and risk/benefits/alternatives (including but not limited to local pain, scarring, dyspigmentation, blistering, possible superinfection, and inability to confirm a diagnosis via histopathology), verbal and written consent were obtained and " "portion of the rash was biopsied for tissue sample.  See below for consent   PROCEDURE TANGENTIAL (SHAVE) BIOPSY NOTE:    Performing Physician:  Nat Davis   Anatomic Location; Clinical Description with size (cm); Pre-Op Diagnosis:   Specimen A;  left calf; shave; skin; 68 year old female with a 1.0 cm pink plaque; Ddx; actinic keratosis vs. SCC  Post-op diagnosis: Same     Local anesthesia: 3:1 1% xylocaine with epi and 1-100,000 buffered     Topical anesthesia: None    Hemostasis: Aluminum chloride       After obtaining informed consent  at which time there was a discussion about the purpose of biopsy  and low risks of infection and bleeding.  The area was prepped and draped in the usual fashion. Anesthesia was obtained with 1% lidocaine with epinephrine. A shave biopsy to an appropriate sampling depth was obtained by Shave (Dermablade or 15 blade) The resulting wound was covered with surgical ointment and bandaged appropriately.     The patient tolerated the procedure well without complications and was without signs of functional compromise.      Specimen has been sent for review by Dermatopathology.    Standard post-procedure care has been explained and has been included in written form within the patient's copy of Informed Consent.    INFORMED CONSENT DISCUSSION AND POST-OPERATIVE INSTRUCTIONS FOR PATIENT    I.  RATIONALE FOR PROCEDURE  I understand that a skin biopsy allows the Dermatologist to test a lesion or rash under the microscope to obtain a diagnosis.  It usually involves numbing the area with numbing medication and removing a small piece of skin; sometimes the area will be closed with sutures. In this specific procedure, sutures are not usually needed.  If any sutures are placed, then they are usually need to be removed in 2 weeks or less.    I understand that my Dermatologist recommends that a skin \"shave\" biopsy be performed today.  A local anesthetic, similar to the kind that a dentist uses " "when filling a cavity, will be injected with a very small needle into the skin area to be sampled.  The injected skin and tissue underneath \"will go to sleep” and become numb so no pain should be felt afterwards.  An instrument shaped like a tiny \"razor blade\" (shave biopsy instrument) will be used to cut a small piece of tissue and skin from the area so that a sample of tissue can be taken and examined more closely under the microscope.  A slight amount of bleeding will occur, but it will be stopped with direct pressure and a pressure bandage and any other appropriate methods.  I understands that a scar will form where the wound was created.  Surgical ointment will be applied to help protect the wound.  Sutures are not usually needed.    II.  RISKS AND POTENTIAL COMPLICATIONS   I understand the risks and potential complications of a skin biopsy include but are not limited to the following:  Bleeding  Infection  Pain  Scar/keloid  Skin discoloration  Incomplete Removal  Recurrence  Nerve Damage/Numbness/Loss of Function  Allergic Reaction to Anesthesia  Biopsies are diagnostic procedures and based on findings additional treatment or evaluation may be required  Loss or destruction of specimen resulting in no additional findings    My Dermatologist has explained to me the nature of the condition, the nature of the procedure, and the benefits to be reasonably expected compared with alternative approaches.  My Dermatologist has discussed the likelihood of major risks or complications of this procedure including the specific risks listed above, such as bleeding, infection, and scarring/keloid.  I understand that a scar is expected after this procedure.  I understand that my physician cannot predict if the scar will form a \"keloid,\" which extends beyond the borders of the wound that is created.  A keloid is a thick, painful, and bumpy scar.  A keloid can be difficult to treat, as it does not always respond well to " "therapy, which includes injecting cortisone directly into the keloid every few weeks.  While this usually reduces the pain and size of the scar, it does not eliminate it.      I understand that photographs may be taken before and after the procedure.  These will be maintained as part of the medical providers confidential records and may not be made available to me.  I further authorize the medical provider to use the photographs for teaching purposes or to illustrate scientific papers, books, or lectures if in his/her judgment, medical research, education, or science may benefit from its use.    I have had an opportunity to fully inquire about the risks and benefits of this procedure and its alternatives.   I have been given ample time and opportunity to ask questions and to seek a second opinion if I wished to do so.  I acknowledge that there have specifically been no guarantees as to the cosmetic results from the procedure.  I am aware that with any procedure there is always the possibility of an unexpected complication.    III. POST-PROCEDURAL CARE (WHAT YOU WILL NEED TO DO \"AFTER THE BIOPSY\" TO OPTIMIZE HEALING)    Keep the area clean and dry.  Try NOT to remove the bandage or get it wet for the first 24 hours.    Gently clean the area and apply surgical ointment (such as Vaseline petrolatum ointment, which is available \"over the counter\" and not a prescription) to the biopsy site for up to 2 weeks straight.  This acts to protect the wound from the outside world.      Sutures are not usually placed in this procedure.  If any sutures were placed, return for suture removal as instructed (generally 1 week for the face, 2 weeks for the body).      Take Acetaminophen (Tylenol) for discomfort, if no contraindications.  Ibuprofen or aspirin could make bleeding worse.    Call our office immediately for signs of infection: fever, chills, increased redness, warmth, tenderness, discomfort/pain, or pus or foul smell coming " "from the wound.    WHAT TO DO IF THERE IS ANY BLEEDING?  If a small amount of bleeding is noticed, place a clean cloth over the area and apply firm pressure for ten minutes.  Check the wound after 10 minutes of direct pressure.  If bleeding persists, try one more time for an additional 10 minutes of direct pressure on the area.  If the bleeding becomes heavier or does not stop after the second attempt, or if you have any other questions about this procedure, then please call your [facility]'s Dermatologist by calling [phone] (SKIN).     I hereby acknowledge that I have reviewed and verified the site with my Dermatologist and have requested and authorized my Dermatologist to proceed with the procedure.    ACTINIC KERATOSIS    Physical Exam:  Anatomic Location: Left cheek  Morphologic Description:  Scaly pink papule  Pertinent Positives:  Pertinent Negatives:    Additional History of Present Condition:  Noted on exam  History of bleeding from this lesion? NO  History of pain, tenderness, and/or itching within this lesion? NO  Personal history of skin cancer? NO  Family history of skin cancer? YES, melanoma  Previous treatment to the same site? NO    Plan:  PRESCRIPTION MANAGEMENT:  Several topical management options and their side effects were discussed including \"field therapies\" such as Efudex (5-fluorouracil) and/or Aldara (imiquimod). Efudex may be used alone or in combination with Calcipotriene. Begin treatment once regions that have been sprayed with liquid nitrogen are healed. Redness and irritation are to be expected within a few days of field therapy treatment and should resolve within 1 to 2 weeks following treatment. Do NOT cover the treatment areas with bandages. Use a sunscreen with an SPF 50+ while using field therapy.  We discussed the pre-cancerous nature of the condition. Actinic keratosis is found on sun-damaged skin and there is small risk that the condition could turn into a skin cancer called " squamous cell carcinoma. There is no risk of actinic keratosis turning into melanoma.  Proliferative actinic keratosis tends to be resistant against standard treatments, including topical therapies and cryotherapy. It has a tendency to develop into infiltrative squamous cell carcinoma. Excision may be considered.  We discussed sun protection measures, including using sunscreen with an SPF 50+ year round, avoiding the sun, and wearing protective clothing such as long sleeves and pants when out in the sun.  Continue to monitor clinically for signs of recurrence. Discussed with patient the importance of keeping up to date with full body skin exams.  Cryotherapy performed in the office (See Procedure Note). We discussed that a hypopigmentation or scar may form in the region following cryotherapy.     PROCEDURES PERFORMED TODAY ASSOCIATED WITH THIS CONDITION:          Cryotherapy: PROCEDURE:  DESTRUCTION OF PRE-MALIGNANT LESIONS  After a thorough discussion of treatment options and risk/benefits/alternatives (including but not limited to local pain, scarring, dyspigmentation, blistering, and possible superinfection), verbal and written consent were obtained and the aforementioned lesions were treated on with cryotherapy using liquid nitrogen x 1 cycle for 5-10 seconds.    TOTAL NUMBER of 1 pre-malignant lesions were treated today on the ANATOMIC LOCATION: left cheek.     The patient tolerated the procedure well, and after-care instructions were provided.         MEDICAL DECISION MAKING  Treatment Goal:  Resolution of this ACUTE, UNCOMPLICATED condition.       A recent or new short-term problem for which treatment is CONSIDERED OR INITIATED. There is little to no risk of mortality with treatment, and full recovery without functional impairment is expected.  Diagnosis or treatment significantly limited by the following social determinants of health:  NONE IDENTIFIED            HDEZ ANGIOMAS     Physical Exam:  Anatomic  "Location Affected:  Trunk and extremities  Morphological Description:  Scattered cherry red papules  Denies pain, itch, bleeding. No treatments tried. Present for years. Present constantly; no modifying factors which make it worse or better.     Assessment and Plan:  Based on a thorough discussion of this condition and the management approach to it (including a comprehensive discussion of the known risks, side effects and potential benefits of treatment), the patient (family) agrees to implement the following specific plan:  Reassure benign        SEBORRHEIC KERATOSIS; NON-INFLAMED     Physical Exam:  Anatomic Location Affected:  Trunk and extremities  Morphological Description:  Waxy, smooth to warty textured, yellow to brownish-grey to dark brown to blackish, discrete, \"stuck-on\" appearing papules.  Present for years. Denies pain, itch, bleeding.      Additional History of Present Condition:  Present constantly; no modifying factors which make it worse or better. No prior treatment.       Assessment and Plan:  Based on a thorough discussion of this condition and the management approach to it (including a comprehensive discussion of the known risks, side effects and potential benefits of treatment), the patient (family) agrees to implement the following specific plan:  Reassure benign  Use sun protection.  Apply SPF 30 or higher at least three times a day.  Wear sun protecting clothing and hats.        SOLAR LENTIGINES   OTHER SKIN CHANGES DUE TO CHRONIC EXPOSURE TO NONIONIZING RADIATION     Physical Exam:  Anatomic Location Affected:  Sun exposed areas of back, chest, arms, legs  Morphological Description:  Multiple scattered brown to tan evenly pigmented macules   Denies pain, itch, bleeding. No treatments tried. Present for months - years. Reports getting newer lesions with sun exposure.         Assessment and Plan:  Based on a thorough discussion of this condition and the management approach to it (including a " "comprehensive discussion of the known risks, side effects and potential benefits of treatment), the patient (family) agrees to implement the following specific plan:  Reassure benign  Use sun protection.  Apply SPF 30 or higher at least three times a day.  Wear sun protecting clothing and hats.         MULTIPLE MELANOCYTIC NEVI (\"Moles\")     Physical Exam:  Anatomic Location Affected: Trunk and extremities  Morphological Description:  Scattered, round to ovoid, symmetrical-appearing, even bordered, skin colored to dark brown macules/papules  Denies pain, itch, bleeding. No treatments tried. Present for years. Present constantly; no modifying factors which make it worse or better. Denies actively changing or growing moles.      Assessment and Plan:  Based on a thorough discussion of this condition and the management approach to it (including a comprehensive discussion of the known risks, side effects and potential benefits of treatment), the patient (family) agrees to implement the following specific plan:  Reassure benign  Monitor for changes  Use sun protection.  Apply SPF 30 or higher at least three times a day.  Wear sun protecting clothing and hats.  Continue annual skin exam       Worrisome signs of skin malignancy discussed, questions answered. Regular self-skin check discussed. Advised to call or return to office if patient notices any spots of concern, rapidly growing/changing lesions, bleeding lesions, non-healing lesions. Advised regular SPF use.        Scribe Attestation      I,:  Nat Wood am acting as a scribe while in the presence of the attending physician.:       I,:  RAMIREZ Dennison personally performed the services described in this documentation    as scribed in my presence.:              "

## 2024-05-01 PROCEDURE — 88305 TISSUE EXAM BY PATHOLOGIST: CPT | Performed by: STUDENT IN AN ORGANIZED HEALTH CARE EDUCATION/TRAINING PROGRAM

## 2024-05-02 NOTE — RESULT ENCOUNTER NOTE
DERMATOPATHOLOGY RESULT NOTE    Results reviewed by ordering physician.  Called patient to personally discuss results. Findings reviewed with Dr. Sevilla, who recommends follow up for cryotherapy.  Discussed results with patient.  She states biopsy sites are healing well.  We discussed office visit for cryotherapy, and she is amenable.  All questions answered, message sent to clerical to assist with scheduling.        Instructions for Clinical Derm Team:   (remember to route Result Note to appropriate staff):    Call patient and schedule for cryotherapy    Result & Plan by Specimen:    Specimen A: benign. Consistent with ACTINIC POROKERATOSIS  Plan: clinic appointment for liquid nitrogen    Tissue Exam: [id number]  Order: [id number]   Status: Final result      Visible to patient: Yes (seen)      Dx: Neoplasm of uncertain behavior of skin    0 Result Notes     Component   Case Report  Surgical Pathology Report                         Case: [id number]                                  Authorizing Provider:  Nat Davis,     Collected:           04/26/2024 1519                                     RAMIREZ                                                                        Ordering Location:     [facility]      Received:            04/26/2024 [address] [place] [address]                                                                      Pathologist:           Shannen Martin MD                                                          Specimen:    Skin, Other, specimen A; left calf                                                      Final Diagnosis  A. Skin, left calf, shave biopsy:    Consistent with ACTINIC POROKERATOSIS (see note).    Note: Clinical pathologic correlation is advised. Because the base of the lesion is not well visualized, a more significant lesion cannot be fully excluded. If the lesion were to progress/persist, additional sampling should be  sought.    Electronically signed by Shannen Martin MD on 5/1/2024 at  1:56 PM

## 2024-05-06 ENCOUNTER — TELEPHONE (OUTPATIENT)
Dept: DERMATOLOGY | Facility: CLINIC | Age: 69
End: 2024-05-06

## 2024-05-06 NOTE — TELEPHONE ENCOUNTER
Spoke with patient and scheduled her with [facility] on 06/26 , unfortunately that's the next available and I also added her to the cancellation list .

## 2024-05-06 NOTE — TELEPHONE ENCOUNTER
Patient called back to scheduled cryo.  I offered her next available w/ [facility] on 6/26 but patient said they wanted it frozen asap.  Is it ok to wait until 6/26 or should she be seen sooner, please advise?

## 2024-06-26 ENCOUNTER — OFFICE VISIT (OUTPATIENT)
Dept: FAMILY MEDICINE CLINIC | Facility: CLINIC | Age: 69
End: 2024-06-26
Payer: MEDICARE

## 2024-06-26 VITALS
DIASTOLIC BLOOD PRESSURE: 84 MMHG | HEIGHT: 67 IN | TEMPERATURE: 98.3 F | HEART RATE: 76 BPM | SYSTOLIC BLOOD PRESSURE: 130 MMHG | OXYGEN SATURATION: 96 % | WEIGHT: 293 LBS | BODY MASS INDEX: 45.99 KG/M2

## 2024-06-26 DIAGNOSIS — E11.69 TYPE 2 DIABETES MELLITUS WITH OBESITY  (HCC): ICD-10-CM

## 2024-06-26 DIAGNOSIS — J44.1 CHRONIC OBSTRUCTIVE PULMONARY DISEASE WITH ACUTE EXACERBATION (HCC): Primary | ICD-10-CM

## 2024-06-26 DIAGNOSIS — H66.001 NON-RECURRENT ACUTE SUPPURATIVE OTITIS MEDIA OF RIGHT EAR WITHOUT SPONTANEOUS RUPTURE OF TYMPANIC MEMBRANE: ICD-10-CM

## 2024-06-26 DIAGNOSIS — R09.81 NASAL CONGESTION: ICD-10-CM

## 2024-06-26 DIAGNOSIS — J34.89 SINUS PRESSURE: ICD-10-CM

## 2024-06-26 DIAGNOSIS — R50.9 FEVER AND CHILLS: ICD-10-CM

## 2024-06-26 DIAGNOSIS — E66.9 TYPE 2 DIABETES MELLITUS WITH OBESITY  (HCC): ICD-10-CM

## 2024-06-26 PROCEDURE — 99214 OFFICE O/P EST MOD 30 MIN: CPT | Performed by: FAMILY MEDICINE

## 2024-06-26 PROCEDURE — G2211 COMPLEX E/M VISIT ADD ON: HCPCS | Performed by: FAMILY MEDICINE

## 2024-06-26 RX ORDER — PREDNISONE 20 MG/1
TABLET ORAL
Qty: 7 TABLET | Refills: 0 | Status: SHIPPED | OUTPATIENT
Start: 2024-06-26 | End: 2024-07-02

## 2024-06-26 RX ORDER — ALBUTEROL SULFATE 90 UG/1
2 AEROSOL, METERED RESPIRATORY (INHALATION) EVERY 4 HOURS PRN
Qty: 18 G | Refills: 0 | Status: SHIPPED | OUTPATIENT
Start: 2024-06-26

## 2024-06-26 RX ORDER — AMOXICILLIN AND CLAVULANATE POTASSIUM 875; 125 MG/1; MG/1
1 TABLET, FILM COATED ORAL EVERY 12 HOURS SCHEDULED
Qty: 14 TABLET | Refills: 0 | Status: SHIPPED | OUTPATIENT
Start: 2024-06-26 | End: 2024-07-03

## 2024-06-26 NOTE — PROGRESS NOTES
"Outpatient Note- Follow up     HPI:     Suha Trejo , 68 y.o. female  presents today for upper respiratory symptoms.  On Friday of last week she started having symptoms of increased congestion, runny nose, and fever and chills.  Her Tmax was about 100.9.  Her temperature was then fluctuating while taking aspirin and finally broke on Saturday evening.  Over the course of the last several days her congestion, runny nose, and cough has persisted and continues to get progressively worse.  She is bringing up some sputum, but it is intermittent.  This morning the mucus was slightly yellow.  Thankfully she has not had a recent fever.  The most bothersome symptom at this point is the frequent dry cough, runny nose, and congestion.    Past Medical History:   Diagnosis Date    Abnormal Pap smear of cervix     cin3; hpv non 16/18 +(12/2016); LGSIL 6/2018    Arthritis     Cancer (HCC)     cervical    Coronary artery disease     GERD (gastroesophageal reflux disease)     History of pneumonia     2 yrs ago    HPV (human papilloma virus) infection     Hypercholesterolemia     Hypertension     Kidney stone     Morbid obesity (HCC)     Motion sickness     Myocardial infarct (HCC)     X2; 2000 and 2002//[facility] cardiology Dr Quintana yearly    Pleuritic chest pain     \"from constant lifting on the job chest muscle area\"    PONV (postoperative nausea and vomiting)     \"extremely with the gas\"    Rheumatoid arthritis (HCC)     Rheu and cervical    Shortness of breath     moderate activity// pt does work full time/on and off feet in warehouse    Vertigo     Wears partial dentures     lower      ROS:   Review of Systems   Constitutional:  Positive for chills and fever.   HENT:  Positive for congestion, ear pain (right pressure), postnasal drip, rhinorrhea, sinus pressure, sinus pain and sore throat. Negative for ear discharge.    Respiratory:  Positive for cough and wheezing. Negative for chest tightness and shortness of breath.  "   Cardiovascular:  Positive for chest pain (costochondritis pain from coughinh).   Gastrointestinal:  Positive for diarrhea. Negative for abdominal pain, blood in stool, constipation, rectal pain and vomiting.   Neurological:  Positive for light-headedness (vertigo) and headaches (pressure).      OBJECTIVE  Vitals:    06/26/24 0954   BP: 130/84   Pulse: 76   Temp: 98.3 °F (36.8 °C)   SpO2: 96%        Physical Exam  Constitutional:       General: She is in acute distress (mild cough and overall looking ill).      Appearance: Normal appearance. She is obese.   HENT:      Head: Normocephalic and atraumatic.      Right Ear: Tenderness present. A middle ear effusion is present. There is no impacted cerumen. Tympanic membrane is erythematous and bulging.      Left Ear: No tenderness.  No middle ear effusion. There is no impacted cerumen. Tympanic membrane is not erythematous or bulging.      Nose: Congestion and rhinorrhea present.      Mouth/Throat:      Mouth: Mucous membranes are moist.      Pharynx: Oropharynx is clear. No oropharyngeal exudate or posterior oropharyngeal erythema.   Eyes:      General:         Right eye: No discharge.         Left eye: No discharge.      Pupils: Pupils are equal, round, and reactive to light.   Cardiovascular:      Rate and Rhythm: Normal rate and regular rhythm.      Heart sounds: Normal heart sounds. No murmur heard.     No friction rub. No gallop.   Pulmonary:      Effort: Prolonged expiration present. No accessory muscle usage.      Breath sounds: Decreased air movement present. Examination of the right-lower field reveals decreased breath sounds. Examination of the left-lower field reveals decreased breath sounds. Decreased breath sounds present. No rhonchi or rales.      Comments: Multiple areas of prolong expiratory phase    Abdominal:      General: Bowel sounds are normal. There is no distension.      Palpations: Abdomen is soft.      Tenderness: There is no abdominal  tenderness.   Skin:     Capillary Refill: Capillary refill takes less than 2 seconds.   Neurological:      Mental Status: She is alert.            ASSESSMENT AND PLAN   Suha was seen today for cold like symptoms.    Diagnoses and all orders for this visit:    Chronic obstructive pulmonary disease with acute exacerbation (HCC)  Nasal congestion  Sinus pressure  Non-recurrent acute suppurative otitis media of right ear without spontaneous rupture of tympanic membrane  Fever and chills  Type 2 diabetes mellitus with obesity  (HCC)  Patient is likely having a COPD exacerbation.  She has a 60-pack-year history of smoking in the past, but she sounds very tight, to the point where she is no longer wheezing.  She does have evidence of prolonged expiratory phase.  Will treat with prednisone taper starting at 40 mg and reducing to 20 and then 10 every 2 days.  Additionally we will treat her possible early ear infection with Augmentin 1 tablet twice a day for 7 days, since it is early I did not feel a full 10 days was necessary and she is already having some diarrhea and loose stool.  Lastly due to the tightness of the chest and air movement, the patient is to start albuterol every 4 hours over the next 24 to 48 hours, this should be then reduced to every 6 hours and then as needed over the course of the next 5 to 7 days.  She is to follow-up in the office if her symptoms get any worse.  -     predniSONE 20 mg tablet; Take 2 tablets (40 mg total) by mouth daily for 2 days, THEN 1 tablet (20 mg total) daily for 2 days, THEN 0.5 tablets (10 mg total) daily for 2 days.  -     amoxicillin-clavulanate (AUGMENTIN) 875-125 mg per tablet; Take 1 tablet by mouth every 12 (twelve) hours for 7 days  -     albuterol (Proventil HFA) 90 mcg/act inhaler; Inhale 2 puffs every 4 (four) hours as needed for wheezing    DO Charlie Best [facility]  6/26/2024 10:35 AM

## 2024-06-27 ENCOUNTER — OFFICE VISIT (OUTPATIENT)
Dept: DERMATOLOGY | Facility: CLINIC | Age: 69
End: 2024-06-27
Payer: MEDICARE

## 2024-06-27 DIAGNOSIS — L56.5 DISSEMINATED SUPERFICIAL ACTINIC POROKERATOSIS: Primary | ICD-10-CM

## 2024-06-27 PROCEDURE — 17000 DESTRUCT PREMALG LESION: CPT | Performed by: NURSE PRACTITIONER

## 2024-06-27 NOTE — PROGRESS NOTES
"[facility] Dermatology Clinic Note     Patient Name: Suha Trejo  Encounter Date: 06/27/2024     Have you been cared for by a [facility] Dermatologist in the last 3 years and, if so, which description applies to you?    Yes.  I have been here within the last 3 years, and my medical history has NOT changed since that time.  I am FEMALE/of child-bearing potential.    REVIEW OF SYSTEMS:  Have you recently had or currently have any of the following? No changes in my recent health.   PAST MEDICAL HISTORY:  Have you personally ever had or currently have any of the following?  If \"YES,\" then please provide more detail. No changes in my medical history.   HISTORY OF IMMUNOSUPPRESSION: Do you have a history of any of the following:  Systemic Immunosuppression such as Diabetes, Biologic or Immunotherapy, Chemotherapy, Organ Transplantation, Bone Marrow Transplantation?  YES, Diabetes      Answering \"YES\" requires the addition of the dotphrase \"IMMUNOSUPPRESSED\" as the first diagnosis of the patient's visit.   FAMILY HISTORY:  Any \"first degree relatives\" (parent, brother, sister, or child) with the following?    No changes in my family's known health.   PATIENT EXPERIENCE:    Do you want the Dermatologist to perform a COMPLETE skin exam today including a clinical examination under the \"bra and underwear\" areas?  NO  If necessary, do we have your permission to call and leave a detailed message on your Preferred Phone number that includes your specific medical information?  Yes      Allergies   Allergen Reactions    Hydrocodone Vomiting     \"can take oxycodone without a problem\"    Atorvastatin Myalgia    Caffeine - Food Allergy Tachycardia     And high blood pressure    Diphenhydramine Hcl (Sleep) Itching    Pravastatin Myalgia    Simvastatin Myalgia      Current Outpatient Medications:     acetaminophen (Tylenol) 325 mg tablet, 2 tablet as needed Orally TID PRN, Disp: , Rfl:     albuterol (Proventil HFA) 90 mcg/act " inhaler, Inhale 2 puffs every 4 (four) hours as needed for wheezing, Disp: 18 g, Rfl: 0    amoxicillin-clavulanate (AUGMENTIN) 875-125 mg per tablet, Take 1 tablet by mouth every 12 (twelve) hours for 7 days, Disp: 14 tablet, Rfl: 0    Ascorbic Acid, Vitamin C, (VITAMIN C) 100 MG tablet, Take 1,000 mg by mouth daily, Disp: , Rfl:     aspirin 81 MG tablet, Take 162 mg by mouth daily, Disp: , Rfl:     azelastine (ASTELIN) 0.1 % nasal spray, 1 spray into each nostril 2 (two) times a day Use in each nostril as directed, Disp: 30 mL, Rfl: 1    Cholecalciferol 50 MCG (2000 UT) CAPS, Take 1 capsule by mouth daily, Disp: , Rfl:     Cranberry 450 MG TABS, Take 1 tablet by mouth 2 (two) times a day, Disp: , Rfl:     Diclofenac Sodium (VOLTAREN) 1 %, APPLY 4 GM FOUR TIMES A DAY AS NEEDED TRANSDERMAL 30 DAYS, Disp: , Rfl:     ezetimibe (ZETIA) 10 mg tablet, , Disp: , Rfl:     fenofibrate (TRICOR) 54 MG tablet, , Disp: , Rfl:     Flaxseed, Linseed, (FLAX SEEDS PO), Take 1 tablet by mouth daily, Disp: , Rfl:     folic acid (FOLVITE) 1 mg tablet, Take 1,000 mcg by mouth daily, Disp: , Rfl:     guaifenesin-codeine (GUAIFENESIN AC) 100-10 MG/5ML liquid, Take 5 mL by mouth 3 (three) times a day as needed for cough, Disp: 160 mL, Rfl: 0    Humira Pen 40 MG/0.4ML PNKT, Finishes end of march 2021, Disp: , Rfl:     meclizine (ANTIVERT) 25 mg tablet, Take 1 tablet (25 mg total) by mouth 3 (three) times a day as needed for dizziness, Disp: 90 tablet, Rfl: 5    meloxicam (MOBIC) 15 mg tablet, Take 1 tablet by mouth daily, Disp: , Rfl:     metFORMIN (GLUCOPHAGE-XR) 500 mg 24 hr tablet, TAKE 1 TABLET(500 MG) BY MOUTH DAILY WITH DINNER, Disp: 90 tablet, Rfl: 1    methotrexate 2.5 mg tablet, 2.5 mg once a week Takes on Fridays  Last dose10/9/20 8 PILLS ONE TIME A WEEK, Disp: , Rfl:     metoprolol tartrate (LOPRESSOR) 25 mg tablet, Take 12.5 mg by mouth every 12 (twelve) hours , Disp: , Rfl:     multivitamin-minerals (CENTRUM) tablet, Take by  mouth daily, Disp: , Rfl:     nystatin (MYCOSTATIN) ointment, APPLY TOPICALLY TO THE AFFECTED AREA TWICE DAILY (Patient not taking: Reported on 4/26/2024), Disp: , Rfl:     OMEGA-3 FATTY ACIDS PO, Take 1 g by mouth 2 (two) times a day , Disp: , Rfl:     pantoprazole (PROTONIX) 40 mg tablet, TAKE 1 TABLET DAILY, Disp: 90 tablet, Rfl: 1    phenazopyridine (PYRIDIUM) 100 mg tablet, Take 1 tablet (100 mg total) by mouth 3 (three) times a day as needed for bladder spasms, Disp: 10 tablet, Rfl: 0    predniSONE 20 mg tablet, Take 2 tablets (40 mg total) by mouth daily for 2 days, THEN 1 tablet (20 mg total) daily for 2 days, THEN 0.5 tablets (10 mg total) daily for 2 days., Disp: 7 tablet, Rfl: 0    rosuvastatin (CRESTOR) 40 MG tablet, , Disp: , Rfl:     triamterene-hydrochlorothiazide (DYAZIDE) 37.5-25 mg per capsule, , Disp: , Rfl:           Whom besides the patient is providing clinical information about today's encounter?   NO ADDITIONAL HISTORIAN (patient alone provided history)    Physical Exam and Assessment/Plan by Diagnosis:    ACTINIC POROKERATOSIS     Physical Exam:  Anatomic Location: Left calf  Morphologic Description:  Scaly pink papules  Pertinent Positives:  Pertinent Negatives:    Additional History of Present Condition:  Patient last seen in the office on 4/26/24.  Patient had reported new skin finding on left calf.  Shave biopsy results confirmed actinic porokeratosis.  Patient presents today for cryotherapy.  She denies any new lesions.  Patient denies any personal history of skin cancer.       Final Diagnosis   A. Skin, left calf, shave biopsy:     Consistent with ACTINIC POROKERATOSIS (see note).       History of bleeding from this lesion? NO  History of pain, tenderness, and/or itching within this lesion? NO  Personal history of skin cancer? NO  Family history of skin cancer? NO  Previous treatment to the same site? NO    Plan:  PRESCRIPTION MANAGEMENT:  Several topical management options and their  "side effects were discussed including \"field therapies\" such as Efudex (5-fluorouracil) and/or Aldara (imiquimod). Efudex may be used alone or in combination with Calcipotriene. Begin treatment once regions that have been sprayed with liquid nitrogen are healed. Redness and irritation are to be expected within a few days of field therapy treatment and should resolve within 1 to 2 weeks following treatment. Do NOT cover the treatment areas with bandages. Use a sunscreen with an SPF 50+ while using field therapy.  We discussed the pre-cancerous nature of the condition. Actinic keratosis is found on sun-damaged skin and there is small risk that the condition could turn into a skin cancer called squamous cell carcinoma. There is no risk of actinic keratosis turning into melanoma.  Proliferative actinic keratosis tends to be resistant against standard treatments, including topical therapies and cryotherapy. It has a tendency to develop into infiltrative squamous cell carcinoma. Excision may be considered.  We discussed sun protection measures, including using sunscreen with an SPF 50+ year round, avoiding the sun, and wearing protective clothing such as long sleeves and pants when out in the sun.  Continue to monitor clinically for signs of recurrence. Discussed with patient the importance of keeping up to date with full body skin exams.  Cryotherapy performed in the office (See Procedure Note). We discussed that a hypopigmentation or scar may form in the region following cryotherapy.     PROCEDURES PERFORMED TODAY ASSOCIATED WITH THIS CONDITION:          Cryotherapy: PROCEDURE:  DESTRUCTION OF PRE-MALIGNANT LESIONS  After a thorough discussion of treatment options and risk/benefits/alternatives (including but not limited to local pain, scarring, dyspigmentation, blistering, and possible superinfection), verbal and written consent were obtained and the aforementioned lesions were treated on with cryotherapy using liquid " nitrogen x 1 cycle for 5-10 seconds.    TOTAL NUMBER of 1 pre-malignant lesions were treated today on the ANATOMIC LOCATION: left calf, 0.8 cm.     The patient tolerated the procedure well, and after-care instructions were provided.      Follow up in 6 months for re-evaluation  Patient advised to call sooner with any concerns/reoccurrences     MEDICAL DECISION MAKING  Treatment Goal:  Resolution of this ACUTE, UNCOMPLICATED condition.       A recent or new short-term problem for which treatment is CONSIDERED OR INITIATED. There is little to no risk of mortality with treatment, and full recovery without functional impairment is expected.  Diagnosis or treatment significantly limited by the following social determinants of health:  NONE IDENTIFIED                  Scribe Attestation      I,:  Nat Wood am acting as a scribe while in the presence of the attending physician.:       I,:  RAMIREZ Dennison personally performed the services described in this documentation    as scribed in my presence.:

## 2024-07-15 ENCOUNTER — TELEPHONE (OUTPATIENT)
Age: 69
End: 2024-07-15

## 2024-07-15 NOTE — TELEPHONE ENCOUNTER
The patient called to inform that the pharmacy will send Dr. Torre a form to fill out for her medication in the next few days.

## 2024-07-17 DIAGNOSIS — R73.03 PRE-DIABETES: ICD-10-CM

## 2024-07-17 RX ORDER — METFORMIN HYDROCHLORIDE 500 MG/1
TABLET, EXTENDED RELEASE ORAL
Qty: 30 TABLET | Refills: 0 | Status: SHIPPED | OUTPATIENT
Start: 2024-07-17 | End: 2024-07-22 | Stop reason: SDUPTHER

## 2024-07-18 DIAGNOSIS — E66.9 TYPE 2 DIABETES MELLITUS WITH OBESITY  (HCC): ICD-10-CM

## 2024-07-18 DIAGNOSIS — E55.9 VITAMIN D DEFICIENCY: ICD-10-CM

## 2024-07-18 DIAGNOSIS — E53.8 B12 DEFICIENCY: ICD-10-CM

## 2024-07-18 DIAGNOSIS — E11.69 TYPE 2 DIABETES MELLITUS WITH OBESITY  (HCC): ICD-10-CM

## 2024-07-18 DIAGNOSIS — E78.2 MIXED HYPERLIPIDEMIA: Primary | ICD-10-CM

## 2024-07-22 DIAGNOSIS — R73.03 PRE-DIABETES: ICD-10-CM

## 2024-07-22 RX ORDER — METFORMIN HYDROCHLORIDE 500 MG/1
500 TABLET, EXTENDED RELEASE ORAL
Qty: 100 TABLET | Refills: 1 | Status: SHIPPED | OUTPATIENT
Start: 2024-07-22

## 2024-07-22 NOTE — TELEPHONE ENCOUNTER
Reason for call: pt wants medication sent to [facility]   [x] Refill   [] Prior Auth  [] Other:     Office:   [x] PCP/Provider -   [] Specialty/Provider -     Medication:         Pharmacy: [facility] [id number]    Does the patient have enough for 3 days?   [] Yes   [x] No - Send as HP to POD

## 2024-10-03 DIAGNOSIS — K21.9 GERD WITHOUT ESOPHAGITIS: ICD-10-CM

## 2024-10-03 RX ORDER — PANTOPRAZOLE SODIUM 40 MG/1
40 TABLET, DELAYED RELEASE ORAL DAILY
Qty: 90 TABLET | Refills: 3 | Status: SHIPPED | OUTPATIENT
Start: 2024-10-03

## 2024-10-14 NOTE — TELEPHONE ENCOUNTER
Patient called back and I told her the new date of the surgery and she would like more information about the reason it is being transferred  Please call her back at [phone]  NIKKY Arenas

## 2024-12-31 DIAGNOSIS — R73.03 PRE-DIABETES: ICD-10-CM

## 2024-12-31 RX ORDER — METFORMIN HYDROCHLORIDE 500 MG/1
500 TABLET, EXTENDED RELEASE ORAL
Qty: 90 TABLET | Refills: 1 | Status: SHIPPED | OUTPATIENT
Start: 2024-12-31

## 2025-01-27 ENCOUNTER — TELEPHONE (OUTPATIENT)
Dept: FAMILY MEDICINE CLINIC | Facility: CLINIC | Age: 70
End: 2025-01-27

## 2025-01-27 NOTE — TELEPHONE ENCOUNTER
I left a message regarding patient's overdue diabetic eye exam, please transfer call to the office.

## 2025-01-31 ENCOUNTER — NURSE TRIAGE (OUTPATIENT)
Dept: OTHER | Facility: OTHER | Age: 70
End: 2025-01-31

## 2025-01-31 ENCOUNTER — OFFICE VISIT (OUTPATIENT)
Dept: FAMILY MEDICINE CLINIC | Facility: CLINIC | Age: 70
End: 2025-01-31

## 2025-01-31 VITALS
SYSTOLIC BLOOD PRESSURE: 125 MMHG | OXYGEN SATURATION: 99 % | WEIGHT: 293 LBS | HEIGHT: 67 IN | HEART RATE: 73 BPM | TEMPERATURE: 98 F | DIASTOLIC BLOOD PRESSURE: 80 MMHG | BODY MASS INDEX: 45.99 KG/M2

## 2025-01-31 DIAGNOSIS — Z99.89 DEPENDENCE ON CANE: ICD-10-CM

## 2025-01-31 DIAGNOSIS — J01.01 ACUTE RECURRENT MAXILLARY SINUSITIS: Primary | ICD-10-CM

## 2025-01-31 DIAGNOSIS — J44.9 CHRONIC OBSTRUCTIVE PULMONARY DISEASE, UNSPECIFIED COPD TYPE (HCC): ICD-10-CM

## 2025-01-31 DIAGNOSIS — D84.9 IMMUNODEFICIENCY, UNSPECIFIED (HCC): ICD-10-CM

## 2025-01-31 DIAGNOSIS — E66.813 CLASS 3 SEVERE OBESITY DUE TO EXCESS CALORIES WITH SERIOUS COMORBIDITY AND BODY MASS INDEX (BMI) OF 45.0 TO 49.9 IN ADULT (HCC): ICD-10-CM

## 2025-01-31 DIAGNOSIS — I25.118 CORONARY ARTERY DISEASE OF NATIVE ARTERY OF NATIVE HEART WITH STABLE ANGINA PECTORIS (HCC): ICD-10-CM

## 2025-01-31 DIAGNOSIS — G47.33 OSA (OBSTRUCTIVE SLEEP APNEA): ICD-10-CM

## 2025-01-31 DIAGNOSIS — M06.9 RHEUMATOID ARTHRITIS, INVOLVING UNSPECIFIED SITE, UNSPECIFIED WHETHER RHEUMATOID FACTOR PRESENT (HCC): ICD-10-CM

## 2025-01-31 DIAGNOSIS — E66.01 CLASS 3 SEVERE OBESITY DUE TO EXCESS CALORIES WITH SERIOUS COMORBIDITY AND BODY MASS INDEX (BMI) OF 45.0 TO 49.9 IN ADULT (HCC): ICD-10-CM

## 2025-01-31 RX ORDER — CEFDINIR 300 MG/1
300 CAPSULE ORAL EVERY 12 HOURS SCHEDULED
Qty: 20 CAPSULE | Refills: 0 | Status: SHIPPED | OUTPATIENT
Start: 2025-01-31 | End: 2025-02-10

## 2025-01-31 NOTE — TELEPHONE ENCOUNTER
"Reason for Disposition  • [1] Sinus congestion as part of a cold AND [2] present < 10 days    Answer Assessment - Initial Assessment Questions  1. LOCATION: \"Where does it hurt?\"       Sinus pain     2. ONSET: \"When did the sinus pain start?\"  (e.g., hours, days)       yesterday     5. NASAL CONGESTION: \"Is the nose blocked?\" If Yes, ask: \"Can you open it or must you breathe through your mouth?\"      Yes     6. NASAL DISCHARGE: \"Do you have discharge from your nose?\" If so ask, \"What color?\"      Yes     7. FEVER: \"Do you have a fever?\" If Yes, ask: \"What is it, how was it measured, and when did it start?\"       Denies     8. OTHER SYMPTOMS: \"Do you have any other symptoms?\" (e.g., sore throat, cough, earache, difficulty breathing)      Cough  Post nasal drip    Protocols used: Sinus Pain or Congestion-Adult-AH    "

## 2025-01-31 NOTE — PROGRESS NOTES
"Name: Suha Trejo      : 1955      MRN: [id number]  Encounter Provider: Seth Torre MD  Encounter Date: 2025   Encounter department: [facility]    Assessment & Plan  Acute recurrent maxillary sinusitis  Mucinex   Antibiotic   And coricedin     Orders:    cefdinir (OMNICEF) 300 mg capsule; Take 1 capsule (300 mg total) by mouth every 12 (twelve) hours for 10 days    Chronic obstructive pulmonary disease, unspecified COPD type ([facility])         DANNY (obstructive sleep apnea)         Rheumatoid arthritis, involving unspecified site, unspecified whether rheumatoid factor present ([facility])         Immunodeficiency, unspecified ([facility])         Class 3 severe obesity due to excess calories with serious comorbidity and body mass index (BMI) of 45.0 to 49.9 in adult ([facility])           Coronary artery disease of native artery of native heart with stable angina pectoris ([facility])         Dependence on cane           Assessment & Plan     Been 2 days with HA and nasal congestion  And HA   Tried over the counter   Sister is sick   With PND also   And coughing     History of Present Illness     History of Present Illness       Review of Systems  Objective   /80   Pulse 73   Temp 98 °F (36.7 °C)   Ht 5' 7\" (1.702 m)   Wt (!) 148 kg (327 lb)   SpO2 99%   BMI 51.22 kg/m²     Physical Exam    Physical Exam  Vitals and nursing note reviewed.   Constitutional:       General: She is in acute distress.      Appearance: Normal appearance. She is well-developed. She is ill-appearing.   HENT:      Head: Normocephalic and atraumatic.      Right Ear: External ear normal. A middle ear effusion is present. There is no impacted cerumen.      Left Ear: External ear normal. A middle ear effusion is present. There is no impacted cerumen.      Nose: Nose normal.      Right Turbinates: Enlarged.      Left Turbinates: Enlarged.      Mouth/Throat:      Mouth: Mucous membranes are moist.      Pharynx: Postnasal " drip present. No posterior oropharyngeal erythema.   Eyes:      Extraocular Movements: Extraocular movements intact.      Conjunctiva/sclera: Conjunctivae normal.      Pupils: Pupils are equal, round, and reactive to light.   Cardiovascular:      Rate and Rhythm: Normal rate and regular rhythm.      Heart sounds: Normal heart sounds. No murmur heard.  Pulmonary:      Effort: Pulmonary effort is normal.      Breath sounds: Normal breath sounds. No wheezing, rhonchi or rales.   Abdominal:      General: Bowel sounds are normal.      Palpations: Abdomen is soft.   Musculoskeletal:         General: No tenderness. Normal range of motion.      Cervical back: Normal range of motion and neck supple. No rigidity.   Lymphadenopathy:      Cervical: No cervical adenopathy.   Skin:     General: Skin is warm and dry.      Capillary Refill: Capillary refill takes less than 2 seconds.   Neurological:      Mental Status: She is alert and oriented to person, place, and time. Mental status is at baseline.   Psychiatric:         Mood and Affect: Mood normal.         Behavior: Behavior normal.         Thought Content: Thought content normal.         Judgment: Judgment normal.

## 2025-01-31 NOTE — TELEPHONE ENCOUNTER
"Regarding: sinus infection  ----- Message from Johanna VELAZQUEZ sent at 1/31/2025  7:10 AM EST -----  \"I have a sinus infection. I would like to try and get an appointment today\"    "

## 2025-02-25 ENCOUNTER — RA CDI HCC (OUTPATIENT)
Dept: OTHER | Facility: HOSPITAL | Age: 70
End: 2025-02-25

## 2025-02-28 RX ORDER — NITROGLYCERIN 0.4 MG/1
0.4 TABLET SUBLINGUAL
COMMUNITY
Start: 2024-12-18

## 2025-03-03 ENCOUNTER — OFFICE VISIT (OUTPATIENT)
Dept: FAMILY MEDICINE CLINIC | Facility: CLINIC | Age: 70
End: 2025-03-03
Payer: MEDICARE

## 2025-03-03 VITALS
WEIGHT: 293 LBS | HEART RATE: 70 BPM | SYSTOLIC BLOOD PRESSURE: 118 MMHG | HEIGHT: 67 IN | BODY MASS INDEX: 45.99 KG/M2 | DIASTOLIC BLOOD PRESSURE: 82 MMHG | OXYGEN SATURATION: 98 %

## 2025-03-03 DIAGNOSIS — G47.33 OSA (OBSTRUCTIVE SLEEP APNEA): ICD-10-CM

## 2025-03-03 DIAGNOSIS — E66.01 CLASS 3 SEVERE OBESITY DUE TO EXCESS CALORIES WITH SERIOUS COMORBIDITY AND BODY MASS INDEX (BMI) OF 45.0 TO 49.9 IN ADULT (HCC): ICD-10-CM

## 2025-03-03 DIAGNOSIS — E11.69 TYPE 2 DIABETES MELLITUS WITH OBESITY  (HCC): ICD-10-CM

## 2025-03-03 DIAGNOSIS — Z12.31 SCREENING MAMMOGRAM FOR BREAST CANCER: Primary | ICD-10-CM

## 2025-03-03 DIAGNOSIS — Z95.5 STENTED CORONARY ARTERY: ICD-10-CM

## 2025-03-03 DIAGNOSIS — E66.813 CLASS 3 SEVERE OBESITY DUE TO EXCESS CALORIES WITH SERIOUS COMORBIDITY AND BODY MASS INDEX (BMI) OF 45.0 TO 49.9 IN ADULT (HCC): ICD-10-CM

## 2025-03-03 DIAGNOSIS — J44.9 CHRONIC OBSTRUCTIVE PULMONARY DISEASE, UNSPECIFIED COPD TYPE (HCC): ICD-10-CM

## 2025-03-03 DIAGNOSIS — E66.9 TYPE 2 DIABETES MELLITUS WITH OBESITY  (HCC): ICD-10-CM

## 2025-03-03 DIAGNOSIS — R09.82 PND (POST-NASAL DRIP): ICD-10-CM

## 2025-03-03 DIAGNOSIS — I25.118 CORONARY ARTERY DISEASE OF NATIVE ARTERY OF NATIVE HEART WITH STABLE ANGINA PECTORIS (HCC): ICD-10-CM

## 2025-03-03 DIAGNOSIS — M06.9 RHEUMATOID ARTHRITIS, INVOLVING UNSPECIFIED SITE, UNSPECIFIED WHETHER RHEUMATOID FACTOR PRESENT (HCC): ICD-10-CM

## 2025-03-03 DIAGNOSIS — Z99.89 DEPENDENCE ON CANE: ICD-10-CM

## 2025-03-03 PROBLEM — R73.03 PRE-DIABETES: Status: RESOLVED | Noted: 2020-10-12 | Resolved: 2025-03-03

## 2025-03-03 PROBLEM — Z86.16 HISTORY OF COVID-19: Status: RESOLVED | Noted: 2021-12-06 | Resolved: 2025-03-03

## 2025-03-03 PROCEDURE — G2211 COMPLEX E/M VISIT ADD ON: HCPCS | Performed by: FAMILY MEDICINE

## 2025-03-03 PROCEDURE — 99214 OFFICE O/P EST MOD 30 MIN: CPT | Performed by: FAMILY MEDICINE

## 2025-03-03 NOTE — ASSESSMENT & PLAN NOTE
Lab Results   Component Value Date    HGBA1C 6.9 (H) 10/20/2023   -stable/controlled, continue same medication. Will evaluate again next visit       Orders:    semaglutide, 0.25 or 0.5 mg/dose, (Ozempic, 0.25 or 0.5 MG/DOSE,) 2 mg/3 mL injection pen; 0.25 mg under the skin every 7 days for 4 doses (28 days), THEN 0.5 mg under the skin every 7 days

## 2025-03-03 NOTE — ASSESSMENT & PLAN NOTE
Orders:    semaglutide, 0.25 or 0.5 mg/dose, (Ozempic, 0.25 or 0.5 MG/DOSE,) 2 mg/3 mL injection pen; 0.25 mg under the skin every 7 days for 4 doses (28 days), THEN 0.5 mg under the skin every 7 days

## 2025-03-03 NOTE — PROGRESS NOTES
Name: Suha Trejo      : 1955      MRN: [id number]  Encounter Provider: Seth Torre MD  Encounter Date: 3/3/2025   Encounter department: [facility]    Assessment & Plan  Screening mammogram for breast cancer    Orders:    Mammo screening bilateral w 3d and cad; Future    Chronic obstructive pulmonary disease, unspecified COPD type (HCC)         Rheumatoid arthritis, involving unspecified site, unspecified whether rheumatoid factor present ([facility])         Type 2 diabetes mellitus with obesity  ([facility])    Lab Results   Component Value Date    HGBA1C 6.9 (H) 10/20/2023   -stable/controlled, continue same medication. Will evaluate again next visit       Orders:    semaglutide, 0.25 or 0.5 mg/dose, (Ozempic, 0.25 or 0.5 MG/DOSE,) 2 mg/3 mL injection pen; 0.25 mg under the skin every 7 days for 4 doses (28 days), THEN 0.5 mg under the skin every 7 days    Stented coronary artery         Coronary artery disease of native artery of native heart with stable angina pectoris ([facility])    Orders:    semaglutide, 0.25 or 0.5 mg/dose, (Ozempic, 0.25 or 0.5 MG/DOSE,) 2 mg/3 mL injection pen; 0.25 mg under the skin every 7 days for 4 doses (28 days), THEN 0.5 mg under the skin every 7 days    Dependence on cane         PND (post-nasal drip)    Orders:    Ambulatory Referral to Otolaryngology; Future    DANNY (obstructive sleep apnea)         Class 3 severe obesity due to excess calories with serious comorbidity and body mass index (BMI) of 45.0 to 49.9 in adult ([facility])      Orders:    semaglutide, 0.25 or 0.5 mg/dose, (Ozempic, 0.25 or 0.5 MG/DOSE,) 2 mg/3 mL injection pen; 0.25 mg under the skin every 7 days for 4 doses (28 days), THEN 0.5 mg under the skin every 7 days      Assessment & Plan  1. Sinusitis.  Her symptoms may be attributed to sinus drainage or heartburn, although the latter is less probable due to her current Protonix regimen. She is advised to continue using her nasal spray,  administering one spray in each nostril twice daily. A referral to an ENT specialist will be made for further evaluation.    2. Weight management.  A prescription for Ozempic 0.25 mg has been issued, with instructions to increase the dosage to 0.5 mg after a few weeks, and subsequently to 1 mg and 2 mg. She is encouraged to maintain a healthy diet and monitor her weight loss progress. The insurance coverage for Ozempic will be verified.    3. Knee pain.  Her knee pain could potentially be nerve-related, possibly sciatica. However, her lumbar spine MRI does not support this hypothesis. The pain may also be due to her knee condition. She is advised to apply lidocaine cream to the affected area and monitor for any numbness. Weight loss is recommended to facilitate potential knee replacement surgery.    4. Diabetes mellitus.  She is advised to undergo blood work this week, including A1c and urinalysis, to monitor her blood sugar levels. She will continue her current metformin regimen.    PROCEDURE  Procedure Performed  Steroid injection by rheumatologist 2 weeks ago.  Fluid drainage from the knee.       History of Present Illness     History of Present Illness  The patient presents for evaluation of sinus issues, weight management, knee pain, and diabetes.    She reports an improvement in her sinus condition but continues to experience postnasal drip. She has been using Flonase nasal spray and has attempted to alleviate the symptoms with a neti pot, which is only effective when her nasal passages are unobstructed. She suspects that her CPAP machine may be contributing to the issue. She has previously consulted with an ENT specialist who diagnosed her with sinusitis.    Her cardiologist, Dr. Goldberg, has advised her to lose weight. She is considering the use of Ozempic for weight loss and is curious about the process of discontinuing the medication after achieving her weight loss goals. Despite attempts to reduce her food  "intake, she continues to experience sugar cravings. She believes that significant weight loss could potentially alleviate her knee pain.    She has been diagnosed with bursitis in her knee, which causes soreness upon touch. The pain intensifies when she bends over or retracts her leg. She has received a steroid injection from her rheumatologist 2 weeks ago, who informed her that the bursitis would persist even post-knee surgery. She has also undergone fluid drainage from her knee, which was not significantly beneficial. She applies Voltaren and diclofenac creams daily and has tried Icy Hot and Mineral Ice for relief. She enjoys walking but experiences throbbing pain in her knee after approximately 20 minutes of activity. She has sciatica in her back and has received multiple injections at the hospital. She has been advised to lose weight before undergoing knee replacement surgery.    She is currently on metformin for diabetes management. She reports no neuropathy in her feet, including pain, numbness, or tingling. She has observed a reduction in ankle swelling after wearing high compression socks.    MEDICATIONS  - Protonix  - Flonase  - Metformin  - Voltaren  - Diclofenac     Review of Systems   Constitutional:  Positive for fatigue. Negative for activity change, appetite change, chills, diaphoresis, fever and unexpected weight change.   HENT:  Positive for congestion, postnasal drip, rhinorrhea and sore throat. Negative for ear pain.    Respiratory:  Negative for cough, chest tightness, shortness of breath and wheezing.    Cardiovascular: Negative.  Negative for leg swelling.   All other systems reviewed and are negative.    Objective   /82   Pulse 70   Ht 5' 7\" (1.702 m)   Wt (!) 144 kg (318 lb)   SpO2 98%   BMI 49.81 kg/m²     Physical Exam    Physical Exam  Vitals and nursing note reviewed.   Constitutional:       General: She is in acute distress.      Appearance: Normal appearance. She is " well-developed. She is ill-appearing.   HENT:      Head: Normocephalic and atraumatic.      Right Ear: External ear normal. A middle ear effusion is present. There is no impacted cerumen.      Left Ear: External ear normal. A middle ear effusion is present. There is no impacted cerumen.      Nose: Nose normal.      Right Turbinates: Enlarged.      Left Turbinates: Enlarged.      Mouth/Throat:      Mouth: Mucous membranes are moist.      Pharynx: Postnasal drip present. No posterior oropharyngeal erythema.   Eyes:      Extraocular Movements: Extraocular movements intact.      Conjunctiva/sclera: Conjunctivae normal.      Pupils: Pupils are equal, round, and reactive to light.   Cardiovascular:      Rate and Rhythm: Normal rate and regular rhythm.      Pulses: no weak pulses.           Dorsalis pedis pulses are 2+ on the right side and 2+ on the left side.      Heart sounds: Normal heart sounds. No murmur heard.  Pulmonary:      Effort: Pulmonary effort is normal.      Breath sounds: Normal breath sounds. No wheezing, rhonchi or rales.   Abdominal:      General: Bowel sounds are normal.      Palpations: Abdomen is soft.   Musculoskeletal:         General: No tenderness. Normal range of motion.      Cervical back: Normal range of motion and neck supple. No rigidity.   Feet:      Right foot:      Skin integrity: Callus and dry skin present. No ulcer, skin breakdown, erythema or warmth.      Left foot:      Skin integrity: Callus and dry skin present. No ulcer, skin breakdown, erythema or warmth.   Lymphadenopathy:      Cervical: No cervical adenopathy.   Skin:     General: Skin is warm and dry.      Capillary Refill: Capillary refill takes less than 2 seconds.   Neurological:      Mental Status: She is alert and oriented to person, place, and time. Mental status is at baseline.   Psychiatric:         Mood and Affect: Mood normal.         Behavior: Behavior normal.         Thought Content: Thought content normal.          Judgment: Judgment normal.       Patient's shoes and socks removed.    Right Foot/Ankle   Right Foot Inspection  Skin Exam: skin normal, skin intact, dry skin, callus and callus. No warmth, no erythema, no maceration, no abnormal color, no pre-ulcer and no ulcer.     Toe Exam: ROM and strength within normal limits.     Sensory   Monofilament testing: intact    Vascular  Capillary refills: < 3 seconds  The right DP pulse is 2+.     Left Foot/Ankle  Left Foot Inspection  Skin Exam: skin normal, skin intact, dry skin and callus. No warmth, no erythema, no maceration, normal color, no pre-ulcer and no ulcer.     Toe Exam: ROM and strength within normal limits.     Sensory   Monofilament testing: intact    Vascular  Capillary refills: < 3 seconds  The left DP pulse is 2+.     Assign Risk Category  No deformity present  No loss of protective sensation  No weak pulses  Risk: 0  ]

## 2025-03-10 ENCOUNTER — TELEPHONE (OUTPATIENT)
Age: 70
End: 2025-03-10

## 2025-03-10 NOTE — TELEPHONE ENCOUNTER
Patient called Medicare and they will cover 80% if provider sends letter and procedure code for the Ozempic ($473 montly) [facility].  She is not sure but said to ask [employer], so the meds can be delivered to [place] or how that would work.     Also, does she need to continue Metformin if she starts Ozempic. Please advise.  Thank you.

## 2025-03-11 NOTE — TELEPHONE ENCOUNTER
I sent the ozempic back on the 3rd   To [facility]   I guess they have it   Sounds like it needs a PA which I dont see a denial for   So maybe just see if [facility] has it first   If not then send it through the PA   She has diabetes and yes she stays on the metformin with it

## 2025-03-13 NOTE — TELEPHONE ENCOUNTER
CONTACTED INSURANCE FOR TIER EXCEPTION - CASE [id number]    INFORMATION VERBALLY SUBMITTED    DETERMINATION 72 HOUR TURNAROUND

## 2025-03-18 NOTE — TELEPHONE ENCOUNTER
According to patient if this is would be covered if delivered to the office. She would need a procedure code? Once her deductible is met this would apply. At this point she has not met her deductible.

## 2025-03-19 NOTE — TELEPHONE ENCOUNTER
This is all to much   We tried the pa   We tired the tire exception     I am sorry I dont know how to order this to get shipped to my office and I dont know what alfred procedure code they need     I have never had to do any of this for any other medicine or any other pt   It is just too much to figure out     I am sorry but we cannot spend any more time on trying to get this medicine for you

## 2025-03-27 LAB
25(OH)D3+25(OH)D2 SERPL-MCNC: 36.4 NG/ML (ref 30–100)
ALBUMIN SERPL-MCNC: 4.1 G/DL (ref 3.9–4.9)
ALBUMIN/CREAT UR: 8 MG/G CREAT (ref 0–29)
ALP SERPL-CCNC: 93 IU/L (ref 44–121)
ALT SERPL-CCNC: 23 IU/L (ref 0–32)
AST SERPL-CCNC: 26 IU/L (ref 0–40)
BASOPHILS # BLD AUTO: 0.1 X10E3/UL (ref 0–0.2)
BASOPHILS NFR BLD AUTO: 1 %
BILIRUB SERPL-MCNC: 0.3 MG/DL (ref 0–1.2)
BUN SERPL-MCNC: 17 MG/DL (ref 8–27)
BUN/CREAT SERPL: 21 (ref 12–28)
CALCIUM SERPL-MCNC: 8.8 MG/DL (ref 8.7–10.3)
CHLORIDE SERPL-SCNC: 103 MMOL/L (ref 96–106)
CHOLEST SERPL-MCNC: 153 MG/DL (ref 100–199)
CO2 SERPL-SCNC: 24 MMOL/L (ref 20–29)
CREAT SERPL-MCNC: 0.81 MG/DL (ref 0.57–1)
CREAT UR-MCNC: 119.2 MG/DL
EGFR: 79 ML/MIN/1.73
EOSINOPHIL # BLD AUTO: 0.2 X10E3/UL (ref 0–0.4)
EOSINOPHIL NFR BLD AUTO: 2 %
ERYTHROCYTE [DISTWIDTH] IN BLOOD BY AUTOMATED COUNT: 14 % (ref 11.7–15.4)
GLOBULIN SER-MCNC: 2.5 G/DL (ref 1.5–4.5)
GLUCOSE SERPL-MCNC: 128 MG/DL (ref 70–99)
HBA1C MFR BLD: 6.8 % (ref 4.8–5.6)
HCT VFR BLD AUTO: 39.5 % (ref 34–46.6)
HDLC SERPL-MCNC: 67 MG/DL
HGB BLD-MCNC: 12.4 G/DL (ref 11.1–15.9)
IMM GRANULOCYTES # BLD: 0 X10E3/UL (ref 0–0.1)
IMM GRANULOCYTES NFR BLD: 0 %
LDLC SERPL CALC-MCNC: 61 MG/DL (ref 0–99)
LDLC/HDLC SERPL: 0.9 RATIO (ref 0–3.2)
LYMPHOCYTES # BLD AUTO: 3.9 X10E3/UL (ref 0.7–3.1)
LYMPHOCYTES NFR BLD AUTO: 45 %
MAGNESIUM SERPL-MCNC: 2 MG/DL (ref 1.6–2.3)
MCH RBC QN AUTO: 28.4 PG (ref 26.6–33)
MCHC RBC AUTO-ENTMCNC: 31.4 G/DL (ref 31.5–35.7)
MCV RBC AUTO: 90 FL (ref 79–97)
MICROALBUMIN UR-MCNC: 9.6 UG/ML
MONOCYTES # BLD AUTO: 0.6 X10E3/UL (ref 0.1–0.9)
MONOCYTES NFR BLD AUTO: 7 %
NEUTROPHILS # BLD AUTO: 3.8 X10E3/UL (ref 1.4–7)
NEUTROPHILS NFR BLD AUTO: 45 %
PLATELET # BLD AUTO: 249 X10E3/UL (ref 150–450)
POTASSIUM SERPL-SCNC: 4 MMOL/L (ref 3.5–5.2)
PROT SERPL-MCNC: 6.6 G/DL (ref 6–8.5)
RBC # BLD AUTO: 4.37 X10E6/UL (ref 3.77–5.28)
SL AMB VLDL CHOLESTEROL CALC: 25 MG/DL (ref 5–40)
SODIUM SERPL-SCNC: 142 MMOL/L (ref 134–144)
TRIGL SERPL-MCNC: 151 MG/DL (ref 0–149)
TSH SERPL DL<=0.005 MIU/L-ACNC: 2.42 UIU/ML (ref 0.45–4.5)
VIT B12 SERPL-MCNC: 561 PG/ML (ref 232–1245)
WBC # BLD AUTO: 8.4 X10E3/UL (ref 3.4–10.8)

## 2025-03-28 ENCOUNTER — RESULTS FOLLOW-UP (OUTPATIENT)
Dept: FAMILY MEDICINE CLINIC | Facility: CLINIC | Age: 70
End: 2025-03-28

## 2025-06-30 DIAGNOSIS — R73.03 PRE-DIABETES: ICD-10-CM

## 2025-06-30 RX ORDER — METFORMIN HYDROCHLORIDE 500 MG/1
500 TABLET, EXTENDED RELEASE ORAL
Qty: 90 TABLET | Refills: 1 | Status: SHIPPED | OUTPATIENT
Start: 2025-06-30

## (undated) DEVICE — GLOVE SRG BIOGEL ECLIPSE 7.5

## (undated) DEVICE — EXIDINE 4 PCT

## (undated) DEVICE — IV EXTENSION TUBING SMALL BORE

## (undated) DEVICE — PLASTIC ADHESIVE BANDAGE: Brand: CURITY

## (undated) DEVICE — PACK TUR

## (undated) DEVICE — SCD SEQUENTIAL COMPRESSION COMFORT SLEEVE MEDIUM KNEE LENGTH: Brand: KENDALL SCD

## (undated) DEVICE — LUBRICANT SURGILUBE TUBE 4 OZ  FLIP TOP

## (undated) DEVICE — GLOVE INDICATOR PI UNDERGLOVE SZ 8 BLUE

## (undated) DEVICE — UROCATCH BAG

## (undated) DEVICE — Device

## (undated) DEVICE — PREMIUM DRY TRAY LF: Brand: MEDLINE INDUSTRIES, INC.

## (undated) DEVICE — TOWEL SET X-RAY

## (undated) DEVICE — SYRINGE 3ML LL

## (undated) DEVICE — NEEDLE SPINAL 25G X 3.5 IN QUINCKE

## (undated) DEVICE — STERILE LATEX POWDER-FREE SURGICAL GLOVESWITH NITRILE COATING: Brand: PROTEXIS

## (undated) DEVICE — NEEDLE SPINAL 22G X 5IN QUINCKE

## (undated) DEVICE — SHEATH URETERAL ACCESS 10/12FR 35CM PROXIS

## (undated) DEVICE — STERILE SURGICAL LUBRICANT,  TUBE: Brand: SURGILUBE

## (undated) DEVICE — GUIDEWIRE STRGHT TIP 0.035 IN  SOLO PLUS

## (undated) DEVICE — GLOVE PI ULTRA TOUCH SZ.8.0

## (undated) DEVICE — CHLORASCRUB PREP 1ML

## (undated) DEVICE — LASER FIBER HOLMIUM 272MICRON

## (undated) DEVICE — GLOVE SRG BIOGEL 7.5

## (undated) DEVICE — SYRINGE 10ML LL

## (undated) DEVICE — NEEDLE 18 G X 1 1/2 SAFETY

## (undated) DEVICE — INVIEW CLEAR LEGGINGS: Brand: CONVERTORS

## (undated) DEVICE — SYRINGE 5ML LL

## (undated) DEVICE — SINGLE PORT MANIFOLD: Brand: NEPTUNE 2

## (undated) DEVICE — ASTOUND STANDARD SURGICAL GOWN, XL: Brand: CONVERTORS

## (undated) DEVICE — 3M™ STERI-STRIP™ COMPOUND BENZOIN TINCTURE 40 BAGS/CARTON 4 CARTONS/CASE C1544: Brand: 3M™ STERI-STRIP™

## (undated) DEVICE — ENDOSCOPIC VALVE WITH ADAPTER.: Brand: SURSEAL® II

## (undated) DEVICE — SPECIMEN CONTAINER STERILE PEEL PACK

## (undated) DEVICE — TUBING SUCTION 5MM X 12 FT

## (undated) DEVICE — CATH URETERAL 5FR X 70 CM FLEX TIP POLYUR BARD

## (undated) DEVICE — GAUZE SPONGES,16 PLY: Brand: CURITY

## (undated) DEVICE — GLOVE SRG BIOGEL ECLIPSE 6.5

## (undated) DEVICE — CHLORHEXIDINE 4PCT 4 OZ

## (undated) DEVICE — GLOVE INDICATOR PI UNDERGLOVE SZ 6.5 BLUE

## (undated) DEVICE — GLOVE PI ULTRA TOUCH SZ.6.5

## (undated) DEVICE — NEEDLE 25G X 1 1/2

## (undated) DEVICE — BASKET SPECIMEN RETRIVAL 1.9FR 120CM